# Patient Record
Sex: MALE | Race: WHITE
[De-identification: names, ages, dates, MRNs, and addresses within clinical notes are randomized per-mention and may not be internally consistent; named-entity substitution may affect disease eponyms.]

---

## 2021-04-12 ENCOUNTER — HOSPITAL ENCOUNTER (OUTPATIENT)
Dept: HOSPITAL 47 - PROCWHC3 | Age: 76
End: 2021-04-12
Attending: EMERGENCY MEDICINE
Payer: OTHER GOVERNMENT

## 2021-04-12 VITALS — DIASTOLIC BLOOD PRESSURE: 67 MMHG | SYSTOLIC BLOOD PRESSURE: 142 MMHG | TEMPERATURE: 98 F | HEART RATE: 93 BPM

## 2021-04-12 VITALS — RESPIRATION RATE: 18 BRPM

## 2021-04-12 DIAGNOSIS — J45.909: ICD-10-CM

## 2021-04-12 DIAGNOSIS — U07.1: Primary | ICD-10-CM

## 2021-04-12 DIAGNOSIS — I10: ICD-10-CM

## 2021-04-12 DIAGNOSIS — Z87.891: ICD-10-CM

## 2021-04-12 PROCEDURE — 71046 X-RAY EXAM CHEST 2 VIEWS: CPT

## 2021-04-12 PROCEDURE — 87635 SARS-COV-2 COVID-19 AMP PRB: CPT

## 2021-04-12 PROCEDURE — 96365 THER/PROPH/DIAG IV INF INIT: CPT

## 2021-04-12 PROCEDURE — 99285 EMERGENCY DEPT VISIT HI MDM: CPT

## 2021-04-12 NOTE — ED
General Adult HPI





- General


Chief complaint: Upper Respiratory Infection


Stated complaint: fever/SOB


Source: patient


Mode of arrival: ambulatory


Limitations: no limitations





- History of Present Illness


Initial comments: 





Study 6-year-old male past history of asthma who presents emergency department 

with reported shortness of breath.  Patient states he awoke this morning feeling

short of breath with a fever.  He attempted to his nebulizer without improvement

in his symptoms.  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 

concern for Covid.  Denies any chest pain.  No nausea, vomiting or diarrhea.  

Patient has never been hospitalized for his asthma.  No history of heart 

disease.  No lower externally swelling.  No history of DVT or PE.  No other 

alleviating, precipitator modifying factors





- Related Data


                                  Previous Rx's











 Medication  Instructions  Recorded


 


Dexamethasone [Decadron] 6 mg PO DAILY #5 tablet 04/12/21











                                    Allergies











Allergy/AdvReac Type Severity Reaction Status Date / Time


 


No Known Allergies Allergy   Verified 04/12/21 08:55














Review of Systems


ROS Statement: 


Those systems with pertinent positive or pertinent negative responses have been 

documented in the HPI.





ROS Other: All systems not noted in ROS Statement are negative.





Past Medical History


Past Medical History: Asthma, Hypertension, Prostate Disorder


Additional Past Medical History / Comment(s): allergies


History of Any Multi-Drug Resistant Organisms: None Reported


Past Surgical History: Appendectomy, Back Surgery, Tonsillectomy


Additional Past Surgical History / Comment(s): cervical


Past Psychological History: No Psychological Hx Reported


Smoking Status: Former smoker


Past Alcohol Use History: None Reported


Past Drug Use History: None Reported





General Exam


Limitations: no limitations





Course





                                   Vital Signs











  04/12/21 04/12/21





  08:55 10:00


 


Temperature 98.2 F 97.8 F


 


Pulse Rate 97 87


 


Respiratory 18 18





Rate  


 


Blood Pressure 142/80 135/70


 


O2 Sat by Pulse 94 L 95





Oximetry  














Medical Decision Making





- Medical Decision Making





Upon arrival patient is placed into room 30.  Thorough history and physical exam

 was performed.  Patient does have mild conversational dyspnea.  Saturating 94-

95% on room air.  Patient is swabbed for Covid which is positive.  Chest x-ray 

was performed which demonstrates new bilateral multifocal peripheral opacities. 

 Results are discussed with the patient.  I did recommend treatment with 

monoclonal antibodies for which the patient did agree to.  Patient was taken up 

to the infusion center at this time.  He was given a dose of steroids in the 

emergency department due to his history of asthma with active wheezing.  Patient

 will be placed on an additional 5 day course of steroids.  He is to use his 

inhaler every 4 hours.  Return to the emergency room for any new or worsening 

symptoms.  Follow up with primary care doctor to 4 days per patient was 

discharged home in stable condition





- Lab Data





                                   Lab Results











  04/12/21 Range/Units





  09:05 


 


Coronavirus (PCR)  Detected A  (Not Detectd)  














Disposition


Condition: Stable


Is patient prescribed a controlled substance at d/c from ED?: No


Time of Disposition: 11:39

## 2021-04-12 NOTE — XR
EXAMINATION TYPE: XR chest 2V

 

DATE OF EXAM: 4/12/2021

 

COMPARISON: Chest x-ray June 25, 2015

 

HISTORY: Shortness of breath and fever.

 

TECHNIQUE:  Frontal and lateral views of the chest are obtained.

 

FINDINGS:  Partial visualization of surgical change in the cervical spine similar to prior. Cardiac s
ilhouette size stable and within normal limits. New multifocal opacities bilaterally. 

 

IMPRESSION:  New bilateral multifocal peripheral opacities suspicious for covid-19 infection  in curr
ent environment.

## 2021-12-16 ENCOUNTER — HOSPITAL ENCOUNTER (INPATIENT)
Dept: HOSPITAL 47 - EC | Age: 76
LOS: 7 days | Discharge: HOME | DRG: 871 | End: 2021-12-23
Attending: INTERNAL MEDICINE | Admitting: INTERNAL MEDICINE
Payer: OTHER GOVERNMENT

## 2021-12-16 VITALS — BODY MASS INDEX: 21.3 KG/M2

## 2021-12-16 DIAGNOSIS — Z90.49: ICD-10-CM

## 2021-12-16 DIAGNOSIS — Z86.16: ICD-10-CM

## 2021-12-16 DIAGNOSIS — Z98.890: ICD-10-CM

## 2021-12-16 DIAGNOSIS — Z87.01: ICD-10-CM

## 2021-12-16 DIAGNOSIS — Z20.822: ICD-10-CM

## 2021-12-16 DIAGNOSIS — J15.6: ICD-10-CM

## 2021-12-16 DIAGNOSIS — K50.90: ICD-10-CM

## 2021-12-16 DIAGNOSIS — I25.5: ICD-10-CM

## 2021-12-16 DIAGNOSIS — Z88.7: ICD-10-CM

## 2021-12-16 DIAGNOSIS — Z79.899: ICD-10-CM

## 2021-12-16 DIAGNOSIS — J44.0: ICD-10-CM

## 2021-12-16 DIAGNOSIS — J44.1: ICD-10-CM

## 2021-12-16 DIAGNOSIS — I50.23: ICD-10-CM

## 2021-12-16 DIAGNOSIS — K76.1: ICD-10-CM

## 2021-12-16 DIAGNOSIS — Z87.891: ICD-10-CM

## 2021-12-16 DIAGNOSIS — J18.9: ICD-10-CM

## 2021-12-16 DIAGNOSIS — E87.1: ICD-10-CM

## 2021-12-16 DIAGNOSIS — E86.1: ICD-10-CM

## 2021-12-16 DIAGNOSIS — M19.90: ICD-10-CM

## 2021-12-16 DIAGNOSIS — E87.6: ICD-10-CM

## 2021-12-16 DIAGNOSIS — Z79.1: ICD-10-CM

## 2021-12-16 DIAGNOSIS — J96.01: ICD-10-CM

## 2021-12-16 DIAGNOSIS — A41.50: Primary | ICD-10-CM

## 2021-12-16 DIAGNOSIS — R04.0: ICD-10-CM

## 2021-12-16 DIAGNOSIS — I11.0: ICD-10-CM

## 2021-12-16 DIAGNOSIS — N40.0: ICD-10-CM

## 2021-12-16 DIAGNOSIS — E78.5: ICD-10-CM

## 2021-12-16 DIAGNOSIS — J45.901: ICD-10-CM

## 2021-12-16 DIAGNOSIS — I21.A1: ICD-10-CM

## 2021-12-16 LAB
ALBUMIN SERPL-MCNC: 3.9 G/DL (ref 3.5–5)
ALP SERPL-CCNC: 70 U/L (ref 38–126)
ALT SERPL-CCNC: 85 U/L (ref 4–49)
ANION GAP SERPL CALC-SCNC: 11 MMOL/L
APTT BLD: 23.9 SEC (ref 22–30)
AST SERPL-CCNC: 132 U/L (ref 17–59)
BASOPHILS # BLD AUTO: 0 K/UL (ref 0–0.2)
BASOPHILS NFR BLD AUTO: 0 %
BUN SERPL-SCNC: 24 MG/DL (ref 9–20)
CALCIUM SPEC-MCNC: 8.8 MG/DL (ref 8.4–10.2)
CHLORIDE SERPL-SCNC: 97 MMOL/L (ref 98–107)
CO2 SERPL-SCNC: 27 MMOL/L (ref 22–30)
EOSINOPHIL # BLD AUTO: 0.1 K/UL (ref 0–0.7)
EOSINOPHIL NFR BLD AUTO: 1 %
ERYTHROCYTE [DISTWIDTH] IN BLOOD BY AUTOMATED COUNT: 6.27 M/UL (ref 4.3–5.9)
ERYTHROCYTE [DISTWIDTH] IN BLOOD: 14.6 % (ref 11.5–15.5)
GLUCOSE BLD-MCNC: 204 MG/DL (ref 75–99)
GLUCOSE BLD-MCNC: 252 MG/DL (ref 75–99)
GLUCOSE SERPL-MCNC: 175 MG/DL (ref 74–99)
HCT VFR BLD AUTO: 54 % (ref 39–53)
HGB BLD-MCNC: 18.2 GM/DL (ref 13–17.5)
INR PPP: 1 (ref ?–1.2)
LYMPHOCYTES # SPEC AUTO: 0.6 K/UL (ref 1–4.8)
LYMPHOCYTES NFR SPEC AUTO: 3 %
MAGNESIUM SPEC-SCNC: 2 MG/DL (ref 1.6–2.3)
MCH RBC QN AUTO: 29 PG (ref 25–35)
MCHC RBC AUTO-ENTMCNC: 33.7 G/DL (ref 31–37)
MCV RBC AUTO: 86.1 FL (ref 80–100)
MONOCYTES # BLD AUTO: 1 K/UL (ref 0–1)
MONOCYTES NFR BLD AUTO: 6 %
NEUTROPHILS # BLD AUTO: 15.8 K/UL (ref 1.3–7.7)
NEUTROPHILS NFR BLD AUTO: 89 %
PLATELET # BLD AUTO: 272 K/UL (ref 150–450)
POTASSIUM SERPL-SCNC: 3.9 MMOL/L (ref 3.5–5.1)
PROT SERPL-MCNC: 7.5 G/DL (ref 6.3–8.2)
PT BLD: 11.1 SEC (ref 9–12)
SODIUM SERPL-SCNC: 135 MMOL/L (ref 137–145)
WBC # BLD AUTO: 17.7 K/UL (ref 3.8–10.6)

## 2021-12-16 PROCEDURE — 80053 COMPREHEN METABOLIC PANEL: CPT

## 2021-12-16 PROCEDURE — 36415 COLL VENOUS BLD VENIPUNCTURE: CPT

## 2021-12-16 PROCEDURE — 71045 X-RAY EXAM CHEST 1 VIEW: CPT

## 2021-12-16 PROCEDURE — 93005 ELECTROCARDIOGRAM TRACING: CPT

## 2021-12-16 PROCEDURE — 96374 THER/PROPH/DIAG INJ IV PUSH: CPT

## 2021-12-16 PROCEDURE — 93306 TTE W/DOPPLER COMPLETE: CPT

## 2021-12-16 PROCEDURE — 87634 RSV DNA/RNA AMP PROBE: CPT

## 2021-12-16 PROCEDURE — 83605 ASSAY OF LACTIC ACID: CPT

## 2021-12-16 PROCEDURE — 87040 BLOOD CULTURE FOR BACTERIA: CPT

## 2021-12-16 PROCEDURE — 94640 AIRWAY INHALATION TREATMENT: CPT

## 2021-12-16 PROCEDURE — 87449 NOS EACH ORGANISM AG IA: CPT

## 2021-12-16 PROCEDURE — 96375 TX/PRO/DX INJ NEW DRUG ADDON: CPT

## 2021-12-16 PROCEDURE — 99285 EMERGENCY DEPT VISIT HI MDM: CPT

## 2021-12-16 PROCEDURE — 84132 ASSAY OF SERUM POTASSIUM: CPT

## 2021-12-16 PROCEDURE — 86738 MYCOPLASMA ANTIBODY: CPT

## 2021-12-16 PROCEDURE — 85730 THROMBOPLASTIN TIME PARTIAL: CPT

## 2021-12-16 PROCEDURE — 83735 ASSAY OF MAGNESIUM: CPT

## 2021-12-16 PROCEDURE — 87635 SARS-COV-2 COVID-19 AMP PRB: CPT

## 2021-12-16 PROCEDURE — 83880 ASSAY OF NATRIURETIC PEPTIDE: CPT

## 2021-12-16 PROCEDURE — 71275 CT ANGIOGRAPHY CHEST: CPT

## 2021-12-16 PROCEDURE — 84484 ASSAY OF TROPONIN QUANT: CPT

## 2021-12-16 PROCEDURE — 87502 INFLUENZA DNA AMP PROBE: CPT

## 2021-12-16 PROCEDURE — 85610 PROTHROMBIN TIME: CPT

## 2021-12-16 PROCEDURE — 85025 COMPLETE CBC W/AUTO DIFF WBC: CPT

## 2021-12-16 PROCEDURE — 94760 N-INVAS EAR/PLS OXIMETRY 1: CPT

## 2021-12-16 PROCEDURE — 80048 BASIC METABOLIC PNL TOTAL CA: CPT

## 2021-12-16 RX ADMIN — CEFAZOLIN SCH MLS/HR: 330 INJECTION, POWDER, FOR SOLUTION INTRAMUSCULAR; INTRAVENOUS at 14:06

## 2021-12-16 RX ADMIN — BUDESONIDE SCH MG: 1 SUSPENSION RESPIRATORY (INHALATION) at 18:44

## 2021-12-16 RX ADMIN — INSULIN ASPART SCH UNIT: 100 INJECTION, SOLUTION INTRAVENOUS; SUBCUTANEOUS at 21:48

## 2021-12-16 RX ADMIN — PRAVASTATIN SODIUM SCH MG: 20 TABLET ORAL at 20:21

## 2021-12-16 RX ADMIN — FINASTERIDE SCH MG: 5 TABLET, FILM COATED ORAL at 20:21

## 2021-12-16 RX ADMIN — INSULIN ASPART SCH UNIT: 100 INJECTION, SOLUTION INTRAVENOUS; SUBCUTANEOUS at 19:04

## 2021-12-16 RX ADMIN — FORMOTEROL FUMARATE DIHYDRATE SCH MCG: 20 SOLUTION RESPIRATORY (INHALATION) at 18:44

## 2021-12-16 RX ADMIN — HEPARIN SODIUM SCH MLS/HR: 10000 INJECTION, SOLUTION INTRAVENOUS at 13:40

## 2021-12-16 RX ADMIN — IPRATROPIUM BROMIDE AND ALBUTEROL SULFATE SCH ML: .5; 3 SOLUTION RESPIRATORY (INHALATION) at 18:44

## 2021-12-16 RX ADMIN — IPRATROPIUM BROMIDE AND ALBUTEROL SULFATE SCH ML: .5; 3 SOLUTION RESPIRATORY (INHALATION) at 15:52

## 2021-12-16 RX ADMIN — METHYLPREDNISOLONE SODIUM SUCCINATE SCH MG: 125 INJECTION, POWDER, FOR SOLUTION INTRAMUSCULAR; INTRAVENOUS at 20:20

## 2021-12-16 RX ADMIN — HEPARIN SODIUM PRN UNIT: 1000 INJECTION, SOLUTION INTRAVENOUS; SUBCUTANEOUS at 20:31

## 2021-12-16 RX ADMIN — DOXYCYCLINE SCH MG: 100 CAPSULE ORAL at 20:21

## 2021-12-16 NOTE — CT
EXAMINATION TYPE: CT angio chest

 

DATE OF EXAM: 12/16/2021

 

COMPARISON: Chest x-ray from earlier today and told her x-ray April 12, 2021

 

HISTORY: SOB, cough, pneumonia

 

CT DLP: 209.3 mGycm. Automated Exposure Control for Dose Reduction was Utilized.

 

CONTRAST: 

CTA scan of the thorax is performed with IV Contrast, patient injected with 78 mL of Isovue 370, pulm
onary embolism protocol.   MIP Images are created on CT scanner and reviewed.

 

FINDINGS:

 

LUNGS: Exam suboptimal as patient unable to hold breath. Focal consolidation retrocardiac region left
 lower lobe is present. Smaller focal area of groundglass opacity medial right lower lobe axial image
 99. There is 7 mm calcified right lower lobe nodule laterally axial image 74. Mild reticulation or i
nterstitial edema. No pleural effusion or pneumothorax seen.

 

MEDIASTINUM: There is a suboptimal study with fecal contrast in the SVC and aorta versus pulmonary ar
teries. Some heterogeneity in the periphery but no CT evidence for acute pulmonary embolism. There ar
e no greater than 1 cm noncalcified hilar or mediastinal lymph nodes.  Prominent calcified subcarinal
 and right hilar lymph nodes. No cardiomegaly or pericardial effusion is seen. Coronary calcification
 is present. No thoracic aortic aneurysm or dissection.

 

OTHER: Exaggerated thoracic kyphosis upper thoracic spine. Scoliotic curvature on coronal images. Ant
erior fusion plate lower cervical spine partially imaged.

 

IMPRESSION: No CT evidence for acute pulmonary embolism. Masslike consolidation in the left lower lob
e suspicious for focal pneumonia. Correlate clinically. Follow-up to resolution advised. Smaller focu
s groundglass opacity medial right lower lobe could reflect second acute developing infectious proces
s.

## 2021-12-16 NOTE — HP
HISTORY AND PHYSICAL



DATE OF SERVICE:

12/16/2021



CHIEF COMPLAINTS:

Shortness of breath and weakness and cough.



HISTORY OF PRESENT ILLNESS:

This 76-year-old gentleman with a past medical history of asthma, hypertension, 
history

of prostate disorder, history of appendectomy, back surgery, being followed by 
[facility] and Dr. Perez in the outpatient setting, was complaining of weakness 
and

shortness of breath for the past several days. The patient had a COVID-19 
infection

earlier this year in April, from which the patient improved significantly, but 
the

patient had contact with his grandson, who has viral gastrointestinal illness, 
and the

patient was admitted for evaluation and treatment. Chest x-ray showed bilateral

pneumonia which was confirmed by CTA chest also. CTA is reported as showing no 
evidence

of pulmonary embolism; masslike consolidation in the left lower lobe suspicious 
for

focal pneumonia was considered, and pulmonary consultation with Dr. Moses is in 
progress

at this time.  There is no history of any rigors or chills at this time.



PAST MEDICAL HISTORY:

History of asthma, hypertension, history of prostate disorder, appendectomy.



HOME MEDICATIONS:

HydroDIURIL, Norvasc, Kenalog, Pravachol, Mobic, Advair, Flonase. Doses and 
other

medications are reviewed.



ALLERGIES:

INFLUENZA VIRUS VACCINE.



FAMILY HISTORY:

No history of heart disease or strokes in the family.



SOCIAL HISTORY:

Previous history of smoking.  No history of alcohol intake.



REVIEW OF SYSTEMS:

ENT: Diminished hearing. Diminished vision.

CARDIOVASCULAR SYSTEM: As mentioned earlier.

RESPIRATORY SYSTEM: As mentioned earlier.

GI: No nausea, vomiting, diarrhea.

:  No dysuria.

NERVOUS SYSTEM: No numbness, weakness.

ALLERGY/IMMUNOLOGY:  No asthma or hay fever.

MUSCULOSKELETAL: As mentioned earlier.

HEMATOLOGY/ONCOLOGY:  No history of anemia.

ENDOCRINE: No history of diabetes or hypothyroidism.

CONSTITUTIONAL: As mentioned earlier.

DERMATOLOGY:  Negative.

RHEUMATOLOGY: Negative.

PSYCHIATRY: As mentioned earlier.



PHYSICAL EXAMINATION:

Patient is alert, oriented x3.  Pulse is 113, blood pressure 110/60, respiration
18,

temperature 97.4, pulse ox 89% on 5 L.

HEENT:  Conjunctivae normal.

NECK: No jugular venous distention.

CARDIOVASCULAR: S1, S2 muffled.

RESPIRATION: Breath sounds diminished at the bases.  A few scattered rhonchi and

crackles.

ABDOMEN: Soft, nontender. No mass palpable.

LEGS: No edema. No swelling.

NERVOUS SYSTEM: Higher functions as mentioned earlier. Moves all 4 limbs.  No 
focal

motor or sensory deficit.

LYMPHATICS: No lymph node palpable in neck, axillae or groin.

SKIN: No ulcer, rash, bleeding.

JOINTS: No active deforming arthropathy.



LABS:

Labs at this time show WBC , hemoglobin is 8.2, sodium 134, potassium 3.8.  
Other

labs are noted.



ASSESSMENT:

1. Acute bilateral pneumonia, left more than the right, possibly community-
acquired,

    possibly Gram-negative with sepsis and acute hypoxic respiratory failure, 
present

    on admission.

2. Asthma, chronic obstructive pulmonary disease, acute exacerbation.

3. Increased white count.

4. Hyponatremia.

5. Elevated random glucose.

6. Elevated bilirubin, AST, ALT, possibly hepatitis of undetermined etiology.

7. Troponin 1.170, possible type 2 myocardial infarction.

8. Hypertension.

9. History of prostate disorder.

10.History of appendectomy.

11.History of back surgery, degenerative joint disease.

12.FULL CODE.



RECOMMENDATIONS AND DISCUSSION:

In this 76-year-old gentleman who presented with multiple complex medical 
issues, we

will monitor the patient closely, continue the current medications, continue

symptomatic treatment. Will initiate empiric antibiotics, broad-spectrum 
antibiotics

cardiology and pulmonology consultations. IV steroids.  Prognosis guarded 
because of

multiple complex medical issues. Further recommendations to follow. A copy of 
this

dictation is being forwarded to Dr. Perez in [facility].





VALENTINO / PERLITA: [id number] / Job#: [id number]

MTDD

## 2021-12-16 NOTE — ED
General Adult HPI





- General


Chief complaint: Shortness of Breath


Stated complaint: low O2, weakness, cough


Time Seen by Provider: 12/16/21 09:30


Source: patient, RN notes reviewed, old records reviewed


Mode of arrival: wheelchair


Limitations: no limitations





- History of Present Illness


Initial comments: 





76-year-old male presenting with cough and dyspnea.  Patient's symptoms have 

been present for the past several days.  He states he did have coronavirus in 

the spring of this year.  He has not been vaccinated.  He had contact with his 

grandson who had a viral gastrointestinal illness.  He denies any vomiting or 

diarrhea but states that after this contact he did have development of a 

productive cough, and fever up to 101.





- Related Data


                                  Previous Rx's











 Medication  Instructions  Recorded


 


Dexamethasone [Decadron] 6 mg PO DAILY #5 tablet 04/12/21











                                    Allergies











Allergy/AdvReac Type Severity Reaction Status Date / Time


 


Influenza Virus Vaccines Allergy  Unknown Verified 12/16/21 09:28














Review of Systems


ROS Statement: 


Those systems with pertinent positive or pertinent negative responses have been 

documented in the HPI.





ROS Other: All systems not noted in ROS Statement are negative.





Past Medical History


Past Medical History: Asthma, Hypertension, Prostate Disorder


Additional Past Medical History / Comment(s): allergies


History of Any Multi-Drug Resistant Organisms: None Reported


Past Surgical History: Appendectomy, Back Surgery, Tonsillectomy


Additional Past Surgical History / Comment(s): cervical


Past Psychological History: No Psychological Hx Reported


Smoking Status: Former smoker


Past Alcohol Use History: None Reported


Past Drug Use History: None Reported





General Exam


Limitations: no limitations


General appearance: alert, in no apparent distress


Head exam: Present: atraumatic, normocephalic


Eye exam: Present: normal appearance, PERRL


ENT exam: Present: mucous membranes dry


Neck exam: Present: normal inspection.  Absent: meningismus


Respiratory exam: Present: respiratory distress, wheezes, rhonchi, decreased 

breath sounds


Cardiovascular Exam: Present: normal rhythm, tachycardia


GI/Abdominal exam: Present: soft.  Absent: distended, tenderness, guarding, 

rebound


Extremities exam: Present: normal inspection, normal capillary refill.  Absent: 

pedal edema


Neurological exam: Present: alert, oriented X3, CN II-XII intact.  Absent: motor

sensory deficit


Psychiatric exam: Present: normal affect, normal mood


Skin exam: Present: warm, dry, intact.  Absent: cyanosis, diaphoretic





Course


                                   Vital Signs











  12/16/21 12/16/21 12/16/21





  09:24 09:37 11:12


 


Temperature 98.8 F  


 


Pulse Rate 122 H  118 H


 


Respiratory 20 20 





Rate   


 


Blood Pressure 131/73  


 


O2 Sat by Pulse 90 L  





Oximetry   














  12/16/21





  11:30


 


Temperature 


 


Pulse Rate 125 H


 


Respiratory 





Rate 


 


Blood Pressure 


 


O2 Sat by Pulse 





Oximetry 














EKG Findings





- EKG Comments:


EKG Findings:: EKG: Sinus tachycardia rate of 123, OK interval 142, QRS duration

86, , no ST segment elevation.





Medical Decision Making





- Medical Decision Making





76-year-old male presenting with cough and fever.  Patient has chest x-ray 

showing bilateral pneumonia.  He has previously had coronavirus.  He has fever 

in the emergency department.  He has leukocytosis of 17.  Normal kidney 

function, normal lactic acid.  He started on antibiotics as well as treatment 

for COPD exacerbation.  He will be admitted to Dr. Aguilar who is aware.





- Lab Data


Result diagrams: 


                                 12/16/21 09:49





                                 12/16/21 11:15


                                   Lab Results











  12/16/21 12/16/21 12/16/21 Range/Units





  09:49 09:49 10:11 


 


WBC  17.7 H    (3.8-10.6)  k/uL


 


RBC  6.27 H    (4.30-5.90)  m/uL


 


Hgb  18.2 H    (13.0-17.5)  gm/dL


 


Hct  54.0 H    (39.0-53.0)  %


 


MCV  86.1    (80.0-100.0)  fL


 


MCH  29.0    (25.0-35.0)  pg


 


MCHC  33.7    (31.0-37.0)  g/dL


 


RDW  14.6    (11.5-15.5)  %


 


Plt Count  272    (150-450)  k/uL


 


MPV  8.7    


 


Neutrophils %  89    %


 


Lymphocytes %  3    %


 


Monocytes %  6    %


 


Eosinophils %  1    %


 


Basophils %  0    %


 


Neutrophils #  15.8 H    (1.3-7.7)  k/uL


 


Lymphocytes #  0.6 L    (1.0-4.8)  k/uL


 


Monocytes #  1.0    (0-1.0)  k/uL


 


Eosinophils #  0.1    (0-0.7)  k/uL


 


Basophils #  0.0    (0-0.2)  k/uL


 


PT   11.1   (9.0-12.0)  sec


 


INR   1.0   (<1.2)  


 


APTT   23.9   (22.0-30.0)  sec


 


Sodium     (137-145)  mmol/L


 


Potassium     (3.5-5.1)  mmol/L


 


Chloride     ()  mmol/L


 


Carbon Dioxide     (22-30)  mmol/L


 


Anion Gap     mmol/L


 


BUN     (9-20)  mg/dL


 


Creatinine     (0.66-1.25)  mg/dL


 


Est GFR (CKD-EPI)AfAm     (>60 ml/min/1.73 sqM)  


 


Est GFR (CKD-EPI)NonAf     (>60 ml/min/1.73 sqM)  


 


Glucose     (74-99)  mg/dL


 


Plasma Lactic Acid Td     (0.7-2.0)  mmol/L


 


Calcium     (8.4-10.2)  mg/dL


 


Magnesium     (1.6-2.3)  mg/dL


 


Total Bilirubin     (0.2-1.3)  mg/dL


 


AST     (17-59)  U/L


 


ALT     (4-49)  U/L


 


Alkaline Phosphatase     ()  U/L


 


Total Protein     (6.3-8.2)  g/dL


 


Albumin     (3.5-5.0)  g/dL


 


Coronavirus (PCR)    Not Detected  (Not Detectd)  


 


Influenza Type A RNA     (Not Detectd)  


 


Influenza Type B (PCR)     (Not Detectd)  














  12/16/21 12/16/21 12/16/21 Range/Units





  10:11 11:15 11:15 


 


WBC     (3.8-10.6)  k/uL


 


RBC     (4.30-5.90)  m/uL


 


Hgb     (13.0-17.5)  gm/dL


 


Hct     (39.0-53.0)  %


 


MCV     (80.0-100.0)  fL


 


MCH     (25.0-35.0)  pg


 


MCHC     (31.0-37.0)  g/dL


 


RDW     (11.5-15.5)  %


 


Plt Count     (150-450)  k/uL


 


MPV     


 


Neutrophils %     %


 


Lymphocytes %     %


 


Monocytes %     %


 


Eosinophils %     %


 


Basophils %     %


 


Neutrophils #     (1.3-7.7)  k/uL


 


Lymphocytes #     (1.0-4.8)  k/uL


 


Monocytes #     (0-1.0)  k/uL


 


Eosinophils #     (0-0.7)  k/uL


 


Basophils #     (0-0.2)  k/uL


 


PT     (9.0-12.0)  sec


 


INR     (<1.2)  


 


APTT     (22.0-30.0)  sec


 


Sodium    135 L  (137-145)  mmol/L


 


Potassium    3.9  (3.5-5.1)  mmol/L


 


Chloride    97 L  ()  mmol/L


 


Carbon Dioxide    27  (22-30)  mmol/L


 


Anion Gap    11  mmol/L


 


BUN    24 H  (9-20)  mg/dL


 


Creatinine    0.72  (0.66-1.25)  mg/dL


 


Est GFR (CKD-EPI)AfAm    >90  (>60 ml/min/1.73 sqM)  


 


Est GFR (CKD-EPI)NonAf    90  (>60 ml/min/1.73 sqM)  


 


Glucose    175 H  (74-99)  mg/dL


 


Plasma Lactic Acid Td   1.3   (0.7-2.0)  mmol/L


 


Calcium    8.8  (8.4-10.2)  mg/dL


 


Magnesium    2.0  (1.6-2.3)  mg/dL


 


Total Bilirubin    2.2 H  (0.2-1.3)  mg/dL


 


AST    132 H  (17-59)  U/L


 


ALT    85 H  (4-49)  U/L


 


Alkaline Phosphatase    70  ()  U/L


 


Total Protein    7.5  (6.3-8.2)  g/dL


 


Albumin    3.9  (3.5-5.0)  g/dL


 


Coronavirus (PCR)     (Not Detectd)  


 


Influenza Type A RNA  Not Detected    (Not Detectd)  


 


Influenza Type B (PCR)  Not Detected    (Not Detectd)  














Disposition


Clinical Impression: 


 Community acquired pneumonia, Acute exacerbation of chronic obstructive 

pulmonary disease





Disposition: ADMITTED AS IP TO THIS [facility]


Condition: Stable


Is patient prescribed a controlled substance at d/c from ED?: No


Referrals: 


[facility],Clinic [Primary Care Provider] - 1-2 days


Decision to Admit Reason: Admit from EC


Decision Date: 12/09/21


Decision Time: 12:15

## 2021-12-16 NOTE — P.CRDCN
History of Present Illness


History of present illness: 


HISTORY OF PRESENTING ILLNESS


This is a pleasant 76-year-old male past medical history significant for 

hypertension, asthma, Covid-19 in March 2021. He does not follow with a 

cardiologist. His pulmonologist is Dr. Moses. We have been asked to see in 

consultation for elevated troponin. Patient presents to the emergency department

with complaints of cough and worsening shortness of breath for the past several 

days. He was hypoxic on presentation and placed on 5L nasal cannula. He denies 

any chest pain, palpitations, lightheadedness, dizziness or syncope. On 

admission, patient's chest xray revealed bilateral infiltrate correlate for 

pneumonia.  EKG revealed sinus tachycardia, rate 123, no significant ST-T wave 

abnormalities. On telemetry he is in sinus tachycardic HR 120s. Troponin was 

elevated at 1.1. 


He denies history of CAD, MI, Stroke, or diabetes. He denies diagnosis of COPD. 

He is a non-smoker, quit 50+ years ago. 





DIAGNOSTICS


Chest CT revealed no evidence of pulmonary embolism, mass like consolidation in 

the left lower lobe suspicious for focal pneumonia.  Smaller focus groundglass 

opacities medial right lower lobe


Laboratory reviewed, WBC 17, Hgb 18, Plt 272, Sodium 135, K 3.9, BUN 24, sCr 

0.72, Mag 2.0, Trop 1.1, covid pcr negative, influenza pcr negative 


Current home medications include Decadron 





REVIEW OF SYSTEMS


At the time of my exam:


CONSTITUTIONAL: Denies fever or chills.


CARDIOVASCULAR: +shortness of breath Denies chest pain,  orthopnea, PND or 

palpitations.


RESPIRATORY: +cough. 


GASTROINTESTINAL: Denies abdominal pain, diarrhea, constipation, nausea or 

vomiting.


MUSCULOSKELETAL: Denies myalgias.


NEUROLOGIC: Denies numbness, tingling, headacbe or weakness.


ENDOCRINE: Denies fatigue, weight change,  polydipsia or polyurina.


GENITOURINARY: Denies burning, hematuria or urgency with micturation.


HEMATOLOGIC: Denies history of anemia or bleeding. 





PHYSICAL EXAMINATION


Blood pressure 131/73 , afebrile 


CONSTITUTIONAL: No apparent distress. 


HEENT: Head is normocephalic. Pupils are equal, round. Sclerae anicteric. Mucous

membranes of the mouth are moist.  No JVD. No carotid bruit.


CHEST EXAMINATION: Lungs are bilateral rhonci with wheezes noted to 

auscultation. No chest wall tenderness is noted on palpation or with deep 

breathing. 


HEART EXAMINATION: Regular tachycardic  rate and rhythm. S1, S2 heard. No 

murmurs, gallops or rub.


ABDOMEN: Soft, nontender. Positive bowel sounds.


EXTREMITIES: 2+ peripheral pulses, no lower extremity edema and no calf 

tenderness.


NEUROLOGIC EXAMINATION: Patient is awake, alert and oriented x3. 





ASSESSMENT


Shortness of breath, cough


Acute asthma/copd exacerbation 


Elevated troponin, likely related to hypoxia and infection 


Pneumonia 





PLAN


Recommend consult pulmonary 


We will start aspirin and continue IV heparin at this time


Obtain 2D echocardiogram 


Based on patient's course further recommendations will be made 








Nurse Practitioner note has been reviewed, I agree with a documented findings 

and plan of care.  Patient was seen and examined.











Past Medical History


Past Medical History: Asthma, Hypertension, Prostate Disorder


Additional Past Medical History / Comment(s): allergies


History of Any Multi-Drug Resistant Organisms: None Reported


Past Surgical History: Appendectomy, Back Surgery, Tonsillectomy


Additional Past Surgical History / Comment(s): cervical


Past Psychological History: No Psychological Hx Reported


Smoking Status: Former smoker


Past Alcohol Use History: None Reported


Past Drug Use History: None Reported





Medications and Allergies


                                Home Medications











 Medication  Instructions  Recorded  Confirmed  Type


 


Dexamethasone [Decadron] 6 mg PO DAILY #5 tablet 04/12/21  Rx








                                    Allergies











Allergy/AdvReac Type Severity Reaction Status Date / Time


 


Influenza Virus Vaccines Allergy  Unknown Verified 12/16/21 09:28














Physical Exam


Vitals: 


                                   Vital Signs











  Temp Pulse Resp BP Pulse Ox


 


 12/16/21 11:30   125 H   


 


 12/16/21 11:12   118 H   


 


 12/16/21 09:37    20  


 


 12/16/21 09:24  98.8 F  122 H  20  131/73  90 L








                                Intake and Output











 12/15/21 12/16/21 12/16/21





 22:59 06:59 14:59


 


Other:   


 


  Weight   63.503 kg














Results





                                 12/16/21 09:49





                                 12/16/21 11:15


                                 Cardiac Enzymes











  12/16/21 12/16/21 Range/Units





  11:15 11:15 


 


AST   132 H  (17-59)  U/L


 


Troponin I  1.170 H*   (0.000-0.034)  ng/mL








                                   Coagulation











  12/16/21 Range/Units





  09:49 


 


PT  11.1  (9.0-12.0)  sec


 


APTT  23.9  (22.0-30.0)  sec








                                       CBC











  12/16/21 Range/Units





  09:49 


 


WBC  17.7 H  (3.8-10.6)  k/uL


 


RBC  6.27 H  (4.30-5.90)  m/uL


 


Hgb  18.2 H  (13.0-17.5)  gm/dL


 


Hct  54.0 H  (39.0-53.0)  %


 


Plt Count  272  (150-450)  k/uL








                          Comprehensive Metabolic Panel











  12/16/21 Range/Units





  11:15 


 


Sodium  135 L  (137-145)  mmol/L


 


Potassium  3.9  (3.5-5.1)  mmol/L


 


Chloride  97 L  ()  mmol/L


 


Carbon Dioxide  27  (22-30)  mmol/L


 


BUN  24 H  (9-20)  mg/dL


 


Creatinine  0.72  (0.66-1.25)  mg/dL


 


Glucose  175 H  (74-99)  mg/dL


 


Calcium  8.8  (8.4-10.2)  mg/dL


 


AST  132 H  (17-59)  U/L


 


ALT  85 H  (4-49)  U/L


 


Alkaline Phosphatase  70  ()  U/L


 


Total Protein  7.5  (6.3-8.2)  g/dL


 


Albumin  3.9  (3.5-5.0)  g/dL








                               Current Medications











Generic Name Dose Route Start Last Admin





  Trade Name Freq  PRN Reason Stop Dose Admin


 


Albuterol/Ipratropium  3 ml  12/16/21 12:12 





  Ipratropium-Albuterol 3 Ml Neb  INHALATION  





  RT-Q4H PRN  





  Shortness Of Breath Or Wheezing  


 


Albuterol/Ipratropium  3 ml  12/16/21 16:00 





  Ipratropium-Albuterol 3 Ml Neb  INHALATION  





  RT-QID [facility]  


 


Heparin Sodium (Porcine)  0 unit  12/16/21 12:28 





  Heparin Sodium 1,000 Un/Ml (10ml Vl)  IV  





  PER PROTOCOL PRN  





  Low PTT  





  Protocol  


 


Sodium Chloride  1,000 mls @ 130 mls/hr  12/16/21 11:30 





  Saline 0.9%  IV  





  .Q7H42M [facility]  


 


Heparin Sodium/Sodium Chloride  250 mls @ 7.62 mls/hr  12/16/21 12:30 





  25,000 unit/ Sodium Chloride  IV  





  .Q24H [facility]  





  Protocol  





  12 UNITS/KG/HR  


 


Methylprednisolone Sodium Succinate  60 mg  12/16/21 18:00 





  Methylprednisolone Sod Succi 125 Mg/2 Ml Vial  IV  





  Q6HR [facility]  








                                Intake and Output











 12/15/21 12/16/21 12/16/21





 22:59 06:59 14:59


 


Other:   


 


  Weight   63.503 kg








                                 Patient Weight











 12/17/21





 06:59


 


Weight 63.503 kg








                                        





                                 12/16/21 09:49 





                                 12/16/21 11:15

## 2021-12-16 NOTE — XR
EXAMINATION TYPE: XR chest 1V portable

 

DATE OF EXAM: 12/16/2021

 

COMPARISON: 4/12/2021

 

HISTORY: Cough

 

TECHNIQUE: Single frontal view of the chest is obtained.

 

FINDINGS:  There are patchy bilateral infiltrates. Granuloma right upper lobe. Subsegmental bilateral
 lower lobe infiltrate and small effusion. No pneumothorax. Postsurgical change overlying the cervica
l spine.

 

IMPRESSION:  Bilateral infiltrate correlate for pneumonia.

## 2021-12-16 NOTE — ECHOF
Referral Reason:



MEASUREMENTS

--------

HEIGHT: 175.3 cm

WEIGHT: 63.5 kg

BP: 131/73

RVIDd:   3.7 cm     (< 3.3)

IVSd:   1.0 cm     (0.6 - 1.1)

LVIDd:   4.1 cm     (3.9 - 5.3)

LVPWd:   0.9 cm     (0.6 - 1.1)

IVSs:   1.4 cm

LVIDs:   2.5 cm

LVPWs:   1.2 cm

LAESV Index (A-L):   25.54 ml/m

Ao Diam:   2.7 cm     (2.0 - 3.7)

AV Cusp:   1.5 cm     (1.5 - 2.6)

LA Diam:   3.1 cm     (2.7 - 3.8)

MV EXCURSION:   21.946 mm     (> 18.000)

MV EF SLOPE:   247 mm/s     (70 - 150)

EPSS:   0.5 cm

RAP:   5.00 mmHg

RVSP:   20.52 mmHg







FINDINGS

--------

This was a technically difficult study with suboptimal views.

The left ventricular size is normal.   Left ventricular wall thickness is normal.   Overall left vent
ricular systolic function is moderately impaired with, an EF between 35 - 40 %.   Mid anterior LV wal
l motion is hypokinetic.    Mid lateral LV wall motion is hypokinetic.    Mid anteroseptal LV wall mo
tion is hypokinetic.    Apical anterior LV wall motion is hypokinetic.    Apical lateral LV wall justina
on is hypokinetic.    Apical inferior LV wall motion is hypokinetic.    Apical septum LV wall motion 
is hypokinetic.

The right ventricle is mildly enlarged.

Normal LA  size by volume 22+/-6 ml/m2.

The right atrial size is normal.

3.0mg of Lumason was utilized for enhancement of images

Interatrial and interventricular septum intact.

There is no evidence of aortic regurgitation.   There is no evidence of aortic stenosis.

Mild mitral regurgitation is present.

Mild tricuspid regurgitation present.   There is no evidence of pulmonary hypertension.   The right v
entricular systolic pressure, as measured by Doppler, is 20.52mmHg.

Trace/mild (physiologic)  pulmonic regurgitation.

The aortic root size is normal.

IVC Not well visulized.

There is no pericardial effusion.



CONCLUSIONS

--------

1. The left ventricular size is normal.

2. Left ventricular wall thickness is normal.

3. Overall left ventricular systolic function is moderately impaired with, an EF between 35 - 40 %.

4. Mid anterior LV wall motion is hypokinetic.

5. Mid lateral LV wall motion is hypokinetic.

6. Mid anteroseptal LV wall motion is hypokinetic.

7. Apical anterior LV wall motion is hypokinetic.

8. Apical lateral LV wall motion is hypokinetic.

9. Apical inferior LV wall motion is hypokinetic.

10. Apical septum LV wall motion is hypokinetic.

11. The right ventricle is mildly enlarged.

12. Mild mitral regurgitation is present.

13. Mild tricuspid regurgitation present.

14. Trace/mild (physiologic)  pulmonic regurgitation.





SONOGRAPHER: Carina Verdugo RDCS

## 2021-12-16 NOTE — P.CNPUL
History of Present Illness


Consult date: 21


Reason for consult: dyspnea, cough, hypoxemia


Chief complaint: Shortness of breath with fever started 2 days ago


History of present illness: 





Patient is a 76-year-old with a remote history of smoking prior history of COPD 

lately have been more short of breath congested recently seen in the office for 

the first time preliminary workup including PFTs labs and x-rays have been 

ordered, patient was complaining of shortness of breath and was hypoxic patient 

has recently placed on home oxygen, chest for the last 2 days started getting 

more shortness of breath cough congestion and fever up to 101, patient has not 

vaccinated for COVID-19 pneumonia, prior history of asthma hypertension prostate

problem enlargement, on arrival patient was tachypneic.  Cardiac with low oxygen

saturation of 90% blood pressure was 122/73, heart rate is 120, respiratory rate

20,Shortness breath patient is a 76-year-old male with the history of hypoxic, 

patient does have a history of Crohn enteritis pneumonia infection before she 

this admission white cell count 70,700 hemoglobin and hematocrit is 24/7.7 to 

hemoglobin is 18, influenza A and B both negative: Negative, troponin elevated 

1.1, computed tomography scan of the chest negative for pulmonary embolism 

however masslike consolidation left lower lobe is present consistent with focal 

pneumonia she infiltrated right lower lobe





Review of Systems


All systems: negative





Past Medical History


Past Medical History: Asthma, Hypertension, Prostate Disorder


Additional Past Medical History / Comment(s): allergies


History of Any Multi-Drug Resistant Organisms: None Reported


Past Surgical History: Appendectomy, Back Surgery, Tonsillectomy


Additional Past Surgical History / Comment(s): cervical


Past Anesthesia/Blood Transfusion Reactions: No Reported Reaction


Additional Past Anesthesia/Blood Transfusion Reaction / Comment(s): Pt is 

clausterphobic.


Past Psychological History: No Psychological Hx Reported


Smoking Status: Former smoker


Past Alcohol Use History: None Reported


Past Drug Use History: None Reported





- Past Family History


  ** Mother


Family Medical History: No Reported History


Additional Family Medical History / Comment(s): Mother was healthy and lived to 

be 92 yrs old.





  ** Father


History Unknown: Yes


Additional Family Medical History / Comment(s): Father  at the age of 68yrs,

pt unable to say from what.





Medications and Allergies


                                Home Medications











 Medication  Instructions  Recorded  Confirmed  Type


 


Albuterol Inhaler [Ventolin Hfa 2 puff INHALATION RT-QID PRN 21 

History





Inhaler]    


 


Albuterol Nebulized [Ventolin 2.5 mg INHALATION RT-Q4H PRN 21 

History





Nebulized]    


 


Ammonium Lactate Lotion 1 applic TOPICAL DAILY PRN 21 History





[Lac-Hydrin 12% Lotion]    


 


Cetirizine HCl [Zyrtec] 10 mg PO DAILY PRN 21 History


 


Diclofenac 1% Top Gel 1 applic TOPICAL BID 21 History


 


Finasteride [Proscar] 5 mg PO HS 21 History


 


Fluticasone Nasal Spray [Flonase 2 spray EA NOSTRIL DAILY 21 

History





Nasal Spray]    


 


Fluticasone/Salmeterol [Advair 1 puff INHALATION RT-BID 21 

History





250-50 Diskus]    


 


Lidocaine 5% Patch [Lidoderm] 1 patch TOPICAL DAILY 21 History


 


Meloxicam [Mobic] 7.5 mg PO DAILY PRN 21 History


 


Polyvinyl Alcohol/Povidone 1 drop BOTH EYES TID 21 History





[[place] Eye Drop]    


 


Pravastatin Sodium [Pravachol] 20 mg PO HS 21 History


 


Triamcinolone 0.1% Cream [Kenalog 1 applicatio TOPICAL BID PRN 21

 History





0.1% Cream]    


 


amLODIPine [Norvasc] 10 mg PO HS 21 History


 


hydroCHLOROthiazide [Hydrodiuril] 25 mg PO DAILY 21 History








                                    Allergies











Allergy/AdvReac Type Severity Reaction Status Date / Time


 


Influenza Virus Vaccines Allergy  Unknown Verified 21 14:27














Physical Exam


Vitals: 


                                   Vital Signs











  Temp Pulse Resp BP Pulse Ox


 


 21 14:00   111 H  18  110/68  90 L


 


 21 13:31  97.4 F L  122 H  20  126/76  89 L


 


 21 11:30   125 H   


 


 21 11:12   118 H   


 


 21 09:37    20  


 


 21 09:24  98.8 F  122 H  20  131/73  90 L








                                Intake and Output











 21





 06:59 14:59 22:59


 


Other:   


 


  Weight  63.503 kg 














- Constitutional


General appearance: average body habitus, cooperative, disheveled, mild distress





- EENT


Eyes: PERRLA


Ears: bilateral: normal





- Neck


Neck: normal ROM


Carotids: bilateral: upstroke normal





- Respiratory


Respiratory: bilateral: diminished, wheezing





- Cardiovascular


Rhythm: regular


Heart sounds: normal: S1, S2





- Gastrointestinal


General gastrointestinal: distended, normal bowel sounds





- Integumentary


Integumentary: normal turgor





- Neurologic


Neurologic: CNII-XII intact, focal deficits





- Musculoskeletal


Musculoskeletal: gait normal, generalized weakness, strength equal bilaterally





- Psychiatric


Psychiatric: A&O x's 3, appropriate affect, intact judgment & insight





Results





- Laboratory Findings


CBC and BMP: 


                                 21 09:49





                                 21 11:15


PT/INR, D-dimer











PT  11.1 sec (9.0-12.0)   21  09:49    


 


INR  1.0  (<1.2)   21  09:49    








Abnormal lab findings: 


                                  Abnormal Labs











  21





  09:49 11:15 11:15


 


WBC  17.7 H  


 


RBC  6.27 H  


 


Hgb  18.2 H  


 


Hct  54.0 H  


 


Neutrophils #  15.8 H  


 


Lymphocytes #  0.6 L  


 


Sodium    135 L


 


Chloride    97 L


 


BUN    24 H


 


Glucose    175 H


 


Total Bilirubin    2.2 H


 


AST    132 H


 


ALT    85 H


 


Troponin I   1.170 H* 














- Diagnostic Findings


Chest x-ray: report reviewed, image reviewed


CT scan - chest: report reviewed, image reviewed (Finding as noted above)





Assessment and Plan


Assessment: 





Left lower lobe community-acquired pneumonia also involving the right lower lobe





Masslike process of the left lower lobe cannot be excluded, patient will need 

further radiographs to document resolution in 8-12 weeks





Acute hypoxic respiratory failure





Elevated troponin





History of COVID-19 pneumonia





History of COPD


Plan: 


Broad-spectrum IV antibiotics with Rocephin and Zithromax





Bronchodilators





IV steroids





Supplemental oxygen with slow tapering





IV heparin per cardiovascular services





Follow up radiographic studies the outpatient





Further plan of care and recommendation as per clinical response of the patient





Time with Patient: Greater than 30

## 2021-12-17 LAB
ANION GAP SERPL CALC-SCNC: 15.8 MMOL/L (ref 10–18)
APTT BLD: 28.9 SEC (ref 22–30)
BASOPHILS # BLD AUTO: 0 K/UL (ref 0–0.2)
BASOPHILS NFR BLD AUTO: 0 %
BUN SERPL-SCNC: 18 MG/DL (ref 9–27)
BUN/CREAT SERPL: 22.5 RATIO (ref 12–20)
CALCIUM SPEC-MCNC: 8.2 MG/DL (ref 8.7–10.3)
CHLORIDE SERPL-SCNC: 103 MMOL/L (ref 96–109)
CO2 SERPL-SCNC: 21.2 MMOL/L (ref 20–27.5)
EOSINOPHIL # BLD AUTO: 0 K/UL (ref 0–0.7)
EOSINOPHIL NFR BLD AUTO: 0 %
ERYTHROCYTE [DISTWIDTH] IN BLOOD BY AUTOMATED COUNT: 5.9 M/UL (ref 4.3–5.9)
ERYTHROCYTE [DISTWIDTH] IN BLOOD: 14.7 % (ref 11.5–15.5)
GLUCOSE BLD-MCNC: 170 MG/DL (ref 75–99)
GLUCOSE BLD-MCNC: 173 MG/DL (ref 75–99)
GLUCOSE BLD-MCNC: 221 MG/DL (ref 75–99)
GLUCOSE BLD-MCNC: 222 MG/DL (ref 75–99)
GLUCOSE SERPL-MCNC: 181 MG/DL (ref 70–110)
HCT VFR BLD AUTO: 51.4 % (ref 39–53)
HGB BLD-MCNC: 16.6 GM/DL (ref 13–17.5)
INR PPP: 1 (ref ?–1.2)
LYMPHOCYTES # SPEC AUTO: 0.4 K/UL (ref 1–4.8)
LYMPHOCYTES NFR SPEC AUTO: 3 %
M PNEUMO IGG SER IA-ACNC: 1.65 INDEX (ref ?–0.9)
M PNEUMO IGM SER QL IA: 0.43 INDEX (ref ?–0.9)
MCH RBC QN AUTO: 28.1 PG (ref 25–35)
MCHC RBC AUTO-ENTMCNC: 32.3 G/DL (ref 31–37)
MCV RBC AUTO: 87.1 FL (ref 80–100)
MONOCYTES # BLD AUTO: 0.6 K/UL (ref 0–1)
MONOCYTES NFR BLD AUTO: 4 %
NEUTROPHILS # BLD AUTO: 15.3 K/UL (ref 1.3–7.7)
NEUTROPHILS NFR BLD AUTO: 93 %
PLATELET # BLD AUTO: 267 K/UL (ref 150–450)
POTASSIUM SERPL-SCNC: 3.3 MMOL/L (ref 3.5–5.5)
PT BLD: 11.1 SEC (ref 9–12)
SODIUM SERPL-SCNC: 140 MMOL/L (ref 135–145)
WBC # BLD AUTO: 16.4 K/UL (ref 3.8–10.6)

## 2021-12-17 RX ADMIN — METOPROLOL SUCCINATE SCH MG: 25 TABLET, EXTENDED RELEASE ORAL at 14:43

## 2021-12-17 RX ADMIN — IPRATROPIUM BROMIDE AND ALBUTEROL SULFATE SCH ML: .5; 3 SOLUTION RESPIRATORY (INHALATION) at 19:50

## 2021-12-17 RX ADMIN — FUROSEMIDE SCH MG: 10 INJECTION, SOLUTION INTRAMUSCULAR; INTRAVENOUS at 22:34

## 2021-12-17 RX ADMIN — METHYLPREDNISOLONE SODIUM SUCCINATE SCH MG: 125 INJECTION, POWDER, FOR SOLUTION INTRAMUSCULAR; INTRAVENOUS at 00:46

## 2021-12-17 RX ADMIN — HEPARIN SODIUM PRN UNIT: 1000 INJECTION, SOLUTION INTRAVENOUS; SUBCUTANEOUS at 16:49

## 2021-12-17 RX ADMIN — DEXTRAN 70 AND HYPROMELLOSE 2910 SCH: 1; 3 SOLUTION/ DROPS OPHTHALMIC at 00:33

## 2021-12-17 RX ADMIN — CEFAZOLIN SCH MLS/HR: 330 INJECTION, POWDER, FOR SOLUTION INTRAMUSCULAR; INTRAVENOUS at 14:46

## 2021-12-17 RX ADMIN — Medication SCH MG: at 09:43

## 2021-12-17 RX ADMIN — CEFAZOLIN SCH MLS/HR: 330 INJECTION, POWDER, FOR SOLUTION INTRAMUSCULAR; INTRAVENOUS at 00:46

## 2021-12-17 RX ADMIN — CEFAZOLIN SCH MLS/HR: 330 INJECTION, POWDER, FOR SOLUTION INTRAMUSCULAR; INTRAVENOUS at 18:02

## 2021-12-17 RX ADMIN — METHYLPREDNISOLONE SODIUM SUCCINATE SCH MG: 125 INJECTION, POWDER, FOR SOLUTION INTRAMUSCULAR; INTRAVENOUS at 17:57

## 2021-12-17 RX ADMIN — DOXYCYCLINE SCH MG: 100 CAPSULE ORAL at 09:43

## 2021-12-17 RX ADMIN — DICLOFENAC SODIUM SCH GM: 10 GEL TOPICAL at 14:44

## 2021-12-17 RX ADMIN — AZITHROMYCIN MONOHYDRATE SCH MLS/HR: 500 INJECTION, POWDER, LYOPHILIZED, FOR SOLUTION INTRAVENOUS at 09:59

## 2021-12-17 RX ADMIN — DOXYCYCLINE SCH MG: 100 CAPSULE ORAL at 22:30

## 2021-12-17 RX ADMIN — HEPARIN SODIUM PRN UNIT: 1000 INJECTION, SOLUTION INTRAVENOUS; SUBCUTANEOUS at 23:59

## 2021-12-17 RX ADMIN — INSULIN ASPART SCH UNIT: 100 INJECTION, SOLUTION INTRAVENOUS; SUBCUTANEOUS at 09:44

## 2021-12-17 RX ADMIN — FORMOTEROL FUMARATE DIHYDRATE SCH MCG: 20 SOLUTION RESPIRATORY (INHALATION) at 19:50

## 2021-12-17 RX ADMIN — METHYLPREDNISOLONE SODIUM SUCCINATE SCH MG: 125 INJECTION, POWDER, FOR SOLUTION INTRAMUSCULAR; INTRAVENOUS at 14:45

## 2021-12-17 RX ADMIN — FINASTERIDE SCH MG: 5 TABLET, FILM COATED ORAL at 22:31

## 2021-12-17 RX ADMIN — FORMOTEROL FUMARATE DIHYDRATE SCH MCG: 20 SOLUTION RESPIRATORY (INHALATION) at 08:56

## 2021-12-17 RX ADMIN — DEXTRAN 70 AND HYPROMELLOSE 2910 SCH DROPS: 1; 3 SOLUTION/ DROPS OPHTHALMIC at 10:20

## 2021-12-17 RX ADMIN — INSULIN ASPART SCH UNIT: 100 INJECTION, SOLUTION INTRAVENOUS; SUBCUTANEOUS at 17:57

## 2021-12-17 RX ADMIN — BUDESONIDE SCH MG: 1 SUSPENSION RESPIRATORY (INHALATION) at 08:56

## 2021-12-17 RX ADMIN — FLUTICASONE PROPIONATE SCH SPRAY: 50 SPRAY, METERED NASAL at 14:43

## 2021-12-17 RX ADMIN — PRAVASTATIN SODIUM SCH MG: 20 TABLET ORAL at 22:31

## 2021-12-17 RX ADMIN — HEPARIN SODIUM PRN UNIT: 1000 INJECTION, SOLUTION INTRAVENOUS; SUBCUTANEOUS at 09:41

## 2021-12-17 RX ADMIN — METHYLPREDNISOLONE SODIUM SUCCINATE SCH MG: 125 INJECTION, POWDER, FOR SOLUTION INTRAMUSCULAR; INTRAVENOUS at 05:02

## 2021-12-17 RX ADMIN — CEFAZOLIN SCH: 330 INJECTION, POWDER, FOR SOLUTION INTRAMUSCULAR; INTRAVENOUS at 05:02

## 2021-12-17 RX ADMIN — ASPIRIN 81 MG CHEWABLE TABLET SCH MG: 81 TABLET CHEWABLE at 09:43

## 2021-12-17 RX ADMIN — DICLOFENAC SODIUM SCH: 10 GEL TOPICAL at 22:33

## 2021-12-17 RX ADMIN — FOLIC ACID SCH MG: 1 TABLET ORAL at 09:43

## 2021-12-17 RX ADMIN — IPRATROPIUM BROMIDE AND ALBUTEROL SULFATE SCH ML: .5; 3 SOLUTION RESPIRATORY (INHALATION) at 15:38

## 2021-12-17 RX ADMIN — DEXTRAN 70 AND HYPROMELLOSE 2910 SCH: 1; 3 SOLUTION/ DROPS OPHTHALMIC at 14:52

## 2021-12-17 RX ADMIN — MELOXICAM PRN MG: 7.5 TABLET ORAL at 10:19

## 2021-12-17 RX ADMIN — INSULIN ASPART SCH UNIT: 100 INJECTION, SOLUTION INTRAVENOUS; SUBCUTANEOUS at 14:46

## 2021-12-17 RX ADMIN — HEPARIN SODIUM SCH MLS/HR: 10000 INJECTION, SOLUTION INTRAVENOUS at 16:08

## 2021-12-17 RX ADMIN — IPRATROPIUM BROMIDE AND ALBUTEROL SULFATE SCH ML: .5; 3 SOLUTION RESPIRATORY (INHALATION) at 08:56

## 2021-12-17 RX ADMIN — IPRATROPIUM BROMIDE AND ALBUTEROL SULFATE SCH ML: .5; 3 SOLUTION RESPIRATORY (INHALATION) at 11:37

## 2021-12-17 RX ADMIN — BUDESONIDE SCH MG: 1 SUSPENSION RESPIRATORY (INHALATION) at 19:50

## 2021-12-17 RX ADMIN — INSULIN ASPART SCH UNIT: 100 INJECTION, SOLUTION INTRAVENOUS; SUBCUTANEOUS at 22:32

## 2021-12-17 RX ADMIN — DICLOFENAC SODIUM SCH: 10 GEL TOPICAL at 00:33

## 2021-12-17 RX ADMIN — DEXTRAN 70 AND HYPROMELLOSE 2910 SCH: 1; 3 SOLUTION/ DROPS OPHTHALMIC at 22:33

## 2021-12-17 RX ADMIN — THERA TABS SCH EACH: TAB at 09:43

## 2021-12-17 NOTE — PN
PROGRESS NOTE



DATE OF SERVICE:

12/17/2021



This 76-year-old gentleman who was admitted with acute bilateral pneumonia, left more

than the right with possible community-acquired also had a gram-negative pneumonia with

possible sepsis.  The patient is on antibiotics, steroids, bronchodilators, multiple

consultants are following the patient closely. No chest pain.  No palpitations.  No

fever.  A 2D echo with Doppler was done which showed ejection fraction about 35-40

percent and multiple wall abnormalities as well indicating possibly new onset

cardiomyopathy.



Past medical history reviewed.



REVIEW OF SYSTEMS:

CARDIOVASCULAR system is as mentioned earlier. Respiratory: As mentioned earlier. GI:

As mentioned earlier. : No dysuria. Nervous system: No focal deficits.



CURRENT MEDICATIONS:

Reviewed and include: Norco,  DuoNeb, Xanax Pulmicort, folic acid, rest of medications

noted.



PHYSICAL EXAMINATION:

Patient is alert, oriented x2.  Pulse is 110, blood pressure is 118/60, respiration 18,

temperature 98.4, pulse ox 98% on 6 L. HEENT:  Conjunctivae normal.

NECK: No JVD.

CARDIOVASCULAR: S1, S2 muffled.

RESPIRATORY: Breath sounds diminished in the bases. A few scattered rhonchi.

ABDOMEN: Soft, nontender.

LEGS: No edema. No swelling.

NERVOUS SYSTEM: No focal deficits.



LABS:

Accu-Cheks noted.  Other labs are noted.



ASSESSMENT:

1. Acute bilateral pneumonia, left more than the right, possibly community-acquired,

    with possibly gram-negative with sepsis with acute hypoxic respiratory failure,

    present on admission.

2. Asthma, chronic obstructive pulmonary disease acute exacerbation, present on

    admission.

3. Possible congestive heart failure, acute exacerbation, acute on chronic systolic

    dysfunction, ejection fraction 35-40 percent.

4. Increased WBC.

5. Hyponatremia.

6. Elevated random glucose.

7. Elevated bilirubin, AST, ALT, possibly hepatitis of undetermined etiology.

8. Troponin 1.170, possible type 2 myocardial infarction.

9. Hypertension.

10.History of prostate disorder.

11.History of appendectomy.

12.History of back surgery, degenerative joint disease.

13.FULL CODE.



RECOMMENDATIONS AND DISCUSSION:

Recommend to continue current medications, monitor and symptomatic treatment. Otherwise

the Covid 19 is negative. Mycoplasma IgM is also negative.  I would recommend Covid 19

send out also to complete the workup.  Repeat labs will be ordered.  Overall prognosis

guarded because of multiple complex medical issues.  We will initiate a small dose of

diuretics.  Further recommendations to follow.





MMODL / IJN: [id number] / Job#: [id number]

## 2021-12-17 NOTE — P.PN
Subjective


Progress Note Date: 12/17/21





HISTORY OF PRESENT ILLNESS: 





This is a pleasant 76-year-old male past medical history significant for 

hypertension, asthma, Covid-19 in March 2021. He does not follow with a cardiolo

gist. His pulmonologist is Dr. Moses. We have been asked to see in consultation 

for elevated troponin. Patient presents to the emergency department with 

complaints of cough and worsening shortness of breath for the past several days.

He was hypoxic on presentation and placed on 5L nasal cannula. He denies any 

chest pain, palpitations, lightheadedness, dizziness or syncope. On admission, 

patient's chest xray revealed bilateral infiltrate correlate for pneumonia.  EKG

revealed sinus tachycardia, rate 123, no significant ST-T wave abnormalities. On

telemetry he is in sinus tachycardic HR 120s. Troponin was elevated at 1.1. 


He denies history of CAD, MI, Stroke, or diabetes. He denies diagnosis of COPD. 

He is a non-smoker, quit 50+ years ago. 





DIAGNOSTICS


Chest CT revealed no evidence of pulmonary embolism, mass like consolidation in 

the left lower lobe suspicious for focal pneumonia.  Smaller focus groundglass 

opacities medial right lower lobe


Laboratory reviewed, WBC 17, Hgb 18, Plt 272, Sodium 135, K 3.9, BUN 24, sCr 

0.72, Mag 2.0, Trop 1.1, covid pcr negative, influenza pcr negative 


Current home medications include Decadron 





12/17/2021


Patient examined this morning at the bedside.  Patient denies any chest pain or 

pressure.  Patient denies having any chest pain prior to coming to the hospital.

 He does report mild shortness of breath.  He remains on 5 L nasal cannula.  

Patient is tachycardic this morning with a heart rate around 110.  Blood 

pressure 125/75.  Echocardiogram completed revealing ejection fraction 35-40%, 

mid anterior, mid lateral, mid anterior septal, apical anterior, apical lateral,

apical inferior, and apical septal LV wall hypokinesis, mild mitral 

regurgitation, and mild tricuspid regurgitation.  There is no prior 

echocardiogram available for review.  Upon questioning the patient further, he 

denies any known history of cardiomyopathy.





PHYSICAL EXAM: 


VITAL SIGNS: Reviewed.


GENERAL: Well-developed in no acute distress. 


NECK: Supple. No JVD or thyromegaly


LUNGS: Respirations even and unlabored. Lungs diminished with wheezing and 

rhonchi noted.


HEART: Regular rate and rhythm.  S1 and S2 heard.


EXTREMITIES: Normal range of motion.  No clubbing or cyanosis.  Peripheral 

pulses intact.  No lower extremity edema





ASSESSMENT: 


Shortness of breath


Acute asthma/COPD exacerbation


Non-STEMI


Pneumonia


New onset cardiomyopathy, etiology unclear


Former nicotine dependence





PLAN: 


Continue IV heparin for an additional 24 hours


Continue aspirin and statin


Begin lisinopril 5mg daily


Begin Metoprolol succinate 25mg daily


Patient will require cardiac cath in the future when his acute pulmonary 

conditions have resolved and he is able to lay flat due to new onset cardiom

yopathy to rule out CAD


Further recommendations pending patient's course








Nurse practitioner note has been reviewed by physician. Signing provider agrees 

with the documented findings, assessment, and plan of care. 








Objective





- Vital Signs


Vital signs: 


                                   Vital Signs











Temp  98.5 F   12/17/21 05:00


 


Pulse  114 H  12/17/21 09:19


 


Resp  18   12/17/21 05:00


 


BP  125/75   12/17/21 05:00


 


Pulse Ox  93 L  12/17/21 05:00








                                 Intake & Output











 12/16/21 12/17/21 12/17/21





 18:59 06:59 18:59


 


Intake Total  52.197 116.163


 


Balance  52.197 116.163


 


Weight 63.503 kg  


 


Intake:   


 


  Intake, IV Titration  52.197 116.163





  Amount   


 


    Heparin Sod,Pork in 0.45%  52.197 116.163





    NaCl 25,000 unit In 0.45   





    % NaCl 1 250ml.bag @ 12   





    UNITS/KG/HR 7.62 mls/hr   





    IV .Q24H [facility] Rx#:   





    [id number]   


 


Other:   


 


  Voiding Method  Urinal 


 


  # Voids  3 














- Labs


CBC & Chem 7: 


                                 12/17/21 04:55





                                 12/17/21 04:55


Labs: 


                  Abnormal Lab Results - Last 24 Hours (Table)











  12/16/21 12/16/21 12/16/21 Range/Units





  11:15 11:15 17:29 


 


WBC     (3.8-10.6)  k/uL


 


Neutrophils #     (1.3-7.7)  k/uL


 


Lymphocytes #     (1.0-4.8)  k/uL


 


APTT    35.6 H  (22.0-30.0)  sec


 


Sodium   135 L   (137-145)  mmol/L


 


Potassium     (3.5-5.5)  mmol/L


 


Chloride   97 L   ()  mmol/L


 


BUN   24 H   (9-20)  mg/dL


 


BUN/Creatinine Ratio     (12.00-20.00)  Ratio


 


Glucose   175 H   (74-99)  mg/dL


 


POC Glucose (mg/dL)     (75-99)  mg/dL


 


Calcium     (8.7-10.3)  mg/dL


 


Total Bilirubin   2.2 H   (0.2-1.3)  mg/dL


 


AST   132 H   (17-59)  U/L


 


ALT   85 H   (4-49)  U/L


 


Troponin I  1.170 H*    (0.000-0.034)  ng/mL


 


Mycoplasma pneumon IgG     (<=0.90)  INDEX














  12/16/21 12/16/21 12/16/21 Range/Units





  17:29 18:54 20:10 


 


WBC     (3.8-10.6)  k/uL


 


Neutrophils #     (1.3-7.7)  k/uL


 


Lymphocytes #     (1.0-4.8)  k/uL


 


APTT     (22.0-30.0)  sec


 


Sodium     (137-145)  mmol/L


 


Potassium     (3.5-5.5)  mmol/L


 


Chloride     ()  mmol/L


 


BUN     (9-20)  mg/dL


 


BUN/Creatinine Ratio     (12.00-20.00)  Ratio


 


Glucose     (74-99)  mg/dL


 


POC Glucose (mg/dL)   204 H   (75-99)  mg/dL


 


Calcium     (8.7-10.3)  mg/dL


 


Total Bilirubin     (0.2-1.3)  mg/dL


 


AST     (17-59)  U/L


 


ALT     (4-49)  U/L


 


Troponin I    3.240 H*  (0.000-0.034)  ng/mL


 


Mycoplasma pneumon IgG  1.65 H    (<=0.90)  INDEX














  12/16/21 12/16/21 12/17/21 Range/Units





  21:09 22:38 04:55 


 


WBC    16.4 H  (3.8-10.6)  k/uL


 


Neutrophils #    15.3 H  (1.3-7.7)  k/uL


 


Lymphocytes #    0.4 L  (1.0-4.8)  k/uL


 


APTT   42.3 H   (22.0-30.0)  sec


 


Sodium     (137-145)  mmol/L


 


Potassium     (3.5-5.5)  mmol/L


 


Chloride     ()  mmol/L


 


BUN     (9-20)  mg/dL


 


BUN/Creatinine Ratio     (12.00-20.00)  Ratio


 


Glucose     (74-99)  mg/dL


 


POC Glucose (mg/dL)  252 H    (75-99)  mg/dL


 


Calcium     (8.7-10.3)  mg/dL


 


Total Bilirubin     (0.2-1.3)  mg/dL


 


AST     (17-59)  U/L


 


ALT     (4-49)  U/L


 


Troponin I     (0.000-0.034)  ng/mL


 


Mycoplasma pneumon IgG     (<=0.90)  INDEX














  12/17/21 12/17/21 Range/Units





  04:55 07:28 


 


WBC    (3.8-10.6)  k/uL


 


Neutrophils #    (1.3-7.7)  k/uL


 


Lymphocytes #    (1.0-4.8)  k/uL


 


APTT    (22.0-30.0)  sec


 


Sodium    (137-145)  mmol/L


 


Potassium  3.3 L   (3.5-5.5)  mmol/L


 


Chloride    ()  mmol/L


 


BUN    (9-20)  mg/dL


 


BUN/Creatinine Ratio  22.50 H   (12.00-20.00)  Ratio


 


Glucose  181 H   (74-99)  mg/dL


 


POC Glucose (mg/dL)   173 H  (75-99)  mg/dL


 


Calcium  8.2 L   (8.7-10.3)  mg/dL


 


Total Bilirubin    (0.2-1.3)  mg/dL


 


AST    (17-59)  U/L


 


ALT    (4-49)  U/L


 


Troponin I    (0.000-0.034)  ng/mL


 


Mycoplasma pneumon IgG    (<=0.90)  INDEX

## 2021-12-18 LAB
ANION GAP SERPL CALC-SCNC: 17.8 MMOL/L (ref 10–18)
BASOPHILS # BLD AUTO: 0 K/UL (ref 0–0.2)
BASOPHILS NFR BLD AUTO: 0 %
BUN SERPL-SCNC: 21.7 MG/DL (ref 9–27)
BUN/CREAT SERPL: 24.11 RATIO (ref 12–20)
CALCIUM SPEC-MCNC: 8.4 MG/DL (ref 8.7–10.3)
CHLORIDE SERPL-SCNC: 100 MMOL/L (ref 96–109)
CO2 SERPL-SCNC: 26.2 MMOL/L (ref 20–27.5)
EOSINOPHIL # BLD AUTO: 0 K/UL (ref 0–0.7)
EOSINOPHIL NFR BLD AUTO: 0 %
ERYTHROCYTE [DISTWIDTH] IN BLOOD BY AUTOMATED COUNT: 5.91 M/UL (ref 4.3–5.9)
ERYTHROCYTE [DISTWIDTH] IN BLOOD: 14.6 % (ref 11.5–15.5)
GLUCOSE BLD-MCNC: 183 MG/DL (ref 75–99)
GLUCOSE BLD-MCNC: 188 MG/DL (ref 75–99)
GLUCOSE BLD-MCNC: 216 MG/DL (ref 75–99)
GLUCOSE BLD-MCNC: 220 MG/DL (ref 75–99)
GLUCOSE SERPL-MCNC: 195 MG/DL (ref 70–110)
HCT VFR BLD AUTO: 50.9 % (ref 39–53)
HGB BLD-MCNC: 16.4 GM/DL (ref 13–17.5)
LYMPHOCYTES # SPEC AUTO: 0.3 K/UL (ref 1–4.8)
LYMPHOCYTES NFR SPEC AUTO: 2 %
MCH RBC QN AUTO: 27.8 PG (ref 25–35)
MCHC RBC AUTO-ENTMCNC: 32.3 G/DL (ref 31–37)
MCV RBC AUTO: 86.2 FL (ref 80–100)
MONOCYTES # BLD AUTO: 0.9 K/UL (ref 0–1)
MONOCYTES NFR BLD AUTO: 5 %
NEUTROPHILS # BLD AUTO: 17.5 K/UL (ref 1.3–7.7)
NEUTROPHILS NFR BLD AUTO: 93 %
PLATELET # BLD AUTO: 280 K/UL (ref 150–450)
POTASSIUM SERPL-SCNC: 2.8 MMOL/L (ref 3.5–5.5)
SODIUM SERPL-SCNC: 144 MMOL/L (ref 135–145)
WBC # BLD AUTO: 18.8 K/UL (ref 3.8–10.6)

## 2021-12-18 RX ADMIN — THERA TABS SCH EACH: TAB at 13:19

## 2021-12-18 RX ADMIN — METOPROLOL SUCCINATE SCH MG: 25 TABLET, EXTENDED RELEASE ORAL at 09:37

## 2021-12-18 RX ADMIN — Medication SCH MG: at 13:19

## 2021-12-18 RX ADMIN — INSULIN ASPART SCH UNIT: 100 INJECTION, SOLUTION INTRAVENOUS; SUBCUTANEOUS at 17:33

## 2021-12-18 RX ADMIN — CEFAZOLIN SCH MLS/HR: 330 INJECTION, POWDER, FOR SOLUTION INTRAMUSCULAR; INTRAVENOUS at 01:00

## 2021-12-18 RX ADMIN — FORMOTEROL FUMARATE DIHYDRATE SCH MCG: 20 SOLUTION RESPIRATORY (INHALATION) at 19:52

## 2021-12-18 RX ADMIN — INSULIN ASPART SCH UNIT: 100 INJECTION, SOLUTION INTRAVENOUS; SUBCUTANEOUS at 20:51

## 2021-12-18 RX ADMIN — CEFAZOLIN SCH: 330 INJECTION, POWDER, FOR SOLUTION INTRAMUSCULAR; INTRAVENOUS at 17:04

## 2021-12-18 RX ADMIN — FOLIC ACID SCH MG: 1 TABLET ORAL at 13:19

## 2021-12-18 RX ADMIN — IPRATROPIUM BROMIDE AND ALBUTEROL SULFATE SCH ML: .5; 3 SOLUTION RESPIRATORY (INHALATION) at 12:31

## 2021-12-18 RX ADMIN — DOXYCYCLINE SCH MG: 100 CAPSULE ORAL at 09:34

## 2021-12-18 RX ADMIN — BUDESONIDE SCH MG: 1 SUSPENSION RESPIRATORY (INHALATION) at 19:52

## 2021-12-18 RX ADMIN — FUROSEMIDE SCH MG: 10 INJECTION, SOLUTION INTRAMUSCULAR; INTRAVENOUS at 09:47

## 2021-12-18 RX ADMIN — IPRATROPIUM BROMIDE AND ALBUTEROL SULFATE SCH ML: .5; 3 SOLUTION RESPIRATORY (INHALATION) at 08:45

## 2021-12-18 RX ADMIN — IPRATROPIUM BROMIDE AND ALBUTEROL SULFATE SCH ML: .5; 3 SOLUTION RESPIRATORY (INHALATION) at 19:52

## 2021-12-18 RX ADMIN — BUDESONIDE SCH MG: 1 SUSPENSION RESPIRATORY (INHALATION) at 08:45

## 2021-12-18 RX ADMIN — FORMOTEROL FUMARATE DIHYDRATE SCH MCG: 20 SOLUTION RESPIRATORY (INHALATION) at 08:45

## 2021-12-18 RX ADMIN — INSULIN ASPART SCH UNIT: 100 INJECTION, SOLUTION INTRAVENOUS; SUBCUTANEOUS at 08:52

## 2021-12-18 RX ADMIN — METOPROLOL SUCCINATE SCH MG: 25 TABLET, EXTENDED RELEASE ORAL at 20:51

## 2021-12-18 RX ADMIN — FINASTERIDE SCH MG: 5 TABLET, FILM COATED ORAL at 20:51

## 2021-12-18 RX ADMIN — METHYLPREDNISOLONE SODIUM SUCCINATE SCH MG: 125 INJECTION, POWDER, FOR SOLUTION INTRAMUSCULAR; INTRAVENOUS at 17:33

## 2021-12-18 RX ADMIN — PRAVASTATIN SODIUM SCH MG: 80 TABLET ORAL at 20:52

## 2021-12-18 RX ADMIN — FUROSEMIDE SCH MG: 20 TABLET ORAL at 20:52

## 2021-12-18 RX ADMIN — DICLOFENAC SODIUM SCH: 10 GEL TOPICAL at 20:55

## 2021-12-18 RX ADMIN — IPRATROPIUM BROMIDE AND ALBUTEROL SULFATE SCH ML: .5; 3 SOLUTION RESPIRATORY (INHALATION) at 16:15

## 2021-12-18 RX ADMIN — DEXTRAN 70 AND HYPROMELLOSE 2910 SCH DROPS: 1; 3 SOLUTION/ DROPS OPHTHALMIC at 20:50

## 2021-12-18 RX ADMIN — DICLOFENAC SODIUM SCH GM: 10 GEL TOPICAL at 09:35

## 2021-12-18 RX ADMIN — METHYLPREDNISOLONE SODIUM SUCCINATE SCH MG: 125 INJECTION, POWDER, FOR SOLUTION INTRAMUSCULAR; INTRAVENOUS at 06:15

## 2021-12-18 RX ADMIN — CEFAZOLIN SCH MLS/HR: 330 INJECTION, POWDER, FOR SOLUTION INTRAMUSCULAR; INTRAVENOUS at 12:31

## 2021-12-18 RX ADMIN — HEPARIN SODIUM SCH MLS/HR: 10000 INJECTION, SOLUTION INTRAVENOUS at 12:28

## 2021-12-18 RX ADMIN — INSULIN ASPART SCH UNIT: 100 INJECTION, SOLUTION INTRAVENOUS; SUBCUTANEOUS at 13:20

## 2021-12-18 RX ADMIN — ASPIRIN 81 MG CHEWABLE TABLET SCH MG: 81 TABLET CHEWABLE at 09:35

## 2021-12-18 RX ADMIN — FLUTICASONE PROPIONATE SCH SPRAY: 50 SPRAY, METERED NASAL at 09:35

## 2021-12-18 RX ADMIN — METHYLPREDNISOLONE SODIUM SUCCINATE SCH MG: 125 INJECTION, POWDER, FOR SOLUTION INTRAMUSCULAR; INTRAVENOUS at 13:19

## 2021-12-18 RX ADMIN — METHYLPREDNISOLONE SODIUM SUCCINATE SCH MG: 125 INJECTION, POWDER, FOR SOLUTION INTRAMUSCULAR; INTRAVENOUS at 23:17

## 2021-12-18 RX ADMIN — DOXYCYCLINE SCH MG: 100 CAPSULE ORAL at 20:51

## 2021-12-18 RX ADMIN — MELOXICAM PRN MG: 7.5 TABLET ORAL at 09:46

## 2021-12-18 RX ADMIN — METHYLPREDNISOLONE SODIUM SUCCINATE SCH MG: 125 INJECTION, POWDER, FOR SOLUTION INTRAMUSCULAR; INTRAVENOUS at 00:01

## 2021-12-18 RX ADMIN — DEXTRAN 70 AND HYPROMELLOSE 2910 SCH: 1; 3 SOLUTION/ DROPS OPHTHALMIC at 16:11

## 2021-12-18 RX ADMIN — DEXTRAN 70 AND HYPROMELLOSE 2910 SCH DROPS: 1; 3 SOLUTION/ DROPS OPHTHALMIC at 09:36

## 2021-12-18 RX ADMIN — AZITHROMYCIN MONOHYDRATE SCH MLS/HR: 500 INJECTION, POWDER, LYOPHILIZED, FOR SOLUTION INTRAVENOUS at 09:34

## 2021-12-18 NOTE — P.PN
Subjective


Progress Note Date: 12/18/21


The pleasant 76-year-old gentleman with past medical history significant for 

hypertension, asthma, COVID-19 in March 2021.  Previously followed with Dr. Mckenzie but has not followed with cardiologist in many years.  Presented to 

the emergency department with complaints of cough and worsening shortness of 

breath the past several days.  He was hypoxic on presentation and found to have 

left lower lobe pneumonia on top of his underlying COPD.  Rest the patient in 

consultation for elevation of troponins.  The troponin peaked at 3.240.  

Echocardiogram with Doppler study showed impaired LV systolic function with 

ejection fraction of 35-40% with evidence of segmental wall motion 

abnormalities.  He's been initiated on aspirin, statin, beta blocker and ACE 

inhibitor.  Currently continues to be on IV Lasix.  He continues on IV 

antibiotics.  Overall he is feeling a bit better today.  Continues to have some 

shortness of breath.  Continues to have orthopnea which he feels he's had for 

the last 10 years.








Objective





- Vital Signs


Vital signs: 


                                   Vital Signs











Temp  98.0 F   12/18/21 12:56


 


Pulse  99   12/18/21 12:56


 


Resp  16   12/18/21 12:56


 


BP  118/72   12/18/21 12:56


 


Pulse Ox  91 L  12/18/21 12:56








                                 Intake & Output











 12/17/21 12/18/21 12/18/21





 18:59 06:59 18:59


 


Intake Total 434.434 85.87 276.573


 


Output Total 200 1650 700


 


Balance 234.434 -1564.13 -423.427


 


Intake:   


 


  Intake, IV Titration 194.434 85.87 156.573





  Amount   


 


    Heparin Sod,Pork in 0.45% 194.434 85.87 156.573





    NaCl 25,000 unit In 0.45   





    % NaCl 1 250ml.bag @ 12   





    UNITS/KG/HR 7.62 mls/hr   





    IV .Q24H [facility] Rx#:   





    [id number]   


 


  Oral 240  120


 


Output:   


 


  Urine 200 1650 700


 


Other:   


 


  Voiding Method Urinal Urinal 


 


  # Voids 7  














- Exam


PHYSICAL EXAMINATION: 





HEENT: Head is atraumatic, normocephalic.  Pupils equal, round.  Neck is supple.

 There is no elevated jugular venous pressure.





HEART EXAMINATION: Heart sounds regular, S1 and S2 normal.  No murmur or gallop 

heard.





CHEST EXAMINATION: Lungs reveal diminished air entry bilaterally with crackles 

noted left lower lobe. No chest wall tenderness is noted on palpation or with 

deep breathing.





ABDOMEN:  Soft, nontender. Bowel sounds are heard. No organomegaly noted.


 


EXTREMITIES: 2+ peripheral pulses with evidence of mild peripheral edema and no 

calf tenderness noted.





NEUROLOGIC patient is awake, alert and oriented x3.


 


.


 











- Labs


CBC & Chem 7: 


                                 12/18/21 05:02





                                 12/18/21 05:02


Labs: 


                  Abnormal Lab Results - Last 24 Hours (Table)











  12/17/21 12/17/21 12/17/21 Range/Units





  15:57 17:43 20:08 


 


WBC     (3.8-10.6)  k/uL


 


RBC     (4.30-5.90)  m/uL


 


Neutrophils #     (1.3-7.7)  k/uL


 


Lymphocytes #     (1.0-4.8)  k/uL


 


APTT  40.7 H    (22.0-30.0)  sec


 


Potassium     (3.5-5.5)  mmol/L


 


BUN/Creatinine Ratio     (12.00-20.00)  Ratio


 


Glucose     ()  mg/dL


 


POC Glucose (mg/dL)   222 H  221 H  (75-99)  mg/dL


 


Calcium     (8.7-10.3)  mg/dL














  12/17/21 12/18/21 12/18/21 Range/Units





  22:40 05:02 05:02 


 


WBC   18.8 H   (3.8-10.6)  k/uL


 


RBC   5.91 H   (4.30-5.90)  m/uL


 


Neutrophils #   17.5 H   (1.3-7.7)  k/uL


 


Lymphocytes #   0.3 L   (1.0-4.8)  k/uL


 


APTT  38.8 H    (22.0-30.0)  sec


 


Potassium    2.8 L  (3.5-5.5)  mmol/L


 


BUN/Creatinine Ratio    24.11 H  (12.00-20.00)  Ratio


 


Glucose    195 H  ()  mg/dL


 


POC Glucose (mg/dL)     (75-99)  mg/dL


 


Calcium    8.4 L  (8.7-10.3)  mg/dL














  12/18/21 12/18/21 12/18/21 Range/Units





  05:02 07:21 12:52 


 


WBC     (3.8-10.6)  k/uL


 


RBC     (4.30-5.90)  m/uL


 


Neutrophils #     (1.3-7.7)  k/uL


 


Lymphocytes #     (1.0-4.8)  k/uL


 


APTT  43.6 H    (22.0-30.0)  sec


 


Potassium     (3.5-5.5)  mmol/L


 


BUN/Creatinine Ratio     (12.00-20.00)  Ratio


 


Glucose     ()  mg/dL


 


POC Glucose (mg/dL)   188 H  220 H  (75-99)  mg/dL


 


Calcium     (8.7-10.3)  mg/dL








                      Microbiology - Last 24 Hours (Table)











 12/16/21 10:55 Blood Culture - Preliminary





 Blood    No Growth after 48 hours


 


 12/16/21 11:10 Blood Culture - Preliminary





 Blood    No Growth after 48 hours














Assessment and Plan


Assessment: 


#1 non-ST elevation MI


#2 pneumonia


#3 acute extubation COPD


#4 cardiomyopathy, etiology unclear at this time


#5 former nicotine dependence





Plan: 


From cardiology's perspective we will stop the amlodipine and increase 

lisinopril.  Will increase the statin dose.  Switch the patient to by mouth 

Lasix.  Patient will require cardiac catheterization in the future when his 

acute pulmonary condition have resolved and he is able to be lying flat to 

assess etiology of the cardiomyopathy and rule out CAD.  We will continue to 

follow the patient provide further recommendations accordingly.





Penile

## 2021-12-18 NOTE — XR
EXAMINATION TYPE: XR chest 1V portable

 

DATE OF EXAM: 12/18/2021

 

COMPARISON: 12/16/2021

 

HISTORY: CHF.

 

TECHNIQUE: Single frontal view of the chest is obtained.

 

FINDINGS:  There is persistent mild to moderate prominence of the interstitial markings. There is bee
n interval worsening in the retrocardiac opacity consistent with atelectasis or alveolar infiltrate.

 

There is no pneumothorax. Heart size normal. The osseous structures are intact

 

IMPRESSION:  No change in the mild to moderate diffuse interstitial infiltrates and worsening of the 
infiltrate in the retrocardiac opacity as described above.

## 2021-12-19 LAB
ANION GAP SERPL CALC-SCNC: 10 MMOL/L
BUN SERPL-SCNC: 25 MG/DL (ref 9–20)
CALCIUM SPEC-MCNC: 8.4 MG/DL (ref 8.4–10.2)
CHLORIDE SERPL-SCNC: 93 MMOL/L (ref 98–107)
CO2 SERPL-SCNC: 37 MMOL/L (ref 22–30)
GLUCOSE BLD-MCNC: 192 MG/DL (ref 75–99)
GLUCOSE BLD-MCNC: 211 MG/DL (ref 75–99)
GLUCOSE BLD-MCNC: 215 MG/DL (ref 75–99)
GLUCOSE BLD-MCNC: 225 MG/DL (ref 75–99)
GLUCOSE SERPL-MCNC: 224 MG/DL (ref 74–99)
POTASSIUM SERPL-SCNC: 2.4 MMOL/L (ref 3.5–5.1)
SODIUM SERPL-SCNC: 140 MMOL/L (ref 137–145)

## 2021-12-19 RX ADMIN — FORMOTEROL FUMARATE DIHYDRATE SCH MCG: 20 SOLUTION RESPIRATORY (INHALATION) at 21:01

## 2021-12-19 RX ADMIN — OXYMETAZOLINE HCL SCH SPRAY: 0.05 SPRAY NASAL at 13:07

## 2021-12-19 RX ADMIN — DEXTRAN 70 AND HYPROMELLOSE 2910 SCH: 1; 3 SOLUTION/ DROPS OPHTHALMIC at 21:47

## 2021-12-19 RX ADMIN — METOPROLOL SUCCINATE SCH MG: 25 TABLET, EXTENDED RELEASE ORAL at 21:44

## 2021-12-19 RX ADMIN — METHYLPREDNISOLONE SODIUM SUCCINATE SCH MG: 125 INJECTION, POWDER, FOR SOLUTION INTRAMUSCULAR; INTRAVENOUS at 23:11

## 2021-12-19 RX ADMIN — BUDESONIDE SCH MG: 1 SUSPENSION RESPIRATORY (INHALATION) at 08:19

## 2021-12-19 RX ADMIN — INSULIN ASPART SCH UNIT: 100 INJECTION, SOLUTION INTRAVENOUS; SUBCUTANEOUS at 21:47

## 2021-12-19 RX ADMIN — IPRATROPIUM BROMIDE AND ALBUTEROL SULFATE SCH ML: .5; 3 SOLUTION RESPIRATORY (INHALATION) at 21:01

## 2021-12-19 RX ADMIN — DEXTRAN 70 AND HYPROMELLOSE 2910 SCH DROPS: 1; 3 SOLUTION/ DROPS OPHTHALMIC at 17:32

## 2021-12-19 RX ADMIN — POTASSIUM CHLORIDE SCH MEQ: 20 TABLET, EXTENDED RELEASE ORAL at 13:08

## 2021-12-19 RX ADMIN — CEFAZOLIN SCH: 330 INJECTION, POWDER, FOR SOLUTION INTRAMUSCULAR; INTRAVENOUS at 01:52

## 2021-12-19 RX ADMIN — DOXYCYCLINE SCH MG: 100 CAPSULE ORAL at 09:23

## 2021-12-19 RX ADMIN — IPRATROPIUM BROMIDE AND ALBUTEROL SULFATE SCH ML: .5; 3 SOLUTION RESPIRATORY (INHALATION) at 17:03

## 2021-12-19 RX ADMIN — METHYLPREDNISOLONE SODIUM SUCCINATE SCH MG: 125 INJECTION, POWDER, FOR SOLUTION INTRAMUSCULAR; INTRAVENOUS at 17:32

## 2021-12-19 RX ADMIN — THERA TABS SCH EACH: TAB at 09:27

## 2021-12-19 RX ADMIN — FOLIC ACID SCH MG: 1 TABLET ORAL at 09:28

## 2021-12-19 RX ADMIN — IPRATROPIUM BROMIDE AND ALBUTEROL SULFATE SCH ML: .5; 3 SOLUTION RESPIRATORY (INHALATION) at 08:19

## 2021-12-19 RX ADMIN — POTASSIUM CHLORIDE SCH MEQ: 20 TABLET, EXTENDED RELEASE ORAL at 10:09

## 2021-12-19 RX ADMIN — AZITHROMYCIN MONOHYDRATE SCH MLS/HR: 500 INJECTION, POWDER, LYOPHILIZED, FOR SOLUTION INTRAVENOUS at 09:49

## 2021-12-19 RX ADMIN — Medication SCH MG: at 09:27

## 2021-12-19 RX ADMIN — METHYLPREDNISOLONE SODIUM SUCCINATE SCH MG: 125 INJECTION, POWDER, FOR SOLUTION INTRAMUSCULAR; INTRAVENOUS at 13:06

## 2021-12-19 RX ADMIN — DOXYCYCLINE SCH MG: 100 CAPSULE ORAL at 21:44

## 2021-12-19 RX ADMIN — POTASSIUM CHLORIDE SCH MEQ: 20 TABLET, EXTENDED RELEASE ORAL at 06:05

## 2021-12-19 RX ADMIN — FLUTICASONE PROPIONATE SCH SPRAY: 50 SPRAY, METERED NASAL at 09:24

## 2021-12-19 RX ADMIN — METHYLPREDNISOLONE SODIUM SUCCINATE SCH MG: 125 INJECTION, POWDER, FOR SOLUTION INTRAMUSCULAR; INTRAVENOUS at 05:03

## 2021-12-19 RX ADMIN — PRAVASTATIN SODIUM SCH MG: 80 TABLET ORAL at 21:46

## 2021-12-19 RX ADMIN — FUROSEMIDE SCH MG: 20 TABLET ORAL at 21:44

## 2021-12-19 RX ADMIN — FINASTERIDE SCH MG: 5 TABLET, FILM COATED ORAL at 21:45

## 2021-12-19 RX ADMIN — CEFAZOLIN SCH: 330 INJECTION, POWDER, FOR SOLUTION INTRAMUSCULAR; INTRAVENOUS at 09:53

## 2021-12-19 RX ADMIN — INSULIN ASPART SCH UNIT: 100 INJECTION, SOLUTION INTRAVENOUS; SUBCUTANEOUS at 13:07

## 2021-12-19 RX ADMIN — INSULIN ASPART SCH UNIT: 100 INJECTION, SOLUTION INTRAVENOUS; SUBCUTANEOUS at 09:08

## 2021-12-19 RX ADMIN — DEXTRAN 70 AND HYPROMELLOSE 2910 SCH DROPS: 1; 3 SOLUTION/ DROPS OPHTHALMIC at 09:21

## 2021-12-19 RX ADMIN — MELOXICAM PRN MG: 7.5 TABLET ORAL at 09:47

## 2021-12-19 RX ADMIN — POTASSIUM CHLORIDE SCH MEQ: 20 TABLET, EXTENDED RELEASE ORAL at 14:03

## 2021-12-19 RX ADMIN — POTASSIUM CHLORIDE SCH MEQ: 20 TABLET, EXTENDED RELEASE ORAL at 09:11

## 2021-12-19 RX ADMIN — DICLOFENAC SODIUM SCH: 10 GEL TOPICAL at 21:45

## 2021-12-19 RX ADMIN — FUROSEMIDE SCH MG: 20 TABLET ORAL at 09:27

## 2021-12-19 RX ADMIN — HEPARIN SODIUM SCH MLS/HR: 10000 INJECTION, SOLUTION INTRAVENOUS at 09:14

## 2021-12-19 RX ADMIN — ASPIRIN 81 MG CHEWABLE TABLET SCH MG: 81 TABLET CHEWABLE at 09:11

## 2021-12-19 RX ADMIN — METOPROLOL SUCCINATE SCH MG: 25 TABLET, EXTENDED RELEASE ORAL at 09:27

## 2021-12-19 RX ADMIN — CEFAZOLIN SCH MLS/HR: 330 INJECTION, POWDER, FOR SOLUTION INTRAMUSCULAR; INTRAVENOUS at 17:33

## 2021-12-19 RX ADMIN — INSULIN ASPART SCH UNIT: 100 INJECTION, SOLUTION INTRAVENOUS; SUBCUTANEOUS at 17:33

## 2021-12-19 RX ADMIN — POTASSIUM CHLORIDE SCH MEQ: 20 TABLET, EXTENDED RELEASE ORAL at 15:15

## 2021-12-19 RX ADMIN — BUDESONIDE SCH MG: 1 SUSPENSION RESPIRATORY (INHALATION) at 21:14

## 2021-12-19 RX ADMIN — DICLOFENAC SODIUM SCH GM: 10 GEL TOPICAL at 09:22

## 2021-12-19 RX ADMIN — FORMOTEROL FUMARATE DIHYDRATE SCH MCG: 20 SOLUTION RESPIRATORY (INHALATION) at 08:19

## 2021-12-19 RX ADMIN — OXYMETAZOLINE HCL SCH SPRAY: 0.05 SPRAY NASAL at 21:46

## 2021-12-19 RX ADMIN — POTASSIUM CHLORIDE SCH MEQ: 20 TABLET, EXTENDED RELEASE ORAL at 19:26

## 2021-12-19 RX ADMIN — IPRATROPIUM BROMIDE AND ALBUTEROL SULFATE SCH ML: .5; 3 SOLUTION RESPIRATORY (INHALATION) at 11:39

## 2021-12-19 RX ADMIN — POTASSIUM CHLORIDE SCH MEQ: 20 TABLET, EXTENDED RELEASE ORAL at 21:45

## 2021-12-19 NOTE — P.PN
Subjective


Progress Note Date: 12/18/21














This is a 76 year old male who was recently admitted with acute bilateral 

pneumonia left more than right with possible community-acquired and also gram-

negative pneumonia with possible sepsis and is being closely monitored.  

cardiology following and patient is maintained on Lasix along with IV heparin 

and will continue.  Chest x-ray today shows no change in the mild to moderate 

diffuse interstitial infiltrates and worsening of the infiltrate in the 

retrocardiac opacity consistent with atelectasis or alveolar infiltrate area did

patient continues on 6 L of oxygen via nasal cannula and states he feels his 

breathing is somewhat improved although maintaining 90% on 6 L.  Patient is 

extremely weak and will have PT/OT evaluate the patient. Pulmonary is also 

following the patient is maintained on antibiotics along with breathing 

inhalational treatments and will continue.





Labs:





WBC is 18.8, hemoglobin is 16.4, platelets are 280, APTT is 43.6, sodium is 144,

potassium 2.8, BUN 21.7, creatinine 0.9, calcium 8.4





Review of systems:


Constitutional: reports of fatigue,no  reports of fever, or chills


Cardiovascular: No reports of chest pain or palpitations


Respiratory: reports of shortness of breath or cough


GI: No reports of nausea, vomiting, or diarrhea, patient reports to passing gas 

but no bowel movement abdominal pain  improved


: No reports of dysuria or retention


Neurovascular: no reports of weakness 





All medications have been reviewed





Active Medications





Hydrocodone Bitart/Acetaminophen (Hydrocodone/Apap 5-325mg 1 Each Tab)  1 each 

PO Q6HR PRN


   PRN Reason: Pain


Albuterol/Ipratropium (Ipratropium-Albuterol 3 Ml Neb)  3 ml INHALATION RT-Q4H 

PRN


   PRN Reason: Shortness Of Breath Or Wheezing


Albuterol/Ipratropium (Ipratropium-Albuterol 3 Ml Neb)  3 ml INHALATION RT-QID 

[facility]


   Last Admin: 12/18/21 19:52 Dose:  3 ml


   Documented by: 


Alprazolam (Alprazolam 0.25 Mg Tab)  0.25 mg PO TID PRN


   PRN Reason: Anxiety


Artificial Tears (Artificial Tears-Hypromellose Drops 15 Ml Btl)  1 drops BOTH 

EYES TID [facility]


   Last Admin: 12/18/21 20:50 Dose:  1 drops


   Documented by: 


Aspirin (Aspirin 81 Mg)  81 mg PO DAILY [facility]


   Last Admin: 12/18/21 09:35 Dose:  81 mg


   Documented by: 


Budesonide (Budesonide 1 Mg/2 Ml Nebu)  1 mg INHALATION RT-BID [facility]


   Last Admin: 12/18/21 19:52 Dose:  1 mg


   Documented by: 


Diclofenac Sodium (Diclofenac Sodium Gel 100 Gm Tube)  1 gm TOPICAL BID [facility]


   Last Admin: 12/18/21 20:55 Dose:  Not Given


   Documented by: 


Doxycycline Monohydrate (Doxycycline 100 Mg Cap)  100 mg PO BID [facility]


   Last Admin: 12/18/21 20:51 Dose:  100 mg


   Documented by: 


Finasteride (Finasteride 5 Mg Tab)  5 mg PO HS [facility]


   Last Admin: 12/18/21 20:51 Dose:  5 mg


   Documented by: 


Fluticasone Propionate (Fluticasone 50mcg/Spray Nasal 16gm)  2 spray EA NOSTRIL 

DAILY [facility]


   Last Admin: 12/18/21 09:35 Dose:  2 spray


   Documented by: 


Folic Acid (Folic Acid 1 Mg Tab)  1 mg PO DAILY@1200 [facility]


   Last Admin: 12/18/21 13:19 Dose:  1 mg


   Documented by: 


Formoterol Fumarate (Formoterol Fumarate 20 Mcg/2 Ml Nebu)  20 mcg INHALATION 

RT-BID [facility]


   Last Admin: 12/18/21 19:52 Dose:  20 mcg


   Documented by: 


Furosemide (Furosemide 20 Mg Tab)  20 mg PO BID [facility]


   Last Admin: 12/18/21 20:52 Dose:  20 mg


   Documented by: 


Heparin Sodium (Porcine) (Heparin Sodium 1,000 Un/Ml (10ml Vl))  0 unit IV PER 

PROTOCOL PRN; Protocol


   PRN Reason: Low PTT


   Last Admin: 12/17/21 23:59 Dose:  1,587 unit


   Documented by: 


Hydrochlorothiazide (Hydrochlorothiazide 25 Mg Tab)  25 mg PO DAILY [facility]


   Last Admin: 12/18/21 09:35 Dose:  25 mg


   Documented by: 


Sodium Chloride (Saline 0.9%)  1,000 mls @ 130 mls/hr IV .Q7H42M [facility]


   Last Admin: 12/19/21 01:52 Dose:  Not Given


   Documented by: 


Heparin Sodium/Sodium Chloride (25,000 unit/ Sodium Chloride)  250 mls @ 7.62 

mls/hr IV .Q24H [facility]; Protocol


   Last Admin: 12/18/21 12:28 Dose:  21 units/kg/hr, 13.336 mls/hr


   Documented by: 


Ceftriaxone Sodium 1 gm/ (Sodium Chloride)  50 mls @ 100 mls/hr IVPB Q24HR [facility]


   Last Admin: 12/18/21 09:34 Dose:  100 mls/hr


   Documented by: 


Azithromycin 500 mg/ Sodium (Chloride)  250 mls @ 250 mls/hr IVPB DAILY [facility]


   Last Admin: 12/18/21 09:34 Dose:  250 mls/hr


   Documented by: 


Insulin Aspart (Insulin Aspart (Novolog) 100 Unit/Ml Vial)  0 unit SQ ACHS [facility]; 

Protocol


   Last Admin: 12/18/21 20:51 Dose:  3 unit


   Documented by: 


Lisinopril (Lisinopril 5 Mg Tab)  5 mg PO BID [facility]


   Last Admin: 12/18/21 20:51 Dose:  5 mg


   Documented by: 


Loratadine (Loratadine 10 Mg Tab)  10 mg PO DAILY PRN


   PRN Reason: Allergy Symptoms


Meloxicam (Meloxicam 7.5 Mg Tab)  7.5 mg PO DAILY PRN


   PRN Reason: Pain


   Last Admin: 12/18/21 09:46 Dose:  7.5 mg


   Documented by: 


Methylprednisolone Sodium Succinate (Methylprednisolone Sod Succi 125 Mg/2 Ml 

Vial)  60 mg IV Q6HR [facility]


   Last Admin: 12/19/21 05:03 Dose:  60 mg


   Documented by: 


Metoprolol Succinate (Metoprolol Succinate (Er) 25 Mg Tab.Er.24h)  25 mg PO BID 

[facility]


   Last Admin: 12/18/21 20:51 Dose:  25 mg


   Documented by: 


Miscellaneous Information (Potassium Replacement Protocol 1 Each Misc)  1 each 

MISCELLANE DAILY PRN; Protocol


   PRN Reason: Per Protocol


Multivitamins (Multivitamins, Thera 1 Each Tab)  1 each PO DAILY@1200 [facility]


   Last Admin: 12/18/21 13:19 Dose:  1 each


   Documented by: 


Pravastatin Sodium (Pravastatin Sodium 80 Mg Tab)  80 mg PO HS [facility]


   Last Admin: 12/18/21 20:52 Dose:  80 mg


   Documented by: 


Thiamine HCl (Thiamine 100 Mg Tab)  100 mg PO DAILY@1200 [facility]


   Last Admin: 12/18/21 13:19 Dose:  100 mg


   Documented by: 





Physical exam:





Gen: This is a 76-year-old male awake, alert and oriented 3, well-developed, 

well-nourished, ill appearing.  Temp is 98.0 F, pulse is 99, respirations are 

16, blood pressure is 118/72, oxygen saturation is 91% on 6 L via nasal cannula


HEENT: Head is atraumatic, normocephalic. Pupils equal, round. Sclerae is 

anicteric. 


NECK: Supple. No JVD. No lymphadenopathy. No thyromegaly. 


LUNGS: Diminished breath sounds bilaterally with some crackles and rhonchi 

noted..  No intercostal retractions.


HEART: S1, S2 are muffled. 


ABDOMEN: Soft.  Bowel sounds are present. No masses.  No tenderness.


EXTREMITIES: No pedal edema.  No calf tenderness. 


NEUROLOGICAL: Patient is awake, alert and oriented x3. no focal deficits.  

diffusely weak





Assessment:





Acute bilateral pneumonia left more than right possibly community-acquired, with

possible gram-negative sepsis with acute hypoxic respiratory failure, present on

admission


Asthma, chronic obstructive pulmonary disease acute exacerbation, present on 

admission


Possible congestive heart failure acute exacerbation acute on chronic systolic 

dysfunction, ejection fraction 35-40%


Increased WBC


Hyponatremia


Elevated random glucose


Elevated bilirubin, AST, ALT possibly hepatitis of undetermined etiology


Troponin 1.170, possible type II myocardial infarction


Hypertension


History of prostate disorder


history of appendectomy


History back surgery, degenerative joint disease


Full code





Plan:





Recommend to continue current medications and management.  Cardiology following 

the patient and patient maintained on IV Lasix eating transitioned to oral.  

Patient also receiving fluids at 130 mL per hour along with IV steroids, IV 

heparin, IV ceftriaxone and Zithromax along with doxycycline.  Pulmonary Dr. Moses

following as well.  Patient continues to be extremely weak and will have PT/OT 

evaluate the patient.  Patient is on 6 L of oxygen via nasal cannula with oxygen

saturation of 90% and will continue to wean as tolerated.  Continue with 

breathing inhalational treatments as well.  Repeat am labs.  Due to multiple 

complex medical issues, prognosis is guarded. 








Objective





- Vital Signs


Vital signs: 


                                   Vital Signs











Temp  98.2 F   12/19/21 05:00


 


Pulse  91   12/19/21 05:00


 


Resp  20   12/19/21 05:00


 


BP  144/79   12/19/21 05:00


 


Pulse Ox  96   12/19/21 05:00








                                 Intake & Output











 12/18/21 12/19/21 12/19/21





 18:59 06:59 18:59


 


Intake Total 1246.573 590 


 


Output Total 2000 600 


 


Balance -753.427 -10 


 


Intake:   


 


  Intake, IV Titration 1006.573  





  Amount   


 


    Azithromycin 500 mg In 250  





    Sodium Chloride 0.9% 250   





    ml @ 250 mls/hr IVPB   





    DAILY [facility] Rx#:[id number]   


 


    Heparin Sod,Pork in 0.45% 156.573  





    NaCl 25,000 unit In 0.45   





    % NaCl 1 250ml.bag @ 12   





    UNITS/KG/HR 7.62 mls/hr   





    IV .Q24H [facility] Rx#:   





    [id number]   


 


    Sodium Chloride 0.9% 1, 500  





    000 ml @ 130 mls/hr IV .   





    Q7H42M [facility] Rx#:[id number]   


 


    cefTRIAXone 1 gm In 100  





    Sodium Chloride 0.9% 50   





    ml @ 100 mls/hr IVPB   





    Q24HR [facility] Rx#:[id number]   


 


  Oral 240 590 


 


Output:   


 


  Urine 2000 600 


 


Other:   


 


  # Voids 1  














- Labs


CBC & Chem 7: 


                                 12/18/21 05:02





                                 12/19/21 04:58


Labs: 


                  Abnormal Lab Results - Last 24 Hours (Table)











  12/18/21 12/18/21 12/18/21 Range/Units





  05:02 12:52 17:09 


 


APTT     (22.0-30.0)  sec


 


Potassium  2.8 L    (3.5-5.5)  mmol/L


 


Chloride     ()  mmol/L


 


Carbon Dioxide     (22-30)  mmol/L


 


BUN     (9-20)  mg/dL


 


BUN/Creatinine Ratio  24.11 H    (12.00-20.00)  Ratio


 


Glucose  195 H    ()  mg/dL


 


POC Glucose (mg/dL)   220 H  183 H  (75-99)  mg/dL


 


Calcium  8.4 L    (8.7-10.3)  mg/dL














  12/18/21 12/19/21 12/19/21 Range/Units





  19:59 04:58 04:58 


 


APTT   54.4 H   (22.0-30.0)  sec


 


Potassium    2.4 L*  (3.5-5.5)  mmol/L


 


Chloride    93 L  ()  mmol/L


 


Carbon Dioxide    37 H  (22-30)  mmol/L


 


BUN    25 H  (9-20)  mg/dL


 


BUN/Creatinine Ratio     (12.00-20.00)  Ratio


 


Glucose    224 H  ()  mg/dL


 


POC Glucose (mg/dL)  216 H    (75-99)  mg/dL


 


Calcium     (8.7-10.3)  mg/dL














  12/19/21 Range/Units





  07:30 


 


APTT   (22.0-30.0)  sec


 


Potassium   (3.5-5.5)  mmol/L


 


Chloride   ()  mmol/L


 


Carbon Dioxide   (22-30)  mmol/L


 


BUN   (9-20)  mg/dL


 


BUN/Creatinine Ratio   (12.00-20.00)  Ratio


 


Glucose   ()  mg/dL


 


POC Glucose (mg/dL)  192 H  (75-99)  mg/dL


 


Calcium   (8.7-10.3)  mg/dL








                      Microbiology - Last 24 Hours (Table)











 12/16/21 10:55 Blood Culture - Preliminary





 Blood    No Growth after 48 hours


 


 12/16/21 11:10 Blood Culture - Preliminary





 Blood    No Growth after 48 hours

## 2021-12-19 NOTE — P.PN
Subjective


Progress Note Date: 12/19/21


The pleasant 76-year-old gentleman with past medical history significant for 

hypertension, asthma, COVID-19 in March 2021.  Previously followed with Dr. Mckenzei but has not followed with cardiologist in many years.  Presented to 

the emergency department with complaints of cough and worsening shortness of 

breath the past several days.  He was hypoxic on presentation and found to have 

left lower lobe pneumonia on top of his underlying COPD.  Rest the patient in 

consultation for elevation of troponins.  The troponin peaked at 3.240.  

Echocardiogram with Doppler study showed impaired LV systolic function with 

ejection fraction of 35-40% with evidence of segmental wall motion 

abnormalities.  He's been initiated on aspirin, statin, beta blocker and ACE 

inhibitor.  Currently continues to be on IV Lasix.  He continues on IV 

antibiotics.  Overall he is feeling a bit better today.  Continues to have some 

shortness of breath.  Continues to have orthopnea which he feels he's had for 

the last 10 years.





12/19/21


Patient was seen and examined resting comfortably in bed with head of bed up.  

He feels his breathing has improved.  Continues on 6 L oxygen via nasal cannula.

 Potassium was low this morning of 2.4 and is being replaced.  Chest x-ray 

yesterday showed no change in the mild to moderate diffuse interstitial 

infiltrates and worsening of infiltrate retrocardiac opacity.














Objective





- Vital Signs


Vital signs: 


                                   Vital Signs











Temp  97.7 F   12/19/21 12:30


 


Pulse  81   12/19/21 12:30


 


Resp  14   12/19/21 12:30


 


BP  99/59   12/19/21 12:30


 


Pulse Ox  96   12/19/21 12:30








                                 Intake & Output











 12/18/21 12/19/21 12/19/21





 18:59 06:59 18:59


 


Intake Total 1246.573 590 430


 


Output Total 2000 600 


 


Balance -753.427 -10 430


 


Intake:   


 


  Intake, IV Titration 1006.573  250





  Amount   


 


    Azithromycin 500 mg In 250  





    Sodium Chloride 0.9% 250   





    ml @ 250 mls/hr IVPB   





    [facility] Rx#:[id number]   


 


    Heparin Sod,Pork in 0.45% 156.573  250





    NaCl 25,000 unit In 0.45   





    % NaCl 1 250ml.bag @ 12   





    UNITS/KG/HR 7.62 mls/hr   





    IV .Q24H [facility] Rx#:   





    [id number]   


 


    Sodium Chloride 0.9% 1, 500  





    000 ml @ 130 mls/hr IV .   





    Q7H42M [facility] Rx#:[id number]   


 


    cefTRIAXone 1 gm In 100  





    Sodium Chloride 0.9% 50   





    ml @ 100 mls/hr IVPB   





    Q24HR [facility] Rx#:[id number]   


 


  Oral 240 590 180


 


Output:   


 


  Urine 2000 600 


 


Other:   


 


  # Voids 1  














- Exam


PHYSICAL EXAMINATION: 





HEENT: Head is atraumatic, normocephalic.  Pupils equal, round.  Neck is supple.

 There is no elevated jugular venous pressure.





HEART EXAMINATION: Heart sounds regular, S1 and S2 normal.  No murmur or gallop 

heard.





CHEST EXAMINATION: Lungs reveal diminished air entry bilaterally with crackles 

noted left lower lobe. No chest wall tenderness is noted on palpation or with 

deep breathing.





ABDOMEN:  Soft, nontender. Bowel sounds are heard. No organomegaly noted.


 


EXTREMITIES: 2+ peripheral pulses with evidence of mild peripheral edema and no 

calf tenderness noted.





NEUROLOGIC patient is awake, alert and oriented x3.


 


.


 











- Labs


CBC & Chem 7: 


                                 12/18/21 05:02





                                 12/19/21 11:22


Labs: 


                  Abnormal Lab Results - Last 24 Hours (Table)











  12/18/21 12/18/21 12/19/21 Range/Units





  17:09 19:59 04:58 


 


APTT    54.4 H  (22.0-30.0)  sec


 


Potassium     (3.5-5.1)  mmol/L


 


Chloride     ()  mmol/L


 


Carbon Dioxide     (22-30)  mmol/L


 


BUN     (9-20)  mg/dL


 


Glucose     (74-99)  mg/dL


 


POC Glucose (mg/dL)  183 H  216 H   (75-99)  mg/dL














  12/19/21 12/19/21 12/19/21 Range/Units





  04:58 07:30 11:22 


 


APTT     (22.0-30.0)  sec


 


Potassium  2.4 L*   2.7 L*  (3.5-5.1)  mmol/L


 


Chloride  93 L    ()  mmol/L


 


Carbon Dioxide  37 H    (22-30)  mmol/L


 


BUN  25 H    (9-20)  mg/dL


 


Glucose  224 H    (74-99)  mg/dL


 


POC Glucose (mg/dL)   192 H   (75-99)  mg/dL














  12/19/21 Range/Units





  12:30 


 


APTT   (22.0-30.0)  sec


 


Potassium   (3.5-5.1)  mmol/L


 


Chloride   ()  mmol/L


 


Carbon Dioxide   (22-30)  mmol/L


 


BUN   (9-20)  mg/dL


 


Glucose   (74-99)  mg/dL


 


POC Glucose (mg/dL)  211 H  (75-99)  mg/dL








                      Microbiology - Last 24 Hours (Table)











 12/16/21 10:55 Blood Culture - Preliminary





 Blood    No Growth after 72 hours


 


 12/16/21 11:10 Blood Culture - Preliminary





 Blood    No Growth after 72 hours














Assessment and Plan


Assessment: 


#1 non-ST elevation MI


#2 pneumonia


#3 acute extubation COPD


#4 cardiomyopathy, etiology unclear at this time


#5 former nicotine dependence





Plan: 


From cardiology's perspective we will continue IV heparin.  Will continue to 

optimize medical therapy.  Patient will require cardiac catheterization in the 

future when his acute pulmonary condition have resolved and he is able to be 

lying flat to assess etiology of the cardiomyopathy and rule out CAD.  We will 

continue to follow the patient provide further recommendations accordingly.





NP note has been reviewed, I agree with a documented findings and plan of care. 

Patient was seen and examined.

## 2021-12-19 NOTE — P.PN
Subjective


Progress Note Date: 12/19/21


Principal diagnosis: 


Left lower lobe community-acquired pneumonia also involving the right lower lobe





Mass like process of the left lower lobe cannot be excluded, patient will need 

further radiographs to document resolution in 8-12 weeks





Acute hypoxic respiratory failure





Elevated troponin





History of COVID-19 pneumonia





History of COPD








12/19/2021, patient seen eval examined during the rounds labs reviewed 

medications reviewed care plan discussed, patient remains on 6 L oxygen, oxygen 

saturation is 96%, labs reviewed sodium is 140 potassium is 2.4 BUN/creatinine 

25/0.84, glucose 224, currently patient remains on bronchodilators and 

antibiotics Zithromax and Rocephin, heparin drip, and Solu-Medrol 60 IV every 6,

patient is still complaining of shortness of breath and intermittent wheezing





Patient is a 76-year-old with a remote history of smoking prior history of COPD 

lately have been more short of breath congested recently seen in the office for 

the first time preliminary workup including PFTs labs and x-rays have been 

ordered, patient was complaining of shortness of breath and was hypoxic patient 

has recently placed on home oxygen, chest for the last 2 days started getting 

more shortness of breath cough congestion and fever up to 101, patient has not 

vaccinated for COVID-19 pneumonia, prior history of asthma hypertension prostate

problem enlargement, on arrival patient was tachypneic.  Cardiac with low oxygen

saturation of 90% blood pressure was 122/73, heart rate is 120, respiratory rate

20,Shortness breath patient is a 76-year-old male with the history of hypoxic, 

patient does have a history of Crohn enteritis pneumonia infection before she 

this admission white cell count 70,700 hemoglobin and hematocrit is 24/7.7 to 

hemoglobin is 18, influenza A and B both negative: Negative, troponin elevated 

1.1, computed tomography scan of the chest negative for pulmonary embolism 

however masslike consolidation left lower lobe is present consistent with focal 

pneumonia she infiltrated right lower lobe








Objective





- Vital Signs


Vital signs: 


                                   Vital Signs











Temp  98.2 F   12/19/21 05:00


 


Pulse  89   12/19/21 08:39


 


Resp  20   12/19/21 05:00


 


BP  144/79   12/19/21 05:00


 


Pulse Ox  96   12/19/21 05:00








                                 Intake & Output











 12/18/21 12/19/21 12/19/21





 18:59 06:59 18:59


 


Intake Total 1246.573 590 430


 


Output Total 2000 600 


 


Balance -753.427 -10 430


 


Intake:   


 


  Intake, IV Titration 1006.573  250





  Amount   


 


    Azithromycin 500 mg In 250  





    Sodium Chloride 0.9% 250   





    ml @ 250 mls/hr IVPB   





    [facility] Rx#:[id number]   


 


    Heparin Sod,Pork in 0.45% 156.573  250





    NaCl 25,000 unit In 0.45   





    % NaCl 1 250ml.bag @ 12   





    UNITS/KG/HR 7.62 mls/hr   





    IV .Q24H [facility] Rx#:   





    [id number]   


 


    Sodium Chloride 0.9% 1, 500  





    000 ml @ 130 mls/hr IV .   





    Q7H42M [facility] Rx#:[id number]   


 


    cefTRIAXone 1 gm In 100  





    Sodium Chloride 0.9% 50   





    ml @ 100 mls/hr IVPB   





    Q24HR [facility] Rx#:[id number]   


 


  Oral 240 590 180


 


Output:   


 


  Urine 2000 600 


 


Other:   


 


  # Voids 1  














- Exam





- Constitutional


General appearance: average body habitus, cooperative, disheveled, mild distress





- EENT


Eyes: PERRLA


Ears: bilateral: normal





- Neck


Neck: normal ROM


Carotids: bilateral: upstroke normal





- Respiratory


Respiratory: bilateral: diminished, wheezing





- Cardiovascular


Rhythm: regular


Heart sounds: normal: S1, S2





- Gastrointestinal


General gastrointestinal: distended, normal bowel sounds





- Integumentary


Integumentary: normal turgor





- Neurologic


Neurologic: CNII-XII intact, focal deficits





- Musculoskeletal


Musculoskeletal: gait normal, generalized weakness, strength equal bilaterally





- Psychiatric


Psychiatric: A&O x's 3, appropriate affect, intact judgment & insight








- Labs


CBC & Chem 7: 


                                 12/18/21 05:02





                                 12/19/21 04:58


Labs: 


                  Abnormal Lab Results - Last 24 Hours (Table)











  12/18/21 12/18/21 12/18/21 Range/Units





  05:02 12:52 17:09 


 


APTT     (22.0-30.0)  sec


 


Potassium  2.8 L    (3.5-5.5)  mmol/L


 


Chloride     ()  mmol/L


 


Carbon Dioxide     (22-30)  mmol/L


 


BUN     (9-20)  mg/dL


 


BUN/Creatinine Ratio  24.11 H    (12.00-20.00)  Ratio


 


Glucose  195 H    ()  mg/dL


 


POC Glucose (mg/dL)   220 H  183 H  (75-99)  mg/dL


 


Calcium  8.4 L    (8.7-10.3)  mg/dL














  12/18/21 12/19/21 12/19/21 Range/Units





  19:59 04:58 04:58 


 


APTT   54.4 H   (22.0-30.0)  sec


 


Potassium    2.4 L*  (3.5-5.5)  mmol/L


 


Chloride    93 L  ()  mmol/L


 


Carbon Dioxide    37 H  (22-30)  mmol/L


 


BUN    25 H  (9-20)  mg/dL


 


BUN/Creatinine Ratio     (12.00-20.00)  Ratio


 


Glucose    224 H  ()  mg/dL


 


POC Glucose (mg/dL)  216 H    (75-99)  mg/dL


 


Calcium     (8.7-10.3)  mg/dL














  12/19/21 Range/Units





  07:30 


 


APTT   (22.0-30.0)  sec


 


Potassium   (3.5-5.5)  mmol/L


 


Chloride   ()  mmol/L


 


Carbon Dioxide   (22-30)  mmol/L


 


BUN   (9-20)  mg/dL


 


BUN/Creatinine Ratio   (12.00-20.00)  Ratio


 


Glucose   ()  mg/dL


 


POC Glucose (mg/dL)  192 H  (75-99)  mg/dL


 


Calcium   (8.7-10.3)  mg/dL








                      Microbiology - Last 24 Hours (Table)











 12/16/21 10:55 Blood Culture - Preliminary





 Blood    No Growth after 48 hours


 


 12/16/21 11:10 Blood Culture - Preliminary





 Blood    No Growth after 48 hours














Assessment and Plan


Assessment: 


Severe hypokalemia





Left lower lobe community-acquired pneumonia also involving the right lower lobe





Masslike process of the left lower lobe cannot be excluded, patient will need 

further radiographs to document resolution in 8-12 weeks





Acute hypoxic respiratory failure





Elevated troponin





History of COVID-19 pneumonia





History of COPD


Plan: 








Replace potassium 





Broad-spectrum IV antibiotics with Rocephin and Zithromax





Bronchodilators





IV steroids





Supplemental oxygen with slow tapering





IV heparin per cardiovascular services





Follow up radiographic studies the outpatient





Further plan of care and recommendation as per clinical response of the patient





Time with Patient: Greater than 30

## 2021-12-20 LAB
ANION GAP SERPL CALC-SCNC: 7 MMOL/L
BASOPHILS # BLD AUTO: 0 K/UL (ref 0–0.2)
BASOPHILS NFR BLD AUTO: 0 %
BUN SERPL-SCNC: 25 MG/DL (ref 9–20)
CALCIUM SPEC-MCNC: 8.4 MG/DL (ref 8.4–10.2)
CHLORIDE SERPL-SCNC: 97 MMOL/L (ref 98–107)
CO2 SERPL-SCNC: 36 MMOL/L (ref 22–30)
EOSINOPHIL # BLD AUTO: 0.1 K/UL (ref 0–0.7)
EOSINOPHIL NFR BLD AUTO: 1 %
ERYTHROCYTE [DISTWIDTH] IN BLOOD BY AUTOMATED COUNT: 6.04 M/UL (ref 4.3–5.9)
ERYTHROCYTE [DISTWIDTH] IN BLOOD: 14.2 % (ref 11.5–15.5)
GLUCOSE BLD-MCNC: 144 MG/DL (ref 75–99)
GLUCOSE BLD-MCNC: 189 MG/DL (ref 75–99)
GLUCOSE BLD-MCNC: 203 MG/DL (ref 75–99)
GLUCOSE BLD-MCNC: 205 MG/DL (ref 75–99)
GLUCOSE SERPL-MCNC: 202 MG/DL (ref 74–99)
HCT VFR BLD AUTO: 53.5 % (ref 39–53)
HGB BLD-MCNC: 17.1 GM/DL (ref 13–17.5)
LYMPHOCYTES # SPEC AUTO: 0.3 K/UL (ref 1–4.8)
LYMPHOCYTES NFR SPEC AUTO: 2 %
MCH RBC QN AUTO: 28.3 PG (ref 25–35)
MCHC RBC AUTO-ENTMCNC: 32 G/DL (ref 31–37)
MCV RBC AUTO: 88.5 FL (ref 80–100)
MONOCYTES # BLD AUTO: 0.6 K/UL (ref 0–1)
MONOCYTES NFR BLD AUTO: 5 %
NEUTROPHILS # BLD AUTO: 11.7 K/UL (ref 1.3–7.7)
NEUTROPHILS NFR BLD AUTO: 92 %
PLATELET # BLD AUTO: 258 K/UL (ref 150–450)
POTASSIUM SERPL-SCNC: 3.9 MMOL/L (ref 3.5–5.1)
SODIUM SERPL-SCNC: 140 MMOL/L (ref 137–145)
WBC # BLD AUTO: 12.8 K/UL (ref 3.8–10.6)

## 2021-12-20 RX ADMIN — ASPIRIN 81 MG CHEWABLE TABLET SCH MG: 81 TABLET CHEWABLE at 09:01

## 2021-12-20 RX ADMIN — FORMOTEROL FUMARATE DIHYDRATE SCH MCG: 20 SOLUTION RESPIRATORY (INHALATION) at 20:06

## 2021-12-20 RX ADMIN — METHYLPREDNISOLONE SODIUM SUCCINATE SCH MG: 125 INJECTION, POWDER, FOR SOLUTION INTRAMUSCULAR; INTRAVENOUS at 12:45

## 2021-12-20 RX ADMIN — OXYMETAZOLINE HCL SCH SPRAY: 0.05 SPRAY NASAL at 21:55

## 2021-12-20 RX ADMIN — DOXYCYCLINE SCH MG: 100 CAPSULE ORAL at 09:02

## 2021-12-20 RX ADMIN — FUROSEMIDE SCH MG: 10 INJECTION, SOLUTION INTRAMUSCULAR; INTRAVENOUS at 21:54

## 2021-12-20 RX ADMIN — IPRATROPIUM BROMIDE AND ALBUTEROL SULFATE SCH ML: .5; 3 SOLUTION RESPIRATORY (INHALATION) at 16:19

## 2021-12-20 RX ADMIN — Medication SCH MG: at 12:45

## 2021-12-20 RX ADMIN — IPRATROPIUM BROMIDE AND ALBUTEROL SULFATE SCH ML: .5; 3 SOLUTION RESPIRATORY (INHALATION) at 20:06

## 2021-12-20 RX ADMIN — POTASSIUM CHLORIDE SCH MEQ: 20 TABLET, EXTENDED RELEASE ORAL at 10:28

## 2021-12-20 RX ADMIN — INSULIN ASPART SCH: 100 INJECTION, SOLUTION INTRAVENOUS; SUBCUTANEOUS at 13:52

## 2021-12-20 RX ADMIN — POTASSIUM CHLORIDE SCH MEQ: 20 TABLET, EXTENDED RELEASE ORAL at 09:01

## 2021-12-20 RX ADMIN — INSULIN ASPART SCH UNIT: 100 INJECTION, SOLUTION INTRAVENOUS; SUBCUTANEOUS at 09:02

## 2021-12-20 RX ADMIN — FUROSEMIDE SCH MG: 10 INJECTION, SOLUTION INTRAMUSCULAR; INTRAVENOUS at 09:05

## 2021-12-20 RX ADMIN — OXYMETAZOLINE HCL SCH SPRAY: 0.05 SPRAY NASAL at 09:03

## 2021-12-20 RX ADMIN — FOLIC ACID SCH MG: 1 TABLET ORAL at 12:45

## 2021-12-20 RX ADMIN — BUDESONIDE SCH MG: 1 SUSPENSION RESPIRATORY (INHALATION) at 20:06

## 2021-12-20 RX ADMIN — METOPROLOL SUCCINATE SCH MG: 25 TABLET, EXTENDED RELEASE ORAL at 09:01

## 2021-12-20 RX ADMIN — THERA TABS SCH EACH: TAB at 12:45

## 2021-12-20 RX ADMIN — INSULIN ASPART SCH UNIT: 100 INJECTION, SOLUTION INTRAVENOUS; SUBCUTANEOUS at 17:53

## 2021-12-20 RX ADMIN — BUDESONIDE SCH MG: 1 SUSPENSION RESPIRATORY (INHALATION) at 09:13

## 2021-12-20 RX ADMIN — DICLOFENAC SODIUM SCH GM: 10 GEL TOPICAL at 10:27

## 2021-12-20 RX ADMIN — DICLOFENAC SODIUM SCH: 10 GEL TOPICAL at 21:54

## 2021-12-20 RX ADMIN — METHYLPREDNISOLONE SODIUM SUCCINATE SCH MG: 40 INJECTION, POWDER, FOR SOLUTION INTRAMUSCULAR; INTRAVENOUS at 21:55

## 2021-12-20 RX ADMIN — CEFAZOLIN SCH MLS/HR: 330 INJECTION, POWDER, FOR SOLUTION INTRAMUSCULAR; INTRAVENOUS at 01:29

## 2021-12-20 RX ADMIN — INSULIN ASPART SCH UNIT: 100 INJECTION, SOLUTION INTRAVENOUS; SUBCUTANEOUS at 21:54

## 2021-12-20 RX ADMIN — DEXTRAN 70 AND HYPROMELLOSE 2910 SCH: 1; 3 SOLUTION/ DROPS OPHTHALMIC at 13:53

## 2021-12-20 RX ADMIN — IPRATROPIUM BROMIDE AND ALBUTEROL SULFATE SCH ML: .5; 3 SOLUTION RESPIRATORY (INHALATION) at 09:13

## 2021-12-20 RX ADMIN — PRAVASTATIN SODIUM SCH MG: 80 TABLET ORAL at 21:53

## 2021-12-20 RX ADMIN — IPRATROPIUM BROMIDE AND ALBUTEROL SULFATE SCH ML: .5; 3 SOLUTION RESPIRATORY (INHALATION) at 11:48

## 2021-12-20 RX ADMIN — FINASTERIDE SCH MG: 5 TABLET, FILM COATED ORAL at 21:53

## 2021-12-20 RX ADMIN — AZITHROMYCIN MONOHYDRATE SCH MLS/HR: 500 INJECTION, POWDER, LYOPHILIZED, FOR SOLUTION INTRAVENOUS at 09:11

## 2021-12-20 RX ADMIN — FLUTICASONE PROPIONATE SCH SPRAY: 50 SPRAY, METERED NASAL at 09:03

## 2021-12-20 RX ADMIN — METOPROLOL SUCCINATE SCH MG: 25 TABLET, EXTENDED RELEASE ORAL at 21:52

## 2021-12-20 RX ADMIN — DOXYCYCLINE SCH MG: 100 CAPSULE ORAL at 21:53

## 2021-12-20 RX ADMIN — DEXTRAN 70 AND HYPROMELLOSE 2910 SCH: 1; 3 SOLUTION/ DROPS OPHTHALMIC at 21:55

## 2021-12-20 RX ADMIN — DICLOFENAC SODIUM SCH: 10 GEL TOPICAL at 12:44

## 2021-12-20 RX ADMIN — DEXTRAN 70 AND HYPROMELLOSE 2910 SCH: 1; 3 SOLUTION/ DROPS OPHTHALMIC at 09:11

## 2021-12-20 RX ADMIN — FORMOTEROL FUMARATE DIHYDRATE SCH MCG: 20 SOLUTION RESPIRATORY (INHALATION) at 09:13

## 2021-12-20 RX ADMIN — METHYLPREDNISOLONE SODIUM SUCCINATE SCH MG: 125 INJECTION, POWDER, FOR SOLUTION INTRAMUSCULAR; INTRAVENOUS at 05:24

## 2021-12-20 NOTE — P.PN
Subjective


Progress Note Date: 12/19/21














This is a 76 year old male who was recently admitted with acute bilateral 

pneumonia left more than right with possible community-acquired and also gram-

negative pneumonia with possible sepsis and is being closely monitored.  

cardiology following and patient is maintained on Lasix along with IV heparin 

and will continue.  Chest x-ray today shows no change in the mild to moderate 

diffuse interstitial infiltrates and worsening of the infiltrate in the 

retrocardiac opacity consistent with atelectasis or alveolar infiltrate area did

patient continues on 6 L of oxygen via nasal cannula and states he feels his 

breathing is somewhat improved although maintaining 90% on 6 L.  Patient is 

extremely weak and will have PT/OT evaluate the patient. Pulmonary is also 

following the patient is maintained on antibiotics along with breathing 

inhalational treatments and will continue.





12/19/2021





Patient is seen this morning and continues with shortness of breath and being 

followed by cardiology and pulmonary Dr. Moses. Patient continues on 6L via NC and

states his breathing is improving slowly. Patient continued on bronchodilators 

and will continue. Patient also continues on IV heparin and cardiology following

and recommending to continue for now. Patient potassium is 2.7 and will continue

to replace and repeat labs later this afternoon. Recommend follow up labs in the

am as well. Patient having some congestion and continues to blow his nose and is

having a mild nose bleed. Encouraged the patient to avoid excessive blowing of 

nose and will add afrin. Patient is on IV heparin. No reports of chest pain or 

palpitations. 





Labs:





AP TTT is 54.4, sodium is 140, potassium 2.4 with replacement still 2.7 and will

continue to replace, BUN is 25, creatinine 0.84, calcium 8.4





Review of systems:


Constitutional: reports of fatigue,no  reports of fever, or chills


Cardiovascular: No reports of chest pain or palpitations


Respiratory: reports of shortness of breath 


GI: No reports of nausea, vomiting, or diarrhea


: No reports of dysuria or retention


Neurovascular: no reports of weakness 





All medications have been reviewed





Active Medications





Hydrocodone Bitart/Acetaminophen (Hydrocodone/Apap 5-325mg 1 Each Tab)  1 each 

PO Q6HR PRN


   PRN Reason: Pain


Albuterol/Ipratropium (Ipratropium-Albuterol 3 Ml Neb)  3 ml INHALATION RT-Q4H 

PRN


   PRN Reason: Shortness Of Breath Or Wheezing


Albuterol/Ipratropium (Ipratropium-Albuterol 3 Ml Neb)  3 ml INHALATION RT-QID 

[facility]


   Last Admin: 12/19/21 11:39 Dose:  3 ml


   Documented by: 


Alprazolam (Alprazolam 0.25 Mg Tab)  0.25 mg PO TID PRN


   PRN Reason: Anxiety


Artificial Tears (Artificial Tears-Hypromellose Drops 15 Ml Btl)  1 drops BOTH 

EYES TID [facility]


   Last Admin: 12/19/21 09:21 Dose:  1 drops


   Documented by: 


Aspirin (Aspirin 81 Mg)  81 mg PO DAILY [facility]


   Last Admin: 12/19/21 09:11 Dose:  81 mg


   Documented by: 


Budesonide (Budesonide 1 Mg/2 Ml Nebu)  1 mg INHALATION RT-BID [facility]


   Last Admin: 12/19/21 08:19 Dose:  1 mg


   Documented by: 


Diclofenac Sodium (Diclofenac Sodium Gel 100 Gm Tube)  1 gm TOPICAL BID [facility]


   Last Admin: 12/19/21 09:22 Dose:  1 gm


   Documented by: 


Doxycycline Monohydrate (Doxycycline 100 Mg Cap)  100 mg PO BID [facility]


   Last Admin: 12/19/21 09:23 Dose:  100 mg


   Documented by: 


Finasteride (Finasteride 5 Mg Tab)  5 mg PO HS [facility]


   Last Admin: 12/18/21 20:51 Dose:  5 mg


   Documented by: 


Fluticasone Propionate (Fluticasone 50mcg/Spray Nasal 16gm)  2 spray EA NOSTRIL 

DAILY [facility]


   Last Admin: 12/19/21 09:24 Dose:  2 spray


   Documented by: 


Folic Acid (Folic Acid 1 Mg Tab)  1 mg PO DAILY@1200 [facility]


   Last Admin: 12/19/21 09:28 Dose:  1 mg


   Documented by: 


Formoterol Fumarate (Formoterol Fumarate 20 Mcg/2 Ml Nebu)  20 mcg INHALATION 

RT-BID [facility]


   Last Admin: 12/19/21 08:19 Dose:  20 mcg


   Documented by: 


Furosemide (Furosemide 20 Mg Tab)  20 mg PO BID [facility]


   Last Admin: 12/19/21 09:27 Dose:  20 mg


   Documented by: 


Sodium Chloride (Saline 0.9%)  1,000 mls @ 130 mls/hr IV .Q7H42M [facility]


   Last Admin: 12/19/21 09:53 Dose:  Not Given


   Documented by: 


Ceftriaxone Sodium 1 gm/ (Sodium Chloride)  50 mls @ 100 mls/hr IVPB Q24HR [facility]


   Last Admin: 12/19/21 09:14 Dose:  100 mls/hr


   Documented by: 


Azithromycin 500 mg/ Sodium (Chloride)  250 mls @ 250 mls/hr IVPB DAILY [facility]


   Last Admin: 12/19/21 09:49 Dose:  250 mls/hr


   Documented by: 


Insulin Aspart (Insulin Aspart (Novolog) 100 Unit/Ml Vial)  0 unit SQ ACHS [facility]; 

Protocol


   Last Admin: 12/19/21 13:07 Dose:  3 unit


   Documented by: 


Lisinopril (Lisinopril 10 Mg Tab)  5 mg PO BID [facility]


Loratadine (Loratadine 10 Mg Tab)  10 mg PO DAILY PRN


   PRN Reason: Allergy Symptoms


Meloxicam (Meloxicam 7.5 Mg Tab)  7.5 mg PO DAILY PRN


   PRN Reason: Pain


   Last Admin: 12/19/21 09:47 Dose:  7.5 mg


   Documented by: 


Methylprednisolone Sodium Succinate (Methylprednisolone Sod Succi 125 Mg/2 Ml 

Vial)  60 mg IV Q6HR [facility]


   Last Admin: 12/19/21 13:06 Dose:  60 mg


   Documented by: 


Metoprolol Succinate (Metoprolol Succinate (Er) 25 Mg Tab.Er.24h)  25 mg PO BID 

[facility]


   Last Admin: 12/19/21 09:27 Dose:  25 mg


   Documented by: 


Miscellaneous Information (Potassium Replacement Protocol 1 Each Misc)  1 each M

ISCELLANE DAILY PRN; Protocol


   PRN Reason: Per Protocol


Multivitamins (Multivitamins, Thera 1 Each Tab)  1 each PO DAILY@1200 [facility]


   Last Admin: 12/19/21 09:27 Dose:  1 each


   Documented by: 


Oxymetazoline HCl (Oxymetazoline 0.05% Nasl Spray 1 Spray Bottle)  2 spray NASAL

BID [facility]


   Last Admin: 12/19/21 13:07 Dose:  2 spray


   Documented by: 


Potassium Chloride (Potassium Chloride Er 20 Meq Tab.Er)  20 meq PO Q1HR [facility]; 

Protocol


   Stop: 12/19/21 15:01


   Last Admin: 12/19/21 14:03 Dose:  20 meq


   Documented by: 


Pravastatin Sodium (Pravastatin Sodium 80 Mg Tab)  80 mg PO HS [facility]


   Last Admin: 12/18/21 20:52 Dose:  80 mg


   Documented by: 


Thiamine HCl (Thiamine 100 Mg Tab)  100 mg PO DAILY@1200 [facility]


   Last Admin: 12/19/21 09:27 Dose:  100 mg


   Documented by: 








Physical exam:





Gen: This is a 76-year-old male awake, alert and oriented 3, well-developed, 

well-nourished, ill appearing.  Temp is 97.7 F, pulse is 81, respirations are 

14, blood pressure is 99/59, oxygen saturation is 96% on 6 L via nasal cannula


HEENT: Head is atraumatic, normocephalic. Pupils equal, round. Sclerae is 

anicteric. left nare nose bleed as patient is blowing nose currently on exam


NECK: Supple. No JVD. No lymphadenopathy. No thyromegaly. 


LUNGS: Diminished breath sounds bilaterally with some crackles and rhonchi 

noted..  No intercostal retractions.


HEART: S1, S2 are muffled. 


ABDOMEN: Soft.  Bowel sounds are present. No masses.  No tenderness.


EXTREMITIES: No pedal edema.  No calf tenderness. 


NEUROLOGICAL: Patient is awake, alert and oriented x3. no focal deficits.  

diffusely weak





Assessment:





Acute bilateral pneumonia left more than right possibly community-acquired, with

possible gram-negative sepsis with acute hypoxic respiratory failure, present on

admission


Asthma, chronic obstructive pulmonary disease acute exacerbation, present on 

admission


Possible congestive heart failure acute exacerbation acute on chronic systolic 

dysfunction, ejection fraction 35-40%


mild epistaxis 


heparin monitoring


Increased WBC


Hyponatremia


Elevated random glucose


Elevated bilirubin, AST, ALT possibly hepatitis of undetermined etiology


Troponin 1.170, possible type II myocardial infarction


Hypertension


History of prostate disorder


history of appendectomy


History back surgery, degenerative joint disease


Full code





Plan:





Recommend to continue current medications and management.  Cardiology following 

the patient and patient maintained on oral Lasix and IV fluids. IV heparin 

infusing. Patient having a mild nose bleed status post continued nose blowing 

for congestion. Encouraged the patient to wipe the nose and avoid nose blowing 

and will add afrin and monitor closely as patient is on IV heparin. Will discuss

with cardiology about oral anticoagulant or treatment plan moving forward.  

Continue with IV steroids, IV ceftriaxone and Zithromax along with doxycycline. 

Pulmonary Dr. Moses following as well.  Patient continues to be extremely weak and

will have PT/OT evaluate the patient.  Patient is on 6 L of oxygen via nasal 

cannula with oxygen saturation of 96% and will continue to wean as tolerated.  

Continue with breathing inhalational treatments as well.  Repeat am labs.  Due 

to multiple complex medical issues, prognosis is guarded. 








Objective





- Vital Signs


Vital signs: 


                                   Vital Signs











Temp  98.2 F   12/19/21 05:00


 


Pulse  88   12/19/21 08:19


 


Resp  20   12/19/21 05:00


 


BP  144/79   12/19/21 05:00


 


Pulse Ox  96   12/19/21 05:00








                                 Intake & Output











 12/18/21 12/19/21 12/19/21





 18:59 06:59 18:59


 


Intake Total 1246.573 590 


 


Output Total 2000 600 


 


Balance -753.427 -10 


 


Intake:   


 


  Intake, IV Titration 1006.573  





  Amount   


 


    Azithromycin 500 mg In 250  





    Sodium Chloride 0.9% 250   





    ml @ 250 mls/hr IVPB   





    [facility] Rx#:[id number]   


 


    Heparin Sod,Pork in 0.45% 156.573  





    NaCl 25,000 unit In 0.45   





    % NaCl 1 250ml.bag @ 12   





    UNITS/KG/HR 7.62 mls/hr   





    IV .Q24H [facility] Rx#:   





    [id number]   


 


    Sodium Chloride 0.9% 1, 500  





    000 ml @ 130 mls/hr IV .   





    Q7H42M [facility] Rx#:[id number]   


 


    cefTRIAXone 1 gm In 100  





    Sodium Chloride 0.9% 50   





    ml @ 100 mls/hr IVPB   





    Q24HR [facility] Rx#:[id number]   


 


  Oral 240 590 


 


Output:   


 


  Urine 2000 600 


 


Other:   


 


  # Voids 1  














- Labs


CBC & Chem 7: 


                                 12/18/21 05:02





                                 12/19/21 23:22


Labs: 


                  Abnormal Lab Results - Last 24 Hours (Table)











  12/18/21 12/18/21 12/18/21 Range/Units





  05:02 12:52 17:09 


 


APTT     (22.0-30.0)  sec


 


Potassium  2.8 L    (3.5-5.5)  mmol/L


 


Chloride     ()  mmol/L


 


Carbon Dioxide     (22-30)  mmol/L


 


BUN     (9-20)  mg/dL


 


BUN/Creatinine Ratio  24.11 H    (12.00-20.00)  Ratio


 


Glucose  195 H    ()  mg/dL


 


POC Glucose (mg/dL)   220 H  183 H  (75-99)  mg/dL


 


Calcium  8.4 L    (8.7-10.3)  mg/dL














  12/18/21 12/19/21 12/19/21 Range/Units





  19:59 04:58 04:58 


 


APTT   54.4 H   (22.0-30.0)  sec


 


Potassium    2.4 L*  (3.5-5.5)  mmol/L


 


Chloride    93 L  ()  mmol/L


 


Carbon Dioxide    37 H  (22-30)  mmol/L


 


BUN    25 H  (9-20)  mg/dL


 


BUN/Creatinine Ratio     (12.00-20.00)  Ratio


 


Glucose    224 H  ()  mg/dL


 


POC Glucose (mg/dL)  216 H    (75-99)  mg/dL


 


Calcium     (8.7-10.3)  mg/dL














  12/19/21 Range/Units





  07:30 


 


APTT   (22.0-30.0)  sec


 


Potassium   (3.5-5.5)  mmol/L


 


Chloride   ()  mmol/L


 


Carbon Dioxide   (22-30)  mmol/L


 


BUN   (9-20)  mg/dL


 


BUN/Creatinine Ratio   (12.00-20.00)  Ratio


 


Glucose   ()  mg/dL


 


POC Glucose (mg/dL)  192 H  (75-99)  mg/dL


 


Calcium   (8.7-10.3)  mg/dL








                      Microbiology - Last 24 Hours (Table)











 12/16/21 10:55 Blood Culture - Preliminary





 Blood    No Growth after 48 hours


 


 12/16/21 11:10 Blood Culture - Preliminary





 Blood    No Growth after 48 hours

## 2021-12-20 NOTE — P.PN
Subjective


Progress Note Date: 12/20/21





HISTORY OF PRESENT ILLNESS: 





This is a pleasant 76-year-old male past medical history significant for 

hypertension, asthma, Covid-19 in March 2021. He does not follow with a cardiolo

gist. His pulmonologist is Dr. Moses. We have been asked to see in consultation 

for elevated troponin. Patient presents to the emergency department with 

complaints of cough and worsening shortness of breath for the past several days.

He was hypoxic on presentation and placed on 5L nasal cannula. He denies any 

chest pain, palpitations, lightheadedness, dizziness or syncope. On admission, 

patient's chest xray revealed bilateral infiltrate correlate for pneumonia.  EKG

revealed sinus tachycardia, rate 123, no significant ST-T wave abnormalities. On

telemetry he is in sinus tachycardic HR 120s. Troponin was elevated at 1.1. 


He denies history of CAD, MI, Stroke, or diabetes. He denies diagnosis of COPD. 

He is a non-smoker, quit 50+ years ago. 





DIAGNOSTICS


Chest CT revealed no evidence of pulmonary embolism, mass like consolidation in 

the left lower lobe suspicious for focal pneumonia.  Smaller focus groundglass 

opacities medial right lower lobe


Laboratory reviewed, WBC 17, Hgb 18, Plt 272, Sodium 135, K 3.9, BUN 24, sCr 

0.72, Mag 2.0, Trop 1.1, covid pcr negative, influenza pcr negative 


Current home medications include Decadron 





12/17/2021


Patient examined this morning at the bedside.  Patient denies any chest pain or 

pressure.  Patient denies having any chest pain prior to coming to the hospital.

 He does report mild shortness of breath.  He remains on 5 L nasal cannula.  

Patient is tachycardic this morning with a heart rate around 110.  Blood 

pressure 125/75.  Echocardiogram completed revealing ejection fraction 35-40%, 

mid anterior, mid lateral, mid anterior septal, apical anterior, apical lateral,

apical inferior, and apical septal LV wall hypokinesis, mild mitral 

regurgitation, and mild tricuspid regurgitation.  There is no prior 

echocardiogram available for review.  Upon questioning the patient further, he 

denies any known history of cardiomyopathy.





12/20/2021


Patient examined this morning at the bedside. Patient denies chest pain or 

pressure. He remains on 6L NC. He is eager to go home. However, he continues to 

report SOB at times. He states he is unable to lay flat in bed due to SOB. 





PHYSICAL EXAM: 


VITAL SIGNS: Reviewed.


GENERAL: Well-developed in no acute distress. 


NECK: Supple. No JVD or thyromegaly


LUNGS: Respirations even and unlabored. Lungs diminished with crackles noted.


HEART: Regular rate and rhythm.  S1 and S2 heard.


EXTREMITIES: Normal range of motion.  No clubbing or cyanosis.  Peripheral puls

es intact.  No lower extremity edema





ASSESSMENT: 


Shortness of breath


Acute asthma/COPD exacerbation


Non-STEMI


Pneumonia


New onset cardiomyopathy, etiology unclear


Former nicotine dependence





PLAN: 


Continue current cardiac medications


Discontinue oral lasix. Begin IV lasix 40mg Q12 hours for 24 hours. Will re-

assess tomorrow.


Patient will require cardiac cath in the future when his acute pulmonary 

conditions have resolved and he is able to lay flat due to new onset 

cardiomyopathy to rule out CAD


Further recommendations pending patient's course








Nurse practitioner note has been reviewed by physician. Signing provider agrees 

with the documented findings, assessment, and plan of care. 








Objective





- Vital Signs


Vital signs: 


                                   Vital Signs











Temp  97.7 F   12/20/21 05:00


 


Pulse  82   12/20/21 09:32


 


Resp  20   12/20/21 05:00


 


BP  146/94   12/20/21 09:18


 


Pulse Ox  95   12/20/21 05:00








                                 Intake & Output











 12/19/21 12/20/21 12/20/21





 18:59 06:59 18:59


 


Intake Total 430 590 


 


Output Total 750 1120 


 


Balance -320 -530 


 


Intake:   


 


  Intake, IV Titration 250  





  Amount   


 


    Heparin Sod,Pork in 0.45% 250  





    NaCl 25,000 unit In 0.45   





    % NaCl 1 250ml.bag @ 12   





    UNITS/KG/HR 7.62 mls/hr   





    IV .Q24H [facility] Rx#:   





    [id number]   


 


  Oral 180 590 


 


Output:   


 


  Urine 750 1120 


 


Other:   


 


  Voiding Method Urinal Urinal 


 


  # Voids 1  


 


  # Bowel Movements 2  














- Labs


CBC & Chem 7: 


                                 12/20/21 06:22





                                 12/20/21 06:22


Labs: 


                  Abnormal Lab Results - Last 24 Hours (Table)











  12/19/21 12/19/21 12/19/21 Range/Units





  11:22 12:30 16:57 


 


WBC     (3.8-10.6)  k/uL


 


RBC     (4.30-5.90)  m/uL


 


Hct     (39.0-53.0)  %


 


Neutrophils #     (1.3-7.7)  k/uL


 


Lymphocytes #     (1.0-4.8)  k/uL


 


Potassium  2.7 L*   3.0 L  (3.5-5.1)  mmol/L


 


Chloride     ()  mmol/L


 


Carbon Dioxide     (22-30)  mmol/L


 


BUN     (9-20)  mg/dL


 


Creatinine     (0.66-1.25)  mg/dL


 


Glucose     (74-99)  mg/dL


 


POC Glucose (mg/dL)   211 H   (75-99)  mg/dL














  12/19/21 12/19/21 12/19/21 Range/Units





  17:22 21:28 23:22 


 


WBC     (3.8-10.6)  k/uL


 


RBC     (4.30-5.90)  m/uL


 


Hct     (39.0-53.0)  %


 


Neutrophils #     (1.3-7.7)  k/uL


 


Lymphocytes #     (1.0-4.8)  k/uL


 


Potassium    3.4 L  (3.5-5.1)  mmol/L


 


Chloride     ()  mmol/L


 


Carbon Dioxide     (22-30)  mmol/L


 


BUN     (9-20)  mg/dL


 


Creatinine     (0.66-1.25)  mg/dL


 


Glucose     (74-99)  mg/dL


 


POC Glucose (mg/dL)  225 H  215 H   (75-99)  mg/dL














  12/20/21 12/20/21 12/20/21 Range/Units





  06:22 06:22 07:26 


 


WBC   12.8 H   (3.8-10.6)  k/uL


 


RBC   6.04 H   (4.30-5.90)  m/uL


 


Hct   53.5 H   (39.0-53.0)  %


 


Neutrophils #   11.7 H   (1.3-7.7)  k/uL


 


Lymphocytes #   0.3 L   (1.0-4.8)  k/uL


 


Potassium     (3.5-5.1)  mmol/L


 


Chloride  97 L    ()  mmol/L


 


Carbon Dioxide  36 H    (22-30)  mmol/L


 


BUN  25 H    (9-20)  mg/dL


 


Creatinine  0.64 L    (0.66-1.25)  mg/dL


 


Glucose  202 H    (74-99)  mg/dL


 


POC Glucose (mg/dL)    189 H  (75-99)  mg/dL








                      Microbiology - Last 24 Hours (Table)











 12/16/21 10:55 Blood Culture - Preliminary





 Blood    No Growth after 72 hours


 


 12/16/21 11:10 Blood Culture - Preliminary





 Blood    No Growth after 72 hours

## 2021-12-20 NOTE — P.PN
Subjective


Progress Note Date: 12/20/21


Principal diagnosis: 


Left lower lobe community-acquired pneumonia also involving the right lower lobe





Mass like process of the left lower lobe cannot be excluded, patient will need 

further radiographs to document resolution in 8-12 weeks





Acute hypoxic respiratory failure





Elevated troponin





History of COVID-19 pneumonia





History of COPD





09/20/2021, patient seen eval examined during the rounds labs reviewed 

medications reviewed care plan discussed, respiratory status remains stable, 

however still cough congestion shortness of breath going on but severity has 

improved patient remains on 6 L nasal cannula, afebrile with stable hemodynamics

respiratory rate is 20, saturation 95%, labs reviewed potassium is 3.9 improved 

significantly as well chemistry is stable BUN/creatinine 25/.64 recommend to 

titrate oxygen down to bring it to 3 or 2 L





12/19/2021, patient seen eval examined during the rounds labs reviewed 

medications reviewed care plan discussed, patient remains on 6 L oxygen, oxygen 

saturation is 96%, labs reviewed sodium is 140 potassium is 2.4 BUN/creatinine 2

5/0.84, glucose 224, currently patient remains on bronchodilators and 

antibiotics Zithromax and Rocephin, heparin drip, and Solu-Medrol 60 IV every 6,

patient is still complaining of shortness of breath and intermittent wheezing





Patient is a 76-year-old with a remote history of smoking prior history of COPD 

lately have been more short of breath congested recently seen in the office for 

the first time preliminary workup including PFTs labs and x-rays have been 

ordered, patient was complaining of shortness of breath and was hypoxic patient 

has recently placed on home oxygen, chest for the last 2 days started getting 

more shortness of breath cough congestion and fever up to 101, patient has not 

vaccinated for COVID-19 pneumonia, prior history of asthma hypertension prostate

problem enlargement, on arrival patient was tachypneic.  Cardiac with low oxygen

saturation of 90% blood pressure was 122/73, heart rate is 120, respiratory rate

20,Shortness breath patient is a 76-year-old male with the history of hypoxic, 

patient does have a history of Crohn enteritis pneumonia infection before she 

this admission white cell count 70,700 hemoglobin and hematocrit is 24/7.7 to 

hemoglobin is 18, influenza A and B both negative: Negative, troponin elevated 

1.1, computed tomography scan of the chest negative for pulmonary embolism 

however masslike consolidation left lower lobe is present consistent with focal 

pneumonia she infiltrated right lower lobe








Objective





- Vital Signs


Vital signs: 


                                   Vital Signs











Temp  97.7 F   12/20/21 05:00


 


Pulse  91   12/20/21 05:00


 


Resp  20   12/20/21 05:00


 


BP  145/81   12/20/21 05:00


 


Pulse Ox  95   12/20/21 05:00








                                 Intake & Output











 12/19/21 12/20/21 12/20/21





 18:59 06:59 18:59


 


Intake Total 430 590 


 


Output Total 750 1120 


 


Balance -320 -530 


 


Intake:   


 


  Intake, IV Titration 250  





  Amount   


 


    Heparin Sod,Pork in 0.45% 250  





    NaCl 25,000 unit In 0.45   





    % NaCl 1 250ml.bag @ 12   





    UNITS/KG/HR 7.62 mls/hr   





    IV .Q24H [facility] Rx#:   





    [id number]   


 


  Oral 180 590 


 


Output:   


 


  Urine 750 1120 


 


Other:   


 


  Voiding Method Urinal Urinal 


 


  # Voids 1  


 


  # Bowel Movements 2  














- Exam





- Constitutional


General appearance: average body habitus, cooperative, disheveled, mild distress





- EENT


Eyes: PERRLA


Ears: bilateral: normal





- Neck


Neck: normal ROM


Carotids: bilateral: upstroke normal





- Respiratory


Respiratory: bilateral: diminished, wheezing





- Cardiovascular


Rhythm: regular


Heart sounds: normal: S1, S2





- Gastrointestinal


General gastrointestinal: distended, normal bowel sounds





- Integumentary


Integumentary: normal turgor





- Neurologic


Neurologic: CNII-XII intact, focal deficits





- Musculoskeletal


Musculoskeletal: gait normal, generalized weakness, strength equal bilaterally





- Psychiatric


Psychiatric: A&O x's 3, appropriate affect, intact judgment & insight








- Labs


CBC & Chem 7: 


                                 12/20/21 06:22





                                 12/20/21 06:22


Labs: 


                  Abnormal Lab Results - Last 24 Hours (Table)











  12/19/21 12/19/21 12/19/21 Range/Units





  11:22 12:30 16:57 


 


WBC     (3.8-10.6)  k/uL


 


RBC     (4.30-5.90)  m/uL


 


Hct     (39.0-53.0)  %


 


Neutrophils #     (1.3-7.7)  k/uL


 


Lymphocytes #     (1.0-4.8)  k/uL


 


Potassium  2.7 L*   3.0 L  (3.5-5.1)  mmol/L


 


Chloride     ()  mmol/L


 


Carbon Dioxide     (22-30)  mmol/L


 


BUN     (9-20)  mg/dL


 


Creatinine     (0.66-1.25)  mg/dL


 


Glucose     (74-99)  mg/dL


 


POC Glucose (mg/dL)   211 H   (75-99)  mg/dL














  12/19/21 12/19/21 12/19/21 Range/Units





  17:22 21:28 23:22 


 


WBC     (3.8-10.6)  k/uL


 


RBC     (4.30-5.90)  m/uL


 


Hct     (39.0-53.0)  %


 


Neutrophils #     (1.3-7.7)  k/uL


 


Lymphocytes #     (1.0-4.8)  k/uL


 


Potassium    3.4 L  (3.5-5.1)  mmol/L


 


Chloride     ()  mmol/L


 


Carbon Dioxide     (22-30)  mmol/L


 


BUN     (9-20)  mg/dL


 


Creatinine     (0.66-1.25)  mg/dL


 


Glucose     (74-99)  mg/dL


 


POC Glucose (mg/dL)  225 H  215 H   (75-99)  mg/dL














  12/20/21 12/20/21 12/20/21 Range/Units





  06:22 06:22 07:26 


 


WBC   12.8 H   (3.8-10.6)  k/uL


 


RBC   6.04 H   (4.30-5.90)  m/uL


 


Hct   53.5 H   (39.0-53.0)  %


 


Neutrophils #   11.7 H   (1.3-7.7)  k/uL


 


Lymphocytes #   0.3 L   (1.0-4.8)  k/uL


 


Potassium     (3.5-5.1)  mmol/L


 


Chloride  97 L    ()  mmol/L


 


Carbon Dioxide  36 H    (22-30)  mmol/L


 


BUN  25 H    (9-20)  mg/dL


 


Creatinine  0.64 L    (0.66-1.25)  mg/dL


 


Glucose  202 H    (74-99)  mg/dL


 


POC Glucose (mg/dL)    189 H  (75-99)  mg/dL








                      Microbiology - Last 24 Hours (Table)











 12/16/21 10:55 Blood Culture - Preliminary





 Blood    No Growth after 72 hours


 


 12/16/21 11:10 Blood Culture - Preliminary





 Blood    No Growth after 72 hours














Assessment and Plan


Assessment: 


Non-STEMI





Severe hypokalemia





Left lower lobe community-acquired pneumonia also involving the right lower lobe





Masslike process of the left lower lobe cannot be excluded, patient will need 

further radiographs to document resolution in 8-12 weeks





Acute hypoxic respiratory failure





Elevated troponin





History of COVID-19 pneumonia





History of COPD


Plan: 








Replace potassium 





Broad-spectrum IV antibiotics with Rocephin and Zithromax





Bronchodilators





IV steroids, can be switched to oral at time of discharge





Supplemental oxygen with slow tapering





IV heparin per cardiovascular services





Follow up radiographic studies the outpatient





Further plan of care and recommendation as per clinical response of the patient





Patient will need cardiac cath and angiogram cardiovascular services following





Time with Patient: Greater than 30

## 2021-12-21 LAB
ANION GAP SERPL CALC-SCNC: 12.4 MMOL/L (ref 10–18)
BUN SERPL-SCNC: 21.6 MG/DL (ref 9–27)
BUN/CREAT SERPL: 30.86 RATIO (ref 12–20)
CALCIUM SPEC-MCNC: 8.6 MG/DL (ref 8.7–10.3)
CHLORIDE SERPL-SCNC: 97 MMOL/L (ref 96–109)
CO2 SERPL-SCNC: 34.6 MMOL/L (ref 20–27.5)
GLUCOSE BLD-MCNC: 139 MG/DL (ref 75–99)
GLUCOSE BLD-MCNC: 163 MG/DL (ref 75–99)
GLUCOSE BLD-MCNC: 169 MG/DL (ref 75–99)
GLUCOSE BLD-MCNC: 181 MG/DL (ref 75–99)
GLUCOSE SERPL-MCNC: 171 MG/DL (ref 70–110)
POTASSIUM SERPL-SCNC: 3.9 MMOL/L (ref 3.5–5.5)
SODIUM SERPL-SCNC: 144 MMOL/L (ref 135–145)

## 2021-12-21 RX ADMIN — DICLOFENAC SODIUM SCH: 10 GEL TOPICAL at 21:31

## 2021-12-21 RX ADMIN — PRAVASTATIN SODIUM SCH MG: 80 TABLET ORAL at 21:32

## 2021-12-21 RX ADMIN — FUROSEMIDE SCH MG: 10 INJECTION, SOLUTION INTRAMUSCULAR; INTRAVENOUS at 09:11

## 2021-12-21 RX ADMIN — OXYMETAZOLINE HCL SCH: 0.05 SPRAY NASAL at 21:33

## 2021-12-21 RX ADMIN — IPRATROPIUM BROMIDE AND ALBUTEROL SULFATE SCH ML: .5; 3 SOLUTION RESPIRATORY (INHALATION) at 15:10

## 2021-12-21 RX ADMIN — INSULIN ASPART SCH UNIT: 100 INJECTION, SOLUTION INTRAVENOUS; SUBCUTANEOUS at 21:32

## 2021-12-21 RX ADMIN — DEXTRAN 70 AND HYPROMELLOSE 2910 SCH: 1; 3 SOLUTION/ DROPS OPHTHALMIC at 12:19

## 2021-12-21 RX ADMIN — METHYLPREDNISOLONE SODIUM SUCCINATE SCH MG: 40 INJECTION, POWDER, FOR SOLUTION INTRAMUSCULAR; INTRAVENOUS at 21:33

## 2021-12-21 RX ADMIN — METOPROLOL SUCCINATE SCH MG: 25 TABLET, EXTENDED RELEASE ORAL at 21:32

## 2021-12-21 RX ADMIN — FORMOTEROL FUMARATE DIHYDRATE SCH MCG: 20 SOLUTION RESPIRATORY (INHALATION) at 20:43

## 2021-12-21 RX ADMIN — Medication SCH MG: at 12:19

## 2021-12-21 RX ADMIN — DICLOFENAC SODIUM SCH: 10 GEL TOPICAL at 09:11

## 2021-12-21 RX ADMIN — DOXYCYCLINE SCH MG: 100 CAPSULE ORAL at 21:32

## 2021-12-21 RX ADMIN — IPRATROPIUM BROMIDE AND ALBUTEROL SULFATE SCH ML: .5; 3 SOLUTION RESPIRATORY (INHALATION) at 11:13

## 2021-12-21 RX ADMIN — THERA TABS SCH EACH: TAB at 12:19

## 2021-12-21 RX ADMIN — OXYMETAZOLINE HCL SCH SPRAY: 0.05 SPRAY NASAL at 09:12

## 2021-12-21 RX ADMIN — FOLIC ACID SCH MG: 1 TABLET ORAL at 12:19

## 2021-12-21 RX ADMIN — METHYLPREDNISOLONE SODIUM SUCCINATE SCH MG: 40 INJECTION, POWDER, FOR SOLUTION INTRAMUSCULAR; INTRAVENOUS at 09:11

## 2021-12-21 RX ADMIN — FORMOTEROL FUMARATE DIHYDRATE SCH MCG: 20 SOLUTION RESPIRATORY (INHALATION) at 07:33

## 2021-12-21 RX ADMIN — INSULIN ASPART SCH UNIT: 100 INJECTION, SOLUTION INTRAVENOUS; SUBCUTANEOUS at 17:53

## 2021-12-21 RX ADMIN — DEXTRAN 70 AND HYPROMELLOSE 2910 SCH: 1; 3 SOLUTION/ DROPS OPHTHALMIC at 21:33

## 2021-12-21 RX ADMIN — DEXTRAN 70 AND HYPROMELLOSE 2910 SCH: 1; 3 SOLUTION/ DROPS OPHTHALMIC at 09:11

## 2021-12-21 RX ADMIN — ASPIRIN 81 MG CHEWABLE TABLET SCH MG: 81 TABLET CHEWABLE at 09:10

## 2021-12-21 RX ADMIN — BUDESONIDE SCH MG: 1 SUSPENSION RESPIRATORY (INHALATION) at 20:43

## 2021-12-21 RX ADMIN — IPRATROPIUM BROMIDE AND ALBUTEROL SULFATE SCH ML: .5; 3 SOLUTION RESPIRATORY (INHALATION) at 07:33

## 2021-12-21 RX ADMIN — AZITHROMYCIN MONOHYDRATE SCH MLS/HR: 500 INJECTION, POWDER, LYOPHILIZED, FOR SOLUTION INTRAVENOUS at 09:10

## 2021-12-21 RX ADMIN — IPRATROPIUM BROMIDE AND ALBUTEROL SULFATE SCH ML: .5; 3 SOLUTION RESPIRATORY (INHALATION) at 20:43

## 2021-12-21 RX ADMIN — FUROSEMIDE SCH MG: 10 INJECTION, SOLUTION INTRAMUSCULAR; INTRAVENOUS at 21:32

## 2021-12-21 RX ADMIN — INSULIN ASPART SCH: 100 INJECTION, SOLUTION INTRAVENOUS; SUBCUTANEOUS at 07:54

## 2021-12-21 RX ADMIN — FLUTICASONE PROPIONATE SCH: 50 SPRAY, METERED NASAL at 09:12

## 2021-12-21 RX ADMIN — INSULIN ASPART SCH UNIT: 100 INJECTION, SOLUTION INTRAVENOUS; SUBCUTANEOUS at 12:19

## 2021-12-21 RX ADMIN — BUDESONIDE SCH MG: 1 SUSPENSION RESPIRATORY (INHALATION) at 07:33

## 2021-12-21 RX ADMIN — METOPROLOL SUCCINATE SCH MG: 25 TABLET, EXTENDED RELEASE ORAL at 09:11

## 2021-12-21 RX ADMIN — DOXYCYCLINE SCH MG: 100 CAPSULE ORAL at 09:10

## 2021-12-21 RX ADMIN — FINASTERIDE SCH MG: 5 TABLET, FILM COATED ORAL at 21:32

## 2021-12-21 NOTE — P.PN
Subjective


Progress Note Date: 12/21/21














This is a 76 year old male who was recently admitted with acute bilateral 

pneumonia left more than right with possible community-acquired and also gram-

negative pneumonia with possible sepsis and is being closely monitored.  

cardiology following and patient is maintained on Lasix along with IV heparin 

and will continue.  Chest x-ray today shows no change in the mild to moderate 

diffuse interstitial infiltrates and worsening of the infiltrate in the 

retrocardiac opacity consistent with atelectasis or alveolar infiltrate area did

patient continues on 6 L of oxygen via nasal cannula and states he feels his 

breathing is somewhat improved although maintaining 90% on 6 L.  Patient is 

extremely weak and will have PT/OT evaluate the patient. Pulmonary is also 

following the patient is maintained on antibiotics along with breathing 

inhalational treatments and will continue.





12/19/2021





Patient is seen this morning and continues with shortness of breath and being 

followed by cardiology and pulmonary Dr. Moses. Patient continues on 6L via NC and

states his breathing is improving slowly. Patient continued on bronchodilators 

and will continue. Patient also continues on IV heparin and cardiology following

and recommending to continue for now. Patient potassium is 2.7 and will continue

to replace and repeat labs later this afternoon. Recommend follow up labs in the

am as well. Patient having some congestion and continues to blow his nose and is

having a mild nose bleed. Encouraged the patient to avoid excessive blowing of 

nose and will add afrin. Patient is on IV heparin. No reports of chest pain or 

palpitations. 





12/20/2021





Patient is seen in follow up this morning and continues with shortness of breath

and weakness although feels is improving. Cardiology following for CHF and 

patient to continue on IV lasix and will follow up with repeat am labs to 

monitor electrolytes and kidney functions. IV heparin being discontinued. Dr. Moses also following and will decrease IV steroids and continue with breathing 

treatments. Patient is tolerating diet with no vomiting noted. PT to reevaluate 

the patient and work with for continued weakness. Continue on 6L via NC and wean

as tolerated.  Continue electrolyte replacement per protocol. 





12/21/2021 





Patient is evaluated today sitting at the bedside asking if he can go home.  

Patient remains on IV Lasix and discuss with cardiology recommending continuing 

IV Lasix for 24 more hours and reevaluation along with labs in the morning.  

Patient is also on 4 L of oxygen and Dr. Moses pulmonary following closely.  

Patient has not had oxygen in the outpatient setting prior to admission.  Case 

management following and [facility] 

setting.  Patient was also continued on Zithromax and ceftriaxone along with 

doxycycline and will continue.  Patient is also on IV steroids along with 

breathing inhalational treatments.  Patient denies any worsening shortness of 

breath or chest pains.  Patient is afebrile.








Review of systems:


Constitutional: No reports of fatigue,no  reports of fever, or chills


Cardiovascular: No reports of chest pain or palpitations


Respiratory: reports of shortness of breath although feels is improved


GI: No reports of nausea, vomiting, or diarrhea


: No reports of dysuria or retention


Neurovascular: no reports of weakness 





All medications have been reviewed





Active Medications





Hydrocodone Bitart/Acetaminophen (Hydrocodone/Apap 5-325mg 1 Each Tab)  1 each 

PO Q6HR PRN


   PRN Reason: Pain


Albuterol/Ipratropium (Ipratropium-Albuterol 3 Ml Neb)  3 ml INHALATION RT-Q4H 

PRN


   PRN Reason: Shortness Of Breath Or Wheezing


Albuterol/Ipratropium (Ipratropium-Albuterol 3 Ml Neb)  3 ml INHALATION RT-QID 

[facility]


   Last Admin: 12/21/21 11:13 Dose:  3 ml


   Documented by: 


Alprazolam (Alprazolam 0.25 Mg Tab)  0.25 mg PO TID PRN


   PRN Reason: Anxiety


Artificial Tears (Artificial Tears-Hypromellose Drops 15 Ml Btl)  1 drops BOTH 

EYES TID [facility]


   Last Admin: 12/21/21 12:19 Dose:  Not Given


   Documented by: 


Aspirin (Aspirin 81 Mg)  81 mg PO DAILY [facility]


   Last Admin: 12/21/21 09:10 Dose:  81 mg


   Documented by: 


Budesonide (Budesonide 1 Mg/2 Ml Nebu)  1 mg INHALATION RT-BID [facility]


   Last Admin: 12/21/21 07:33 Dose:  1 mg


   Documented by: 


Diclofenac Sodium (Diclofenac Sodium Gel 100 Gm Tube)  1 gm TOPICAL BID [facility]


   Last Admin: 12/21/21 09:11 Dose:  Not Given


   Documented by: 


Doxycycline Monohydrate (Doxycycline 100 Mg Cap)  100 mg PO BID [facility]


   Last Admin: 12/21/21 09:10 Dose:  100 mg


   Documented by: 


Finasteride (Finasteride 5 Mg Tab)  5 mg PO HS [facility]


   Last Admin: 12/20/21 21:53 Dose:  5 mg


   Documented by: 


Fluticasone Propionate (Fluticasone 50mcg/Spray Nasal 16gm)  2 spray EA NOSTRIL 

DAILY [facility]


   Last Admin: 12/21/21 09:12 Dose:  Not Given


   Documented by: 


Folic Acid (Folic Acid 1 Mg Tab)  1 mg PO DAILY@1200 [facility]


   Last Admin: 12/21/21 12:19 Dose:  1 mg


   Documented by: 


Formoterol Fumarate (Formoterol Fumarate 20 Mcg/2 Ml Nebu)  20 mcg INHALATION 

RT-BID [facility]


   Last Admin: 12/21/21 07:33 Dose:  20 mcg


   Documented by: 


Furosemide (Furosemide 10 Mg/Ml 4 Ml Vial)  40 mg IV Q12HR [facility]


   Last Admin: 12/21/21 09:11 Dose:  40 mg


   Documented by: 


Ceftriaxone Sodium 1 gm/ (Sodium Chloride)  50 mls @ 100 mls/hr IVPB Q24HR [facility]


   Last Admin: 12/21/21 11:01 Dose:  100 mls/hr


   Documented by: 


Azithromycin 500 mg/ Sodium (Chloride)  250 mls @ 250 mls/hr IVPB DAILY [facility]


   Last Admin: 12/21/21 09:10 Dose:  250 mls/hr


   Documented by: 


Insulin Aspart (Insulin Aspart (Novolog) 100 Unit/Ml Vial)  0 unit SQ ACHS [facility]; 

Protocol


   Last Admin: 12/21/21 12:19 Dose:  2 unit


   Documented by: 


Lisinopril (Lisinopril 5 Mg Tab)  5 mg PO BID [facility]


   Last Admin: 12/21/21 09:10 Dose:  5 mg


   Documented by: 


Loratadine (Loratadine 10 Mg Tab)  10 mg PO DAILY PRN


   PRN Reason: Allergy Symptoms


Meloxicam (Meloxicam 7.5 Mg Tab)  7.5 mg PO DAILY PRN


   PRN Reason: Pain


   Last Admin: 12/19/21 09:47 Dose:  7.5 mg


   Documented by: 


Methylprednisolone Sodium Succinate (Methylprednisolone Sod Succi 40 Mg/Ml 1 Ml 

Vial)  40 mg IV BID [facility]


   Last Admin: 12/21/21 09:11 Dose:  40 mg


   Documented by: 


Metoprolol Succinate (Metoprolol Succinate (Er) 25 Mg Tab.Er.24h)  25 mg PO BID 

[facility]


   Last Admin: 12/21/21 09:11 Dose:  25 mg


   Documented by: 


Miscellaneous Information (Potassium Replacement Protocol 1 Each Misc)  1 each 

MISCELLANE DAILY PRN; Protocol


   PRN Reason: Per Protocol


Multivitamins (Multivitamins, Thera 1 Each Tab)  1 each PO DAILY@1200 [facility]


   Last Admin: 12/21/21 12:19 Dose:  1 each


   Documented by: 


Oxymetazoline HCl (Oxymetazoline 0.05% Nasl Spray 1 Spray Bottle)  2 spray NASAL

BID [facility]


   Last Admin: 12/21/21 09:12 Dose:  2 spray


   Documented by: 


Pravastatin Sodium (Pravastatin Sodium 80 Mg Tab)  80 mg PO HS [facility]


   Last Admin: 12/20/21 21:53 Dose:  80 mg


   Documented by: 


Thiamine HCl (Thiamine 100 Mg Tab)  100 mg PO DAILY@1200 [facility]


   Last Admin: 12/21/21 12:19 Dose:  100 mg


   Documented by: 














Physical exam:





Gen: This is a 76-year-old male awake, alert and oriented 3, well-developed, 

well-nourished, ill appearing. on 4 L via nasal cannula


HEENT: Head is atraumatic, normocephalic. Pupils equal, round. Sclerae is 

anicteric. left nare nose bleed as patient is blowing nose currently on exam


NECK: Supple. No JVD. No lymphadenopathy. No thyromegaly. 


LUNGS: Diminished breath sounds bilaterally with some crackles at the bases and 

rhonchi noted..  No intercostal retractions.


HEART: S1, S2 are muffled. 


ABDOMEN: Soft.  Bowel sounds are present. No masses.  No tenderness.


EXTREMITIES: No pedal edema.  No calf tenderness. 


NEUROLOGICAL: Patient is awake, alert and oriented x3. no focal deficits.  di

ffusely weak





Assessment:





Acute bilateral pneumonia left more than right possibly community-acquired, with

possible gram-negative sepsis with acute hypoxic respiratory failure, present on

admission


Asthma, chronic obstructive pulmonary disease acute exacerbation, present on 

admission


congestive heart failure acute exacerbation acute on chronic systolic 

dysfunction, ejection fraction 35-40%


mild epistaxis, resolved


Increased WBC


Hyponatremia


Elevated random glucose


Elevated bilirubin, AST, ALT possibly hepatitis of undetermined etiology


Troponin 1.170, possible type II myocardial infarction


Hypertension


History of prostate disorder


history of appendectomy


History back surgery, degenerative joint disease


Full code





Plan:





Recommend to continue current medications and management.  Cardiology following 

the patient and patient maintained on IV Lasix. discussed with cardiology and 

would like to continue with the patient on 24 more hours of IV Lasix and follow-

up labs and evaluation.  Patient also continues  with IV steroids, IV 

ceftriaxone and Zithromax along with doxycycline.  Pulmonary Dr. Moses following 

as well.  she currently on 4 L via nasal cannula and discuss with nursing staff 

about weaning FiO2 as tolerated and case management following [facility] 

[facility] as this is [facility] this [facility]. Continue with breathing 

inhalational treatments as well.  Repeat am labs.  Due to multiple complex 

medical issues, prognosis is guarded.  possible discharge in 24-48 hours.








Objective





- Vital Signs


Vital signs: 


                                   Vital Signs











Temp  98.0 F   12/21/21 05:00


 


Pulse  92   12/21/21 07:54


 


Resp  18   12/21/21 05:00


 


BP  160/93   12/21/21 05:00


 


Pulse Ox  100   12/21/21 05:00








                                 Intake & Output











 12/20/21 12/21/21 12/21/21





 18:59 06:59 18:59


 


Weight  65.5 kg 


 


Other:   


 


  Voiding Method Urinal Urinal 





  Diaper 


 


  # Bowel Movements 1  














- Labs


CBC & Chem 7: 


                                 12/20/21 06:22





                                 12/21/21 05:22


Labs: 


                  Abnormal Lab Results - Last 24 Hours (Table)











  12/20/21 12/20/21 12/20/21 Range/Units





  12:39 17:26 21:00 


 


POC Glucose (mg/dL)  144 H  203 H  205 H  (75-99)  mg/dL














  12/21/21 Range/Units





  07:23 


 


POC Glucose (mg/dL)  139 H  (75-99)  mg/dL








                      Microbiology - Last 24 Hours (Table)











 12/16/21 10:55 Blood Culture - Preliminary





 Blood    No Growth after 96 hours


 


 12/16/21 11:10 Blood Culture - Preliminary





 Blood    No Growth after 96 hours

## 2021-12-21 NOTE — P.PN
Subjective


Progress Note Date: 12/20/21














This is a 76 year old male who was recently admitted with acute bilateral 

pneumonia left more than right with possible community-acquired and also gram-

negative pneumonia with possible sepsis and is being closely monitored.  

cardiology following and patient is maintained on Lasix along with IV heparin 

and will continue.  Chest x-ray today shows no change in the mild to moderate 

diffuse interstitial infiltrates and worsening of the infiltrate in the 

retrocardiac opacity consistent with atelectasis or alveolar infiltrate area did

patient continues on 6 L of oxygen via nasal cannula and states he feels his 

breathing is somewhat improved although maintaining 90% on 6 L.  Patient is 

extremely weak and will have PT/OT evaluate the patient. Pulmonary is also 

following the patient is maintained on antibiotics along with breathing 

inhalational treatments and will continue.





12/19/2021





Patient is seen this morning and continues with shortness of breath and being 

followed by cardiology and pulmonary Dr. Moses. Patient continues on 6L via NC and

states his breathing is improving slowly. Patient continued on bronchodilators 

and will continue. Patient also continues on IV heparin and cardiology following

and recommending to continue for now. Patient potassium is 2.7 and will continue

to replace and repeat labs later this afternoon. Recommend follow up labs in the

am as well. Patient having some congestion and continues to blow his nose and is

having a mild nose bleed. Encouraged the patient to avoid excessive blowing of 

nose and will add afrin. Patient is on IV heparin. No reports of chest pain or 

palpitations. 





12/20/2021





Patient is seen in follow up this morning and continues with shortness of breath

and weakness although feels is improving. Cardiology following for CHF and 

patient to continue on IV lasix and will follow up with repeat am labs to 

monitor electrolytes and kidney functions. IV heparin being discontinued. Dr. Moses also following and will decrease IV steroids and continue with breathing 

treatments. Patient is tolerating diet with no vomiting noted. PT to reevaluate 

the patient and work with for continued weakness. Continue on 6L via NC and wean

as tolerated.  Continue electrolyte replacement per protocol. 








Review of systems:


Constitutional: reports of fatigue,no  reports of fever, or chills


Cardiovascular: No reports of chest pain or palpitations


Respiratory: reports of shortness of breath 


GI: No reports of nausea, vomiting, or diarrhea


: No reports of dysuria or retention


Neurovascular: no reports of weakness 





All medications have been reviewed





Active Medications





Hydrocodone Bitart/Acetaminophen (Hydrocodone/Apap 5-325mg 1 Each Tab)  1 each 

PO Q6HR PRN


   PRN Reason: Pain


Albuterol/Ipratropium (Ipratropium-Albuterol 3 Ml Neb)  3 ml INHALATION RT-Q4H 

PRN


   PRN Reason: Shortness Of Breath Or Wheezing


Albuterol/Ipratropium (Ipratropium-Albuterol 3 Ml Neb)  3 ml INHALATION RT-QID 

[facility]


   Last Admin: 12/20/21 20:06 Dose:  3 ml


   Documented by: 


Alprazolam (Alprazolam 0.25 Mg Tab)  0.25 mg PO TID PRN


   PRN Reason: Anxiety


Artificial Tears (Artificial Tears-Hypromellose Drops 15 Ml Btl)  1 drops BOTH 

EYES TID [facility]


   Last Admin: 12/20/21 21:55 Dose:  Not Given


   Documented by: 


Aspirin (Aspirin 81 Mg)  81 mg PO DAILY [facility]


   Last Admin: 12/20/21 09:01 Dose:  81 mg


   Documented by: 


Budesonide (Budesonide 1 Mg/2 Ml Nebu)  1 mg INHALATION RT-BID [facility]


   Last Admin: 12/20/21 20:06 Dose:  1 mg


   Documented by: 


Diclofenac Sodium (Diclofenac Sodium Gel 100 Gm Tube)  1 gm TOPICAL BID [facility]


   Last Admin: 12/20/21 21:54 Dose:  Not Given


   Documented by: 


Doxycycline Monohydrate (Doxycycline 100 Mg Cap)  100 mg PO BID [facility]


   Last Admin: 12/20/21 21:53 Dose:  100 mg


   Documented by: 


Finasteride (Finasteride 5 Mg Tab)  5 mg PO HS [facility]


   Last Admin: 12/20/21 21:53 Dose:  5 mg


   Documented by: 


Fluticasone Propionate (Fluticasone 50mcg/Spray Nasal 16gm)  2 spray EA NOSTRIL 

DAILY [facility]


   Last Admin: 12/20/21 09:03 Dose:  2 spray


   Documented by: 


Folic Acid (Folic Acid 1 Mg Tab)  1 mg PO DAILY@1200 [facility]


   Last Admin: 12/20/21 12:45 Dose:  1 mg


   Documented by: 


Formoterol Fumarate (Formoterol Fumarate 20 Mcg/2 Ml Nebu)  20 mcg INHALATION 

RT-BID [facility]


   Last Admin: 12/20/21 20:06 Dose:  20 mcg


   Documented by: 


Furosemide (Furosemide 10 Mg/Ml 4 Ml Vial)  40 mg IV Q12HR [facility]


   Last Admin: 12/20/21 21:54 Dose:  40 mg


   Documented by: 


Ceftriaxone Sodium 1 gm/ (Sodium Chloride)  50 mls @ 100 mls/hr IVPB Q24HR [facility]


   Last Admin: 12/20/21 10:28 Dose:  100 mls/hr


   Documented by: 


Azithromycin 500 mg/ Sodium (Chloride)  250 mls @ 250 mls/hr IVPB DAILY [facility]


   Last Admin: 12/20/21 09:11 Dose:  250 mls/hr


   Documented by: 


Insulin Aspart (Insulin Aspart (Novolog) 100 Unit/Ml Vial)  0 unit SQ ACHS [facility]; 

Protocol


   Last Admin: 12/20/21 21:54 Dose:  2 unit


   Documented by: 


Lisinopril (Lisinopril 5 Mg Tab)  5 mg PO BID [facility]


   Last Admin: 12/20/21 21:53 Dose:  5 mg


   Documented by: 


Loratadine (Loratadine 10 Mg Tab)  10 mg PO DAILY PRN


   PRN Reason: Allergy Symptoms


Meloxicam (Meloxicam 7.5 Mg Tab)  7.5 mg PO DAILY PRN


   PRN Reason: Pain


   Last Admin: 12/19/21 09:47 Dose:  7.5 mg


   Documented by: 


Methylprednisolone Sodium Succinate (Methylprednisolone Sod Succi 40 Mg/Ml 1 Ml 

Vial)  40 mg IV BID [facility]


   Last Admin: 12/20/21 21:55 Dose:  40 mg


   Documented by: 


Metoprolol Succinate (Metoprolol Succinate (Er) 25 Mg Tab.Er.24h)  25 mg PO BID 

[facility]


   Last Admin: 12/20/21 21:52 Dose:  25 mg


   Documented by: 


Miscellaneous Information (Potassium Replacement Protocol 1 Each Misc)  1 each 

MISCELLANE DAILY PRN; Protocol


   PRN Reason: Per Protocol


Multivitamins (Multivitamins, Thera 1 Each Tab)  1 each PO DAILY@1200 [facility]


   Last Admin: 12/20/21 12:45 Dose:  1 each


   Documented by: 


Oxymetazoline HCl (Oxymetazoline 0.05% Nasl Spray 1 Spray Bottle)  2 spray NASAL

BID [facility]


   Last Admin: 12/20/21 21:55 Dose:  2 spray


   Documented by: 


Pravastatin Sodium (Pravastatin Sodium 80 Mg Tab)  80 mg PO HS [facility]


   Last Admin: 12/20/21 21:53 Dose:  80 mg


   Documented by: 


Thiamine HCl (Thiamine 100 Mg Tab)  100 mg PO DAILY@1200 [facility]


   Last Admin: 12/20/21 12:45 Dose:  100 mg


   Documented by: 











Physical exam:





Gen: This is a 76-year-old male awake, alert and oriented 3, well-developed, 

well-nourished, ill appearing. on 6 L via nasal cannula


HEENT: Head is atraumatic, normocephalic. Pupils equal, round. Sclerae is 

anicteric. left nare nose bleed as patient is blowing nose currently on exam


NECK: Supple. No JVD. No lymphadenopathy. No thyromegaly. 


LUNGS: Diminished breath sounds bilaterally with some crackles and rhonchi 

noted..  No intercostal retractions.


HEART: S1, S2 are muffled. 


ABDOMEN: Soft.  Bowel sounds are present. No masses.  No tenderness.


EXTREMITIES: No pedal edema.  No calf tenderness. 


NEUROLOGICAL: Patient is awake, alert and oriented x3. no focal deficits.  di

ffusely weak





Assessment:





Acute bilateral pneumonia left more than right possibly community-acquired, with

possible gram-negative sepsis with acute hypoxic respiratory failure, present on

admission


Asthma, chronic obstructive pulmonary disease acute exacerbation, present on 

admission


Possible congestive heart failure acute exacerbation acute on chronic systolic 

dysfunction, ejection fraction 35-40%


mild epistaxis, resolved


Increased WBC


Hyponatremia


Elevated random glucose


Elevated bilirubin, AST, ALT possibly hepatitis of undetermined etiology


Troponin 1.170, possible type II myocardial infarction


Hypertension


History of prostate disorder


history of appendectomy


History back surgery, degenerative joint disease


Full code





Plan:





Recommend to continue current medications and management.  Cardiology following 

the patient and patient maintained on IV Lasix. IV heparin discontinued. Continu

e with IV steroids, IV ceftriaxone and Zithromax along with doxycycline.  

Pulmonary Dr. Moses following as well.  Patient continues to be extremely weak and

will have PT/OT evaluate the patient.  Patient is on 6 L of oxygen via nasal 

cannula  will continue to wean as tolerated.  Continue with breathing 

inhalational treatments as well.  Repeat am labs.  Due to multiple complex 

medical issues, prognosis is guarded. 








Objective





- Vital Signs


Vital signs: 


                                   Vital Signs











Temp  97.7 F   12/20/21 05:00


 


Pulse  82   12/20/21 09:32


 


Resp  20   12/20/21 05:00


 


BP  146/94   12/20/21 09:18


 


Pulse Ox  95   12/20/21 05:00








                                 Intake & Output











 12/19/21 12/20/21 12/20/21





 18:59 06:59 18:59


 


Intake Total 430 590 


 


Output Total 750 1120 


 


Balance -320 -530 


 


Intake:   


 


  Intake, IV Titration 250  





  Amount   


 


    Heparin Sod,Pork in 0.45% 250  





    NaCl 25,000 unit In 0.45   





    % NaCl 1 250ml.bag @ 12   





    UNITS/KG/HR 7.62 mls/hr   





    IV .Q24H [facility] Rx#:   





    [id number]   


 


  Oral 180 590 


 


Output:   


 


  Urine 750 1120 


 


Other:   


 


  Voiding Method Urinal Urinal 


 


  # Voids 1  


 


  # Bowel Movements 2  














- Labs


CBC & Chem 7: 


                                 12/20/21 06:22





                                 12/20/21 06:22


Labs: 


                  Abnormal Lab Results - Last 24 Hours (Table)











  12/19/21 12/19/21 12/19/21 Range/Units





  11:22 12:30 16:57 


 


WBC     (3.8-10.6)  k/uL


 


RBC     (4.30-5.90)  m/uL


 


Hct     (39.0-53.0)  %


 


Neutrophils #     (1.3-7.7)  k/uL


 


Lymphocytes #     (1.0-4.8)  k/uL


 


Potassium  2.7 L*   3.0 L  (3.5-5.1)  mmol/L


 


Chloride     ()  mmol/L


 


Carbon Dioxide     (22-30)  mmol/L


 


BUN     (9-20)  mg/dL


 


Creatinine     (0.66-1.25)  mg/dL


 


Glucose     (74-99)  mg/dL


 


POC Glucose (mg/dL)   211 H   (75-99)  mg/dL














  12/19/21 12/19/21 12/19/21 Range/Units





  17:22 21:28 23:22 


 


WBC     (3.8-10.6)  k/uL


 


RBC     (4.30-5.90)  m/uL


 


Hct     (39.0-53.0)  %


 


Neutrophils #     (1.3-7.7)  k/uL


 


Lymphocytes #     (1.0-4.8)  k/uL


 


Potassium    3.4 L  (3.5-5.1)  mmol/L


 


Chloride     ()  mmol/L


 


Carbon Dioxide     (22-30)  mmol/L


 


BUN     (9-20)  mg/dL


 


Creatinine     (0.66-1.25)  mg/dL


 


Glucose     (74-99)  mg/dL


 


POC Glucose (mg/dL)  225 H  215 H   (75-99)  mg/dL














  12/20/21 12/20/21 12/20/21 Range/Units





  06:22 06:22 07:26 


 


WBC   12.8 H   (3.8-10.6)  k/uL


 


RBC   6.04 H   (4.30-5.90)  m/uL


 


Hct   53.5 H   (39.0-53.0)  %


 


Neutrophils #   11.7 H   (1.3-7.7)  k/uL


 


Lymphocytes #   0.3 L   (1.0-4.8)  k/uL


 


Potassium     (3.5-5.1)  mmol/L


 


Chloride  97 L    ()  mmol/L


 


Carbon Dioxide  36 H    (22-30)  mmol/L


 


BUN  25 H    (9-20)  mg/dL


 


Creatinine  0.64 L    (0.66-1.25)  mg/dL


 


Glucose  202 H    (74-99)  mg/dL


 


POC Glucose (mg/dL)    189 H  (75-99)  mg/dL








                      Microbiology - Last 24 Hours (Table)











 12/16/21 10:55 Blood Culture - Preliminary





 Blood    No Growth after 72 hours


 


 12/16/21 11:10 Blood Culture - Preliminary





 Blood    No Growth after 72 hours

## 2021-12-21 NOTE — P.PN
Progress Note - Text


Progress Note Date: 12/21/21





The patient is a very pleasant 76-year-old gentleman with hypertension and 

dyslipidemia who was admitted to the hospital with shortness of breath and ruled

in for acute coronary syndrome.  The acute coronary syndrome was treated 

medically.  The patient was also experiencing exacerbation of asthma/COPD as 

well as he does have underlying pneumonia.  He underwent an echo which showed 

cardiomyopathy as well.  The patient was started yesterday on Lasix IV because 

we felt he was in heart failure.





He was seen this morning.  He stated "I feel better".  On examination he does 

have bilateral lower extremities edema and crackles/decreased breathing sounds 

in both lung fields.  We advised continue the IV Lasix for additional 24 hours 

and continue monitor the kidney function as well as electrolytes.  He is on 

antiplatelet which we will continue.  Beside that we would consider up titrating

his anti-ischemic medications as well as cardiomyopathy medications and also add

Plavix to the current medical regimen in the next 24-48 hours.  We'll continue 

following up with

## 2021-12-21 NOTE — P.PN
Subjective


Progress Note Date: 12/21/21


Principal diagnosis: 


Left lower lobe community-acquired pneumonia also involving the right lower lobe





Mass like process of the left lower lobe cannot be excluded, patient will need 

further radiographs to document resolution in 8-12 weeks





Acute hypoxic respiratory failure





Elevated troponin





History of COVID-19 pneumonia





History of COPD





09/21/2021, patient seen eval examined during the rounds labs reviewed 

medications reviewed, respiratory rate remained stable in low 20s, patient has 

been ambulating, remains on 6 L oxygen, oxygen saturation 100%, blood pressure 

160/93 respiratory 18 pulse 89 temperature 90.8,





09/20/2021, patient seen eval examined during the rounds labs reviewed 

medications reviewed care plan discussed, respiratory status remains stable, 

however still cough congestion shortness of breath going on but severity has 

improved patient remains on 6 L nasal cannula, afebrile with stable hemodynamics

respiratory rate is 20, saturation 95%, labs reviewed potassium is 3.9 improved 

significantly as well chemistry is stable BUN/creatinine 25/.64 recommend to 

titrate oxygen down to bring it to 3 or 2 L





12/19/2021, patient seen eval examined during the rounds labs reviewed 

medications reviewed care plan discussed, patient remains on 6 L oxygen, oxygen 

saturation is 96%, labs reviewed sodium is 140 potassium is 2.4 BUN/creatinine 

25/0.84, glucose 224, currently patient remains on bronchodilators and 

antibiotics Zithromax and Rocephin, heparin drip, and Solu-Medrol 60 IV every 6,

patient is still complaining of shortness of breath and intermittent wheezing





Patient is a 76-year-old with a remote history of smoking prior history of COPD 

lately have been more short of breath congested recently seen in the office for 

the first time preliminary workup including PFTs labs and x-rays have been 

ordered, patient was complaining of shortness of breath and was hypoxic patient 

has recently placed on home oxygen, chest for the last 2 days started getting 

more shortness of breath cough congestion and fever up to 101, patient has not 

vaccinated for COVID-19 pneumonia, prior history of asthma hypertension prostate

problem enlargement, on arrival patient was tachypneic.  Cardiac with low oxygen

saturation of 90% blood pressure was 122/73, heart rate is 120, respiratory rate

20,Shortness breath patient is a 76-year-old male with the history of hypoxic, 

patient does have a history of Crohn enteritis pneumonia infection before she 

this admission white cell count 70,700 hemoglobin and hematocrit is 24/7.7 to 

hemoglobin is 18, influenza A and B both negative: Negative, troponin elevated 

1.1, computed tomography scan of the chest negative for pulmonary embolism 

however masslike consolidation left lower lobe is present consistent with focal 

pneumonia she infiltrated right lower lobe








Objective





- Vital Signs


Vital signs: 


                                   Vital Signs











Temp  98.0 F   12/21/21 05:00


 


Pulse  92   12/21/21 07:54


 


Resp  18   12/21/21 05:00


 


BP  160/93   12/21/21 05:00


 


Pulse Ox  100   12/21/21 05:00








                                 Intake & Output











 12/20/21 12/21/21 12/21/21





 18:59 06:59 18:59


 


Weight  65.5 kg 


 


Other:   


 


  Voiding Method Urinal Urinal 





  Diaper 


 


  # Bowel Movements 1  














- Exam





- Constitutional


General appearance: average body habitus, cooperative, disheveled, mild distress





- EENT


Eyes: PERRLA


Ears: bilateral: normal





- Neck


Neck: normal ROM


Carotids: bilateral: upstroke normal





- Respiratory


Respiratory: bilateral: diminished, wheezing





- Cardiovascular


Rhythm: regular


Heart sounds: normal: S1, S2





- Gastrointestinal


General gastrointestinal: distended, normal bowel sounds





- Integumentary


Integumentary: normal turgor





- Neurologic


Neurologic: CNII-XII intact, focal deficits





- Musculoskeletal


Musculoskeletal: gait normal, generalized weakness, strength equal bilaterally





- Psychiatric


Psychiatric: A&O x's 3, appropriate affect, intact judgment & insight








- Labs


CBC & Chem 7: 


                                 12/20/21 06:22





                                 12/20/21 06:22


Labs: 


                  Abnormal Lab Results - Last 24 Hours (Table)











  12/20/21 12/20/21 12/20/21 Range/Units





  12:39 17:26 21:00 


 


POC Glucose (mg/dL)  144 H  203 H  205 H  (75-99)  mg/dL














  12/21/21 Range/Units





  07:23 


 


POC Glucose (mg/dL)  139 H  (75-99)  mg/dL








                      Microbiology - Last 24 Hours (Table)











 12/16/21 10:55 Blood Culture - Preliminary





 Blood    No Growth after 96 hours


 


 12/16/21 11:10 Blood Culture - Preliminary





 Blood    No Growth after 96 hours














Assessment and Plan


Assessment: 


Non-STEMI





Severe hypokalemia





Left lower lobe community-acquired pneumonia also involving the right lower lobe





Masslike process of the left lower lobe cannot be excluded, patient will need 

further radiographs to document resolution in 8-12 weeks





Acute hypoxic respiratory failure





Elevated troponin





History of COVID-19 pneumonia





History of COPD


Plan: 











Broad-spectrum IV antibiotics with Rocephin and Zithromax him a condition 

changed to simple Zithromax to complete 7-10 day therapy





Bronchodilators





IV steroids, can be switched to oral at time of discharge





Supplemental oxygen with slow tapering patient can be safely turned down to 2-4 

L oxygen HEENT saturation around 90% and above





Follow up radiographic studies the outpatient





Further plan of care and recommendation as per clinical response of the patient





Patient will need cardiac cath and angiogram cardiovascular services following





Time with Patient: Greater than 30

## 2021-12-22 VITALS — RESPIRATION RATE: 18 BRPM

## 2021-12-22 LAB
ANION GAP SERPL CALC-SCNC: 6 MMOL/L
BUN SERPL-SCNC: 35 MG/DL (ref 9–20)
CALCIUM SPEC-MCNC: 8.6 MG/DL (ref 8.4–10.2)
CHLORIDE SERPL-SCNC: 90 MMOL/L (ref 98–107)
CO2 SERPL-SCNC: 41 MMOL/L (ref 22–30)
GLUCOSE BLD-MCNC: 165 MG/DL (ref 75–99)
GLUCOSE BLD-MCNC: 167 MG/DL (ref 75–99)
GLUCOSE BLD-MCNC: 198 MG/DL (ref 75–99)
GLUCOSE BLD-MCNC: 201 MG/DL (ref 75–99)
GLUCOSE SERPL-MCNC: 272 MG/DL (ref 74–99)
POTASSIUM SERPL-SCNC: 4 MMOL/L (ref 3.5–5.1)
SODIUM SERPL-SCNC: 137 MMOL/L (ref 137–145)

## 2021-12-22 RX ADMIN — FUROSEMIDE SCH MG: 10 INJECTION, SOLUTION INTRAMUSCULAR; INTRAVENOUS at 07:57

## 2021-12-22 RX ADMIN — METHYLPREDNISOLONE SODIUM SUCCINATE SCH MG: 40 INJECTION, POWDER, FOR SOLUTION INTRAMUSCULAR; INTRAVENOUS at 07:57

## 2021-12-22 RX ADMIN — IPRATROPIUM BROMIDE AND ALBUTEROL SULFATE SCH ML: .5; 3 SOLUTION RESPIRATORY (INHALATION) at 16:00

## 2021-12-22 RX ADMIN — METHYLPREDNISOLONE SODIUM SUCCINATE SCH MG: 40 INJECTION, POWDER, FOR SOLUTION INTRAMUSCULAR; INTRAVENOUS at 21:12

## 2021-12-22 RX ADMIN — DOXYCYCLINE SCH MG: 100 CAPSULE ORAL at 07:58

## 2021-12-22 RX ADMIN — AZITHROMYCIN MONOHYDRATE SCH MLS/HR: 500 INJECTION, POWDER, LYOPHILIZED, FOR SOLUTION INTRAVENOUS at 07:56

## 2021-12-22 RX ADMIN — FORMOTEROL FUMARATE DIHYDRATE SCH MCG: 20 SOLUTION RESPIRATORY (INHALATION) at 07:46

## 2021-12-22 RX ADMIN — Medication SCH MG: at 13:34

## 2021-12-22 RX ADMIN — METOPROLOL SUCCINATE SCH MG: 50 TABLET, EXTENDED RELEASE ORAL at 21:12

## 2021-12-22 RX ADMIN — INSULIN ASPART SCH UNIT: 100 INJECTION, SOLUTION INTRAVENOUS; SUBCUTANEOUS at 18:07

## 2021-12-22 RX ADMIN — FUROSEMIDE SCH MG: 10 INJECTION, SOLUTION INTRAMUSCULAR; INTRAVENOUS at 21:11

## 2021-12-22 RX ADMIN — DEXTRAN 70 AND HYPROMELLOSE 2910 SCH: 1; 3 SOLUTION/ DROPS OPHTHALMIC at 11:50

## 2021-12-22 RX ADMIN — INSULIN ASPART SCH UNIT: 100 INJECTION, SOLUTION INTRAVENOUS; SUBCUTANEOUS at 13:33

## 2021-12-22 RX ADMIN — BUDESONIDE SCH MG: 1 SUSPENSION RESPIRATORY (INHALATION) at 20:18

## 2021-12-22 RX ADMIN — THERA TABS SCH EACH: TAB at 07:57

## 2021-12-22 RX ADMIN — FORMOTEROL FUMARATE DIHYDRATE SCH MCG: 20 SOLUTION RESPIRATORY (INHALATION) at 20:18

## 2021-12-22 RX ADMIN — ASPIRIN 81 MG CHEWABLE TABLET SCH MG: 81 TABLET CHEWABLE at 07:57

## 2021-12-22 RX ADMIN — DOXYCYCLINE SCH MG: 100 CAPSULE ORAL at 21:11

## 2021-12-22 RX ADMIN — METOPROLOL SUCCINATE SCH MG: 25 TABLET, EXTENDED RELEASE ORAL at 07:56

## 2021-12-22 RX ADMIN — DICLOFENAC SODIUM SCH: 10 GEL TOPICAL at 21:11

## 2021-12-22 RX ADMIN — DICLOFENAC SODIUM SCH: 10 GEL TOPICAL at 07:58

## 2021-12-22 RX ADMIN — BUDESONIDE SCH MG: 1 SUSPENSION RESPIRATORY (INHALATION) at 07:46

## 2021-12-22 RX ADMIN — IPRATROPIUM BROMIDE AND ALBUTEROL SULFATE SCH ML: .5; 3 SOLUTION RESPIRATORY (INHALATION) at 20:18

## 2021-12-22 RX ADMIN — IPRATROPIUM BROMIDE AND ALBUTEROL SULFATE SCH ML: .5; 3 SOLUTION RESPIRATORY (INHALATION) at 07:47

## 2021-12-22 RX ADMIN — OXYMETAZOLINE HCL SCH SPRAY: 0.05 SPRAY NASAL at 07:58

## 2021-12-22 RX ADMIN — FINASTERIDE SCH MG: 5 TABLET, FILM COATED ORAL at 21:11

## 2021-12-22 RX ADMIN — PRAVASTATIN SODIUM SCH MG: 80 TABLET ORAL at 21:13

## 2021-12-22 RX ADMIN — INSULIN ASPART SCH UNIT: 100 INJECTION, SOLUTION INTRAVENOUS; SUBCUTANEOUS at 07:56

## 2021-12-22 RX ADMIN — DEXTRAN 70 AND HYPROMELLOSE 2910 SCH: 1; 3 SOLUTION/ DROPS OPHTHALMIC at 07:58

## 2021-12-22 RX ADMIN — FOLIC ACID SCH MG: 1 TABLET ORAL at 07:57

## 2021-12-22 RX ADMIN — INSULIN ASPART SCH UNIT: 100 INJECTION, SOLUTION INTRAVENOUS; SUBCUTANEOUS at 21:12

## 2021-12-22 RX ADMIN — OXYMETAZOLINE HCL SCH SPRAY: 0.05 SPRAY NASAL at 21:31

## 2021-12-22 RX ADMIN — DEXTRAN 70 AND HYPROMELLOSE 2910 SCH: 1; 3 SOLUTION/ DROPS OPHTHALMIC at 21:13

## 2021-12-22 RX ADMIN — FLUTICASONE PROPIONATE SCH: 50 SPRAY, METERED NASAL at 07:58

## 2021-12-22 RX ADMIN — IPRATROPIUM BROMIDE AND ALBUTEROL SULFATE SCH ML: .5; 3 SOLUTION RESPIRATORY (INHALATION) at 11:51

## 2021-12-22 NOTE — P.PN
Subjective


Progress Note Date: 12/22/21


Principal diagnosis: 





Acute coronary syndrome/heart failure





The patient is a 76-year-old gentleman who was admitted to the hospital with 

acute coronary syndrome complicated by heart failure.





He was seen this morning.  He remains not having any symptoms of chest pain or 

chest discomfort and he stated that the shortness of breath has improved.  On 

examination he continues to have diminished breathing sounds bilaterally and 

crackles are better overall.  He does have mild bilateral lower except his 

edema.  He continues to be hypertensive and tachycardic.  I'm going to increase 

the dose of Toprol-XL along with continue IV Lasix for additional 24 hours and 

add Plavix to the current medical regimen.





Objective





- Vital Signs


Vital signs: 


                                   Vital Signs











Temp  97.4 F L  12/22/21 12:32


 


Pulse  87   12/22/21 12:32


 


Resp  16   12/22/21 12:32


 


BP  152/81   12/22/21 12:32


 


Pulse Ox  95   12/22/21 12:32








                                 Intake & Output











 12/21/21 12/22/21 12/22/21





 18:59 06:59 18:59


 


Output Total   300


 


Balance   -300


 


Weight 65.5 kg 66 kg 


 


Output:   


 


  Urine   300


 


Other:   


 


  Voiding Method  Urinal 





  Diaper 














- Constitutional


General appearance: Present: no acute distress





- Respiratory


Respiratory: bilateral: diminished





- Cardiovascular


Rhythm: regular


Heart sounds: normal: S1, S2





- Labs


CBC & Chem 7: 


                                 12/20/21 06:22





                                 12/22/21 14:57


Labs: 


                  Abnormal Lab Results - Last 24 Hours (Table)











  12/21/21 12/21/21 12/22/21 Range/Units





  17:43 20:11 07:25 


 


Chloride     ()  mmol/L


 


Carbon Dioxide     (22-30)  mmol/L


 


BUN     (9-20)  mg/dL


 


Glucose     (74-99)  mg/dL


 


POC Glucose (mg/dL)  181 H  163 H  167 H  (75-99)  mg/dL














  12/22/21 12/22/21 Range/Units





  12:29 14:57 


 


Chloride   90 L  ()  mmol/L


 


Carbon Dioxide   41 H*  (22-30)  mmol/L


 


BUN   35 H  (9-20)  mg/dL


 


Glucose   272 H  (74-99)  mg/dL


 


POC Glucose (mg/dL)  165 H   (75-99)  mg/dL








                      Microbiology - Last 24 Hours (Table)











 12/16/21 11:10 Blood Culture - Final





 Blood    No Growth after 144 hours


 


 12/16/21 10:55 Blood Culture - Final





 Blood    No Growth after 144 hours














Assessment and Plan


Assessment: 





Assessment


#1 acute non-ST deviation myocardial infarction


#2 heart failure exacerbation related to heart failure with reduced ejection 

fraction


#3 cardiomyopathy, ischemic


#4 multiple comorbid conditions





Plan


#1 increase the dose of Toprol-XL


#2 continue IV Lasix for additional 24 hours


#3 add Plavix to current medical regimen

## 2021-12-22 NOTE — P.PN
Subjective


Progress Note Date: 12/22/21














This is a 76 year old male who was recently admitted with acute bilateral 

pneumonia left more than right with possible community-acquired and also gram-

negative pneumonia with possible sepsis and is being closely monitored.  

cardiology following and patient is maintained on Lasix along with IV heparin 

and will continue.  Chest x-ray today shows no change in the mild to moderate 

diffuse interstitial infiltrates and worsening of the infiltrate in the 

retrocardiac opacity consistent with atelectasis or alveolar infiltrate area did

patient continues on 6 L of oxygen via nasal cannula and states he feels his 

breathing is somewhat improved although maintaining 90% on 6 L.  Patient is 

extremely weak and will have PT/OT evaluate the patient. Pulmonary is also 

following the patient is maintained on antibiotics along with breathing 

inhalational treatments and will continue.





12/19/2021





Patient is seen this morning and continues with shortness of breath and being 

followed by cardiology and pulmonary Dr. Moses. Patient continues on 6L via NC and

states his breathing is improving slowly. Patient continued on bronchodilators 

and will continue. Patient also continues on IV heparin and cardiology following

and recommending to continue for now. Patient potassium is 2.7 and will continue

to replace and repeat labs later this afternoon. Recommend follow up labs in the

am as well. Patient having some congestion and continues to blow his nose and is

having a mild nose bleed. Encouraged the patient to avoid excessive blowing of 

nose and will add afrin. Patient is on IV heparin. No reports of chest pain or 

palpitations. 





12/20/2021





Patient is seen in follow up this morning and continues with shortness of breath

and weakness although feels is improving. Cardiology following for CHF and 

patient to continue on IV lasix and will follow up with repeat am labs to 

monitor electrolytes and kidney functions. IV heparin being discontinued. Dr. Moses also following and will decrease IV steroids and continue with breathing 

treatments. Patient is tolerating diet with no vomiting noted. PT to reevaluate 

the patient and work with for continued weakness. Continue on 6L via NC and wean

as tolerated.  Continue electrolyte replacement per protocol. 





12/21/2021 





Patient is evaluated today sitting at the bedside asking if he can go home.  

Patient remains on IV Lasix and discuss with cardiology recommending continuing 

IV Lasix for 24 more hours and reevaluation along with labs in the morning.  

Patient is also on 4 L of oxygen and Dr. Moses pulmonary following closely.  

Patient has not had oxygen in the outpatient setting prior to admission.  Case 

management following and [facility] in the [facility] 

setting.  Patient was also continued on Zithromax and ceftriaxone along with 

doxycycline and will continue.  Patient is also on IV steroids along with 

breathing inhalational treatments.  Patient denies any worsening shortness of 

breath or chest pains.  Patient is afebrile.





12/22/2021


Patient is seen in follow-up continues on 4 L via nasal cannula and case 

management working on [facility].  Cardiology and 

pulmonary following and patient is continued on IV steroids along with IV Lasix 

and cardiology evaluated the patient recommending an additional 24 hours with 

close monitoring of IV Lasix and follow-up with repeat labs tomorrow.  Patient 

continues to request to go home although agreeable to one more day.  Patient 

will need a prednisone taper on discharge.








Review of systems:


Constitutional: No reports of fatigue,no  reports of fever, or chills


Cardiovascular: No reports of chest pain or palpitations


Respiratory: reports of shortness of breath although feels is improved


GI: No reports of nausea, vomiting, or diarrhea


: No reports of dysuria or retention


Neurovascular: no reports of weakness 





All medications have been reviewed





Active Medications





Hydrocodone Bitart/Acetaminophen (Hydrocodone/Apap 5-325mg 1 Each Tab)  1 each 

PO Q6HR PRN


   PRN Reason: Pain


Albuterol/Ipratropium (Ipratropium-Albuterol 3 Ml Neb)  3 ml INHALATION RT-Q4H 

PRN


   PRN Reason: Shortness Of Breath Or Wheezing


Albuterol/Ipratropium (Ipratropium-Albuterol 3 Ml Neb)  3 ml INHALATION RT-QID 

[facility]


   Last Admin: 12/22/21 16:00 Dose:  3 ml


   Documented by: 


Alprazolam (Alprazolam 0.25 Mg Tab)  0.25 mg PO TID PRN


   PRN Reason: Anxiety


Artificial Tears (Artificial Tears-Hypromellose Drops 15 Ml Btl)  1 drops BOTH 

EYES TID [facility]


   Last Admin: 12/22/21 11:50 Dose:  Not Given


   Documented by: 


Aspirin (Aspirin 81 Mg)  81 mg PO DAILY [facility]


   Last Admin: 12/22/21 07:57 Dose:  81 mg


   Documented by: 


Budesonide (Budesonide 1 Mg/2 Ml Nebu)  1 mg INHALATION RT-BID [facility]


   Last Admin: 12/22/21 07:46 Dose:  1 mg


   Documented by: 


Clopidogrel Bisulfate (Clopidogrel 75 Mg Tab)  75 mg PO DAILY [facility]


Diclofenac Sodium (Diclofenac Sodium Gel 100 Gm Tube)  1 gm TOPICAL BID [facility]


   Last Admin: 12/22/21 07:58 Dose:  Not Given


   Documented by: 


Doxycycline Monohydrate (Doxycycline 100 Mg Cap)  100 mg PO BID [facility]


   Last Admin: 12/22/21 07:58 Dose:  100 mg


   Documented by: 


Finasteride (Finasteride 5 Mg Tab)  5 mg PO HS [facility]


   Last Admin: 12/21/21 21:32 Dose:  5 mg


   Documented by: 


Fluticasone Propionate (Fluticasone 50mcg/Spray Nasal 16gm)  2 spray EA NOSTRIL 

DAILY [facility]


   Last Admin: 12/22/21 07:58 Dose:  Not Given


   Documented by: 


Folic Acid (Folic Acid 1 Mg Tab)  1 mg PO DAILY@1200 [facility]


   Last Admin: 12/22/21 07:57 Dose:  1 mg


   Documented by: 


Formoterol Fumarate (Formoterol Fumarate 20 Mcg/2 Ml Nebu)  20 mcg INHALATION 

RT-BID [facility]


   Last Admin: 12/22/21 07:46 Dose:  20 mcg


   Documented by: 


Furosemide (Furosemide 10 Mg/Ml 4 Ml Vial)  40 mg IV Q12HR [facility]


   Last Admin: 12/22/21 07:57 Dose:  40 mg


   Documented by: 


Ceftriaxone Sodium 1 gm/ (Sodium Chloride)  50 mls @ 100 mls/hr IVPB Q24HR [facility]


   Last Admin: 12/22/21 10:06 Dose:  100 mls/hr


   Documented by: 


Azithromycin 500 mg/ Sodium (Chloride)  250 mls @ 250 mls/hr IVPB DAILY [facility]


   Last Admin: 12/22/21 07:56 Dose:  250 mls/hr


   Documented by: 


Insulin Aspart (Insulin Aspart (Novolog) 100 Unit/Ml Vial)  0 unit SQ ACHS [facility]; 

Protocol


   Last Admin: 12/22/21 13:33 Dose:  1 unit


   Documented by: 


Lisinopril (Lisinopril 5 Mg Tab)  5 mg PO BID [facility]


   Last Admin: 12/22/21 07:57 Dose:  5 mg


   Documented by: 


Loratadine (Loratadine 10 Mg Tab)  10 mg PO DAILY PRN


   PRN Reason: Allergy Symptoms


Meloxicam (Meloxicam 7.5 Mg Tab)  7.5 mg PO DAILY PRN


   PRN Reason: Pain


   Last Admin: 12/19/21 09:47 Dose:  7.5 mg


   Documented by: 


Methylprednisolone Sodium Succinate (Methylprednisolone Sod Succi 40 Mg/Ml 1 Ml 

Vial)  40 mg IV BID [facility]


   Last Admin: 12/22/21 07:57 Dose:  40 mg


   Documented by: 


Metoprolol Succinate (Metoprolol Succinate (Er) 50 Mg Tab.Er.24h)  50 mg PO BID 

[facility]


Miscellaneous Information (Potassium Replacement Protocol 1 Each Misc)  1 each 

MISCELLANE DAILY PRN; Protocol


   PRN Reason: Per Protocol


Multivitamins (Multivitamins, Thera 1 Each Tab)  1 each PO DAILY@1200 [facility]


   Last Admin: 12/22/21 07:57 Dose:  1 each


   Documented by: 


Oxymetazoline HCl (Oxymetazoline 0.05% Nasl Spray 1 Spray Bottle)  2 spray NASAL

BID [facility]


   Last Admin: 12/22/21 07:58 Dose:  2 spray


   Documented by: 


Pravastatin Sodium (Pravastatin Sodium 80 Mg Tab)  80 mg PO HS [facility]


   Last Admin: 12/21/21 21:32 Dose:  80 mg


   Documented by: 


Thiamine HCl (Thiamine 100 Mg Tab)  100 mg PO DAILY@1200 [facility]


   Last Admin: 12/22/21 13:34 Dose:  100 mg


   Documented by: 











Physical exam:





Gen: This is a 76-year-old male awake, alert and oriented 3, well-developed, 

well-nourished, ill appearing. on 4 L via nasal cannula


HEENT: Head is atraumatic, normocephalic. Pupils equal, round. Sclerae is 

anicteric. left nare nose bleed as patient is blowing nose currently on exam


NECK: Supple. No JVD. No lymphadenopathy. No thyromegaly. 


LUNGS: Diminished breath sounds bilaterally with some crackles at the bases and 

rhonchi noted..  No intercostal retractions.


HEART: S1, S2 are muffled. 


ABDOMEN: Soft.  Bowel sounds are present. No masses.  No tenderness.


EXTREMITIES: No pedal edema.  No calf tenderness. 


NEUROLOGICAL: Patient is awake, alert and oriented x3. no focal deficits.  

diffusely weak





Assessment:





Acute bilateral pneumonia left more than right possibly community-acquired, with

possible gram-negative sepsis with acute hypoxic respiratory failure, present on

admission


Asthma, chronic obstructive pulmonary disease acute exacerbation, present on 

admission


congestive heart failure acute exacerbation acute on chronic systolic 

dysfunction, ejection fraction 35-40%


mild epistaxis, resolved


Increased WBC


Hyponatremia


Elevated random glucose


Elevated bilirubin, AST, ALT possibly hepatitis of undetermined etiology


Troponin 1.170, possible type II myocardial infarction


Hypertension


History of prostate disorder


history of appendectomy


History back surgery, degenerative joint disease


Full code





Plan:





Recommend to continue current medications and management.  Cardiology following 

the patient and patient maintained on IV Lasix. discussed with cardiology and 

would like to continue with the patient on 24 more hours of IV Lasix and 

follow-up labs and evaluation.  Patient also continues  with IV steroids, IV 

ceftriaxone and Zithromax along with doxycycline.  Pulmonary Dr. Moses following 

as well.  Patient is currently on 4 L via nasal cannula and discuss with nursing

staff about weaning FiO2 as tolerated and case management following arranging 

[facility] as this is new this admission. Continue with breathing

inhalational treatments as well.  Repeat am labs.  Due to multiple complex 

medical issues, prognosis is guarded.  possible discharge in 24 hours.








Objective





- Vital Signs


Vital signs: 


                                   Vital Signs











Temp  98.1 F   12/22/21 04:36


 


Pulse  92   12/22/21 08:09


 


Resp  18   12/22/21 04:36


 


BP  132/84   12/22/21 08:00


 


Pulse Ox  95   12/22/21 04:36








                                 Intake & Output











 12/21/21 12/22/21 12/22/21





 18:59 06:59 18:59


 


Weight 65.5 kg 66 kg 


 


Other:   


 


  Voiding Method  Urinal 





  Diaper 














- Labs


CBC & Chem 7: 


                                 12/20/21 06:22





                                 12/22/21 14:57


Labs: 


                  Abnormal Lab Results - Last 24 Hours (Table)











  12/21/21 12/21/21 12/21/21 Range/Units





  05:22 11:59 17:43 


 


Carbon Dioxide  34.6 H    (20.0-27.5)  mmol/L


 


BUN/Creatinine Ratio  30.86 H    (12.00-20.00)  Ratio


 


Glucose  171 H    ()  mg/dL


 


POC Glucose (mg/dL)   169 H  181 H  (75-99)  mg/dL


 


Calcium  8.6 L    (8.7-10.3)  mg/dL














  12/21/21 12/22/21 Range/Units





  20:11 07:25 


 


Carbon Dioxide    (20.0-27.5)  mmol/L


 


BUN/Creatinine Ratio    (12.00-20.00)  Ratio


 


Glucose    ()  mg/dL


 


POC Glucose (mg/dL)  163 H  167 H  (75-99)  mg/dL


 


Calcium    (8.7-10.3)  mg/dL








                      Microbiology - Last 24 Hours (Table)











 12/16/21 11:10 Blood Culture - Preliminary





 Blood    No Growth after 120 hours


 


 12/16/21 10:55 Blood Culture - Preliminary





 Blood    No Growth after 120 hours

## 2021-12-23 VITALS — TEMPERATURE: 97.5 F | DIASTOLIC BLOOD PRESSURE: 78 MMHG | SYSTOLIC BLOOD PRESSURE: 130 MMHG

## 2021-12-23 VITALS — HEART RATE: 96 BPM

## 2021-12-23 LAB
GLUCOSE BLD-MCNC: 129 MG/DL (ref 75–99)
GLUCOSE BLD-MCNC: 170 MG/DL (ref 75–99)
GLUCOSE BLD-MCNC: 190 MG/DL (ref 75–99)

## 2021-12-23 RX ADMIN — BUDESONIDE SCH MG: 1 SUSPENSION RESPIRATORY (INHALATION) at 08:21

## 2021-12-23 RX ADMIN — AZITHROMYCIN MONOHYDRATE SCH MLS/HR: 500 INJECTION, POWDER, LYOPHILIZED, FOR SOLUTION INTRAVENOUS at 10:55

## 2021-12-23 RX ADMIN — FORMOTEROL FUMARATE DIHYDRATE SCH MCG: 20 SOLUTION RESPIRATORY (INHALATION) at 08:21

## 2021-12-23 RX ADMIN — FOLIC ACID SCH MG: 1 TABLET ORAL at 08:42

## 2021-12-23 RX ADMIN — IPRATROPIUM BROMIDE AND ALBUTEROL SULFATE SCH ML: .5; 3 SOLUTION RESPIRATORY (INHALATION) at 08:21

## 2021-12-23 RX ADMIN — IPRATROPIUM BROMIDE AND ALBUTEROL SULFATE SCH ML: .5; 3 SOLUTION RESPIRATORY (INHALATION) at 16:02

## 2021-12-23 RX ADMIN — FORMOTEROL FUMARATE DIHYDRATE SCH MCG: 20 SOLUTION RESPIRATORY (INHALATION) at 20:00

## 2021-12-23 RX ADMIN — INSULIN ASPART SCH: 100 INJECTION, SOLUTION INTRAVENOUS; SUBCUTANEOUS at 13:06

## 2021-12-23 RX ADMIN — THERA TABS SCH EACH: TAB at 08:42

## 2021-12-23 RX ADMIN — DICLOFENAC SODIUM SCH: 10 GEL TOPICAL at 08:44

## 2021-12-23 RX ADMIN — ASPIRIN 81 MG CHEWABLE TABLET SCH MG: 81 TABLET CHEWABLE at 08:42

## 2021-12-23 RX ADMIN — IPRATROPIUM BROMIDE AND ALBUTEROL SULFATE SCH ML: .5; 3 SOLUTION RESPIRATORY (INHALATION) at 11:35

## 2021-12-23 RX ADMIN — BUDESONIDE SCH MG: 1 SUSPENSION RESPIRATORY (INHALATION) at 20:00

## 2021-12-23 RX ADMIN — IPRATROPIUM BROMIDE AND ALBUTEROL SULFATE SCH ML: .5; 3 SOLUTION RESPIRATORY (INHALATION) at 20:00

## 2021-12-23 RX ADMIN — DEXTRAN 70 AND HYPROMELLOSE 2910 SCH: 1; 3 SOLUTION/ DROPS OPHTHALMIC at 08:44

## 2021-12-23 RX ADMIN — FLUTICASONE PROPIONATE SCH: 50 SPRAY, METERED NASAL at 08:45

## 2021-12-23 RX ADMIN — INSULIN ASPART SCH UNIT: 100 INJECTION, SOLUTION INTRAVENOUS; SUBCUTANEOUS at 08:40

## 2021-12-23 RX ADMIN — Medication SCH MG: at 08:42

## 2021-12-23 RX ADMIN — METHYLPREDNISOLONE SODIUM SUCCINATE SCH MG: 40 INJECTION, POWDER, FOR SOLUTION INTRAMUSCULAR; INTRAVENOUS at 08:43

## 2021-12-23 RX ADMIN — FUROSEMIDE SCH MG: 10 INJECTION, SOLUTION INTRAMUSCULAR; INTRAVENOUS at 08:45

## 2021-12-23 RX ADMIN — OXYMETAZOLINE HCL SCH SPRAY: 0.05 SPRAY NASAL at 08:43

## 2021-12-23 RX ADMIN — INSULIN ASPART SCH UNIT: 100 INJECTION, SOLUTION INTRAVENOUS; SUBCUTANEOUS at 17:39

## 2021-12-23 RX ADMIN — DEXTRAN 70 AND HYPROMELLOSE 2910 SCH: 1; 3 SOLUTION/ DROPS OPHTHALMIC at 16:36

## 2021-12-23 RX ADMIN — DOXYCYCLINE SCH MG: 100 CAPSULE ORAL at 08:45

## 2021-12-23 RX ADMIN — METOPROLOL SUCCINATE SCH MG: 50 TABLET, EXTENDED RELEASE ORAL at 08:42

## 2021-12-23 NOTE — P.DS
Providers


Date of admission: 


12/16/21 12:12





Attending physician: 


Sabas Aguilar MD





Consults: 





                                        





12/16/21 12:29


Consult Physician Routine 


   Consulting Provider: Yvan Lin


   Consult Reason/Comments: Elevated troponin


   Do you want consulting provider notified?: Yes





12/16/21 13:08


Consult Physician Routine 


   Consulting Provider: Braeden Moses


   Consult Reason/Comments: Pneumonia, COPD/Asthma exacerbation


   Do you want consulting provider notified?: Yes











Primary care physician: 


[facility]





Hospital Course: 





76 year old male who was recently admitted with acute bilateral pneumonia left 

more than right with possible community-acquired and also gram-negative 

pneumonia with possible sepsis and is being closely monitored.  cardiology 

following and patient is maintained on Lasix along with IV heparin and will c

ontinue.  Chest x-ray today shows no change in the mild to moderate diffuse 

interstitial infiltrates and worsening of the infiltrate in the retrocardiac 

opacity consistent with atelectasis or alveolar infiltrate area did patient 

continues on 6 L of oxygen via nasal cannula and states he feels his breathing 

is somewhat improved although maintaining 90% on 6 L.  Patient is extremely weak

and will have PT/OT evaluate the patient. Pulmonary is also following the 

patient is maintained on antibiotics along with breathing inhalational 

treatments and will continue.





12/19/2021





Patient is seen this morning and continues with shortness of breath and being 

followed by cardiology and pulmonary Dr. Moses. Patient continues on 6L via NC and

states his breathing is improving slowly. Patient continued on bronchodilators 

and will continue. Patient also continues on IV heparin and cardiology following

and recommending to continue for now. Patient potassium is 2.7 and will continue

to replace and repeat labs later this afternoon. Recommend follow up labs in the

am as well. Patient having some congestion and continues to blow his nose and is

having a mild nose bleed. Encouraged the patient to avoid excessive blowing of 

nose and will add afrin. Patient is on IV heparin. No reports of chest pain or 

palpitations. 





12/20/2021





Patient is seen in follow up this morning and continues with shortness of breath

and weakness although feels is improving. Cardiology following for CHF and 

patient to continue on IV lasix and will follow up with repeat am labs to 

monitor electrolytes and kidney functions. IV heparin being discontinued. Dr. Moses also following and will decrease IV steroids and continue with breathing 

treatments. Patient is tolerating diet with no vomiting noted. PT to reevaluate 

the patient and work with for continued weakness. Continue on 6L via NC and wean

as tolerated.  Continue electrolyte replacement per protocol. 





12/21/2021 





Patient is evaluated today sitting at the bedside asking if he can go home.  

Patient remains on IV Lasix and discuss with cardiology recommending continuing 

IV Lasix for 24 more hours and reevaluation along with labs in the morning.  

Patient is also on 4 L of oxygen and Dr. Moses pulmonary following closely.  

Patient has not had oxygen in the outpatient setting prior to admission.  Case 

management following and [facility] for [facility] in the outpatient 

setting.  Patient was also continued on Zithromax and ceftriaxone along with 

doxycycline and will continue.  Patient is also on IV steroids along with 

breathing inhalational treatments.  Patient denies any worsening shortness of 

breath or chest pains.  Patient is afebrile.





12/22/2021


Patient is seen in follow-up continues on 4 L via nasal cannula and case 

management working on [facility].  Cardiology and 

pulmonary following and patient is continued on IV steroids along with IV Lasix 

and cardiology evaluated the patient recommending an additional 24 hours with 

close monitoring of IV Lasix and follow-up with repeat labs tomorrow.  Patient 

continues to request to go home although agreeable to one more day.  Patient 

will need a prednisone taper on discharge.





12/23/2021


Patient is clinically doing well patient is evaluated this time patient will be 

discharged today on oral Lasix.  Patient is requiring oxygen because of his 

COPD.








Review of systems:


Constitutional: No reports of fatigue,no  reports of fever, or chills


Cardiovascular: No reports of chest pain or palpitations


Respiratory: reports of shortness of breath although feels is improved


GI: No reports of nausea, vomiting, or diarrhea


: No reports of dysuria or retention


Neurovascular: no reports of weakness 





All medications have been reviewed














Physical exam:





Gen: This is a 76-year-old male awake, alert and oriented 3, well-developed, 

well-nourished, ill appearing. on 4 L via nasal cannula


HEENT: Head is atraumatic, normocephalic. Pupils equal, round. Sclerae is anicte

mary. left nare nose bleed as patient is blowing nose currently on exam


NECK: Supple. No JVD. No lymphadenopathy. No thyromegaly. 


LUNGS: Diminished breath sounds bilaterally with some crackles at the bases and 

rhonchi noted..  No intercostal retractions.


HEART: S1, S2 are muffled. 


ABDOMEN: Soft.  Bowel sounds are present. No masses.  No tenderness.


EXTREMITIES: No pedal edema.  No calf tenderness. 


NEUROLOGICAL: Patient is awake, alert and oriented x3. no focal deficits.  

diffusely weak





Assessment:





Possibility of pneumonia on the left lower lung fields for which patient 

received 7 days of antibiotics will not require any more antibodies upon 

discharge pneumonia left more than right possibly community-acquired, 


-Acute hypoxic respiratory failure mainly secondary to CHF


chronic obstructive pulmonary disease acute exacerbation, present on admission


congestive heart failure acute exacerbation acute on chronic systolic 

dysfunction, ejection fraction 35-40%


mild epistaxis, resolved


Hyponatremia hypervolemic hyponatremia improved with Lasix


Transaminitis: Secondary to hepatic congestion 


Troponin 1.170, possible type II myocardial infarction she will follow up with 

cardiology as an outpatient may need cardiac catheterization down the line


Hypertension


History of prostate disorder








Patient Condition at Discharge: Stable





Plan - Discharge Summary


Discharge Rx Participant: No


New Discharge Prescriptions: 


New


   Metoprolol Succinate (ER) [Toprol XL] 50 mg PO BID #60


   Folic Acid 1 mg PO DAILY@1200 #30 tab


   Furosemide [Lasix] 40 mg PO BID@0900,1600 #60 tab


   Thiamine [Vitamin B-1] 100 mg PO DAILY@1200 #30 tab


   lisinopriL [Zestril] 5 mg PO BID #60 tab





Continue


   Albuterol Nebulized [Ventolin Nebulized] 2.5 mg INHALATION RT-Q4H PRN


     PRN Reason: Shortness Of Breath


   Meloxicam [Mobic] 7.5 mg PO DAILY PRN


     PRN Reason: Pain


   Finasteride [Proscar] 5 mg PO HS


   Lidocaine 5% Patch [Lidoderm 5% Patch] 1 patch TOPICAL DAILY


   Polyvinyl Alcohol/Povidone [[place] Eye Drop] 1 drop BOTH EYES TID


   Fluticasone/Salmeterol [Advair 250-50 Diskus] 1 puff INHALATION RT-BID


   Albuterol Inhaler [Ventolin Hfa Inhaler] 2 puff INHALATION RT-QID PRN


     PRN Reason: Shortness Of Breath


   Cetirizine HCl [Zyrtec] 10 mg PO DAILY PRN


     PRN Reason: Allergy Symptoms


   Triamcinolone 0.1% Cream [Kenalog 0.1% Cream] 1 applicatio TOPICAL BID PRN


     PRN Reason: Rash


   Ammonium Lactate Lotion [Lac-Hydrin 12% Lotion] 1 applic TOPICAL DAILY PRN


     PRN Reason: DRY FEET


   Diclofenac 1% Top Gel 1 applic TOPICAL BID


   Pravastatin Sodium [Pravachol] 20 mg PO HS


   Fluticasone Nasal Spray [Flonase Nasal Spray] 2 spray EA NOSTRIL DAILY





Discontinued


   hydroCHLOROthiazide [Hydrodiuril] 25 mg PO DAILY


   amLODIPine [Norvasc] 10 mg PO HS


Discharge Medication List





Albuterol Inhaler [Ventolin Hfa Inhaler] 2 puff INHALATION RT-QID PRN 12/16/21 

[History]


Albuterol Nebulized [Ventolin Nebulized] 2.5 mg INHALATION RT-Q4H PRN 12/16/21 

[History]


Ammonium Lactate Lotion [Lac-Hydrin 12% Lotion] 1 applic TOPICAL DAILY PRN 

12/16/21 [History]


Cetirizine HCl [Zyrtec] 10 mg PO DAILY PRN 12/16/21 [History]


Diclofenac 1% Top Gel 1 applic TOPICAL BID 12/16/21 [History]


Finasteride [Proscar] 5 mg PO HS 12/16/21 [History]


Fluticasone Nasal Spray [Flonase Nasal Spray] 2 spray EA NOSTRIL DAILY 12/16/21 

[History]


Fluticasone/Salmeterol [Advair 250-50 Diskus] 1 puff INHALATION RT-BID 12/16/21 

[History]


Lidocaine 5% Patch [Lidoderm 5% Patch] 1 patch TOPICAL DAILY 12/16/21 [History]


Meloxicam [Mobic] 7.5 mg PO DAILY PRN 12/16/21 [History]


Polyvinyl Alcohol/Povidone [[place] Eye Drop] 1 drop BOTH EYES TID 12/16/21 

[History]


Pravastatin Sodium [Pravachol] 20 mg PO HS 12/16/21 [History]


Triamcinolone 0.1% Cream [Kenalog 0.1% Cream] 1 applicatio TOPICAL BID PRN 

12/16/21 [History]


Folic Acid 1 mg PO DAILY@1200 #30 tab 12/23/21 [Rx]


Furosemide [Lasix] 40 mg PO BID@0900,1600 #60 tab 12/23/21 [Rx]


Metoprolol Succinate (ER) [Toprol XL] 50 mg PO BID #60 12/23/21 [Rx]


Thiamine [Vitamin B-1] 100 mg PO DAILY@1200 #30 tab 12/23/21 [Rx]


lisinopriL [Zestril] 5 mg PO BID #60 tab 12/23/21 [Rx]








Follow up Appointment(s)/Referral(s): 


[facility] [NON-STAFF] - 1 Week


Farhat Pelaez MD [STAFF PHYSICIAN] - 1 Week


Braeden Moses MD [STAFF PHYSICIAN] - 1 Week


[facility] [Primary Care Provider] - 1-2 days


Discharge Disposition: HOME SELF-CARE

## 2021-12-23 NOTE — P.PN
Subjective


Progress Note Date: 12/23/21


Principal diagnosis: 





Acute coronary syndrome/heart failure





The patient is a 76-year-old gentleman who was admitted to the hospital with 

acute coronary syndrome complicated by heart failure.





The patient was seen this morning.  He is feeling better.  The lung examination 

is definitely better.  The lower extremities edema has improved.  He is on skin 

to go home.  He is on dual antiplatelet therapy along with high intensity 

statin.  I'm going to DC Lasix IV and start the patient on Lasix by mouth.  From

a cardiovascular standpoint of view, the patient need to be seen as an ou

tpatient in about one to 2 weeks for a heart catheterization.  He is currently 

chest pain-free











Objective





- Vital Signs


Vital signs: 


                                   Vital Signs











Temp  97.8 F   12/23/21 05:00


 


Pulse  100   12/23/21 08:40


 


Resp  18   12/23/21 05:00


 


BP  153/91   12/23/21 05:00


 


Pulse Ox  97   12/23/21 05:00








                                 Intake & Output











 12/22/21 12/23/21 12/23/21





 18:59 06:59 18:59


 


Intake Total 540  


 


Output Total 1100 100 


 


Balance -560 -100 


 


Weight  67.5 kg 


 


Intake:   


 


  Oral 540  


 


Output:   


 


  Urine 1100 100 


 


Other:   


 


  Voiding Method  Urinal Urinal





  Diaper Diaper


 


  # Voids 3 1 














- Constitutional


General appearance: Present: no acute distress





- Respiratory


Respiratory: bilateral: diminished





- Cardiovascular


Rhythm: regular





- Labs


CBC & Chem 7: 


                                 12/20/21 06:22





                                 12/22/21 14:57


Labs: 


                  Abnormal Lab Results - Last 24 Hours (Table)











  12/22/21 12/22/21 12/22/21 Range/Units





  12:29 14:57 17:48 


 


Chloride   90 L   ()  mmol/L


 


Carbon Dioxide   41 H*   (22-30)  mmol/L


 


BUN   35 H   (9-20)  mg/dL


 


Glucose   272 H   (74-99)  mg/dL


 


POC Glucose (mg/dL)  165 H   198 H  (75-99)  mg/dL














  12/22/21 12/23/21 Range/Units





  20:17 07:25 


 


Chloride    ()  mmol/L


 


Carbon Dioxide    (22-30)  mmol/L


 


BUN    (9-20)  mg/dL


 


Glucose    (74-99)  mg/dL


 


POC Glucose (mg/dL)  201 H  170 H  (75-99)  mg/dL








                      Microbiology - Last 24 Hours (Table)











 12/16/21 11:10 Blood Culture - Final





 Blood    No Growth after 144 hours


 


 12/16/21 10:55 Blood Culture - Final





 Blood    No Growth after 144 hours














Assessment and Plan


Assessment: 





Assessment


#1 acute non-ST deviation myocardial infarction


#2 heart failure exacerbation related to heart failure with reduced ejection 

fraction


#3 cardiomyopathy, ischemic


#4 multiple comorbid conditions





Plan


#1 DC Lasix IV and start the patient on Lasix by mouth


#2 the patient is asking to go home


#3 the patient need to be seen for a cardiac follow-up for a heart 

catheterization

## 2021-12-23 NOTE — P.PN
Subjective


Progress Note Date: 12/22/21


Principal diagnosis: 


Left lower lobe community-acquired pneumonia also involving the right lower lobe





Mass like process of the left lower lobe cannot be excluded, patient will need 

further radiographs to document resolution in 8-12 weeks





Acute hypoxic respiratory failure





Elevated troponin





History of COVID-19 pneumonia





History of COPD





12/22/2021, patient seen and evaluated examined remains on 4 L nasal cannula 

oxygen slowly being Titrated down, patient remains on IV steroids breathing 

treatment and IV diuretics, respiratory status continued to improve, once ready 

for discharge can be switched to oral





12/21/2021, patient seen eval examined during the rounds labs reviewed 

medications reviewed, respiratory rate remained stable in low 20s, patient has 

been ambulating, remains on 6 L oxygen, oxygen saturation 100%, blood pressure 

160/93 respiratory 18 pulse 89 temperature 90.8,





12/20/2021, patient seen eval examined during the rounds labs reviewed 

medications reviewed care plan discussed, respiratory status remains stable, 

however still cough congestion shortness of breath going on but severity has 

improved patient remains on 6 L nasal cannula, afebrile with stable hemodynamics

respiratory rate is 20, saturation 95%, labs reviewed potassium is 3.9 improved 

significantly as well chemistry is stable BUN/creatinine 25/.64 recommend to 

titrate oxygen down to bring it to 3 or 2 L





12/19/2021, patient seen eval examined during the rounds labs reviewed medicatio

ns reviewed care plan discussed, patient remains on 6 L oxygen, oxygen 

saturation is 96%, labs reviewed sodium is 140 potassium is 2.4 BUN/creatinine 

25/0.84, glucose 224, currently patient remains on bronchodilators and 

antibiotics Zithromax and Rocephin, heparin drip, and Solu-Medrol 60 IV every 6,

patient is still complaining of shortness of breath and intermittent wheezing





Patient is a 76-year-old with a remote history of smoking prior history of COPD 

lately have been more short of breath congested recently seen in the office for 

the first time preliminary workup including PFTs labs and x-rays have been 

ordered, patient was complaining of shortness of breath and was hypoxic patient 

has recently placed on home oxygen, chest for the last 2 days started getting 

more shortness of breath cough congestion and fever up to 101, patient has not 

vaccinated for COVID-19 pneumonia, prior history of asthma hypertension prostate

problem enlargement, on arrival patient was tachypneic.  Cardiac with low oxygen

saturation of 90% blood pressure was 122/73, heart rate is 120, respiratory rate

20,Shortness breath patient is a 76-year-old male with the history of hypoxic, 

patient does have a history of Crohn enteritis pneumonia infection before she 

this admission white cell count 70,700 hemoglobin and hematocrit is 24/7.7 to 

hemoglobin is 18, influenza A and B both negative: Negative, troponin elevated 

1.1, computed tomography scan of the chest negative for pulmonary embolism 

however masslike consolidation left lower lobe is present consistent with focal 

pneumonia she infiltrated right lower lobe








Objective





- Vital Signs


Vital signs: 


                                   Vital Signs











Temp  97.4 F L  12/22/21 12:32


 


Pulse  92   12/22/21 16:11


 


Resp  16   12/22/21 12:32


 


BP  152/81   12/22/21 12:32


 


Pulse Ox  95   12/22/21 15:45








                                 Intake & Output











 12/21/21 12/22/21 12/22/21





 18:59 06:59 18:59


 


Output Total   300


 


Balance   -300


 


Weight 65.5 kg 66 kg 


 


Output:   


 


  Urine   300


 


Other:   


 


  Voiding Method  Urinal 





  Diaper 














- Exam





- Constitutional


General appearance: average body habitus, cooperative, disheveled, mild distress





- EENT


Eyes: PERRLA


Ears: bilateral: normal





- Neck


Neck: normal ROM


Carotids: bilateral: upstroke normal





- Respiratory


Respiratory: bilateral: diminished, wheezing





- Cardiovascular


Rhythm: regular


Heart sounds: normal: S1, S2





- Gastrointestinal


General gastrointestinal: distended, normal bowel sounds





- Integumentary


Integumentary: normal turgor





- Neurologic


Neurologic: CNII-XII intact, focal deficits





- Musculoskeletal


Musculoskeletal: gait normal, generalized weakness, strength equal bilaterally





- Psychiatric


Psychiatric: A&O x's 3, appropriate affect, intact judgment & insight








- Labs


CBC & Chem 7: 


                                 12/20/21 06:22





                                 12/22/21 14:57


Labs: 


                  Abnormal Lab Results - Last 24 Hours (Table)











  12/21/21 12/21/21 12/22/21 Range/Units





  17:43 20:11 07:25 


 


Chloride     ()  mmol/L


 


Carbon Dioxide     (22-30)  mmol/L


 


BUN     (9-20)  mg/dL


 


Glucose     (74-99)  mg/dL


 


POC Glucose (mg/dL)  181 H  163 H  167 H  (75-99)  mg/dL














  12/22/21 12/22/21 Range/Units





  12:29 14:57 


 


Chloride   90 L  ()  mmol/L


 


Carbon Dioxide   41 H*  (22-30)  mmol/L


 


BUN   35 H  (9-20)  mg/dL


 


Glucose   272 H  (74-99)  mg/dL


 


POC Glucose (mg/dL)  165 H   (75-99)  mg/dL








                      Microbiology - Last 24 Hours (Table)











 12/16/21 11:10 Blood Culture - Final





 Blood    No Growth after 144 hours


 


 12/16/21 10:55 Blood Culture - Final





 Blood    No Growth after 144 hours














Assessment and Plan


Assessment: 


Non-STEMI





Severe hypokalemia





Left lower lobe community-acquired pneumonia also involving the right lower lobe





Masslike process of the left lower lobe cannot be excluded, patient will need 

further radiographs to document resolution in 8-12 weeks





Acute hypoxic respiratory failure





Elevated troponin





History of COVID-19 pneumonia





History of COPD


Plan: 











Broad-spectrum IV antibiotics with Rocephin and Zithromax him a condition 

changed to simple Zithromax to complete 7-10 day therapy





Bronchodilators





IV steroids, can be switched to oral at time of discharge





Supplemental oxygen with slow tapering patient can be safely turned down to 2-4 

L oxygen HEENT saturation around 90% and above





Follow up radiographic studies the outpatient





Further plan of care and recommendation as per clinical response of the patient





Patient will need cardiac cath and angiogram cardiovascular services following





Time with Patient: Greater than 30

## 2021-12-23 NOTE — P.PN
Subjective


Progress Note Date: 12/23/21


Principal diagnosis: 


Left lower lobe community-acquired pneumonia also involving the right lower lobe





Mass like process of the left lower lobe cannot be excluded, patient will need 

further radiographs to document resolution in 8-12 weeks





Acute hypoxic respiratory failure





Elevated troponin





History of COVID-19 pneumonia





History of COPD





12/23/2021, patient seen eval examined during the rounds labs reviewed 

medications reviewed care plan discussed, patient is now down to 2 L nasal 

cannula, while getting nebulizer treatment high flow oxygen, respirator status 

significantly improved denies any sputum production, noted elevated CO2 in the 

serum chemistry consistent with contraction alkalosis





12/22/2021, patient seen and evaluated examined remains on 4 L nasal cannula oxy

gen slowly being Titrated down, patient remains on IV steroids breathing 

treatment and IV diuretics, respiratory status continued to improve, once ready 

for discharge can be switched to oral





12/21/2021, patient seen eval examined during the rounds labs reviewed 

medications reviewed, respiratory rate remained stable in low 20s, patient has 

been ambulating, remains on 6 L oxygen, oxygen saturation 100%, blood pressure 

160/93 respiratory 18 pulse 89 temperature 90.8,





12/20/2021, patient seen eval examined during the rounds labs reviewed 

medications reviewed care plan discussed, respiratory status remains stable, 

however still cough congestion shortness of breath going on but severity has 

improved patient remains on 6 L nasal cannula, afebrile with stable hemodynamics

respiratory rate is 20, saturation 95%, labs reviewed potassium is 3.9 improved 

significantly as well chemistry is stable BUN/creatinine 25/.64 recommend to 

titrate oxygen down to bring it to 3 or 2 L





12/19/2021, patient seen eval examined during the rounds labs reviewed 

medications reviewed care plan discussed, patient remains on 6 L oxygen, oxygen 

saturation is 96%, labs reviewed sodium is 140 potassium is 2.4 BUN/creatinine 

25/0.84, glucose 224, currently patient remains on bronchodilators and 

antibiotics Zithromax and Rocephin, heparin drip, and Solu-Medrol 60 IV every 6,

patient is still complaining of shortness of breath and intermittent wheezing





Patient is a 76-year-old with a remote history of smoking prior history of COPD 

lately have been more short of breath congested recently seen in the office for 

the first time preliminary workup including PFTs labs and x-rays have been 

ordered, patient was complaining of shortness of breath and was hypoxic patient 

has recently placed on home oxygen, chest for the last 2 days started getting 

more shortness of breath cough congestion and fever up to 101, patient has not 

vaccinated for COVID-19 pneumonia, prior history of asthma hypertension prostate

problem enlargement, on arrival patient was tachypneic.  Cardiac with low oxygen

saturation of 90% blood pressure was 122/73, heart rate is 120, respiratory rate

20,Shortness breath patient is a 76-year-old male with the history of hypoxic, 

patient does have a history of Crohn enteritis pneumonia infection before she 

this admission white cell count 70,700 hemoglobin and hematocrit is 24/7.7 to 

hemoglobin is 18, influenza A and B both negative: Negative, troponin elevated 

1.1, computed tomography scan of the chest negative for pulmonary embolism 

however masslike consolidation left lower lobe is present consistent with focal 

pneumonia she infiltrated right lower lobe








Objective





- Vital Signs


Vital signs: 


                                   Vital Signs











Temp  97.8 F   12/23/21 05:00


 


Pulse  100   12/23/21 08:40


 


Resp  18   12/23/21 05:00


 


BP  153/91   12/23/21 05:00


 


Pulse Ox  97   12/23/21 05:00








                                 Intake & Output











 12/22/21 12/23/21 12/23/21





 18:59 06:59 18:59


 


Intake Total 540  


 


Output Total 1100 100 


 


Balance -560 -100 


 


Weight  67.5 kg 


 


Intake:   


 


  Oral 540  


 


Output:   


 


  Urine 1100 100 


 


Other:   


 


  Voiding Method  Urinal Urinal





  Diaper Diaper


 


  # Voids 3 1 














- Exam





- Constitutional


General appearance: average body habitus, cooperative, disheveled, mild distress





- EENT


Eyes: PERRLA


Ears: bilateral: normal





- Neck


Neck: normal ROM


Carotids: bilateral: upstroke normal





- Respiratory


Respiratory: bilateral: diminished, wheezing





- Cardiovascular


Rhythm: regular


Heart sounds: normal: S1, S2





- Gastrointestinal


General gastrointestinal: distended, normal bowel sounds





- Integumentary


Integumentary: normal turgor





- Neurologic


Neurologic: CNII-XII intact, focal deficits





- Musculoskeletal


Musculoskeletal: gait normal, generalized weakness, strength equal bilaterally





- Psychiatric


Psychiatric: A&O x's 3, appropriate affect, intact judgment & insight








- Labs


CBC & Chem 7: 


                                 12/20/21 06:22





                                 12/22/21 14:57


Labs: 


                  Abnormal Lab Results - Last 24 Hours (Table)











  12/22/21 12/22/21 12/22/21 Range/Units





  12:29 14:57 17:48 


 


Chloride   90 L   ()  mmol/L


 


Carbon Dioxide   41 H*   (22-30)  mmol/L


 


BUN   35 H   (9-20)  mg/dL


 


Glucose   272 H   (74-99)  mg/dL


 


POC Glucose (mg/dL)  165 H   198 H  (75-99)  mg/dL














  12/22/21 12/23/21 Range/Units





  20:17 07:25 


 


Chloride    ()  mmol/L


 


Carbon Dioxide    (22-30)  mmol/L


 


BUN    (9-20)  mg/dL


 


Glucose    (74-99)  mg/dL


 


POC Glucose (mg/dL)  201 H  170 H  (75-99)  mg/dL








                      Microbiology - Last 24 Hours (Table)











 12/16/21 11:10 Blood Culture - Final





 Blood    No Growth after 144 hours


 


 12/16/21 10:55 Blood Culture - Final





 Blood    No Growth after 144 hours














Assessment and Plan


Assessment: 


Non-STEMI





Severe hypokalemia





Left lower lobe community-acquired pneumonia also involving the right lower lobe





Masslike process of the left lower lobe cannot be excluded, patient will need 

further radiographs to document resolution in 8-12 weeks





Acute hypoxic respiratory failure





Elevated troponin





History of COVID-19 pneumonia





History of COPD


Plan: 











Broad-spectrum IV antibiotics with Rocephin and Zithromax him a condition 

changed to simple Zithromax to complete 7-10 day therapy





Bronchodilators





IV steroids, can be switched to oral at time of discharge





Supplemental oxygen with slow tapering patient can be safely turned down to 2-4 

L oxygen HEENT saturation around 90% and above





Follow up radiographic studies the outpatient





Further plan of care and recommendation as per clinical response of the patient





Patient will need cardiac cath and angiogram cardiovascular services following





Time with Patient: Greater than 30

## 2021-12-30 ENCOUNTER — HOSPITAL ENCOUNTER (INPATIENT)
Dept: HOSPITAL 47 - EC | Age: 76
LOS: 3 days | Discharge: HOME HEALTH | DRG: 246 | End: 2022-01-02
Attending: HOSPITALIST | Admitting: HOSPITALIST
Payer: OTHER GOVERNMENT

## 2021-12-30 VITALS — BODY MASS INDEX: 21.2 KG/M2

## 2021-12-30 DIAGNOSIS — Z98.890: ICD-10-CM

## 2021-12-30 DIAGNOSIS — E87.1: ICD-10-CM

## 2021-12-30 DIAGNOSIS — I11.0: ICD-10-CM

## 2021-12-30 DIAGNOSIS — N40.1: ICD-10-CM

## 2021-12-30 DIAGNOSIS — C61: ICD-10-CM

## 2021-12-30 DIAGNOSIS — Z90.89: ICD-10-CM

## 2021-12-30 DIAGNOSIS — I25.5: ICD-10-CM

## 2021-12-30 DIAGNOSIS — I21.09: Primary | ICD-10-CM

## 2021-12-30 DIAGNOSIS — N17.9: ICD-10-CM

## 2021-12-30 DIAGNOSIS — Z90.49: ICD-10-CM

## 2021-12-30 DIAGNOSIS — Z88.7: ICD-10-CM

## 2021-12-30 DIAGNOSIS — I50.21: ICD-10-CM

## 2021-12-30 DIAGNOSIS — Z87.01: ICD-10-CM

## 2021-12-30 DIAGNOSIS — E86.1: ICD-10-CM

## 2021-12-30 DIAGNOSIS — Z79.891: ICD-10-CM

## 2021-12-30 DIAGNOSIS — N13.8: ICD-10-CM

## 2021-12-30 DIAGNOSIS — E78.5: ICD-10-CM

## 2021-12-30 DIAGNOSIS — J44.9: ICD-10-CM

## 2021-12-30 DIAGNOSIS — Z79.899: ICD-10-CM

## 2021-12-30 DIAGNOSIS — I25.10: ICD-10-CM

## 2021-12-30 DIAGNOSIS — Z87.891: ICD-10-CM

## 2021-12-30 DIAGNOSIS — J44.0: ICD-10-CM

## 2021-12-30 DIAGNOSIS — I25.2: ICD-10-CM

## 2021-12-30 DIAGNOSIS — Z87.19: ICD-10-CM

## 2021-12-30 DIAGNOSIS — Z20.822: ICD-10-CM

## 2021-12-30 DIAGNOSIS — Z79.1: ICD-10-CM

## 2021-12-30 DIAGNOSIS — R31.0: ICD-10-CM

## 2021-12-30 LAB
ALBUMIN SERPL-MCNC: 2.9 G/DL (ref 3.5–5)
ALP SERPL-CCNC: 57 U/L (ref 38–126)
ALT SERPL-CCNC: 40 U/L (ref 4–49)
ANION GAP SERPL CALC-SCNC: 15 MMOL/L
ANION GAP SERPL CALC-SCNC: 6 MMOL/L
APTT BLD: 21.9 SEC (ref 22–30)
AST SERPL-CCNC: 33 U/L (ref 17–59)
BASOPHILS # BLD AUTO: 0 K/UL (ref 0–0.2)
BASOPHILS # BLD AUTO: 0 K/UL (ref 0–0.2)
BASOPHILS NFR BLD AUTO: 0 %
BASOPHILS NFR BLD AUTO: 0 %
BUN SERPL-SCNC: 113 MG/DL (ref 9–20)
BUN SERPL-SCNC: 64 MG/DL (ref 9–20)
CALCIUM SPEC-MCNC: 8.1 MG/DL (ref 8.4–10.2)
CALCIUM SPEC-MCNC: 8.9 MG/DL (ref 8.4–10.2)
CHLORIDE SERPL-SCNC: 89 MMOL/L (ref 98–107)
CHLORIDE SERPL-SCNC: 99 MMOL/L (ref 98–107)
CO2 SERPL-SCNC: 26 MMOL/L (ref 22–30)
CO2 SERPL-SCNC: 33 MMOL/L (ref 22–30)
EOSINOPHIL # BLD AUTO: 0 K/UL (ref 0–0.7)
EOSINOPHIL # BLD AUTO: 0 K/UL (ref 0–0.7)
EOSINOPHIL NFR BLD AUTO: 0 %
EOSINOPHIL NFR BLD AUTO: 0 %
ERYTHROCYTE [DISTWIDTH] IN BLOOD BY AUTOMATED COUNT: 5.24 M/UL (ref 4.3–5.9)
ERYTHROCYTE [DISTWIDTH] IN BLOOD BY AUTOMATED COUNT: 5.32 M/UL (ref 4.3–5.9)
ERYTHROCYTE [DISTWIDTH] IN BLOOD BY AUTOMATED COUNT: 5.44 M/UL (ref 4.3–5.9)
ERYTHROCYTE [DISTWIDTH] IN BLOOD: 14.1 % (ref 11.5–15.5)
ERYTHROCYTE [DISTWIDTH] IN BLOOD: 14.1 % (ref 11.5–15.5)
ERYTHROCYTE [DISTWIDTH] IN BLOOD: 14.3 % (ref 11.5–15.5)
GLUCOSE BLD-MCNC: 179 MG/DL (ref 75–99)
GLUCOSE SERPL-MCNC: 145 MG/DL (ref 74–99)
GLUCOSE SERPL-MCNC: 213 MG/DL (ref 74–99)
HCT VFR BLD AUTO: 44.3 % (ref 39–53)
HCT VFR BLD AUTO: 45.9 % (ref 39–53)
HCT VFR BLD AUTO: 46.9 % (ref 39–53)
HGB BLD-MCNC: 14.4 GM/DL (ref 13–17.5)
HGB BLD-MCNC: 14.8 GM/DL (ref 13–17.5)
HGB BLD-MCNC: 15.2 GM/DL (ref 13–17.5)
INR PPP: 1.1 (ref ?–1.2)
LIPASE SERPL-CCNC: 107 U/L (ref 23–300)
LYMPHOCYTES # SPEC AUTO: 0.4 K/UL (ref 1–4.8)
LYMPHOCYTES # SPEC AUTO: 0.5 K/UL (ref 1–4.8)
LYMPHOCYTES NFR SPEC AUTO: 3 %
LYMPHOCYTES NFR SPEC AUTO: 3 %
MAGNESIUM SPEC-SCNC: 2.2 MG/DL (ref 1.6–2.3)
MCH RBC QN AUTO: 27.5 PG (ref 25–35)
MCH RBC QN AUTO: 27.8 PG (ref 25–35)
MCH RBC QN AUTO: 27.9 PG (ref 25–35)
MCHC RBC AUTO-ENTMCNC: 32.3 G/DL (ref 31–37)
MCHC RBC AUTO-ENTMCNC: 32.4 G/DL (ref 31–37)
MCHC RBC AUTO-ENTMCNC: 32.6 G/DL (ref 31–37)
MCV RBC AUTO: 84.4 FL (ref 80–100)
MCV RBC AUTO: 86.2 FL (ref 80–100)
MCV RBC AUTO: 86.3 FL (ref 80–100)
MONOCYTES # BLD AUTO: 1 K/UL (ref 0–1)
MONOCYTES # BLD AUTO: 1.2 K/UL (ref 0–1)
MONOCYTES NFR BLD AUTO: 6 %
MONOCYTES NFR BLD AUTO: 7 %
NEUTROPHILS # BLD AUTO: 12.8 K/UL (ref 1.3–7.7)
NEUTROPHILS # BLD AUTO: 16 K/UL (ref 1.3–7.7)
NEUTROPHILS NFR BLD AUTO: 88 %
NEUTROPHILS NFR BLD AUTO: 88 %
PLATELET # BLD AUTO: 144 K/UL (ref 150–450)
PLATELET # BLD AUTO: 166 K/UL (ref 150–450)
PLATELET # BLD AUTO: 172 K/UL (ref 150–450)
PLATELET # BLD AUTO: 183 K/UL (ref 150–450)
POTASSIUM SERPL-SCNC: 3.8 MMOL/L (ref 3.5–5.1)
POTASSIUM SERPL-SCNC: 4.4 MMOL/L (ref 3.5–5.1)
PROT SERPL-MCNC: 5.5 G/DL (ref 6.3–8.2)
PT BLD: 11.6 SEC (ref 9–12)
SODIUM SERPL-SCNC: 130 MMOL/L (ref 137–145)
SODIUM SERPL-SCNC: 138 MMOL/L (ref 137–145)
WBC # BLD AUTO: 14.6 K/UL (ref 3.8–10.6)
WBC # BLD AUTO: 15.2 K/UL (ref 3.8–10.6)
WBC # BLD AUTO: 18.1 K/UL (ref 3.8–10.6)

## 2021-12-30 PROCEDURE — 99291 CRITICAL CARE FIRST HOUR: CPT

## 2021-12-30 PROCEDURE — 71045 X-RAY EXAM CHEST 1 VIEW: CPT

## 2021-12-30 PROCEDURE — 027036Z DILATION OF CORONARY ARTERY, ONE ARTERY WITH THREE DRUG-ELUTING INTRALUMINAL DEVICES, PERCUTANEOUS APPROACH: ICD-10-PCS

## 2021-12-30 PROCEDURE — 83735 ASSAY OF MAGNESIUM: CPT

## 2021-12-30 PROCEDURE — 85610 PROTHROMBIN TIME: CPT

## 2021-12-30 PROCEDURE — B41F1ZZ FLUOROSCOPY OF RIGHT LOWER EXTREMITY ARTERIES USING LOW OSMOLAR CONTRAST: ICD-10-PCS

## 2021-12-30 PROCEDURE — 83880 ASSAY OF NATRIURETIC PEPTIDE: CPT

## 2021-12-30 PROCEDURE — 80053 COMPREHEN METABOLIC PANEL: CPT

## 2021-12-30 PROCEDURE — 4A023N7 MEASUREMENT OF CARDIAC SAMPLING AND PRESSURE, LEFT HEART, PERCUTANEOUS APPROACH: ICD-10-PCS

## 2021-12-30 PROCEDURE — 84484 ASSAY OF TROPONIN QUANT: CPT

## 2021-12-30 PROCEDURE — 87635 SARS-COV-2 COVID-19 AMP PRB: CPT

## 2021-12-30 PROCEDURE — 93308 TTE F-UP OR LMTD: CPT

## 2021-12-30 PROCEDURE — 85025 COMPLETE CBC W/AUTO DIFF WBC: CPT

## 2021-12-30 PROCEDURE — 85027 COMPLETE CBC AUTOMATED: CPT

## 2021-12-30 PROCEDURE — 93005 ELECTROCARDIOGRAM TRACING: CPT

## 2021-12-30 PROCEDURE — 83690 ASSAY OF LIPASE: CPT

## 2021-12-30 PROCEDURE — 85730 THROMBOPLASTIN TIME PARTIAL: CPT

## 2021-12-30 PROCEDURE — 85049 AUTOMATED PLATELET COUNT: CPT

## 2021-12-30 PROCEDURE — 36415 COLL VENOUS BLD VENIPUNCTURE: CPT

## 2021-12-30 PROCEDURE — 80061 LIPID PANEL: CPT

## 2021-12-30 PROCEDURE — 80048 BASIC METABOLIC PNL TOTAL CA: CPT

## 2021-12-30 PROCEDURE — B2111ZZ FLUOROSCOPY OF MULTIPLE CORONARY ARTERIES USING LOW OSMOLAR CONTRAST: ICD-10-PCS

## 2021-12-30 PROCEDURE — 93458 L HRT ARTERY/VENTRICLE ANGIO: CPT

## 2021-12-30 RX ADMIN — NICARDIPINE HYDROCHLORIDE ONE MCG: 2.5 INJECTION INTRAVENOUS at 03:03

## 2021-12-30 RX ADMIN — ASPIRIN 81 MG CHEWABLE TABLET SCH MG: 81 TABLET CHEWABLE at 10:44

## 2021-12-30 RX ADMIN — ATORVASTATIN CALCIUM SCH MG: 80 TABLET, FILM COATED ORAL at 20:36

## 2021-12-30 RX ADMIN — NICARDIPINE HYDROCHLORIDE ONE MCG: 2.5 INJECTION INTRAVENOUS at 02:53

## 2021-12-30 RX ADMIN — NITROGLYCERIN ONE MCG: 10 INJECTION INTRAVENOUS at 02:53

## 2021-12-30 RX ADMIN — ACETAMINOPHEN PRN MG: 325 TABLET, FILM COATED ORAL at 10:44

## 2021-12-30 RX ADMIN — METOPROLOL SUCCINATE SCH: 25 TABLET, EXTENDED RELEASE ORAL at 09:12

## 2021-12-30 RX ADMIN — TICAGRELOR SCH MG: 90 TABLET ORAL at 13:00

## 2021-12-30 RX ADMIN — NITROGLYCERIN ONE MCG: 10 INJECTION INTRAVENOUS at 03:03

## 2021-12-30 RX ADMIN — TICAGRELOR SCH MG: 90 TABLET ORAL at 20:36

## 2021-12-30 NOTE — CC
CARDIAC CATHETERIZATION REPORT



DATE OF SERVICE:

December 30, 2021



PERFORMING PHYSICIAN:

Farhat Pelaez MD.



PROCEDURE PERFORMED:

1. Selective right and left coronary angiogram.

2. Successful stenting of the proximal left anterior descending artery using 2.75 x 18

    mm Xience drug-eluting stent with an excellent angiographic results.

3. Successful stenting of the mid left anterior descending artery using 2.75 x 12 mm

    Xience drug-eluting stent with excellent angiographic results.

4. Successful stenting of the distal left anterior descending artery using 2.0 x 26 mm

    yamel drug-eluting stent with an excellent angiographic results.

5. Left heart catheterization.

6. Selective right common femoral artery angiogram.



INDICATION:

This is a 76-year-old gentleman who presented to the hospital with chest discomfort and

was diagnosed with acute anterior ST-elevation myocardial infarction. In light of that,

a heart catheterization was advised.  He was admitted to the hospital recently with

shortness of breath and he was ruled in for acute coronary event and also was diagnosed

with pneumonia.  Maximize medical treatment was advised at that point, and the plan to

pursue with a heart catheterization as an outpatient.  Unfortunately, the patient came

in with chest discomfort.



APPROACH:

Right common femoral artery.



COMPLICATIONS:

None.



LEVEL OF SEDATION:

Moderate, with sedation length of 48 minutes.



Door to balloon was 85 minutes.



PROCEDURE DESCRIPTION:

After obtaining an informed consent, the patient was brought to the cardiac cath lab.

The right common femoral artery was cannulated using micropuncture technique, the

micropuncture wire passed easily, then I placed a 6-[place] sheath at the right common

femoral artery.



Selective right and left coronary angiogram performed using JR 3.5 diagnostic catheter

and JL 3.5 guiding catheter.



I did after that intervene on the LAD. Please see a separate paragraph for that.  After

that, I did the left heart catheterization using 6-[place] pigtail catheter.



After that, I did selective right common femoral artery angiogram.



The procedure was completed without any complication.



SELECTIVE CORONARY ANGIOGRAM:

1. The right coronary artery is a large-caliber vessel. It is a superdominant vessel.

    The proximal RCA has mild disease only.  The mid RCA has mild disease only.  The

    RCA distally has mild disease only. It bifurcates into PDA and PLV branches.  The

    PDA has an ostial lesion appeared to be in the range of 70% and mid lesion appeared

    to be in the range of 50% to 60%.  The PLV branch of the RCA has a critical lesion

    appeared to be in the range of 80% to 90%.

2. Left main is a short left main.  Has a lesion appeared to be in the range of 20-30

    percent.  Bifurcates into a small nondominant left circumflex and left anterior

    descending artery.

3. The left circumflex is a small caliber vessel. It is a nondominant vessel.  The

    left circumflex appeared to have mild disease only.  Gives rise into multiple small

    obtuse marginal branches.

4. The LAD: The LAD is occluded in the proximal portion right after the bifurcation

    from the left main.

5.



HEMODYNAMICS:

The LVEDP was 25 mmHg with no gradient across aortic valve.



PCI OF THE LAD:

Anticoagulation was achieved using heparin with continuous ACT monitoring throughout

the procedure.



Subsequently, using JL3.5 guiding catheter,  the left main was engaged.  I did wire the

LAD using a run-through wire and the wire was advanced all the way to the distal LAD.

Balloon angioplasty of the proximal left anterior descending artery was performed using

2.5 x 12 mm balloon.



After that, the angiogram showed a critical lesion appeared to be involving the mid LAD

and seems to be only a short lesion.  For that reason, I decided to stent that lesion

first.  I deployed x 12 mm Xience drug-eluting stent where the stent was positioned

under fluoroscopic guidance and deployed under 12 atmospheres for about 15 seconds.

The following angiogram showed good angiographic results for that lesion.  After that,

I did an PUENTE caudal view, which showed the lesion we ballooned initially which is in

the proximal LAD and I decided to stent that lesion which was tight as well.  I

deployed 2.75 x 18 mm Xience drug-eluting stent at that point, where the stent was

positioned again under fluoroscopic guidance and deployed under its nominal pressure.

The following angiogram showed good angiographic results for the proximal and mid LAD,

but the distal LAD now has a long tubular lesion appeared to be flow limiting and hazy

with possible thrombus there.  I decided to stent that lesion as well.  Attempting

advancing 2.0 x 26 mm yamel drug-eluting stent for direct stenting was unsuccessful.

For that reason, I decided to balloon the distal lesion.  The balloon angioplasty

performed using 2.0 X 12 x 20 mm balloon.  With that, I was able to advance 2.0 x 26 mm

yamel drug-eluting stent where the stent was positioned under fluoroscopic guidance and

deployed under its nominal pressure.  The following angiogram showed excellent

angiographic results.  I was able to achieve JUANCARLOS-3 flow by the end.  There was some

myocardial blush as well.



CONCLUSION:

1. Acute anterior ST-elevation myocardial infarction.

2. Occluded LAD in the proximal portion.  Beside that, severe disease involving the

    mid and distal LAD.  I performed successful stenting of the proximal and mid and

    distal LAD as described above with an excellent angiographic results and JUANCARLOS-3

    flow.

3. Severe disease involving the PLV branch and PDA branch of the right coronary

    artery.

4. Elevated left-sided filling pressure.



POSTPROCEDURE MANAGEMENT:

1. Dual anti-platelet therapy.

2. Aggressive cholesterol control.

3. Risk factor modifications.

4. Echocardiogram to assess the LV function.

5. Nephrology consult in view of the elevated creatinine.

6. Start the patient on anti-ischemic medication using beta blocker.

7. Follow up with the patient.





VALENTINO / CRYSTALN: [id number] / Job#: [id number]

## 2021-12-30 NOTE — ED
Chest Pain HPI





- General


Chief Complaint: Chest Pain


Stated Complaint: Chest Pain


Time Seen by Provider: 21 01:25


Source: patient, EMS, RN notes reviewed, old records reviewed


Mode of arrival: EMS


Limitations: no limitations





- History of Present Illness


Initial Comments: 





This is a 76-year-old male with history of high blood pressure coming in for 

evaluation of chest pain today recent hospital admission where he did have 

similar chest pain and was supposed to undergo heart catheterization suite is a 

wanted him to get stronger in the meantime.  Patient began with persistent chest

pain tonight chest pain mild shortness of breath and weakness burning chest 

pain.


MD Complaint: chest pain


-: hour(s)


Onset: during rest


Pain Location: substernal, left chest


Pain Radiation: none


Severity: moderate


Severity scale (1-10): 4


Quality: tightness, other (Burning)


Consistency: constant


Improves With: nothing


Worsens With: nothing


Context: other (Recent history of similar admission)


Anginal Symptoms: diaphoresis, dyspnea, sense of impending doom


Other Symptoms: palpitations


Treatments Prior to Arrival: none





- Related Data


                                Home Medications











 Medication  Instructions  Recorded  Confirmed


 


Albuterol Inhaler [Ventolin Hfa 2 puff INHALATION RT-QID PRN 21





Inhaler]   


 


Albuterol Nebulized [Ventolin 2.5 mg INHALATION RT-Q4H PRN 21





Nebulized]   


 


Ammonium Lactate Lotion 1 applic TOPICAL DAILY PRN 21





[Lac-Hydrin 12% Lotion]   


 


Cetirizine HCl [Zyrtec] 10 mg PO DAILY PRN 21


 


Diclofenac 1% Top Gel 1 applic TOPICAL BID 21


 


Finasteride [Proscar] 5 mg PO HS 21


 


Fluticasone Nasal Spray [Flonase 2 spray EA NOSTRIL DAILY 21





Nasal Spray]   


 


Fluticasone/Salmeterol [Advair 1 puff INHALATION RT-BID 21





250-50 Diskus]   


 


Lidocaine 5% Patch [Lidoderm 5% 1 patch TOPICAL DAILY 21





Patch]   


 


Meloxicam [Mobic] 7.5 mg PO DAILY PRN 21


 


Polyvinyl Alcohol/Povidone 1 drop BOTH EYES TID 21





[[place] Eye Drop]   


 


Pravastatin Sodium [Pravachol] 20 mg PO HS 21


 


Triamcinolone 0.1% Cream [Kenalog 1 applicatio TOPICAL BID PRN 21





0.1% Cream]   








                                  Previous Rx's











 Medication  Instructions  Recorded


 


Folic Acid 1 mg PO DAILY@1200 #30 tab 21


 


Furosemide [Lasix] 40 mg PO BID@0900,1600 #60 tab 21


 


Metoprolol Succinate (ER) [Toprol 50 mg PO BID #60 21





XL]  


 


Thiamine [Vitamin B-1] 100 mg PO DAILY@1200 #30 tab 21


 


lisinopriL [Zestril] 5 mg PO BID #60 tab 21











                                    Allergies











Allergy/AdvReac Type Severity Reaction Status Date / Time


 


Influenza Virus Vaccines Allergy  Unknown Verified 21 14:27














Review of Systems


ROS Statement: 


Those systems with pertinent positive or pertinent negative responses have been 

documented in the HPI.





ROS Other: All systems not noted in ROS Statement are negative.





Past Medical History


Past Medical History: Asthma, Hypertension, Prostate Disorder


Additional Past Medical History / Comment(s): allergies


History of Any Multi-Drug Resistant Organisms: None Reported


Past Surgical History: Appendectomy, Back Surgery, Tonsillectomy


Additional Past Surgical History / Comment(s): cervical


Past Anesthesia/Blood Transfusion Reactions: No Reported Reaction


Additional Past Anesthesia/Blood Transfusion Reaction / Comment(s): Pt is 

clausterphobic.


Past Psychological History: No Psychological Hx Reported


Smoking Status: Former smoker


Past Alcohol Use History: None Reported


Past Drug Use History: None Reported





- Past Family History


  ** Mother


Family Medical History: No Reported History


Additional Family Medical History / Comment(s): Mother was healthy and lived to 

be 92 yrs old.





  ** Father


History Unknown: Yes


Additional Family Medical History / Comment(s): Father  at the age of 68yrs,

 pt unable to say from what.





General Exam


Limitations: no limitations


General appearance: alert, in no apparent distress


Head exam: Present: atraumatic, normocephalic, normal inspection


Eye exam: Present: normal appearance, PERRL, EOMI.  Absent: scleral icterus, 

conjunctival injection, periorbital swelling


ENT exam: Present: normal exam, mucous membranes moist


Neck exam: Present: normal inspection.  Absent: tenderness, meningismus, 

lymphadenopathy


Respiratory exam: Present: normal lung sounds bilaterally.  Absent: respiratory 

distress, wheezes, rales, rhonchi, stridor


Cardiovascular Exam: Present: regular rate, normal rhythm, normal heart sounds. 

 Absent: systolic murmur, diastolic murmur, rubs, gallop, clicks


GI/Abdominal exam: Present: soft, normal bowel sounds.  Absent: distended, 

tenderness, guarding, rebound, rigid


Extremities exam: Present: normal inspection, full ROM, normal capillary refill.

  Absent: tenderness, pedal edema, joint swelling, calf tenderness


Back exam: Present: normal inspection


Neurological exam: Present: alert, oriented X3, CN II-XII intact


Psychiatric exam: Present: normal affect, normal mood


Skin exam: Present: warm, dry, intact, normal color.  Absent: rash





Course





                                   Vital Signs











  21





  01:26


 


Pulse Rate 103 H


 


Respiratory 17





Rate 


 


Blood Pressure 109/74


 


O2 Sat by Pulse 94 L





Oximetry 














- Reevaluation(s)


Reevaluation #1: 





21 01:47


Medical record is reviewed


Reevaluation #2: 





21 01:47


STEMI is paged on initial EKG


Reevaluation #3: 





21 01:47


Patient no significant acute distress does have persistent chest pain





- Consultations


Consultation #1: 





Spoke with Dr. Polanco coming in to see patient regarding ST elevated MI





Chest Pain MDM





- [facility]





76 male to be admitted for ST elevation MI with chest pain.





Critical Care Time


Critical Care Time: Yes


Total Critical Care Time: 31





Disposition


Clinical Impression: 


 ST elevation myocardial infarction (STEMI)





Disposition: ADMITTED AS IP TO THIS HOSP


Condition: Serious


Is patient prescribed a controlled substance at d/c from ED?: No


Referrals: 


[facility],Clinic [Primary Care Provider] - 1-2 days

## 2021-12-30 NOTE — P.NPCON
History of Present Illness





- Reason for Consult


acute renal failure





- Chief Complaint


Chest pain and acute MI





- History of Present Illness





This is a 76-year-old male came in with chest discomfort and had acute anterior 

ST elevation MI, underwent emergent heart catheterization and had occluded LAD 

which was stented in 3 places because of large thrombus burden he was started on

Aggrastat.





He had hematuria.





A Jama catheter was inserted and seems to be like he had large amount of urine 

that came out.  His creatinine which was 0.73 on 2021 and was 10.95 on 

admission has come down to 3.79 within a few hours.





Patient is currently asymptomatic denies any chest pain shortness of breath.  No

dizziness no fever chills sweating.





He has a Jama catheter which is draining somewhat blood tinged urine.





Patient is aware that there was some issues with his urinary retention with 

difficulty in urination and a feeling of not emptying completely.  He was seen 

at the [facility] in the past and was told that there may be a small prostate 

cancer with does not need any significant surgery or radiation treatment.  Other

than this no history of kidney disease.





At home he was on Proscar and Mobic





Past Medical History


Past Medical History: Asthma, Hypertension, Prostate Disorder


Additional Past Medical History / Comment(s): allergies


Last Myocardial Infarction Date:: 2021


History of Any Multi-Drug Resistant Organisms: None Reported


Past Surgical History: Appendectomy, Back Surgery, Tonsillectomy


Additional Past Surgical History / Comment(s): cervical


Past Anesthesia/Blood Transfusion Reactions: No Reported Reaction


Additional Past Anesthesia/Blood Transfusion Reaction / Comment(s): Pt is 

clausterphobic.


Past Psychological History: No Psychological Hx Reported


Smoking Status: Former smoker


Past Alcohol Use History: None Reported


Past Drug Use History: None Reported





- Past Family History


  ** Mother


Family Medical History: No Reported History


Additional Family Medical History / Comment(s): Mother was healthy and lived to 

be 92 yrs old.





  ** Father


History Unknown: Yes


Additional Family Medical History / Comment(s): Father  at the age of 68yrs,

pt unable to say from what.





Medications and Allergies


                                Home Medications











 Medication  Instructions  Recorded  Confirmed  Type


 


Albuterol Inhaler [Ventolin Hfa 2 puff INHALATION RT-QID PRN 21 

History





Inhaler]    


 


Albuterol Nebulized [Ventolin 2.5 mg INHALATION RT-Q4H PRN 21 

History





Nebulized]    


 


Ammonium Lactate Lotion 1 applic TOPICAL DAILY PRN 21 History





[Lac-Hydrin 12% Lotion]    


 


Cetirizine HCl [Zyrtec] 10 mg PO DAILY PRN 21 History


 


Diclofenac 1% Top Gel 1 applic TOPICAL BID 21 History


 


Finasteride [Proscar] 5 mg PO HS 21 History


 


Fluticasone Nasal Spray [Flonase 2 spray EA NOSTRIL DAILY 21 H

istory





Nasal Spray]    


 


Fluticasone/Salmeterol [Advair 1 puff INHALATION RT-BID 21 

History





250-50 Diskus]    


 


Lidocaine 5% Patch [Lidoderm 5% 1 patch TOPICAL DAILY 21 History





Patch]    


 


Meloxicam [Mobic] 7.5 mg PO DAILY PRN 21 History


 


Polyvinyl Alcohol/Povidone 1 drop BOTH EYES TID 21 History





[[place] Eye Drop]    


 


Pravastatin Sodium [Pravachol] 20 mg PO HS 21 History


 


Triamcinolone 0.1% Cream [Kenalog 1 applicatio TOPICAL BID PRN 21

 History





0.1% Cream]    


 


Folic Acid 1 mg PO DAILY@1200 #30 tab 21  Rx


 


Furosemide [Lasix] 40 mg PO BID@0900,1600 #60 tab 21  Rx


 


Metoprolol Succinate (ER) [Toprol 50 mg PO BID #60 21  Rx





XL]    


 


Thiamine [Vitamin B-1] 100 mg PO DAILY@1200 #30 tab 21  Rx


 


lisinopriL [Zestril] 5 mg PO BID #60 tab 21  Rx








                                    Allergies











Allergy/AdvReac Type Severity Reaction Status Date / Time


 


Influenza Virus Vaccines Allergy  Unknown Verified 21 14:27














Physical Exam


Vitals: 


                                   Vital Signs











  Temp Pulse Resp BP Pulse Ox


 


 21 12:00   90  18  108/65  96


 


 21 11:00   92  16  105/61  96


 


 21 10:00   80  18  105/61  97


 


 21 09:00   93  27 H  88/65  98


 


 21 08:00   92  26 H  92/61  97


 


 21 07:00   89  13  94/62  95


 


 21 06:00   88  15  114/72  97


 


 21 05:00   90  3 L  98/79  97


 


 21 04:00  97.4 F L  94  18  119/72  95


 


 21 02:10   103 H  19  119/72  95


 


 21 01:26   103 H  17  109/74  94 L








                                Intake and Output











 21





 22:59 06:59 14:59


 


Intake Total  405 450


 


Output Total  3525 2900


 


Balance  -3120 -2450


 


Intake:   


 


  IV  405 450


 


    Sodium Chloride 0.9% 1,  225 450





    000 ml @ 75 mls/hr IV .   





    [id number] [facility] Rx#:[id number]   


 


Output:   


 


  Urine  3525 2900


 


Other:   


 


  Weight  65.317 kg 








On exam concurrently is awake alert oriented comfortable





A chin exam no JVP neck is supple no facial asymmetry





Lungs are clear to auscultation good air entry bilaterally





Heart sounds unremarkable for any murmur rub gallop





Abdomen soft nontender no organomegaly ascites masses





Extremity exam was 2+ edema





Neurologically awake alert oriented





Results





- Lab Results


                             Most recent lab results











Calcium  8.9 mg/dL (8.4-10.2)   21  11:24    


 


Magnesium  2.2 mg/dL (1.6-2.3)   21  01:43    














                                 21 11:24





                                 21 11:24





Assessment and Plan


Assessment: 





Impression





1.  Acute kidney injury from outlet obstruction, relieved with Jama catheter 

creatinine coming down from 10-3.7 and a few hours with somewhat blood tinged 

urine.





2.  History of taking nonsteroidal Mobic at home.  This might have contributed 

to acute kidney injury.





3.  History of prostatism.  Questionable history of carcinoma of the prostate 

based on patient's history 





4.  Acute MI status post cardiac catheterization and stenting.





5.  Hyponatremia secondary to acute kidney injury expected to improve





Recommendation





1.  Continue Jama catheter





2.  If hematuria does not clear or worsens we'll consider starting IV bicarbona

te drip





3.  Avoid any nephrotoxic medication for right now





4.  Monitor labs





Thank you for this consultation and we'll continue to follow

## 2021-12-30 NOTE — ECHOF
Referral Reason:CP



MEASUREMENTS

--------

HEIGHT: 175.3 cm

WEIGHT: 65.3 kg

BP: 

RVIDd:   1.9 cm     (< 3.3)

IVSd:   1.1 cm     (0.6 - 1.1)

LVIDd:   4.1 cm     (3.9 - 5.3)

LVPWd:   1.4 cm     (0.6 - 1.1)

IVSs:   1.5 cm

LVIDs:   3.3 cm

LVPWs:   1.3 cm







FINDINGS

--------

Limited Study

The left ventricular size is normal.   There is borderline concentric left ventricular hypertrophy.  
 Overall left ventricular systolic function is moderate-severely impaired with, an EF between 30 - 35
 %.   Basal anterior LV wall motion is hypokinetic.    Mid anterior LV wall motion is hypokinetic.   
 Mid anteroseptal LV wall motion is hypokinetic.    Apical anterior LV wall motion is hypokinetic.   
 Apical lateral LV wall motion is hypokinetic.    Apical inferior LV wall motion is hypokinetic.    A
pical septum LV wall motion is hypokinetic.

There is a trivial pericardial effusion present.



CONCLUSIONS

--------

1. The left ventricular size is normal.

2. There is borderline concentric left ventricular hypertrophy.

3. Overall left ventricular systolic function is moderate-severely impaired with, an EF between 30 - 
35 %.

4. Basal anterior LV wall motion is hypokinetic.

5. Mid anterior LV wall motion is hypokinetic.

6. Mid anteroseptal LV wall motion is hypokinetic.

7. Apical anterior LV wall motion is hypokinetic.

8. Apical lateral LV wall motion is hypokinetic.

9. Apical inferior LV wall motion is hypokinetic.

10. Apical septum LV wall motion is hypokinetic.

11. There is a trivial pericardial effusion present.





SONOGRAPHER: Kristina Manzano, SAMI

## 2021-12-30 NOTE — XR
EXAMINATION TYPE: XR chest 1V

 

DATE OF EXAM: 12/30/2021

 

COMPARISON: 12/18/2021

 

HISTORY: Chest pain

 

TECHNIQUE: Single view

 

FINDINGS: Heart is normal. There is slight coarsening of interstitial markings. There is mild bluntin
g of the costophrenic angles. There are no hilar masses.

 

IMPRESSION: Small pleural effusions and mild pulmonary congestion. Minimal heart failure is possible.
 Chest not significantly different than recent exam.

## 2021-12-30 NOTE — P.GSCN
History of Present Illness


Consult date: 21


Reason for Consult: 





Urinary retention


History of present illness: 





This is a 76-year-old male admitted to the hospital with chest pain.  Urology is

consulted for urinary retention.  A Jama catheter was placed with return of 1.9

liter of blood-tinged urine urine.  Upon presentation his creatinine was 10.9, 

from a baseline of 0.7.  His creatinine is 3.7 following Jama catheter 

insertion.  He was asymptomatic from his urinary retention, denies any 

significant urinary symptoms at baseline.  He does have history of prostate 

cancer, on active surveillance.  [facility] 

[facility].  He has made 6 L of urine since catheter placement





Review of Systems





- Constitutional


Denies fever, Denies weight loss





- Cardiovascular


Reports chest pain





- Respiratory


Denies cough, Denies 7





- Gastrointestinal


Reports as per HPI





- Genitourinary


Reports urinary retention, Denies flank pain, Denies hematuria





- Neurological


Denies headaches, Denies syncope





Past Medical History


Past Medical History: Asthma, Hypertension, Prostate Disorder


Additional Past Medical History / Comment(s): allergies


Last Myocardial Infarction Date:: 2021


History of Any Multi-Drug Resistant Organisms: None Reported


Past Surgical History: Appendectomy, Back Surgery, Tonsillectomy


Additional Past Surgical History / Comment(s): cervical


Past Anesthesia/Blood Transfusion Reactions: No Reported Reaction


Additional Past Anesthesia/Blood Transfusion Reaction / Comm: Pt is 

clausterphobic.


Past Psychological History: No Psychological Hx Reported


Smoking Status: Former smoker


Past Alcohol Use History: None Reported


Past Drug Use History: None Reported





- Past Family History


  ** Mother


Family Medical History: No Reported History


Additional Family Medical History / Comment(s): Mother was healthy and lived to 

be 92 yrs old.





  ** Father


History Unknown: Yes


Additional Family Medical History / Comment(s): Father  at the age of 68yrs,

pt unable to say from what.





Medications and Allergies


                                Home Medications











 Medication  Instructions  Recorded  Confirmed  Type


 


Albuterol Inhaler [Ventolin Hfa 2 puff INHALATION RT-QID PRN 21 

History





Inhaler]    


 


Albuterol Nebulized [Ventolin 2.5 mg INHALATION RT-QID PRN 21 

History





Nebulized]    


 


Ammonium Lactate Lotion 1 applic TOPICAL DAILY PRN 21 History





[Lac-Hydrin 12% Lotion]    


 


Cetirizine HCl [Zyrtec] 10 mg PO DAILY PRN 21 History


 


Finasteride [Proscar] 5 mg PO HS 21 History


 


Fluticasone Nasal Spray [Flonase 2 spr EA NOSTRIL DAILY 21 

History





Nasal Spray]    


 


Fluticasone/Salmeterol [Advair 1 puff INHALATION RT-BID 21 

History





250-50 Diskus]    


 


Lidocaine 5% Patch [Lidoderm 5% 1 patch TRANSDERM DAILY 21 

History





Patch]    


 


Meloxicam [Mobic] 7.5 mg PO DAILY PRN 21 History


 


Polyvinyl Alcohol/Povidone 1 drop BOTH EYES TID 21 History





[[place] Eye Drop]    


 


Pravastatin Sodium [Pravachol] 20 mg PO HS 21 History


 


Triamcinolone 0.1% Cream [Kenalog 1 applic TOPICAL BID PRN 21 

History





0.1% Cream]    


 


Folic Acid 1 mg PO DAILY@1200 #30 tab 21 Rx


 


Furosemide [Lasix] 40 mg PO BID@0900,1600 #60 tab 21 Rx


 


Metoprolol Succinate (ER) [Toprol 50 mg PO BID #60 21 Rx





XL]    


 


Thiamine [Vitamin B-1] 100 mg PO DAILY@1200 #30 tab 21 Rx


 


lisinopriL [Zestril] 5 mg PO BID #60 tab 21 Rx


 


Diclofenac Sodium Gel [Voltaren 2 - 4 gm TOPICAL BID 21 History





Gel]    








                                    Allergies











Allergy/AdvReac Type Severity Reaction Status Date / Time


 


Influenza Virus Vaccines Allergy  Unknown Verified 21 13:06














Surgical - Exam


                                   Vital Signs











Pulse Resp BP Pulse Ox


 


 103 H  17   109/74   94 L


 


 21 01:26  21 01:26  21 01:26  21 01:26














- General


no distress, no pain





- Eyes


normal ocular movement, no pale





- ENT


normal nares, normal mucosa





- Respiratory


normal expansion, normal respiratory effort





- Abdomen


Abdomen: soft, non tender





- Genitourinary





Jama in place blood-tinged urine





- Psychiatric


oriented to time, oriented to person





Results





- Labs





                                 21 11:24





                                 21 11:24


                  Abnormal Lab Results - Last 24 Hours (Table)











  21 Range/Units





  01:43 01:43 01:43 


 


WBC  14.6 H    (3.8-10.6)  k/uL


 


Plt Count  144 L    (150-450)  k/uL


 


Neutrophils #  12.8 H    (1.3-7.7)  k/uL


 


Lymphocytes #  0.4 L    (1.0-4.8)  k/uL


 


Monocytes #     (0-1.0)  k/uL


 


APTT   21.9 L   (22.0-30.0)  sec


 


Sodium    130 L  (137-145)  mmol/L


 


Chloride    89 L  ()  mmol/L


 


Carbon Dioxide     (22-30)  mmol/L


 


BUN    113 H*  (9-20)  mg/dL


 


Creatinine    10.95 H*  (0.66-1.25)  mg/dL


 


Glucose    213 H  (74-99)  mg/dL


 


POC Glucose (mg/dL)     (75-99)  mg/dL


 


Calcium    8.1 L  (8.4-10.2)  mg/dL


 


Troponin I     (0.000-0.034)  ng/mL


 


Total Protein    5.5 L  (6.3-8.2)  g/dL


 


Albumin    2.9 L  (3.5-5.0)  g/dL














  21 Range/Units





  01:43 04:02 04:22 


 


WBC     (3.8-10.6)  k/uL


 


Plt Count     (150-450)  k/uL


 


Neutrophils #     (1.3-7.7)  k/uL


 


Lymphocytes #     (1.0-4.8)  k/uL


 


Monocytes #     (0-1.0)  k/uL


 


APTT    135.5 H*  (22.0-30.0)  sec


 


Sodium     (137-145)  mmol/L


 


Chloride     ()  mmol/L


 


Carbon Dioxide     (22-30)  mmol/L


 


BUN     (9-20)  mg/dL


 


Creatinine     (0.66-1.25)  mg/dL


 


Glucose     (74-99)  mg/dL


 


POC Glucose (mg/dL)   179 H   (75-99)  mg/dL


 


Calcium     (8.4-10.2)  mg/dL


 


Troponin I  0.592 H*    (0.000-0.034)  ng/mL


 


Total Protein     (6.3-8.2)  g/dL


 


Albumin     (3.5-5.0)  g/dL














  21 Range/Units





  04:22 04:22 07:36 


 


WBC   15.2 H   (3.8-10.6)  k/uL


 


Plt Count     (150-450)  k/uL


 


Neutrophils #     (1.3-7.7)  k/uL


 


Lymphocytes #     (1.0-4.8)  k/uL


 


Monocytes #     (0-1.0)  k/uL


 


APTT     (22.0-30.0)  sec


 


Sodium     (137-145)  mmol/L


 


Chloride     ()  mmol/L


 


Carbon Dioxide     (22-30)  mmol/L


 


BUN     (9-20)  mg/dL


 


Creatinine     (0.66-1.25)  mg/dL


 


Glucose     (74-99)  mg/dL


 


POC Glucose (mg/dL)     (75-99)  mg/dL


 


Calcium     (8.4-10.2)  mg/dL


 


Troponin I  24.400 H*   231.000 H*  (0.000-0.034)  ng/mL


 


Total Protein     (6.3-8.2)  g/dL


 


Albumin     (3.5-5.0)  g/dL














  21 Range/Units





  11:24 11:24 


 


WBC   18.1 H  (3.8-10.6)  k/uL


 


Plt Count    (150-450)  k/uL


 


Neutrophils #   16.0 H  (1.3-7.7)  k/uL


 


Lymphocytes #   0.5 L  (1.0-4.8)  k/uL


 


Monocytes #   1.2 H  (0-1.0)  k/uL


 


APTT    (22.0-30.0)  sec


 


Sodium    (137-145)  mmol/L


 


Chloride    ()  mmol/L


 


Carbon Dioxide  33 H   (22-30)  mmol/L


 


BUN  64 H   (9-20)  mg/dL


 


Creatinine  3.79 H   (0.66-1.25)  mg/dL


 


Glucose  145 H   (74-99)  mg/dL


 


POC Glucose (mg/dL)    (75-99)  mg/dL


 


Calcium    (8.4-10.2)  mg/dL


 


Troponin I    (0.000-0.034)  ng/mL


 


Total Protein    (6.3-8.2)  g/dL


 


Albumin    (3.5-5.0)  g/dL








                                 Diabetes panel











  21 Range/Units





  01:43 11:24 


 


Sodium  130 L  138  (137-145)  mmol/L


 


Potassium  3.8  4.4  (3.5-5.1)  mmol/L


 


Chloride  89 L  99  ()  mmol/L


 


Carbon Dioxide  26  33 H  (22-30)  mmol/L


 


BUN  113 H*  64 H  (9-20)  mg/dL


 


Creatinine  10.95 H*  3.79 H  (0.66-1.25)  mg/dL


 


Glucose  213 H  145 H  (74-99)  mg/dL


 


Calcium  8.1 L  8.9  (8.4-10.2)  mg/dL


 


AST  33   (17-59)  U/L


 


ALT  40   (4-49)  U/L


 


Alkaline Phosphatase  57   ()  U/L


 


Total Protein  5.5 L   (6.3-8.2)  g/dL


 


Albumin  2.9 L   (3.5-5.0)  g/dL








                                  Calcium panel











  21 Range/Units





  01:43 11:24 


 


Calcium  8.1 L  8.9  (8.4-10.2)  mg/dL


 


Albumin  2.9 L   (3.5-5.0)  g/dL








                                 Pituitary panel











  21 Range/Units





  01:43 11:24 


 


Sodium  130 L  138  (137-145)  mmol/L


 


Potassium  3.8  4.4  (3.5-5.1)  mmol/L


 


Chloride  89 L  99  ()  mmol/L


 


Carbon Dioxide  26  33 H  (22-30)  mmol/L


 


BUN  113 H*  64 H  (9-20)  mg/dL


 


Creatinine  10.95 H*  3.79 H  (0.66-1.25)  mg/dL


 


Glucose  213 H  145 H  (74-99)  mg/dL


 


Calcium  8.1 L  8.9  (8.4-10.2)  mg/dL








                                  Adrenal panel











  21 Range/Units





  01:43 11:24 


 


Sodium  130 L  138  (137-145)  mmol/L


 


Potassium  3.8  4.4  (3.5-5.1)  mmol/L


 


Chloride  89 L  99  ()  mmol/L


 


Carbon Dioxide  26  33 H  (22-30)  mmol/L


 


BUN  113 H*  64 H  (9-20)  mg/dL


 


Creatinine  10.95 H*  3.79 H  (0.66-1.25)  mg/dL


 


Glucose  213 H  145 H  (74-99)  mg/dL


 


Calcium  8.1 L  8.9  (8.4-10.2)  mg/dL


 


Total Bilirubin  0.8   (0.2-1.3)  mg/dL


 


AST  33   (17-59)  U/L


 


ALT  40   (4-49)  U/L


 


Alkaline Phosphatase  57   ()  U/L


 


Total Protein  5.5 L   (6.3-8.2)  g/dL


 


Albumin  2.9 L   (3.5-5.0)  g/dL














Assessment and Plan


Assessment: 





76-year-old male with 1.9 L urinary retention.  History of prostate cancer on a

ctive surveillance.  [facility].  Patient was an 

acute kidney injury on presentation secondary to his retention.  His gross 

hematuria is from bladder distention.  Given his degree of urine output, monitor

closely his I's and O's..


-Recommend keeping the Jama catheters for 2 weeks, given the degree of 

retention and his acute kidney injury.  He [facility] 

as an outpatient for catheter removal


-We will start Flomax 0.4 mg

## 2021-12-30 NOTE — P.PN
Subjective


Progress Note Date: 12/30/21


Principal diagnosis: 





Acute coronary syndrome





The patient is a pleasant 76-year-old gentleman with hypertension and 

dyslipidemia who was admitted to the hospital yesterday with chest discomfort 

and was diagnosed with acute anterior ST elevation myocardial infarction.  He 

underwent an emergent heart catheterization and was found to have occluded LAD 

which was stented in the proximal and mid and distal portion.  Because of the 

large thrombus burden he was started on Aggrastat.





The patient was seen this morning.  He had a Jama catheter for urinary 

obstruction and subsequently he did have hematuria.  Aggrastat is on hold at 

this point.  No heparin IV is going at this point as well.  He remains on dual 

antiplatelet therapy along with high intensity statin along with beta blocker 

with Toprol-XL.  An echo is in process to be done.  Clinically he is doing very 

well.  Hemodynamically he is stable.





Objective





- Vital Signs


Vital signs: 


                                   Vital Signs











Temp  97.4 F L  12/30/21 04:00


 


Pulse  90   12/30/21 05:00


 


Resp  3 L  12/30/21 05:00


 


BP  98/79   12/30/21 05:00


 


Pulse Ox  97   12/30/21 05:00








                                 Intake & Output











 12/29/21 12/29/21 12/30/21





 06:59 18:59 06:59


 


Intake Total   330


 


Output Total   3400


 


Balance   -3070


 


Weight   65.317 kg


 


Intake:   


 


  IV   330


 


    Sodium Chloride 0.9% 1,   150





    000 ml @ 75 mls/hr IV .   





    [id number] [facility] Rx#:[id number]   


 


Output:   


 


  Urine   3400














- Constitutional


General appearance: Present: no acute distress





- Respiratory


Respiratory: bilateral: CTA





- Cardiovascular


Rhythm: regular


Heart sounds: normal: S1, S2





- Labs


CBC & Chem 7: 


                                 12/30/21 04:22





                                 12/30/21 01:43


Labs: 


                  Abnormal Lab Results - Last 24 Hours (Table)











  12/30/21 12/30/21 12/30/21 Range/Units





  01:43 01:43 01:43 


 


WBC  14.6 H    (3.8-10.6)  k/uL


 


Plt Count  144 L    (150-450)  k/uL


 


Neutrophils #  12.8 H    (1.3-7.7)  k/uL


 


Lymphocytes #  0.4 L    (1.0-4.8)  k/uL


 


APTT   21.9 L   (22.0-30.0)  sec


 


Sodium    130 L  (137-145)  mmol/L


 


Chloride    89 L  ()  mmol/L


 


BUN    113 H*  (9-20)  mg/dL


 


Creatinine    10.95 H*  (0.66-1.25)  mg/dL


 


Glucose    213 H  (74-99)  mg/dL


 


POC Glucose (mg/dL)     (75-99)  mg/dL


 


Calcium    8.1 L  (8.4-10.2)  mg/dL


 


Troponin I     (0.000-0.034)  ng/mL


 


Total Protein    5.5 L  (6.3-8.2)  g/dL


 


Albumin    2.9 L  (3.5-5.0)  g/dL














  12/30/21 12/30/21 12/30/21 Range/Units





  01:43 04:02 04:22 


 


WBC     (3.8-10.6)  k/uL


 


Plt Count     (150-450)  k/uL


 


Neutrophils #     (1.3-7.7)  k/uL


 


Lymphocytes #     (1.0-4.8)  k/uL


 


APTT    135.5 H*  (22.0-30.0)  sec


 


Sodium     (137-145)  mmol/L


 


Chloride     ()  mmol/L


 


BUN     (9-20)  mg/dL


 


Creatinine     (0.66-1.25)  mg/dL


 


Glucose     (74-99)  mg/dL


 


POC Glucose (mg/dL)   179 H   (75-99)  mg/dL


 


Calcium     (8.4-10.2)  mg/dL


 


Troponin I  0.592 H*    (0.000-0.034)  ng/mL


 


Total Protein     (6.3-8.2)  g/dL


 


Albumin     (3.5-5.0)  g/dL














  12/30/21 12/30/21 Range/Units





  04:22 04:22 


 


WBC   15.2 H  (3.8-10.6)  k/uL


 


Plt Count    (150-450)  k/uL


 


Neutrophils #    (1.3-7.7)  k/uL


 


Lymphocytes #    (1.0-4.8)  k/uL


 


APTT    (22.0-30.0)  sec


 


Sodium    (137-145)  mmol/L


 


Chloride    ()  mmol/L


 


BUN    (9-20)  mg/dL


 


Creatinine    (0.66-1.25)  mg/dL


 


Glucose    (74-99)  mg/dL


 


POC Glucose (mg/dL)    (75-99)  mg/dL


 


Calcium    (8.4-10.2)  mg/dL


 


Troponin I  24.400 H*   (0.000-0.034)  ng/mL


 


Total Protein    (6.3-8.2)  g/dL


 


Albumin    (3.5-5.0)  g/dL














Assessment and Plan


Assessment: 





Assessment


#1 acute anterior ST patient myocardial infarction


#2 hypertension


#3 dyslipidemia


#4 acute renal failure likely related to obstructive uropathy


#5 hematuria








Plan


#1 DC Aggrastat


#2 he is not on heparin IV


#3 continue dual antiplatelet therapy.  Consider switching the patient to Plavix

if he continues to have hematuria


#4 continue high intensity statin


#5 continue Toprol-XL


#6 consider adding ACE inhibitor once the creatinine back to baseline


#7 nephrology consult


#8 urology consult


#9 follow-up on the echocardiogram


#10 follow-up with the patient

## 2021-12-31 LAB
ANION GAP SERPL CALC-SCNC: 5 MMOL/L
BASOPHILS # BLD AUTO: 0 K/UL (ref 0–0.2)
BASOPHILS NFR BLD AUTO: 0 %
BUN SERPL-SCNC: 19 MG/DL (ref 9–20)
CALCIUM SPEC-MCNC: 9 MG/DL (ref 8.4–10.2)
CHLORIDE SERPL-SCNC: 106 MMOL/L (ref 98–107)
CHOLEST SERPL-MCNC: 110 MG/DL (ref 0–200)
CO2 SERPL-SCNC: 27 MMOL/L (ref 22–30)
EOSINOPHIL # BLD AUTO: 0.1 K/UL (ref 0–0.7)
EOSINOPHIL NFR BLD AUTO: 1 %
ERYTHROCYTE [DISTWIDTH] IN BLOOD BY AUTOMATED COUNT: 4.92 M/UL (ref 4.3–5.9)
ERYTHROCYTE [DISTWIDTH] IN BLOOD: 14.5 % (ref 11.5–15.5)
GLUCOSE SERPL-MCNC: 104 MG/DL (ref 74–99)
HCT VFR BLD AUTO: 43.8 % (ref 39–53)
HDLC SERPL-MCNC: 34.9 MG/DL (ref 40–60)
HGB BLD-MCNC: 13.6 GM/DL (ref 13–17.5)
LDLC SERPL CALC-MCNC: 55.1 MG/DL (ref 0–131)
LYMPHOCYTES # SPEC AUTO: 0.8 K/UL (ref 1–4.8)
LYMPHOCYTES NFR SPEC AUTO: 5 %
MCH RBC QN AUTO: 27.6 PG (ref 25–35)
MCHC RBC AUTO-ENTMCNC: 31 G/DL (ref 31–37)
MCV RBC AUTO: 89.1 FL (ref 80–100)
MONOCYTES # BLD AUTO: 0.8 K/UL (ref 0–1)
MONOCYTES NFR BLD AUTO: 6 %
NEUTROPHILS # BLD AUTO: 12.1 K/UL (ref 1.3–7.7)
NEUTROPHILS NFR BLD AUTO: 86 %
PLATELET # BLD AUTO: 165 K/UL (ref 150–450)
POTASSIUM SERPL-SCNC: 4 MMOL/L (ref 3.5–5.1)
SODIUM SERPL-SCNC: 138 MMOL/L (ref 137–145)
TRIGL SERPL-MCNC: 100 MG/DL (ref 0–149)
VLDLC SERPL CALC-MCNC: 20 MG/DL (ref 5–40)
WBC # BLD AUTO: 14.1 K/UL (ref 3.8–10.6)

## 2021-12-31 RX ADMIN — TAMSULOSIN HYDROCHLORIDE SCH MG: 0.4 CAPSULE ORAL at 08:39

## 2021-12-31 RX ADMIN — TICAGRELOR SCH MG: 90 TABLET ORAL at 19:56

## 2021-12-31 RX ADMIN — ATORVASTATIN CALCIUM SCH MG: 80 TABLET, FILM COATED ORAL at 19:56

## 2021-12-31 RX ADMIN — METOPROLOL SUCCINATE SCH MG: 25 TABLET, EXTENDED RELEASE ORAL at 08:39

## 2021-12-31 RX ADMIN — ASPIRIN 81 MG CHEWABLE TABLET SCH MG: 81 TABLET CHEWABLE at 08:39

## 2021-12-31 RX ADMIN — TICAGRELOR SCH MG: 90 TABLET ORAL at 08:39

## 2021-12-31 RX ADMIN — ACETAMINOPHEN PRN MG: 325 TABLET, FILM COATED ORAL at 19:56

## 2021-12-31 RX ADMIN — CEFAZOLIN SCH MLS/HR: 330 INJECTION, POWDER, FOR SOLUTION INTRAMUSCULAR; INTRAVENOUS at 08:53

## 2021-12-31 NOTE — P.PN
Subjective





HISTORY OF PRESENTING ILLNESS


Acute coronary syndrome





The patient is a pleasant 76-year-old gentleman with hypertension and 

dyslipidemia who was admitted to the hospital yesterday with chest discomfort 

and was diagnosed with acute anterior ST elevation myocardial infarction.  He 

underwent an emergent heart catheterization and was found to have occluded LAD 

which was stented in the proximal and mid and distal portion.  Because of the 

large thrombus burden he was started on Aggrastat.





The patient was seen this morning.  He had a Jama catheter for urinary 

obstruction and subsequently he did have hematuria.  Aggrastat is on hold at 

this point.  No heparin IV is going at this point as well.  He remains on dual 

antiplatelet therapy along with high intensity statin along with beta blocker 

with Toprol-XL.  An echo is in process to be done.  Clinically he is doing very 

well.  Hemodynamically he is stable.





12/31


Patient seen and examined.  Patient underwent extensive stenting of the LAD with

anterior STEMI.  He had been having urinary retention for the prior 4-5 days and

was found to have acute kidney injury with creatinine up to 10.  He had a Jama 

placed with improvement in creatinine down to 3's today.  Echocardiogram shows 

cardiomyopathy EF 30-35%.  His blood pressures been borderline and Toprol was 

held yesterday however given this morning.  He denies any further heartburn type

sensation.





REVIEW OF SYSTEMS


At the time of my exam:


CONSTITUTIONAL: Denies fever or chills.


CARDIOVASCULAR: Denies chest pain, shortness of breath, orthopnea, PND or 

palpitations.


RESPIRATORY: Denies cough. 


GASTROINTESTINAL: Denies abdominal pain, diarrhea, constipation, nausea or 

vomiting.


MUSCULOSKELETAL: Denies myalgias.


NEUROLOGIC: Denies numbness, tingling or weakness.


ENDOCRINE: Denies fatigue, weight change,  polydipsia or polyurina.


GENITOURINARY: Denies burning, hematuria or urgency with micturation.


HEMATOLOGIC: Denies history of anemia or bleeding. 





PHYSICAL EXAMINATION


Vital signs reviewed.


CONSTITUTIONAL: No apparent distress. 


HEENT: Head is normocephalic. Pupils are equal, round. Sclerae anicteric. Mucous

membranes of the mouth are moist.  No JVD. No carotid bruit.


CHEST EXAMINATION: Lungs are clear to auscultation. No chest wall tenderness is 

noted on palpation or with deep breathing. 


HEART EXAMINATION: Regular rate and rhythm. S1, S2 heard. No murmurs, gallops or

rub.


ABDOMEN: Soft, nontender. Positive bowel sounds.


EXTREMITIES: 2+ peripheral pulses, no lower extremity edema and no calf 

tenderness.


NEUROLOGIC EXAMINATION: Patient is awake, alert and oriented x3. 





ASSESSMENT


1.  Anterior STEMI status post PCI LAD 


2.  Coronary artery disease with residual RCA disease


3.  Acute kidney injury related to obstructive uropathy


4.  Hypertension, borderline


5.  Ischemic cardiopathy EF 30-35%


6.  Prostate cancer appears stable per patient





PLAN


We will continue with dual antiplatelets.  Continue with low-dose metoprolol and

monitor blood pressure closely.  Ideally add ACE inhibitor however monitor 

kidney function before adding and monitor blood pressure.  Continue gentle IV 

fluids given acute kidney injury however majority of this appears related to 

obstructive uropathy and hopefully this improves.











Objective





- Vital Signs


Vital signs: 


                                   Vital Signs











Temp  97.9 F   12/31/21 08:00


 


Pulse  88   12/31/21 08:00


 


Resp  24   12/31/21 08:00


 


BP  96/63   12/31/21 08:00


 


Pulse Ox  94 L  12/31/21 08:00








                                 Intake & Output











 12/30/21 12/31/21 12/31/21





 18:59 06:59 18:59


 


Intake Total 825 750 


 


Output Total 3850 1950 


 


Balance -3025 -1200 


 


Weight 65.317 kg 69.3 kg 


 


Intake:   


 


   390 


 


    .9 KVO  180 


 


    Sodium Chloride 0.9% 1, 825 210 





    000 ml @ 75 mls/hr IV .   





    [id number] [facility] Rx#:[id number]   


 


  Oral  360 


 


Output:   


 


  Urine 3850 1950 


 


Other:   


 


  Voiding Method Indwelling Catheter Indwelling Catheter 














- Labs


CBC & Chem 7: 


                                 12/30/21 11:24





                                 12/30/21 11:24


Labs: 


                  Abnormal Lab Results - Last 24 Hours (Table)











  12/30/21 12/30/21 Range/Units





  11:24 11:24 


 


WBC   18.1 H  (3.8-10.6)  k/uL


 


Neutrophils #   16.0 H  (1.3-7.7)  k/uL


 


Lymphocytes #   0.5 L  (1.0-4.8)  k/uL


 


Monocytes #   1.2 H  (0-1.0)  k/uL


 


Carbon Dioxide  33 H   (22-30)  mmol/L


 


BUN  64 H   (9-20)  mg/dL


 


Creatinine  3.79 H   (0.66-1.25)  mg/dL


 


Glucose  145 H   (74-99)  mg/dL

## 2021-12-31 NOTE — P.PN
Subjective


Progress Note Date: 12/31/21


Principal diagnosis: 





This is a 76-year-old male seen in consultation because of acute kidney injury, 

likely from outlet obstruction mostly as his creatinine came down from 10 mg to 

3.7 within a few hours after the Jama catheter was inserted.  He was admitted 

with acute MI and had a cardiac catheterization, with significant coronary 

artery disease needing stenting.  He developed some hematuria post cardiac 

catheterization  because of the anticoagulation use  was subsequently changed.





He is known with history of prostatism and questionable see of the prostate. 





This morning his vital signs us stable except blood pressure lowest is 93/65 

currently 112/62 and he is comfortable and denies any complaints.





Urine output is documented 5800 mL, intake of 1575.  Premature is improved he 

continues to have Jama catheter Labs are pending this morning





Objective





- Vital Signs


Vital signs: 


                                   Vital Signs











Temp  97.8 F   12/31/21 04:00


 


Pulse  86   12/31/21 06:00


 


Resp  33 H  12/31/21 06:00


 


BP  112/62   12/31/21 06:00


 


Pulse Ox  95   12/31/21 06:00








                                 Intake & Output











 12/30/21 12/31/21 12/31/21





 18:59 06:59 18:59


 


Intake Total 825 750 


 


Output Total 3850 1950 


 


Balance -3025 -1200 


 


Weight 65.317 kg 69.3 kg 


 


Intake:   


 


   390 


 


    .9 KVO  180 


 


    Sodium Chloride 0.9% 1, 825 210 





    000 ml @ 75 mls/hr IV .   





    [id number] [facility] Rx#:[id number]   


 


  Oral  360 


 


Output:   


 


  Urine 3850 1950 


 


Other:   


 


  Voiding Method Indwelling Catheter Indwelling Catheter 








On examination awake alert oriented comfortable





A chin exam no JVP neck is supple no facial asymmetry





Lungs are clear to auscultation good air entry bilaterally





Heart sounds unremarkable normal sinus rhythm no murmur rub gallop





Abdomen soft nontender





Extreme exam mild edema improved





Neuro awake alert oriented 





- Labs


CBC & Chem 7: 


                                 12/30/21 11:24





                                 12/30/21 11:24


Labs: 


                  Abnormal Lab Results - Last 24 Hours (Table)











  12/30/21 12/30/21 12/30/21 Range/Units





  07:36 11:24 11:24 


 


WBC    18.1 H  (3.8-10.6)  k/uL


 


Neutrophils #    16.0 H  (1.3-7.7)  k/uL


 


Lymphocytes #    0.5 L  (1.0-4.8)  k/uL


 


Monocytes #    1.2 H  (0-1.0)  k/uL


 


Carbon Dioxide   33 H   (22-30)  mmol/L


 


BUN   64 H   (9-20)  mg/dL


 


Creatinine   3.79 H   (0.66-1.25)  mg/dL


 


Glucose   145 H   (74-99)  mg/dL


 


Troponin I  231.000 H*    (0.000-0.034)  ng/mL














Assessment and Plan


Assessment: 





Impression





1.  Acute kidney injury from outlet obstruction, additionally the possibility of

dye-induced acute kidney injury is considered.  His creatinine and urine output 

significantly improved with a Jama catheter, creatinine coming down from 10-3.7

and a few hours with somewhat blood tinged urine.  This morning lab is pending, 

he had 5 L out





2.  History of taking nonsteroidal Mobic at home.  This might have contributed 

to acute kidney injury.





3.  History of prostatism.  Questionable history of carcinoma of the prostate 

based on patient's history 





4.  Acute MI status post cardiac catheterization and stenting.





5.  Hyponatremia secondary to acute kidney injury expected to improve





Recommendation





1.  Continue Jama catheter





2.  If hematuria does not clear or worsens we'll consider starting IV bicarbonat

e drip





3.  Avoid any nephrotoxic medication for right now





4.  Monitor labs





Thank you for this consultation and we'll continue to follow

## 2021-12-31 NOTE — P.HPIM
History of Present Illness


H&P Date: 21


Chief Complaint: Chest pain





Patient is a 76-year-old male with a known history of asthma, hypertension, 

recent diagnosis of cardiomyopathy ejection fraction 35 to 40% presents to ER 

with complaints of chest pain.  Patient states that he was at home sitting in 

the couch and suddenly developed mid retrosternal chest pain and felt like 

heartburn.  Denied any radiation of the pain.  No complaints of nausea or 

vomiting or dizziness or lightheadedness.  Patient was brought to the hospital 

by EMS.  EKG concerning for acute anterior ST related MI and was taken to 

cardiac catheterization for stent placement.


Patient was recently discharged from the hospital on 2021 he was treated 

for COPD exacerbation and pneumonia and was also found to have elevated troponin

level.  2D echocardiogram today with him fraction 35 to 40% is recommended to 

follow-up in the clinic after completion of treatment for pneumonia.


Patient underwent cardiac catheterization showed total occlusion of the LAD in 

the proximal portion and severe disease involving the mid LAD and distal LAD.  

Status post successful stenting of the proximal and mid and distal LAD.


Chest x-ray showed small pleural effusions and mild pulmonary congestion.  

Minimal heart failure is possible.


Laboratory showed WBC 14.6 hemoglobin 14.4 and platelets 144 lymphocytes 0.4


 and creatinine 10.95


Troponin 0 0.592 and 24.4


COVID-19 PCR not detected.  proBNP is 869, sodium 130 potassium 3.8 chloride 89








Review of Systems





Constitutional: Patient denies any fever or chills .  No generalized weakness or

weight loss.  


Abdomen: Patient denied nausea vomiting and diarrhea and abdominal pain.


Cardiovascular: Patient denies any chest pain or short of breath no 

palpitations.


Respiratory: patient denied any cough or sputum production.  No shortness of 

breath


Neurologic: Patient denied any numbness or tingling headache.


Musculoskeletal: Patient denies any complaints of joint swelling or deformity.


Skin: Negative


Psychiatric: Negative


Endocrine: No heat or cold intolerance.  No recent weight gain.


Genitourinary: No dysuria or hematuria.


All other 14 point ROS negative except the above





Past Medical History


Past Medical History: Asthma, Hypertension, Prostate Disorder


Additional Past Medical History / Comment(s): allergies


Last Myocardial Infarction Date:: 2021


History of Any Multi-Drug Resistant Organisms: None Reported


Past Surgical History: Appendectomy, Back Surgery, Tonsillectomy


Additional Past Surgical History / Comment(s): cervical


Past Anesthesia/Blood Transfusion Reactions: No Reported Reaction


Additional Past Anesthesia/Blood Transfusion Reaction / Comment(s): Pt is 

clausterphobic.


Past Psychological History: No Psychological Hx Reported


Smoking Status: Former smoker


Past Alcohol Use History: None Reported


Past Drug Use History: None Reported





- Past Family History


  ** Mother


Family Medical History: No Reported History


Additional Family Medical History / Comment(s): Mother was healthy and lived to 

be 92 yrs old.





  ** Father


History Unknown: Yes


Additional Family Medical History / Comment(s): Father  at the age of 68yrs,

pt unable to say from what.





Medications and Allergies


                                Home Medications











 Medication  Instructions  Recorded  Confirmed  Type


 


RX: Albuterol Inhaler [Ventolin 2 puff INHALATION RT-QID PRN 21 

History





Hfa Inhaler]    


 


RX: Albuterol Nebulized [Ventolin 2.5 mg INHALATION RT-QID PRN 21

 History





Nebulized]    


 


RX: Ammonium Lactate Lotion 1 applic TOPICAL DAILY PRN 21 History





[Lac-Hydrin 12% Lotion]    


 


RX: Cetirizine HCl [Zyrtec] 10 mg PO DAILY PRN 21 History


 


RX: Finasteride [Proscar] 5 mg PO HS 21 History


 


RX: Fluticasone Nasal Spray 2 spr EA NOSTRIL DAILY 21 History





[Flonase Nasal Spray]    


 


RX: Fluticasone/Salmeterol [Advair 1 puff INHALATION RT-BID 21 

History





250-50 Diskus]    


 


RX: Lidocaine 5% Patch [Lidoderm 1 patch TRANSDERM DAILY 21 

History





5% Patch]    


 


RX: Meloxicam [Mobic] 7.5 mg PO DAILY PRN 21 History


 


RX: Polyvinyl Alcohol/Povidone 1 drop BOTH EYES TID 21 History





[[place] Eye Drop]    


 


RX: Pravastatin Sodium [Pravachol] 20 mg PO HS 21 History


 


RX: Triamcinolone 0.1% Cream 1 applic TOPICAL BID PRN 21 History





[Kenalog 0.1% Cream]    


 


RX: Folic Acid 1 mg PO DAILY@1200 #30 tab 21 Rx


 


RX: Furosemide [Lasix] 40 mg PO BID@0900,1600 #60 tab 21 Rx


 


RX: Metoprolol Succinate (ER) 50 mg PO BID #60 21 Rx





[Toprol XL]    


 


RX: Thiamine [Vitamin B-1] 100 mg PO DAILY@1200 #30 tab 21 Rx


 


RX: lisinopriL [Zestril] 5 mg PO BID #60 tab 21 Rx


 


Diclofenac Sodium Gel [Voltaren 2 - 4 gm TOPICAL BID 21 History





Gel]    








                                    Allergies











Allergy/AdvReac Type Severity Reaction Status Date / Time


 


Influenza Virus Vaccines Allergy  Unknown Verified 21 13:06














Physical Exam


Vitals: 


                                   Vital Signs











  Temp Pulse Resp BP Pulse Ox


 


 21 12:00   90  18  108/65  96


 


 21 11:00   92  16  105/61  96


 


 21 10:00   80  18  105/61  97


 


 21 09:00   93  27 H  88/65  98


 


 21 08:00   92  18  92/61  97


 


 21 07:00   89  13  94/62  95


 


 21 06:00   88  15  114/72  97


 


 21 05:00   90  3 L  98/79  97


 


 21 04:00  97.4 F L  94  18  119/72  95


 


 21 02:10   103 H  19  119/72  95


 


 21 01:26   103 H  17  109/74  94 L








                                Intake and Output











 21





 22:59 06:59 14:59


 


Intake Total  405 525


 


Output Total  3525 3125


 


Balance  -3120 -2600


 


Intake:   


 


  IV  405 525


 


    Sodium Chloride 0.9% 1,  225 525





    000 ml @ 75 mls/hr IV .   





    [id number] [facility] Rx#:[id number]   


 


Output:   


 


  Urine  3525 3125


 


Other:   


 


  Voiding Method   Indwelling Catheter


 


  Weight  65.317 kg 65.317 kg














PHYSICAL EXAMINATION: 


Patient is lying in the bed comfortably, no acute distress, awake alert and 

oriented.. 


HEENT: Normocephalic. Neck is supple. Pupils reactive. Nostrils clear. Oral 

cavity is moist. 


Neck reveals no JVD, carotid bruits, or thyromegaly. 


CHEST EXAMINATION: Trachea is central. Symmetrical expansion. Lung fields clear 

to auscultation and percussion. 


CARDIAC: Normal S1, S2 with no gallops. No murmurs 


ABDOMEN: Soft. Bowel sounds normal. No organomegaly. No abdominal bruits. 


Extremities: reveal no edema.  No clubbing or cyanosis


Neurologically awake, alert, oriented x3 with well-coordinated movements.  No 

focal deficits noted


Skin: No rash or skin lesions. 


Psychiatric: Cooperative.  Nonsuicidal


Musculoskeletal: No joint swelling or deformity.  Normal range of motion.





Results


CBC & Chem 7: 


                                 21 11:24





                                 21 11:24


Labs: 


                  Abnormal Lab Results - Last 24 Hours (Table)











  21 Range/Units





  01:43 01:43 01:43 


 


WBC  14.6 H    (3.8-10.6)  k/uL


 


Plt Count  144 L    (150-450)  k/uL


 


Neutrophils #  12.8 H    (1.3-7.7)  k/uL


 


Lymphocytes #  0.4 L    (1.0-4.8)  k/uL


 


Monocytes #     (0-1.0)  k/uL


 


APTT   21.9 L   (22.0-30.0)  sec


 


Sodium    130 L  (137-145)  mmol/L


 


Chloride    89 L  ()  mmol/L


 


Carbon Dioxide     (22-30)  mmol/L


 


BUN    113 H*  (9-20)  mg/dL


 


Creatinine    10.95 H*  (0.66-1.25)  mg/dL


 


Glucose    213 H  (74-99)  mg/dL


 


POC Glucose (mg/dL)     (75-99)  mg/dL


 


Calcium    8.1 L  (8.4-10.2)  mg/dL


 


Troponin I     (0.000-0.034)  ng/mL


 


Total Protein    5.5 L  (6.3-8.2)  g/dL


 


Albumin    2.9 L  (3.5-5.0)  g/dL














  21 Range/Units





  01:43 04:02 04:22 


 


WBC     (3.8-10.6)  k/uL


 


Plt Count     (150-450)  k/uL


 


Neutrophils #     (1.3-7.7)  k/uL


 


Lymphocytes #     (1.0-4.8)  k/uL


 


Monocytes #     (0-1.0)  k/uL


 


APTT    135.5 H*  (22.0-30.0)  sec


 


Sodium     (137-145)  mmol/L


 


Chloride     ()  mmol/L


 


Carbon Dioxide     (22-30)  mmol/L


 


BUN     (9-20)  mg/dL


 


Creatinine     (0.66-1.25)  mg/dL


 


Glucose     (74-99)  mg/dL


 


POC Glucose (mg/dL)   179 H   (75-99)  mg/dL


 


Calcium     (8.4-10.2)  mg/dL


 


Troponin I  0.592 H*    (0.000-0.034)  ng/mL


 


Total Protein     (6.3-8.2)  g/dL


 


Albumin     (3.5-5.0)  g/dL














  21 Range/Units





  04:22 04:22 07:36 


 


WBC   15.2 H   (3.8-10.6)  k/uL


 


Plt Count     (150-450)  k/uL


 


Neutrophils #     (1.3-7.7)  k/uL


 


Lymphocytes #     (1.0-4.8)  k/uL


 


Monocytes #     (0-1.0)  k/uL


 


APTT     (22.0-30.0)  sec


 


Sodium     (137-145)  mmol/L


 


Chloride     ()  mmol/L


 


Carbon Dioxide     (22-30)  mmol/L


 


BUN     (9-20)  mg/dL


 


Creatinine     (0.66-1.25)  mg/dL


 


Glucose     (74-99)  mg/dL


 


POC Glucose (mg/dL)     (75-99)  mg/dL


 


Calcium     (8.4-10.2)  mg/dL


 


Troponin I  24.400 H*   231.000 H*  (0.000-0.034)  ng/mL


 


Total Protein     (6.3-8.2)  g/dL


 


Albumin     (3.5-5.0)  g/dL














  21 Range/Units





  11:24 11:24 


 


WBC   18.1 H  (3.8-10.6)  k/uL


 


Plt Count    (150-450)  k/uL


 


Neutrophils #   16.0 H  (1.3-7.7)  k/uL


 


Lymphocytes #   0.5 L  (1.0-4.8)  k/uL


 


Monocytes #   1.2 H  (0-1.0)  k/uL


 


APTT    (22.0-30.0)  sec


 


Sodium    (137-145)  mmol/L


 


Chloride    ()  mmol/L


 


Carbon Dioxide  33 H   (22-30)  mmol/L


 


BUN  64 H   (9-20)  mg/dL


 


Creatinine  3.79 H   (0.66-1.25)  mg/dL


 


Glucose  145 H   (74-99)  mg/dL


 


POC Glucose (mg/dL)    (75-99)  mg/dL


 


Calcium    (8.4-10.2)  mg/dL


 


Troponin I    (0.000-0.034)  ng/mL


 


Total Protein    (6.3-8.2)  g/dL


 


Albumin    (3.5-5.0)  g/dL














Thrombosis Risk Factor Assmnt





- DVT/VTE Prophylaxis


DVT/VTE Prophylaxis: Pharmacologic Prophylaxis ordered





- Choose All That Apply


Each Risk Factor Represents 3 Points: Age 75 years or older


Thrombosis Risk Factor Assessment Total Risk Factor Score: 3


Thrombosis Risk Factor Assessment Level: Moderate Risk





Assessment and Plan


Assessment: 








Acute ST elevated anterior wall MI status post stent placement to proximal mid 

and distal LAD


Acute kidney injury due to post renal obstruction relieved with Jama catheter.


Hypovolemic hyponatremia


Cardiomyopathy ejection fraction 35 to 40%


Hypertension


Hyperlipidemia


COPD not in exacerbation


DVT prophylaxis





Plan:


Patient is status post cardiac catheterization and stent placement to LAD.  Co

ntinue with dual antiplatelet therapy and statins.


Nephrology has seen the patient due to elevated creatinine level which is due to

obstructive uropathy and creatinine level improved to 3.37 after Jama catheter 

placement.


Continue with telemetry monitoring and patient is being monitored in the MICU 

currently.  Follow-up closely.


Time with Patient: Greater than 30

## 2022-01-01 LAB
BASOPHILS # BLD AUTO: 0 K/UL (ref 0–0.2)
BASOPHILS NFR BLD AUTO: 0 %
EOSINOPHIL # BLD AUTO: 0.2 K/UL (ref 0–0.7)
EOSINOPHIL NFR BLD AUTO: 2 %
ERYTHROCYTE [DISTWIDTH] IN BLOOD BY AUTOMATED COUNT: 4.53 M/UL (ref 4.3–5.9)
ERYTHROCYTE [DISTWIDTH] IN BLOOD: 15.1 % (ref 11.5–15.5)
GLUCOSE BLD-MCNC: 172 MG/DL (ref 75–99)
HCT VFR BLD AUTO: 41.1 % (ref 39–53)
HGB BLD-MCNC: 12.8 GM/DL (ref 13–17.5)
LYMPHOCYTES # SPEC AUTO: 0.9 K/UL (ref 1–4.8)
LYMPHOCYTES NFR SPEC AUTO: 10 %
MCH RBC QN AUTO: 28.2 PG (ref 25–35)
MCHC RBC AUTO-ENTMCNC: 31.1 G/DL (ref 31–37)
MCV RBC AUTO: 90.6 FL (ref 80–100)
MONOCYTES # BLD AUTO: 0.5 K/UL (ref 0–1)
MONOCYTES NFR BLD AUTO: 5 %
NEUTROPHILS # BLD AUTO: 7.9 K/UL (ref 1.3–7.7)
NEUTROPHILS NFR BLD AUTO: 82 %
PLATELET # BLD AUTO: 177 K/UL (ref 150–450)
WBC # BLD AUTO: 9.6 K/UL (ref 3.8–10.6)

## 2022-01-01 RX ADMIN — FUROSEMIDE SCH MG: 20 TABLET ORAL at 12:24

## 2022-01-01 RX ADMIN — ATORVASTATIN CALCIUM SCH MG: 80 TABLET, FILM COATED ORAL at 19:50

## 2022-01-01 RX ADMIN — CEFAZOLIN SCH MLS/HR: 330 INJECTION, POWDER, FOR SOLUTION INTRAMUSCULAR; INTRAVENOUS at 06:31

## 2022-01-01 RX ADMIN — METOPROLOL SUCCINATE SCH MG: 25 TABLET, EXTENDED RELEASE ORAL at 08:25

## 2022-01-01 RX ADMIN — TICAGRELOR SCH MG: 90 TABLET ORAL at 08:26

## 2022-01-01 RX ADMIN — TAMSULOSIN HYDROCHLORIDE SCH MG: 0.4 CAPSULE ORAL at 08:26

## 2022-01-01 RX ADMIN — TICAGRELOR SCH MG: 90 TABLET ORAL at 19:51

## 2022-01-01 RX ADMIN — ASPIRIN 81 MG CHEWABLE TABLET SCH MG: 81 TABLET CHEWABLE at 08:26

## 2022-01-01 NOTE — P.PN
Subjective





HISTORY OF PRESENTING ILLNESS


Acute coronary syndrome





The patient is a pleasant 76-year-old gentleman with hypertension and 

dyslipidemia who was admitted to the hospital yesterday with chest discomfort 

and was diagnosed with acute anterior ST elevation myocardial infarction.  He 

underwent an emergent heart catheterization and was found to have occluded LAD 

which was stented in the proximal and mid and distal portion.  Because of the 

large thrombus burden he was started on Aggrastat.





The patient was seen this morning.  He had a Jama catheter for urinary 

obstruction and subsequently he did have hematuria.  Aggrastat is on hold at 

this point.  No heparin IV is going at this point as well.  He remains on dual 

antiplatelet therapy along with high intensity statin along with beta blocker 

with Toprol-XL.  An echo is in process to be done.  Clinically he is doing very 

well.  Hemodynamically he is stable.





12/31


Patient seen and examined.  Patient underwent extensive stenting of the LAD with

anterior STEMI.  He had been having urinary retention for the prior 4-5 days and

was found to have acute kidney injury with creatinine up to 10.  He had a Jama 

placed with improvement in creatinine down to 3's today.  Echocardiogram shows 

cardiomyopathy EF 30-35%.  His blood pressures been borderline and Toprol was 

held yesterday however given this morning.  He denies any further heartburn type

sensation.





1/1


Patient seen and examined.  Patient denies any further chest pain or GERD type 

symptoms.  He is having some mild hematuria noted and his Jama catheter.  Per 

urology plan is for discharge home with Jama catheter.  He has been on gentle 

IV fluids and creatinine has improved back to normal.  BP has been borderline on

the metoprolol 25 mg daily.





REVIEW OF SYSTEMS


At the time of my exam:


CONSTITUTIONAL: Denies fever or chills.


CARDIOVASCULAR: Denies chest pain, shortness of breath, orthopnea, PND or 

palpitations.


RESPIRATORY: Denies cough. 


GASTROINTESTINAL: Denies abdominal pain, diarrhea, constipation, nausea or 

vomiting.


MUSCULOSKELETAL: Denies myalgias.


NEUROLOGIC: Denies numbness, tingling or weakness.


ENDOCRINE: Denies fatigue, weight change,  polydipsia or polyurina.


GENITOURINARY: Denies burning, hematuria or urgency with micturation.


HEMATOLOGIC: Denies history of anemia or bleeding. 





PHYSICAL EXAMINATION


Vital signs reviewed.


CONSTITUTIONAL: No apparent distress. 


HEENT: Head is normocephalic. Pupils are equal, round. Sclerae anicteric. Mucous

membranes of the mouth are moist.  No JVD. No carotid bruit.


CHEST EXAMINATION: Lungs are clear to auscultation. No chest wall tenderness is 

noted on palpation or with deep breathing. 


HEART EXAMINATION: Regular rate and rhythm. S1, S2 heard. No murmurs, gallops or

rub.


ABDOMEN: Soft, nontender. Positive bowel sounds.


EXTREMITIES: 2+ peripheral pulses, no lower extremity edema and no calf 

tenderness.


NEUROLOGIC EXAMINATION: Patient is awake, alert and oriented x3. 





ASSESSMENT


1.  Anterior STEMI status post PCI LAD 


2.  Coronary artery disease with residual RCA disease


3.  Acute kidney injury related to obstructive uropathy


4.  Hypertension, borderline


5.  Ischemic cardiopathy EF 30-35%


6.  Prostate cancer appears stable per patient





PLAN


Patient appears relatively stable from a cardiac standpoint.  Kidney function 

has improved and appears all related to obstructive uropathy.  Patient does have

mild hematuria noted however would continue with dual antiplatelets.  Blood 

pressures have been borderline however tolerating the metoprolol and no ACE inh

ibitor or angiotensin receptor blocker given borderline blood pressures.  Would 

recommend staged PCI of the RCA to be performed as an outpatient.  Patient 

appears stable for a cardiac standpoint for discharge home on current regimen 

however if further monitoring of hematuria as needed will defer to primary team.











Objective





- Vital Signs


Vital signs: 


                                   Vital Signs











Temp  97.4 F L  01/01/22 08:00


 


Pulse  87   01/01/22 08:00


 


Resp  16   01/01/22 08:00


 


BP  97/53   01/01/22 08:00


 


Pulse Ox  97   01/01/22 08:00








                                 Intake & Output











 12/31/21 01/01/22 01/01/22





 18:59 06:59 18:59


 


Intake Total 100 420 240


 


Output Total 750 900 


 


Balance -650 -480 240


 


Intake:   


 


    


 


    .9   


 


  Intake, IV Titration  420 





  Amount   


 


    Sodium Chloride 0.9% 1,  420 





    000 ml @ 60 mls/hr IV .   





    [id number] [facility] Rx#:[id number]   


 


  Oral   240


 


Output:   


 


  Urine 750 900 


 


Other:   


 


  Voiding Method Indwelling Catheter Indwelling Catheter 














- Labs


CBC & Chem 7: 


                                 01/01/22 07:20





                                 12/31/21 12:06


Labs: 


                  Abnormal Lab Results - Last 24 Hours (Table)











  12/31/21 12/31/21 01/01/22 Range/Units





  12:06 12:06 07:20 


 


WBC   14.1 H   (3.8-10.6)  k/uL


 


Hgb    12.8 L  (13.0-17.5)  gm/dL


 


Neutrophils #   12.1 H  7.9 H  (1.3-7.7)  k/uL


 


Lymphocytes #   0.8 L  0.9 L  (1.0-4.8)  k/uL


 


Glucose  104 H    (74-99)  mg/dL


 


HDL Cholesterol  34.90 L    (40.00-60.00)  mg/dL

## 2022-01-01 NOTE — P.PN
Subjective





Patient is seen in follow-up for acute kidney injury.  Renal function back to 

baseline.  Receiving IV fluids.  Has a Jama catheter for urinary retention.  

Denies chest pain or shortness of breath.





Vital signs are stable.


General: The patient appeared well nourished and normally developed. 


HEENT: Head exam is unremarkable. 


LUNGS: Breath sounds decreased.


HEART: Rate and Rhythm are regular. 


ABDOMEN: Soft, no distention.


EXTREMITITES: 2+ edema.





Objective





- Vital Signs


Vital signs: 


                                   Vital Signs











Temp  97.4 F L  01/01/22 08:00


 


Pulse  87   01/01/22 08:00


 


Resp  16   01/01/22 08:00


 


BP  97/53   01/01/22 08:00


 


Pulse Ox  97   01/01/22 08:00








                                 Intake & Output











 12/31/21 01/01/22 01/01/22





 18:59 06:59 18:59


 


Intake Total 100 420 240


 


Output Total 750 900 


 


Balance -650 -480 240


 


Intake:   


 


    


 


    .9   


 


  Intake, IV Titration  420 





  Amount   


 


    Sodium Chloride 0.9% 1,  420 





    000 ml @ 60 mls/hr IV .   





    [id number] [facility] Rx#:[id number]   


 


  Oral   240


 


Output:   


 


  Urine 750 900 


 


Other:   


 


  Voiding Method Indwelling Catheter Indwelling Catheter 














- Labs


CBC & Chem 7: 


                                 01/01/22 07:20





                                 12/31/21 12:06


Labs: 


                  Abnormal Lab Results - Last 24 Hours (Table)











  12/31/21 12/31/21 01/01/22 Range/Units





  12:06 12:06 07:20 


 


WBC   14.1 H   (3.8-10.6)  k/uL


 


Hgb    12.8 L  (13.0-17.5)  gm/dL


 


Neutrophils #   12.1 H  7.9 H  (1.3-7.7)  k/uL


 


Lymphocytes #   0.8 L  0.9 L  (1.0-4.8)  k/uL


 


Glucose  104 H    (74-99)  mg/dL


 


HDL Cholesterol  34.90 L    (40.00-60.00)  mg/dL














Assessment and Plan


Plan: 





Assessment:


1.  Acute kidney injury secondary to urinary retention.  Improved post Jama 

catheter placement.  Creatinine 0.85 today.


2.  Urinary retention.  Has a Jama catheter.  Urology following.  On Flomax.


3.  Coronary artery disease status post cardiac catheterization with stenting on

12/30/2021.


4.  Acute systolic CHF with ejection fraction of 30-35%.


5.  Lower extremity edema.





Plan:


Hep-Lock IV fluids.


Add oral Lasix 20 mg once daily.


Avoid nephrotoxins.

## 2022-01-01 NOTE — P.PN
Subjective


Progress Note Date: 12/31/21





Patient is a 76-year-old male with a known history of asthma, hypertension, 

recent diagnosis of cardiomyopathy ejection fraction 35 to 40% presents to ER 

with complaints of chest pain.  Patient states that he was at home sitting in 

the couch and suddenly developed mid retrosternal chest pain and felt like 

heartburn.  Denied any radiation of the pain.  No complaints of nausea or 

vomiting or dizziness or lightheadedness.  Patient was brought to the hospital 

by EMS.  EKG concerning for acute anterior ST related MI and was taken to 

cardiac catheterization for stent placement.


Patient was recently discharged from the hospital on 12/23/2021 he was treated 

for COPD exacerbation and pneumonia and was also found to have elevated troponin

level.  2D echocardiogram today with him fraction 35 to 40% is recommended to 

follow-up in the clinic after completion of treatment for pneumonia.


Patient underwent cardiac catheterization showed total occlusion of the LAD in 

the proximal portion and severe disease involving the mid LAD and distal LAD.  

Status post successful stenting of the proximal and mid and distal LAD.


Chest x-ray showed small pleural effusions and mild pulmonary congestion.  

Minimal heart failure is possible.


Laboratory showed WBC 14.6 hemoglobin 14.4 and platelets 144 lymphocytes 0.4


 and creatinine 10.95


Troponin 0 0.592 and 24.4


COVID-19 PCR not detected.  proBNP is 869, sodium 130 potassium 3.8 chloride 89





12/31/2021


Patient is currently in MICU and is being transferred to medical floor.  No 

complaints of chest pain or worsening shortness breath.  Minimal exertional dysp

tacos.  Requiring 2 L oxygen via nasal cannula.


Patient is status post cardiac catheterization and stent placement and presented

with acute anterior wall MI.


Patient is being continued on dual antiplatelet agents and metoprolol.


2D echocardiogram today contractural 30 to 35%.


Urinary retention has resolved and currently on Jama catheter.  Creatinine 

level is normalized to 0.85.  Creatinine went up to 10 before.  Nephrology and 

cardiology is on board.





Current medications reviewed.





REVIEW OF SYSTEMS


CONSTITUTIONAL: Denies fever or chills.


CARDIOVASCULAR: Denies chest pain, shortness of breath, orthopnea, PND or 

palpitations.


RESPIRATORY: Denies cough. 


GASTROINTESTINAL: Denies abdominal pain, diarrhea, constipation, nausea or 

vomiting.


MUSCULOSKELETAL: Denies myalgias.


NEUROLOGIC: Denies numbness, tingling or weakness.


ENDOCRINE: Denies fatigue, weight change,  polydipsia or polyurina.


GENITOURINARY: Denies burning, hematuria or urgency with micturation.


HEMATOLOGIC: Denies history of anemia or bleeding. 





Objective





- Vital Signs


Vital signs: 


                                   Vital Signs











Temp  97.9 F   12/31/21 08:00


 


Pulse  78   12/31/21 11:00


 


Resp  17   12/31/21 11:00


 


BP  97/60   12/31/21 11:00


 


Pulse Ox  95   12/31/21 11:00








                                 Intake & Output











 12/30/21 12/31/21 12/31/21





 18:59 06:59 18:59


 


Intake Total 825 750 


 


Output Total 3850 1950 


 


Balance -3025 -1200 


 


Weight 65.317 kg 69.3 kg 


 


Intake:   


 


   390 


 


    .9 KVO  180 


 


    Sodium Chloride 0.9% 1, 825 210 





    000 ml @ 75 mls/hr IV .   





    [id number] [facility] Rx#:[id number]   


 


  Oral  360 


 


Output:   


 


  Urine 3850 1950 


 


Other:   


 


  Voiding Method Indwelling Catheter Indwelling Catheter 














- Exam





PHYSICAL EXAMINATION: 


Patient is lying in the bed comfortably, no acute distress, awake alert and 

oriented.. 


HEENT: Normocephalic. Neck is supple. Pupils reactive. Nostrils clear. Oral 

cavity is moist. 


Neck reveals no JVD, carotid bruits, or thyromegaly. 


CHEST EXAMINATION: Trachea is central. Symmetrical expansion. rt.  bsilar 

crackles, Lung fields clear to auscultation and percussion. 


CARDIAC: Normal S1, S2 with no gallops. No murmurs 


ABDOMEN: Soft. Bowel sounds normal. No organomegaly. No abdominal bruits. 


Extremities: reveal no edema.  No clubbing or cyanosis


Neurologically awake, alert, oriented x3 with well-coordinated movements.  No 

focal deficits noted


Skin: No rash or skin lesions. 


Psychiatric: Cooperative.  Nonsuicidal


Musculoskeletal: No joint swelling or deformity.  Normal range of motion.





- Labs


CBC & Chem 7: 


                                 01/01/22 07:20





                                 12/31/21 12:06





Assessment and Plan


Assessment: 








Acute ST elevated anterior wall MI status post stent placement to proximal mid 

and distal LAD


Acute kidney injury due to post renal obstruction relieved with Jama catheter.


Hypovolemic hyponatremia


Cardiomyopathy ejection fraction 30-35%


Hypertension


Hyperlipidemia


COPD not in exacerbation


DVT prophylaxis





Plan:


Patient is status post cardiac catheterization and stent placement to LAD.  

Continue with dual antiplatelet therapy and statins.


Nephrology has seen the patient due to elevated creatinine level which is due to

obstructive uropathy and creatinine level improved to 3.37-->0.85 after Jama 

catheter placement.


Seen by urology and recommends to continue Jama catheter and follow-up as an 

outpatient.


Continue with telemetry monitoring and patient is being monitored in the MICU 

currently.  Follow-up closely.


Time with Patient: Greater than 30

## 2022-01-01 NOTE — P.PN
Subjective


Progress Note Date: 01/01/22





Patient is a 76-year-old male with a known history of asthma, hypertension, 

recent diagnosis of cardiomyopathy ejection fraction 35 to 40% presents to ER 

with complaints of chest pain.  Patient states that he was at home sitting in 

the couch and suddenly developed mid retrosternal chest pain and felt like 

heartburn.  Denied any radiation of the pain.  No complaints of nausea or 

vomiting or dizziness or lightheadedness.  Patient was brought to the hospital 

by EMS.  EKG concerning for acute anterior ST related MI and was taken to 

cardiac catheterization for stent placement.


Patient was recently discharged from the hospital on 12/23/2021 he was treated 

for COPD exacerbation and pneumonia and was also found to have elevated troponin

level.  2D echocardiogram today with him fraction 35 to 40% is recommended to 

follow-up in the clinic after completion of treatment for pneumonia.


Patient underwent cardiac catheterization showed total occlusion of the LAD in 

the proximal portion and severe disease involving the mid LAD and distal LAD.  

Status post successful stenting of the proximal and mid and distal LAD.


Chest x-ray showed small pleural effusions and mild pulmonary congestion.  

Minimal heart failure is possible.


Laboratory showed WBC 14.6 hemoglobin 14.4 and platelets 144 lymphocytes 0.4


 and creatinine 10.95


Troponin 0 0.592 and 24.4


COVID-19 PCR not detected.  proBNP is 869, sodium 130 potassium 3.8 chloride 89





12/31/2021


Patient is currently in MICU and is being transferred to medical floor.  No 

complaints of chest pain or worsening shortness breath.  Minimal exertional dysp

tacos.  Requiring 2 L oxygen via nasal cannula.


Patient is status post cardiac catheterization and stent placement and presented

with acute anterior wall MI.


Patient is being continued on dual antiplatelet agents and metoprolol.


2D echocardiogram today contractural 30 to 35%.


Urinary retention has resolved and currently on Jama catheter.  Creatinine 

level is normalized to 0.85.  Creatinine went up to 10 before.  Nephrology and 

cardiology is on board.





1/1/2022


Patient is currently sitting in the chair.  Awake alert oriented x3.  Breathing 

status is much improved.  Requiring 2 L oxygen via nasal cannula which is her 

baseline at home.


Denies any complaints of chest pain or shortness of breath.


Patient does have blood-tinged urine this morning which is clearing up at this 

time.


Laboratory data showed WBC 9.6 and hemoglobin 12.8 and platelets 177.  Patient 

will need urology follow-up as an outpatient and continue with Jama catheter 

upon discharge.


Anticipate discharge in next 24 hours.





Current medications reviewed.





REVIEW OF SYSTEMS


CONSTITUTIONAL: Denies fever or chills.


CARDIOVASCULAR: Denies chest pain, shortness of breath, orthopnea, PND or 

palpitations.


RESPIRATORY: Denies cough. 


GASTROINTESTINAL: Denies abdominal pain, diarrhea, constipation, nausea or 

vomiting.


MUSCULOSKELETAL: Denies myalgias.


NEUROLOGIC: Denies numbness, tingling or weakness.


ENDOCRINE: Denies fatigue, weight change,  polydipsia or polyurina.


GENITOURINARY: Denies burning, hematuria or urgency with micturation.


HEMATOLOGIC: Denies history of anemia or bleeding. 





Objective





- Vital Signs


Vital signs: 


                                   Vital Signs











Temp  98.2 F   01/01/22 19:45


 


Pulse  94   01/01/22 20:00


 


Resp  16   01/01/22 19:45


 


BP  117/72   01/01/22 19:45


 


Pulse Ox  97   01/01/22 19:45








                                 Intake & Output











 01/01/22 01/01/22 01/02/22





 06:59 18:59 06:59


 


Intake Total 420 976 


 


Output Total 900 1400 400


 


Balance -480 -424 -400


 


Intake:   


 


  Intake, IV Titration 420  





  Amount   


 


    Sodium Chloride 0.9% 1, 420  





    000 ml @ 60 mls/hr IV .   





    [id number] [facility] Rx#:[id number]   


 


  Oral  976 


 


Output:   


 


  Urine 900 1400 400


 


Other:   


 


  Voiding Method Indwelling Catheter Indwelling Catheter Indwelling Catheter














- Exam





PHYSICAL EXAMINATION: 


Patient is lying in the bed comfortably, no acute distress, awake alert and 

oriented.. 


HEENT: Normocephalic. Neck is supple. Pupils reactive. Nostrils clear. Oral 

cavity is moist. 


Neck reveals no JVD, carotid bruits, or thyromegaly. 


CHEST EXAMINATION: Trachea is central. Symmetrical expansion. rt.  bsilar 

crackles, Lung fields clear to auscultation and percussion. 


CARDIAC: Normal S1, S2 with no gallops. No murmurs 


ABDOMEN: Soft. Bowel sounds normal. No organomegaly. No abdominal bruits. 


Extremities: reveal no edema.  No clubbing or cyanosis


Neurologically awake, alert, oriented x3 with well-coordinated movements.  No 

focal deficits noted


Skin: No rash or skin lesions. 


Psychiatric: Cooperative.  Nonsuicidal


Musculoskeletal: No joint swelling or deformity.  Normal range of motion.





- Labs


CBC & Chem 7: 


                                 01/01/22 07:20





                                 12/31/21 12:06


Labs: 


                  Abnormal Lab Results - Last 24 Hours (Table)











  01/01/22 01/01/22 Range/Units





  07:20 11:53 


 


Hgb  12.8 L   (13.0-17.5)  gm/dL


 


Neutrophils #  7.9 H   (1.3-7.7)  k/uL


 


Lymphocytes #  0.9 L   (1.0-4.8)  k/uL


 


POC Glucose (mg/dL)   172 H  (75-99)  mg/dL














Assessment and Plan


Assessment: 








Acute ST elevated anterior wall MI status post stent placement to proximal mid 

and distal LAD


Acute kidney injury due to post renal obstruction relieved with Jama catheter.


Acute urine retention status post Jama catheter placement.  Outpatient urology 

follow-up.


Hypovolemic hyponatremia


Cardiomyopathy ejection fraction 30-35%


Hypertension


Hyperlipidemia


COPD not in exacerbation


DVT prophylaxis





Plan:


Patient is status post cardiac catheterization and stent placement to LAD.  

Continue with dual antiplatelet therapy and statins.


Nephrology has seen the patient due to elevated creatinine level which is due to

obstructive uropathy and creatinine level improved to 3.37-->0.85 after Jama 

catheter placement.


Seen by urology and recommends to continue Jama catheter and follow-up as an 

outpatient.


Continue with telemetry monitoring and patient is being monitored in the MICU 

currently.  Follow-up closely.


Time with Patient: Greater than 30

## 2022-01-02 VITALS — HEART RATE: 85 BPM | SYSTOLIC BLOOD PRESSURE: 96 MMHG | TEMPERATURE: 97.6 F | DIASTOLIC BLOOD PRESSURE: 56 MMHG

## 2022-01-02 VITALS — RESPIRATION RATE: 18 BRPM

## 2022-01-02 LAB
ANION GAP SERPL CALC-SCNC: 3 MMOL/L
BASOPHILS # BLD AUTO: 0 K/UL (ref 0–0.2)
BASOPHILS NFR BLD AUTO: 0 %
BUN SERPL-SCNC: 15 MG/DL (ref 9–20)
CALCIUM SPEC-MCNC: 8.7 MG/DL (ref 8.4–10.2)
CHLORIDE SERPL-SCNC: 106 MMOL/L (ref 98–107)
CO2 SERPL-SCNC: 28 MMOL/L (ref 22–30)
EOSINOPHIL # BLD AUTO: 0.2 K/UL (ref 0–0.7)
EOSINOPHIL NFR BLD AUTO: 2 %
ERYTHROCYTE [DISTWIDTH] IN BLOOD BY AUTOMATED COUNT: 4.56 M/UL (ref 4.3–5.9)
ERYTHROCYTE [DISTWIDTH] IN BLOOD: 14.7 % (ref 11.5–15.5)
GLUCOSE SERPL-MCNC: 208 MG/DL (ref 74–99)
HCT VFR BLD AUTO: 41.4 % (ref 39–53)
HGB BLD-MCNC: 12.8 GM/DL (ref 13–17.5)
LYMPHOCYTES # SPEC AUTO: 0.8 K/UL (ref 1–4.8)
LYMPHOCYTES NFR SPEC AUTO: 7 %
MAGNESIUM SPEC-SCNC: 1.7 MG/DL (ref 1.6–2.3)
MCH RBC QN AUTO: 28.2 PG (ref 25–35)
MCHC RBC AUTO-ENTMCNC: 31 G/DL (ref 31–37)
MCV RBC AUTO: 90.7 FL (ref 80–100)
MONOCYTES # BLD AUTO: 0.4 K/UL (ref 0–1)
MONOCYTES NFR BLD AUTO: 4 %
NEUTROPHILS # BLD AUTO: 9.6 K/UL (ref 1.3–7.7)
NEUTROPHILS NFR BLD AUTO: 86 %
PLATELET # BLD AUTO: 183 K/UL (ref 150–450)
POTASSIUM SERPL-SCNC: 3.9 MMOL/L (ref 3.5–5.1)
SODIUM SERPL-SCNC: 137 MMOL/L (ref 137–145)
WBC # BLD AUTO: 11.2 K/UL (ref 3.8–10.6)

## 2022-01-02 RX ADMIN — METOPROLOL SUCCINATE SCH MG: 25 TABLET, EXTENDED RELEASE ORAL at 08:21

## 2022-01-02 RX ADMIN — FUROSEMIDE SCH MG: 20 TABLET ORAL at 08:21

## 2022-01-02 RX ADMIN — TICAGRELOR SCH MG: 90 TABLET ORAL at 08:21

## 2022-01-02 RX ADMIN — TAMSULOSIN HYDROCHLORIDE SCH MG: 0.4 CAPSULE ORAL at 08:21

## 2022-01-02 RX ADMIN — ASPIRIN 81 MG CHEWABLE TABLET SCH MG: 81 TABLET CHEWABLE at 08:21

## 2022-01-02 NOTE — P.PN
Subjective





Patient is seen in follow-up for acute kidney injury.  Renal function back to 

baseline.  Has a Jama catheter for urinary retention.  Denies chest pain or 

shortness of breath.  No active complaints.





Vital signs are stable.


General: The patient appeared well nourished and normally developed. 


HEENT: Head exam is unremarkable. 


LUNGS: Breath sounds decreased.


HEART: Rate and Rhythm are regular. 


ABDOMEN: Soft, no distention.


EXTREMITITES: Trace edema.





Objective





- Vital Signs


Vital signs: 


                                   Vital Signs











Temp  97.4 F L  01/02/22 08:14


 


Pulse  95   01/02/22 08:14


 


Resp  18   01/02/22 08:14


 


BP  97/57   01/02/22 08:14


 


Pulse Ox  95   01/02/22 08:14








                                 Intake & Output











 01/01/22 01/02/22 01/02/22





 18:59 06:59 18:59


 


Intake Total 976  130


 


Output Total 1400 1050 250


 


Balance -424 -1050 -120


 


Intake:   


 


  IV   10


 


    Invasive Line 1   10


 


  Oral 976  120


 


Output:   


 


  Urine 1400 1050 250


 


    Uretheral (Jama)   250


 


Other:   


 


  Voiding Method Indwelling Catheter Indwelling Catheter Indwelling Catheter














- Labs


CBC & Chem 7: 


                                 01/02/22 08:08





                                 01/02/22 08:08


Labs: 


                  Abnormal Lab Results - Last 24 Hours (Table)











  01/01/22 01/02/22 01/02/22 Range/Units





  11:53 08:08 08:08 


 


WBC    11.2 H  (3.8-10.6)  k/uL


 


Hgb    12.8 L  (13.0-17.5)  gm/dL


 


Neutrophils #    9.6 H  (1.3-7.7)  k/uL


 


Lymphocytes #    0.8 L  (1.0-4.8)  k/uL


 


Glucose   208 H   (74-99)  mg/dL


 


POC Glucose (mg/dL)  172 H    (75-99)  mg/dL














Assessment and Plan


Plan: 





Assessment:


1.  Acute kidney injury secondary to urinary retention.  Improved post Jama 

catheter placement.  Creatinine 0.69 today.


2.  Urinary retention.  Has a Jama catheter.  Urology following.  On Flomax.


3.  Coronary artery disease status post cardiac catheterization with stenting on

12/30/2021.


4.  Acute systolic CHF with ejection fraction of 30-35%.


5.  Lower extremity edema.  Improved with diuresis.





Plan:


Maintain oral Lasix.


Avoid nephrotoxins.

## 2022-01-02 NOTE — P.PN
Subjective





HISTORY OF PRESENTING ILLNESS


Acute coronary syndrome





The patient is a pleasant 76-year-old gentleman with hypertension and 

dyslipidemia who was admitted to the hospital yesterday with chest discomfort 

and was diagnosed with acute anterior ST elevation myocardial infarction.  He 

underwent an emergent heart catheterization and was found to have occluded LAD 

which was stented in the proximal and mid and distal portion.  Because of the 

large thrombus burden he was started on Aggrastat.





The patient was seen this morning.  He had a Jama catheter for urinary 

obstruction and subsequently he did have hematuria.  Aggrastat is on hold at 

this point.  No heparin IV is going at this point as well.  He remains on dual 

antiplatelet therapy along with high intensity statin along with beta blocker 

with Toprol-XL.  An echo is in process to be done.  Clinically he is doing very 

well.  Hemodynamically he is stable.





12/31


Patient seen and examined.  Patient underwent extensive stenting of the LAD with

anterior STEMI.  He had been having urinary retention for the prior 4-5 days and

was found to have acute kidney injury with creatinine up to 10.  He had a Jama 

placed with improvement in creatinine down to 3's today.  Echocardiogram shows 

cardiomyopathy EF 30-35%.  His blood pressures been borderline and Toprol was 

held yesterday however given this morning.  He denies any further heartburn type

sensation.





1/1


Patient seen and examined.  Patient denies any further chest pain or GERD type 

symptoms.  He is having some mild hematuria noted and his Jama catheter.  Per 

urology plan is for discharge home with Jama catheter.  He has been on gentle 

IV fluids and creatinine has improved back to normal.  BP has been borderline on

the metoprolol 25 mg daily.





1/2


Patient seen and examined.  Patient denies any chest pain or heartburn 10 

sensation.  He denies any significant shortness breath.  He has been tolerating 

the metoprolol with borderline blood pressures.  Creatinine continues to remain 

stable.  No further hematuria noted.





REVIEW OF SYSTEMS


At the time of my exam:


CONSTITUTIONAL: Denies fever or chills.


CARDIOVASCULAR: Denies chest pain, shortness of breath, orthopnea, PND or 

palpitations.


RESPIRATORY: Denies cough. 


GASTROINTESTINAL: Denies abdominal pain, diarrhea, constipation, nausea or 

vomiting.


MUSCULOSKELETAL: Denies myalgias.


NEUROLOGIC: Denies numbness, tingling or weakness.


ENDOCRINE: Denies fatigue, weight change,  polydipsia or polyurina.


GENITOURINARY: Denies burning, hematuria or urgency with micturation.


HEMATOLOGIC: Denies history of anemia or bleeding. 





PHYSICAL EXAMINATION


Vital signs reviewed.


CONSTITUTIONAL: No apparent distress. 


HEENT: Head is normocephalic. Pupils are equal, round. Sclerae anicteric. Mucous

membranes of the mouth are moist.  No JVD. No carotid bruit.


CHEST EXAMINATION: Lungs are clear to auscultation. No chest wall tenderness is 

noted on palpation or with deep breathing. 


HEART EXAMINATION: Regular rate and rhythm. S1, S2 heard. No murmurs, gallops or

rub.


ABDOMEN: Soft, nontender. Positive bowel sounds.


EXTREMITIES: 2+ peripheral pulses, no lower extremity edema and no calf 

tenderness.


NEUROLOGIC EXAMINATION: Patient is awake, alert and oriented x3. 





ASSESSMENT


1.  Anterior STEMI status post PCI LAD 


2.  Coronary artery disease with residual RCA disease


3.  Acute kidney injury related to obstructive uropathy


4.  Hypertension, borderline


5.  Ischemic cardiopathy EF 30-35%


6.  Prostate cancer appears stable per patient





PLAN


Hematuria appears to have improved.  Creatinine also improved and would likely 

consider staged PCI however this may be performed outpatient.  No ACE inhibitor 

given borderline blood pressures.  Appears stable for discharge home on dual an

tiplatelets and follow-up in office within one week.








Objective





- Vital Signs


Vital signs: 


                                   Vital Signs











Temp  97.4 F L  01/02/22 08:14


 


Pulse  95   01/02/22 08:14


 


Resp  18   01/02/22 08:14


 


BP  97/57   01/02/22 08:14


 


Pulse Ox  95   01/02/22 08:14








                                 Intake & Output











 01/01/22 01/02/22 01/02/22





 18:59 06:59 18:59


 


Intake Total 976  130


 


Output Total 1400 1050 250


 


Balance -424 -1050 -120


 


Intake:   


 


  IV   10


 


    Invasive Line 1   10


 


  Oral 976  120


 


Output:   


 


  Urine 1400 1050 250


 


    Uretheral (Jama)   250


 


Other:   


 


  Voiding Method Indwelling Catheter Indwelling Catheter Indwelling Catheter














- Labs


CBC & Chem 7: 


                                 01/02/22 08:08





                                 12/31/21 12:06


Labs: 


                  Abnormal Lab Results - Last 24 Hours (Table)











  01/01/22 01/02/22 Range/Units





  11:53 08:08 


 


WBC   11.2 H  (3.8-10.6)  k/uL


 


Hgb   12.8 L  (13.0-17.5)  gm/dL


 


Neutrophils #   9.6 H  (1.3-7.7)  k/uL


 


Lymphocytes #   0.8 L  (1.0-4.8)  k/uL


 


POC Glucose (mg/dL)  172 H   (75-99)  mg/dL

## 2022-01-19 ENCOUNTER — HOSPITAL ENCOUNTER (EMERGENCY)
Dept: HOSPITAL 47 - EC | Age: 77
Discharge: HOME | End: 2022-01-19
Payer: MEDICARE

## 2022-01-19 VITALS — TEMPERATURE: 98.4 F

## 2022-01-19 VITALS — HEART RATE: 110 BPM | DIASTOLIC BLOOD PRESSURE: 71 MMHG | SYSTOLIC BLOOD PRESSURE: 127 MMHG

## 2022-01-19 VITALS — RESPIRATION RATE: 18 BRPM

## 2022-01-19 DIAGNOSIS — Z79.899: ICD-10-CM

## 2022-01-19 DIAGNOSIS — I25.2: ICD-10-CM

## 2022-01-19 DIAGNOSIS — R33.9: Primary | ICD-10-CM

## 2022-01-19 DIAGNOSIS — Z79.51: ICD-10-CM

## 2022-01-19 DIAGNOSIS — I10: ICD-10-CM

## 2022-01-19 DIAGNOSIS — Z79.82: ICD-10-CM

## 2022-01-19 DIAGNOSIS — Z87.891: ICD-10-CM

## 2022-01-19 DIAGNOSIS — J45.909: ICD-10-CM

## 2022-01-19 LAB
PH UR: 6.5 [PH] (ref 5–8)
PROT UR QL: (no result)
RBC UR QL: 171 /HPF (ref 0–5)
SP GR UR: 1.02 (ref 1–1.03)
UROBILINOGEN UR QL STRIP: <2 MG/DL (ref ?–2)
WBC # UR AUTO: >182 /HPF (ref 0–5)

## 2022-01-19 PROCEDURE — 87077 CULTURE AEROBIC IDENTIFY: CPT

## 2022-01-19 PROCEDURE — 51702 INSERT TEMP BLADDER CATH: CPT

## 2022-01-19 PROCEDURE — 87186 SC STD MICRODIL/AGAR DIL: CPT

## 2022-01-19 PROCEDURE — 81001 URINALYSIS AUTO W/SCOPE: CPT

## 2022-01-19 PROCEDURE — 87086 URINE CULTURE/COLONY COUNT: CPT

## 2022-01-19 PROCEDURE — 99283 EMERGENCY DEPT VISIT LOW MDM: CPT

## 2022-01-19 NOTE — ED
Male Urogenital HPI





- General


Chief complaint: Urogenital


Stated complaint: Difficulty urinating


Time Seen by Provider: 22 04:12


Source: patient, family, RN notes reviewed, old records reviewed


Mode of arrival: wheelchair


Limitations: no limitations





- History of Present Illness


Initial comments: 





This is a 76-year-old male to the emergency department today.  Patient presents 

today for evaluation regards to inability to urinate.  Patient has had 

difficulty with urination recently had Jama catheter was just removed and has 

appointment with urology today.  Patient has been unable to urinate to the night

has severe abdominal pain


MD Complaint: other (Inability to urinate)


-: hour(s)


Location: abdomen


Radiation: none


Severity: severe


Severity scale (1-10): 10


Quality: sharp


Consistency: constant


Improves with: none


Worsens with: none


indwelling catheter (Just had removed)


Reports: urinary retention, blood in urine, nausea/vomiting





- Related Data


                                Home Medications











 Medication  Instructions  Recorded  Confirmed


 


Albuterol Inhaler [Ventolin Hfa 2 puff INHALATION RT-QID PRN 21





Inhaler]   


 


Albuterol Nebulized [Ventolin 2.5 mg INHALATION RT-QID PRN 21





Nebulized]   


 


Ammonium Lactate Lotion 1 applic TOPICAL DAILY PRN 21





[Lac-Hydrin 12% Lotion]   


 


Cetirizine HCl [Zyrtec] 10 mg PO DAILY PRN 21


 


Finasteride [Proscar] 5 mg PO HS 21


 


Fluticasone Nasal Spray [Flonase 2 spr EA NOSTRIL DAILY 21





Nasal Spray]   


 


Fluticasone/Salmeterol [Advair 1 puff INHALATION RT-BID 21





250-50 Diskus]   


 


Lidocaine 5% Patch [Lidoderm 5% 1 patch TRANSDERM DAILY 21





Patch]   


 


Polyvinyl Alcohol/Povidone 1 drop BOTH EYES TID 21





[[place] Eye Drop]   


 


Triamcinolone 0.1% Cream [Kenalog 1 applic TOPICAL BID PRN 21





0.1% Cream]   


 


Diclofenac Sodium Gel [Voltaren 2 - 4 gm TOPICAL BID 21





Gel]   








                                  Previous Rx's











 Medication  Instructions  Recorded


 


Folic Acid 1 mg PO DAILY@1200 #30 tab 21


 


Thiamine [Vitamin B-1] 100 mg PO DAILY@1200 #30 tab 21


 


Aspirin 81 mg PO DAILY #30 tab 22


 


Atorvastatin [Lipitor] 80 mg PO HS #30 tab 22


 


Furosemide [Lasix] 20 mg PO DAILY #30 tab 22


 


Metoprolol Succinate (ER) [Toprol 25 mg PO DAILY #30 22





XL]  


 


Nitroglycerin Sl Tabs [Nitrostat] 0.4 mg SUBLINGUAL Q5M PRN #30 tab 22


 


Tamsulosin [Flomax] 0.4 mg PO DAILY #30 capsule 22


 


Ticagrelor [Brilinta] 90 mg PO BID #60 tab 22











                                    Allergies











Allergy/AdvReac Type Severity Reaction Status Date / Time


 


Influenza Virus Vaccines Allergy  Unknown Verified 22 04:10














Review of Systems


ROS Statement: 


Those systems with pertinent positive or pertinent negative responses have been 

documented in the HPI.





ROS Other: All systems not noted in ROS Statement are negative.





Past Medical History


Past Medical History: Asthma, Hypertension, Myocardial Infarction (MI), 

Pneumonia, Prostate Disorder


Additional Past Medical History / Comment(s): allergies


Last Myocardial Infarction Date:: 2021


History of Any Multi-Drug Resistant Organisms: None Reported


Past Surgical History: Appendectomy, Back Surgery, Heart Catheterization With 

Stent, Tonsillectomy


Additional Past Surgical History / Comment(s): cervical


Past Anesthesia/Blood Transfusion Reactions: No Reported Reaction


Additional Past Anesthesia/Blood Transfusion Reaction / Comment(s): Pt is 

clausterphobic.


Past Psychological History: No Psychological Hx Reported


Smoking Status: Former smoker


Past Alcohol Use History: None Reported


Past Drug Use History: None Reported





- Past Family History


  ** Mother


Family Medical History: No Reported History


Additional Family Medical History / Comment(s): Mother was healthy and lived to 

be 92 yrs old.





  ** Father


History Unknown: Yes


Additional Family Medical History / Comment(s): Father  at the age of 68yrs,

 pt unable to say from what.





General Exam


Limitations: no limitations


General appearance: alert, anxious, in distress


Head exam: Present: atraumatic, normocephalic, normal inspection


Eye exam: Present: normal appearance, PERRL, EOMI.  Absent: scleral icterus, 

conjunctival injection, periorbital swelling


ENT exam: Present: normal exam, mucous membranes moist


Neck exam: Present: normal inspection.  Absent: tenderness, meningismus, 

lymphadenopathy


Respiratory exam: Present: normal lung sounds bilaterally.  Absent: respiratory 

distress, wheezes, rales, rhonchi, stridor


Cardiovascular Exam: Present: regular rate, tachycardia, normal heart sounds.  

Absent: systolic murmur, diastolic murmur, rubs, gallop, clicks


GI/Abdominal exam: Present: soft, distended, tenderness, guarding, normal bowel 

sounds.  Absent: rebound, rigid


Extremities exam: Present: normal inspection, full ROM, normal capillary refill.

  Absent: tenderness, pedal edema, joint swelling, calf tenderness


Back exam: Present: normal inspection


Neurological exam: Present: alert, oriented X3, CN II-XII intact


Psychiatric exam: Present: normal affect, normal mood


Skin exam: Present: warm, dry, intact, normal color.  Absent: rash





Course


                                   Vital Signs











  22





  04:08


 


Temperature 98.4 F


 


Pulse Rate 126 H


 


Respiratory 20





Rate 


 


Blood Pressure 116/68


 


O2 Sat by Pulse 94 L





Oximetry 














- Reevaluation(s)


Reevaluation #1: 





22 05:42


Medical record is reviewed


Reevaluation #2: 





22 05:42


Jama catheter is placed in symptoms are resolved


Reevaluation #3: 





22 05:42


Patient informed results and questions are answered





Medical Decision Making





- Medical Decision Making





76 male to the emergency department for severe abdominal pain, urinary retention

 severe, Jama catheter is placed with resolution and patient can be discharged 

home





Disposition


Clinical Impression: 


 Urinary retention





Disposition: HOME SELF-CARE


Condition: Good


Instructions (If sedation given, give patient instructions):  Urinary Retention 

in Men (ED)


Is patient prescribed a controlled substance at d/c from ED?: No


Referrals: 


[facility],Clinic [Primary Care Provider] - 1-2 days

## 2022-01-20 ENCOUNTER — HOSPITAL ENCOUNTER (OUTPATIENT)
Dept: HOSPITAL 47 - LABWHC1 | Age: 77
Discharge: HOME | End: 2022-01-20
Attending: NURSE PRACTITIONER
Payer: OTHER GOVERNMENT

## 2022-01-20 DIAGNOSIS — I25.5: Primary | ICD-10-CM

## 2022-01-20 LAB
ALBUMIN SERPL-MCNC: 3.3 G/DL (ref 3.8–4.9)
ALBUMIN/GLOB SERPL: 1 G/DL (ref 1.6–3.17)
ALP SERPL-CCNC: 69 U/L (ref 41–126)
ALT SERPL-CCNC: 11 U/L (ref 10–49)
ANION GAP SERPL CALC-SCNC: 19.1 MMOL/L (ref 10–18)
AST SERPL-CCNC: 26 U/L (ref 14–35)
BUN SERPL-SCNC: 10.5 MG/DL (ref 9–27)
BUN/CREAT SERPL: 11.3 RATIO (ref 12–20)
CALCIUM SPEC-MCNC: 9.2 MG/DL (ref 8.7–10.3)
CHLORIDE SERPL-SCNC: 99 MMOL/L (ref 96–109)
CO2 SERPL-SCNC: 22 MMOL/L (ref 20–27.5)
GLOBULIN SER CALC-MCNC: 3.3 G/DL (ref 1.6–3.3)
GLUCOSE SERPL-MCNC: 108 MG/DL (ref 70–110)
POTASSIUM SERPL-SCNC: 3.6 MMOL/L (ref 3.5–5.5)
PROT SERPL-MCNC: 6.6 G/DL (ref 6.2–8.2)
SODIUM SERPL-SCNC: 140 MMOL/L (ref 135–145)

## 2022-01-20 PROCEDURE — 36415 COLL VENOUS BLD VENIPUNCTURE: CPT

## 2022-01-20 PROCEDURE — 80053 COMPREHEN METABOLIC PANEL: CPT

## 2022-01-20 PROCEDURE — 83880 ASSAY OF NATRIURETIC PEPTIDE: CPT

## 2022-02-22 ENCOUNTER — HOSPITAL ENCOUNTER (OUTPATIENT)
Dept: HOSPITAL 47 - RADXRMAIN | Age: 77
Discharge: HOME | End: 2022-02-22
Payer: OTHER GOVERNMENT

## 2022-02-22 DIAGNOSIS — J90: Primary | ICD-10-CM

## 2022-02-22 PROCEDURE — 71046 X-RAY EXAM CHEST 2 VIEWS: CPT

## 2022-02-22 NOTE — XR
EXAMINATION TYPE: XR chest 2V

 

DATE OF EXAM: 2/22/2022

 

COMPARISON: Chest x-ray 12/30/2021, chest x-ray 12/16/2021

 

HISTORY: Pneumonia

 

TECHNIQUE:  Frontal and lateral views of the chest are obtained.

 

FINDINGS:  There is persistent blunting the left costophrenic angle, obscured left hemidiaphragm. No 
evident pneumothorax. Cardiac mediastinal silhouette is stable accounting for rotation. Postop change
 noted to the lower cervical spine. The aorta is dense. Nodular density in the right midlung is again
 noted. Arthropathy noted at the acromioclavicular joint.

 

IMPRESSION:  Basilar effusion, associated atelectasis versus pneumonia is improved compared to previo
us exams. Old granulomatous disease.

## 2022-03-07 ENCOUNTER — HOSPITAL ENCOUNTER (OUTPATIENT)
Dept: HOSPITAL 47 - LABWHC1 | Age: 77
Discharge: HOME | End: 2022-03-07
Attending: INTERNAL MEDICINE
Payer: OTHER GOVERNMENT

## 2022-03-07 DIAGNOSIS — E78.2: Primary | ICD-10-CM

## 2022-03-07 DIAGNOSIS — I25.5: ICD-10-CM

## 2022-03-07 LAB
ALBUMIN SERPL-MCNC: 4.3 G/DL (ref 3.8–4.9)
ALBUMIN/GLOB SERPL: 1.48 G/DL (ref 1.6–3.17)
ALP SERPL-CCNC: 86 U/L (ref 41–126)
ALT SERPL-CCNC: 10 U/L (ref 10–49)
ANION GAP SERPL CALC-SCNC: 12.5 MMOL/L (ref 10–18)
AST SERPL-CCNC: 15 U/L (ref 14–35)
BUN SERPL-SCNC: 10.8 MG/DL (ref 9–27)
BUN/CREAT SERPL: 13.5 RATIO (ref 12–20)
CALCIUM SPEC-MCNC: 9.8 MG/DL (ref 8.7–10.3)
CHLORIDE SERPL-SCNC: 104 MMOL/L (ref 96–109)
CHOLEST SERPL-MCNC: 102 MG/DL (ref 0–200)
CO2 SERPL-SCNC: 26.5 MMOL/L (ref 20–27.5)
GLOBULIN SER CALC-MCNC: 2.9 G/DL (ref 1.6–3.3)
GLUCOSE SERPL-MCNC: 125 MG/DL (ref 70–110)
HDLC SERPL-MCNC: 48.1 MG/DL (ref 40–60)
LDLC SERPL CALC-MCNC: 42.3 MG/DL (ref 0–131)
POTASSIUM SERPL-SCNC: 4.2 MMOL/L (ref 3.5–5.5)
PROT SERPL-MCNC: 7.2 G/DL (ref 6.2–8.2)
SODIUM SERPL-SCNC: 143 MMOL/L (ref 135–145)
TRIGL SERPL-MCNC: 58.1 MG/DL (ref 0–149)
VLDLC SERPL CALC-MCNC: 11.62 MG/DL (ref 5–40)

## 2022-03-07 PROCEDURE — 83880 ASSAY OF NATRIURETIC PEPTIDE: CPT

## 2022-03-07 PROCEDURE — 36415 COLL VENOUS BLD VENIPUNCTURE: CPT

## 2022-03-07 PROCEDURE — 80061 LIPID PANEL: CPT

## 2022-03-07 PROCEDURE — 80053 COMPREHEN METABOLIC PANEL: CPT

## 2022-04-29 ENCOUNTER — HOSPITAL ENCOUNTER (EMERGENCY)
Dept: HOSPITAL 47 - EC | Age: 77
Discharge: HOME | End: 2022-04-29
Payer: OTHER GOVERNMENT

## 2022-04-29 VITALS — TEMPERATURE: 98.3 F

## 2022-04-29 DIAGNOSIS — I10: ICD-10-CM

## 2022-04-29 DIAGNOSIS — S09.90XA: Primary | ICD-10-CM

## 2022-04-29 DIAGNOSIS — Z79.899: ICD-10-CM

## 2022-04-29 DIAGNOSIS — Z79.51: ICD-10-CM

## 2022-04-29 DIAGNOSIS — J45.909: ICD-10-CM

## 2022-04-29 DIAGNOSIS — Z79.82: ICD-10-CM

## 2022-04-29 DIAGNOSIS — Z87.891: ICD-10-CM
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
## 2022-04-29 DIAGNOSIS — I25.2: ICD-10-CM

## 2022-04-29 PROCEDURE — 99284 EMERGENCY DEPT VISIT MOD MDM: CPT

## 2022-04-29 PROCEDURE — 72125 CT NECK SPINE W/O DYE: CPT

## 2022-04-29 PROCEDURE — 70450 CT HEAD/BRAIN W/O DYE: CPT

## 2022-04-29 NOTE — ED
Head Injury HPI





- General


Chief complaint: Head Injury


Stated complaint: Fall


Time Seen by Provider: 22 21:32


Source: patient


Mode of arrival: ambulatory


Limitations: no limitations





- History of Present Illness


Initial comments: 





This patient is 77-year-old man who states that he had fallen ground-level on 

Wednesday.  He had lost his balance, tipped backwards and struck his head near 

the occiput.  There was no loss of consciousness.  He states there was only a 

little little bit of tenderness at the site, no real headache.  No neck pain.  

He has not had strokelike symptoms weakness change in vision or other senses.  

The patient states that he discussed the episode with his physician at the [facility] and they wanted him to go to the ER to have computed tomography 

scan because of the fact that he takes Plavix and aspirin and they were 

concerned about possibility of hemorrhage.


MD Complaint: head injury


Onset/Timin


-: days(s)


Mechanism of Injury: mechanical fall


Location: occipital


Loss of Consciousness: no


Previous Trauma to this Area: No


Place: outdoors


Radiation: none


Severity: mild


Quality: dull


Consistency: now resolved


Provoking factors: none known


Other Injuries: none


Context: on Aspirin, on other anticoagulant


Associated Symptoms: denies other symptoms





- Related Data


                                Home Medications











 Medication  Instructions  Recorded  Confirmed


 


Albuterol Inhaler [Ventolin Hfa 2 puff INHALATION RT-QID PRN 21





Inhaler]   


 


Albuterol Nebulized [Ventolin 2.5 mg INHALATION RT-QID PRN 21





Nebulized]   


 


Ammonium Lactate Lotion 1 applic TOPICAL DAILY PRN 21





[Lac-Hydrin 12% Lotion]   


 


Cetirizine HCl [Zyrtec] 10 mg PO DAILY PRN 21


 


Finasteride [Proscar] 5 mg PO HS 21


 


Fluticasone Nasal Spray [Flonase 2 spr EA NOSTRIL DAILY 21





Nasal Spray]   


 


Fluticasone/Salmeterol [Advair 1 puff INHALATION RT-BID 21





250-50 Diskus]   


 


Lidocaine 5% Patch [Lidoderm 5% 1 patch TRANSDERM DAILY 21





Patch]   


 


Polyvinyl Alcohol/Povidone 1 drop BOTH EYES TID 21





[[place] Eye Drop]   


 


Triamcinolone 0.1% Cream [Kenalog 1 applic TOPICAL BID PRN 21





0.1% Cream]   


 


Diclofenac Sodium Gel [Voltaren 2 - 4 gm TOPICAL BID 21





Gel]   








                                  Previous Rx's











 Medication  Instructions  Recorded


 


Folic Acid 1 mg PO DAILY@1200 #30 tab 21


 


Thiamine [Vitamin B-1] 100 mg PO DAILY@1200 #30 tab 21


 


Aspirin 81 mg PO DAILY #30 tab 22


 


Atorvastatin [Lipitor] 80 mg PO HS #30 tab 22


 


Furosemide [Lasix] 20 mg PO DAILY #30 tab 22


 


Metoprolol Succinate (ER) [Toprol 25 mg PO DAILY #30 22





XL]  


 


Nitroglycerin Sl Tabs [Nitrostat] 0.4 mg SUBLINGUAL Q5M PRN #30 tab 22


 


Tamsulosin [Flomax] 0.4 mg PO DAILY #30 capsule 22


 


Ticagrelor [Brilinta] 90 mg PO BID #60 tab 22











Allergies/Adverse reactions: 


                                    Allergies











Allergy/AdvReac Type Severity Reaction Status Date / Time


 


Influenza Virus Vaccines Allergy  Unknown Verified 22 18:10














Review of Systems


ROS Statement: 


Those systems with pertinent positive or pertinent negative responses have been 

documented in the HPI.





ROS Other: All systems not noted in ROS Statement are negative.


Constitutional: Denies: fever, weakness


Eyes: Denies: eye pain, vision change


ENT: Denies: ear pain, hearing loss


Respiratory: Denies: cough


Cardiovascular: Denies: chest pain


Gastrointestinal: Denies: abdominal pain, nausea, vomiting


Musculoskeletal: Denies: back pain


Neurological: Denies: headache, weakness, numbness, paresthesias, confusion


Hematological/Lymphatic: Denies: easy bleeding





Past Medical History


Past Medical History: Asthma, Hypertension, Myocardial Infarction (MI), 

Pneumonia, Prostate Disorder


Additional Past Medical History / Comment(s): allergies


Last Myocardial Infarction Date:: 2021


History of Any Multi-Drug Resistant Organisms: None Reported


Past Surgical History: Appendectomy, Back Surgery, Heart Catheterization With 

Stent, Tonsillectomy


Additional Past Surgical History / Comment(s): cervical


Past Anesthesia/Blood Transfusion Reactions: No Reported Reaction


Additional Past Anesthesia/Blood Transfusion Reaction / Comment(s): Pt is 

clausterphobic.


Past Psychological History: No Psychological Hx Reported


Smoking Status: Former smoker


Past Alcohol Use History: None Reported


Past Drug Use History: None Reported





- Past Family History


  ** Mother


Family Medical History: No Reported History


Additional Family Medical History / Comment(s): Mother was healthy and lived to 

be 92 yrs old.





  ** Father


History Unknown: Yes


Additional Family Medical History / Comment(s): Father  at the age of 68yrs,

 pt unable to say from what.





General Exam


Limitations: no limitations


General appearance: alert, in no apparent distress


Head exam: Present: atraumatic, normocephalic, normal inspection


Eye exam: Present: normal appearance, PERRL, EOMI.  Absent: scleral icterus, 

conjunctival injection, nystagmus


Neck exam: Present: normal inspection, full ROM.  Absent: tenderness, 

meningismus


Respiratory exam: Absent: chest wall tenderness


Extremities exam: Present: normal inspection, full ROM.  Absent: tenderness


Back exam: Absent: vertebral tenderness


Neurological exam: Present: alert, oriented X3, CN II-XII intact.  Absent: motor

 sensory deficit


Skin exam: Present: warm, dry, intact, normal color.  Absent: rash





Course


                                   Vital Signs











  22





  18:07


 


Temperature 98.3 F


 


Pulse Rate 78


 


Respiratory 20





Rate 


 


Blood Pressure 143/92


 


O2 Sat by Pulse 98





Oximetry 














Disposition


Clinical Impression: 


 Head injury





Disposition: HOME SELF-CARE


Condition: Good


Instructions (If sedation given, give patient instructions):  Head Injury (ED)


Is patient prescribed a controlled substance at d/c from ED?: No


Referrals: 


[facility],Clinic [Primary Care Provider] - 1-2 days

## 2022-04-29 NOTE — CT
EXAMINATION TYPE: CT brain cspine wo con

 

DATE OF EXAM: 4/29/2022

 

COMPARISON: None

 

HISTORY: head injury from fall

 

CT DLP: 1307.4 mGycm

Automated exposure control for dose reduction was used.

 

Images of the brain and cervical spine obtained with no contrast.

 

There is some cerebral cortical atrophy. There is no mass effect or midline shift. There is no sign o
f intracranial hemorrhage. There is mild hypodensity in the left parietal lobe white matter. Calvariu
m is intact. There is normal aeration of the mastoid sinuses.

The cervical vertebra have normal alignment. There is plate with screws fusing anteriorly the cervica
l spine from C7 to C4. There is spondylotic changes at C3-4 with spurring of the endplates and bridgi
ng osteophytes. There is similar change at C7-T1. There is multilevel hypertrophic facet arthropathy.
 Skull base is intact.

 

 

IMPRESSION:

White matter hypodensity left posterior parietal lobe suggestive of chronic small vessel ischemia. No
 acute intracranial abnormality.

 

Multilevel cervical spine fusion surgery. No acute abnormality. No fracture. There is some narrowing 
of the spinal canal at C6-7 and C7-T1 and a mild relative spinal stenosis. There is probably also spi
nal stenosis at C3-4.

## 2022-04-30 VITALS — DIASTOLIC BLOOD PRESSURE: 84 MMHG | SYSTOLIC BLOOD PRESSURE: 147 MMHG | HEART RATE: 74 BPM | RESPIRATION RATE: 18 BRPM

## 2022-09-26 ENCOUNTER — HOSPITAL ENCOUNTER (OUTPATIENT)
Dept: HOSPITAL 47 - EC | Age: 77
Setting detail: OBSERVATION
LOS: 1 days | Discharge: HOME | End: 2022-09-27
Attending: INTERNAL MEDICINE | Admitting: INTERNAL MEDICINE
Payer: OTHER GOVERNMENT

## 2022-09-26 DIAGNOSIS — R00.2: ICD-10-CM

## 2022-09-26 DIAGNOSIS — N40.1: ICD-10-CM

## 2022-09-26 DIAGNOSIS — Z79.899: ICD-10-CM

## 2022-09-26 DIAGNOSIS — R33.8: ICD-10-CM

## 2022-09-26 DIAGNOSIS — I10: ICD-10-CM

## 2022-09-26 DIAGNOSIS — E80.7: ICD-10-CM

## 2022-09-26 DIAGNOSIS — I25.5: ICD-10-CM

## 2022-09-26 DIAGNOSIS — R07.89: Primary | ICD-10-CM

## 2022-09-26 DIAGNOSIS — J30.9: ICD-10-CM

## 2022-09-26 DIAGNOSIS — Z66: ICD-10-CM

## 2022-09-26 DIAGNOSIS — I34.0: ICD-10-CM

## 2022-09-26 DIAGNOSIS — Z98.890: ICD-10-CM

## 2022-09-26 DIAGNOSIS — Z79.51: ICD-10-CM

## 2022-09-26 DIAGNOSIS — Z95.5: ICD-10-CM

## 2022-09-26 DIAGNOSIS — I25.10: ICD-10-CM

## 2022-09-26 DIAGNOSIS — Z96.0: ICD-10-CM

## 2022-09-26 DIAGNOSIS — Z87.891: ICD-10-CM

## 2022-09-26 DIAGNOSIS — Z90.49: ICD-10-CM

## 2022-09-26 DIAGNOSIS — Z85.828: ICD-10-CM

## 2022-09-26 DIAGNOSIS — F40.240: ICD-10-CM

## 2022-09-26 DIAGNOSIS — Z79.82: ICD-10-CM

## 2022-09-26 DIAGNOSIS — E78.5: ICD-10-CM

## 2022-09-26 DIAGNOSIS — Z87.01: ICD-10-CM

## 2022-09-26 DIAGNOSIS — Z85.46: ICD-10-CM

## 2022-09-26 DIAGNOSIS — Z88.7: ICD-10-CM

## 2022-09-26 DIAGNOSIS — Z79.02: ICD-10-CM

## 2022-09-26 DIAGNOSIS — J44.9: ICD-10-CM

## 2022-09-26 DIAGNOSIS — I25.2: ICD-10-CM

## 2022-09-26 LAB
ALBUMIN SERPL-MCNC: 4.3 G/DL (ref 3.5–5)
ALP SERPL-CCNC: 90 U/L (ref 38–126)
ALT SERPL-CCNC: 24 U/L (ref 4–49)
ANION GAP SERPL CALC-SCNC: 10 MMOL/L
APTT BLD: 24.2 SEC (ref 22–30)
AST SERPL-CCNC: 37 U/L (ref 17–59)
BASOPHILS # BLD AUTO: 0.2 K/UL (ref 0–0.2)
BASOPHILS NFR BLD AUTO: 2 %
BUN SERPL-SCNC: 14 MG/DL (ref 9–20)
CALCIUM SPEC-MCNC: 9.6 MG/DL (ref 8.4–10.2)
CHLORIDE SERPL-SCNC: 100 MMOL/L (ref 98–107)
CO2 SERPL-SCNC: 32 MMOL/L (ref 22–30)
EOSINOPHIL # BLD AUTO: 0.9 K/UL (ref 0–0.7)
EOSINOPHIL NFR BLD AUTO: 9 %
ERYTHROCYTE [DISTWIDTH] IN BLOOD BY AUTOMATED COUNT: 6.29 M/UL (ref 4.3–5.9)
ERYTHROCYTE [DISTWIDTH] IN BLOOD: 15.8 % (ref 11.5–15.5)
GLUCOSE SERPL-MCNC: 101 MG/DL (ref 74–99)
HCT VFR BLD AUTO: 52.8 % (ref 39–53)
HGB BLD-MCNC: 16.1 GM/DL (ref 13–17.5)
INR PPP: 1.1 (ref ?–1.2)
LYMPHOCYTES # SPEC AUTO: 2 K/UL (ref 1–4.8)
LYMPHOCYTES NFR SPEC AUTO: 20 %
MAGNESIUM SPEC-SCNC: 2 MG/DL (ref 1.6–2.3)
MCH RBC QN AUTO: 25.7 PG (ref 25–35)
MCHC RBC AUTO-ENTMCNC: 30.6 G/DL (ref 31–37)
MCV RBC AUTO: 84 FL (ref 80–100)
MONOCYTES # BLD AUTO: 0.6 K/UL (ref 0–1)
MONOCYTES NFR BLD AUTO: 6 %
NEUTROPHILS # BLD AUTO: 5.8 K/UL (ref 1.3–7.7)
NEUTROPHILS NFR BLD AUTO: 60 %
PLATELET # BLD AUTO: 278 K/UL (ref 150–450)
POTASSIUM SERPL-SCNC: 4.5 MMOL/L (ref 3.5–5.1)
PROT SERPL-MCNC: 7 G/DL (ref 6.3–8.2)
PT BLD: 11.7 SEC (ref 9–12)
SODIUM SERPL-SCNC: 142 MMOL/L (ref 137–145)
WBC # BLD AUTO: 9.7 K/UL (ref 3.8–10.6)

## 2022-09-26 PROCEDURE — 94640 AIRWAY INHALATION TREATMENT: CPT

## 2022-09-26 PROCEDURE — 83735 ASSAY OF MAGNESIUM: CPT

## 2022-09-26 PROCEDURE — 80061 LIPID PANEL: CPT

## 2022-09-26 PROCEDURE — 85610 PROTHROMBIN TIME: CPT

## 2022-09-26 PROCEDURE — 85379 FIBRIN DEGRADATION QUANT: CPT

## 2022-09-26 PROCEDURE — 93306 TTE W/DOPPLER COMPLETE: CPT

## 2022-09-26 PROCEDURE — 96372 THER/PROPH/DIAG INJ SC/IM: CPT

## 2022-09-26 PROCEDURE — 93005 ELECTROCARDIOGRAM TRACING: CPT

## 2022-09-26 PROCEDURE — 85025 COMPLETE CBC W/AUTO DIFF WBC: CPT

## 2022-09-26 PROCEDURE — 71046 X-RAY EXAM CHEST 2 VIEWS: CPT

## 2022-09-26 PROCEDURE — 85730 THROMBOPLASTIN TIME PARTIAL: CPT

## 2022-09-26 PROCEDURE — 99285 EMERGENCY DEPT VISIT HI MDM: CPT

## 2022-09-26 PROCEDURE — 80053 COMPREHEN METABOLIC PANEL: CPT

## 2022-09-26 PROCEDURE — 36415 COLL VENOUS BLD VENIPUNCTURE: CPT

## 2022-09-26 PROCEDURE — 84484 ASSAY OF TROPONIN QUANT: CPT

## 2022-09-26 PROCEDURE — 83880 ASSAY OF NATRIURETIC PEPTIDE: CPT

## 2022-09-26 RX ADMIN — SODIUM CHLORIDE, PRESERVATIVE FREE SCH ML: 5 INJECTION INTRAVENOUS at 21:13

## 2022-09-26 RX ADMIN — METOPROLOL SUCCINATE SCH MG: 25 TABLET, EXTENDED RELEASE ORAL at 21:12

## 2022-09-26 RX ADMIN — HEPARIN SODIUM SCH UNIT: 5000 INJECTION INTRAVENOUS; SUBCUTANEOUS at 21:12

## 2022-09-26 RX ADMIN — NITROGLYCERIN SCH: 20 OINTMENT TOPICAL at 17:07

## 2022-09-26 RX ADMIN — BUDESONIDE AND FORMOTEROL FUMARATE DIHYDRATE SCH PUFF: 80; 4.5 AEROSOL RESPIRATORY (INHALATION) at 19:55

## 2022-09-26 NOTE — XR
EXAMINATION TYPE: XR chest 2V

 

DATE OF EXAM: 9/26/2022

 

COMPARISON: 8/21/2022

 

HISTORY: 77-year-old male with chest pain

 

TECHNIQUE:  AP and lateral views

 

FINDINGS:  

ACDF hardware. Calcified granuloma periphery of the right mid lung. Heart normal size. Aorta and pulm
onary vasculature within normal limits. Cardiac stents. Heart normal size. Interstitial prominence is
 unchanged. No consolidation or pleural effusion.

 

 

IMPRESSION:  

Chronic changes. Prior granulomatous disease. No acute process seen.

## 2022-09-26 NOTE — P.HPIM
History of Present Illness


H&P Date: 22


Chief Complaint: chest pain





Patient is a 77-year-old male with a past medical history of hypertension, 

hyperlipidemia, BPH, coronary artery disease status post stents, COPD, history 

of skin cancer status post resection, urinary retention with chronic Jama 

catheter, prostate cancer that is being monitored and possible chronic heart 

failure with unknown ejection fraction who presents to the ED with palpitations 

and chest discomfort.  Patient states that he was sitting on his couch and when 

he got up his heart was racing and he also had some chest comfort which he 

described it as "funny feeling".  Patient states that it felt like heartburn.  

He states he was left-sided but nonradiating.  He states that the discomfort was

similar to his previous heart attack.  In the ED patient had a nitro patch 

placed on his chest which patient states is helping with his chest pain.  

Patient currently denying any palpitations.  EKG shows no acute ischemic 

changes.  First 2 troponins were negative.





Review of Systems





10 ROS reviewed and are negative except as noted in HPI





Past Medical History


Past Medical History: Asthma, Hypertension, Myocardial Infarction (MI), 

Pneumonia, Prostate Disorder


Additional Past Medical History / Comment(s): allergies


Last Myocardial Infarction Date:: 2021


History of Any Multi-Drug Resistant Organisms: None Reported


Past Surgical History: Appendectomy, Back Surgery, Heart Catheterization With 

Stent, Tonsillectomy


Additional Past Surgical History / Comment(s): cervical


Past Anesthesia/Blood Transfusion Reactions: No Reported Reaction


Additional Past Anesthesia/Blood Transfusion Reaction / Comment(s): Pt is 

clausterphobic.


Past Psychological History: No Psychological Hx Reported


Smoking Status: Former smoker


Past Alcohol Use History: None Reported


Past Drug Use History: None Reported





- Past Family History


  ** Mother


Family Medical History: No Reported History


Additional Family Medical History / Comment(s): Mother was healthy and lived to 

be 92 yrs old.





  ** Father


History Unknown: Yes


Additional Family Medical History / Comment(s): Father  at the age of 68yrs,

pt unable to say from what.





Medications and Allergies


                                Home Medications











 Medication  Instructions  Recorded  Confirmed  Type


 


Albuterol Inhaler [Ventolin Hfa 2 puff INHALATION RT-Q6H PRN 21 

History





Inhaler]    


 


Albuterol Nebulized [Ventolin 2.5 mg INHALATION RT-Q4H PRN 21 

History





Nebulized]    


 


Ammonium Lactate Lotion 1 applic TOPICAL DAILY PRN 21 History





[Lac-Hydrin 12% Lotion]    


 


Cetirizine HCl [Zyrtec] 10 mg PO DAILY PRN 21 History


 


Finasteride [Proscar] 5 mg PO HS 21 History


 


Fluticasone Nasal Spray [Flonase 1 spr EA NOSTRIL DAILY 21 

History





Nasal Spray]    


 


Fluticasone Propion/Salmeterol 1 puff INHALATION RT-BID 21 

History





[Advair 250-50 Diskus]    


 


Lidocaine 5% Patch [Lidoderm 5% 1 patch TRANSDERM DAILY PRN 21 

History





Patch]    


 


Polyvinyl Alcohol/Povidone 1 drop BOTH EYES QID PRN 21 History





[[place] Eye Drop]    


 


Diclofenac Sodium Gel [Voltaren 1 applic TOPICAL BID PRN 21 

History





Gel]    


 


Aspirin 81 mg PO DAILY #30 tab 22 Rx


 


Furosemide [Lasix] 20 mg PO DAILY #30 tab 22 Rx


 


Nitroglycerin Sl Tabs [Nitrostat] 0.4 mg SUBLINGUAL Q5M PRN #30 tab 22 Rx


 


Tamsulosin [Flomax] 0.4 mg PO DAILY #30 capsule 22 Rx


 


Acetaminophen Tab [Tylenol Tab] 1,000 mg PO TID PRN 22 History


 


Atorvastatin [Lipitor] 80 mg PO DAILY 22 History


 


Clopidogrel [Plavix] 75 mg PO DAILY 22 History


 


Folic Acid 1 mg PO DAILY 22 History


 


Ketoconazole 2% Shampoo [Nizoral] 1 applic TOPICAL DAILY 22 

History


 


Metoprolol Succinate (ER) [Toprol 25 mg PO BID 22 History





XL]    


 


Multivitamins, Thera [Multivitamin 1 tab PO DAILY 22 History





(formulary)]    


 


Thiamine [Vitamin B-1] 100 mg PO DAILY 22 History


 


lisinopriL [Zestril] 5 mg PO DAILY 22 History








                                    Allergies











Allergy/AdvReac Type Severity Reaction Status Date / Time


 


Influenza Virus Vaccines Allergy  Unknown Verified 22 12:14














Physical Exam


Osteopathic Statement: *.  No significant issues noted on an osteopathic 

structural exam other than those noted in the History and Physical/Consult.


Vitals: 


                                   Vital Signs











  Temp Pulse Resp BP Pulse Ox


 


 22 12:30   58 L  22  146/74  96


 


 22 12:00   60  24  145/72  96


 


 22 11:30   51 L  22  145/72  96


 


 22 11:00   53 L  25 H  157/77  96


 


 22 10:40   59 L  20  157/77  96


 


 22 10:22   60  22  151/73  97


 


 22 09:39  97.3 F L  52 L  18  142/74  96








                                Intake and Output











 22





 06:59 14:59 22:59


 


Other:   


 


  Weight  54.431 kg 














General: [Alert and oriented, well nourished, no acute distress, appears 

chronically debilitated].


Eye: [PERRL, EOMI, normal conjunctiva].


HENT: [Normocephalic, clear tympanic membranes, normal hearing, moist oral 

mucosa, no scleral icterus, no sinus tenderness].


Neck: [Supple, non-tender, no carotid bruits, no JVD, no lymphadenopathy].


Lungs: [Clear to auscultation and percussion, non-labored respiration].


Heart: [Normal rate, regular rhythm, no murmur, gallop or edema].


Abdomen: [Soft, non-tender, non-distended, normal bowel sounds, no masses].


Musculoskeletal: [Normal range of motion and strength, no tenderness or 

swelling].


Skin: [Skin is warm, dry and pink, no rashes or lesions].


Neurologic: [Awake, alert, and oriented X3, CN II-XII intact].


Psychiatric: [Cooperative, appropriate mood and affect].





Results


CBC & Chem 7: 


                                 09/26/22 10:07





                                 22 10:06


Labs: 


                  Abnormal Lab Results - Last 24 Hours (Table)











  22 Range/Units





  10:06 10:06 10:07 


 


RBC    6.29 H  (4.30-5.90)  m/uL


 


MCHC    30.6 L  (31.0-37.0)  g/dL


 


RDW    15.8 H  (11.5-15.5)  %


 


Eosinophils #    0.9 H  (0-0.7)  k/uL


 


D-Dimer  0.63 H    (<0.60)  mg/L FEU


 


Carbon Dioxide   32 H   (22-30)  mmol/L


 


Glucose   101 H   (74-99)  mg/dL


 


Total Bilirubin   1.6 H   (0.2-1.3)  mg/dL














Assessment and Plan


Assessment: 





Chest pain


Rule out ACS: Check troponins 3.  First 2 sets are negative


Patient known to our cardiology service so we will defer echocardiogram to 

cardiology


Patient currently has nitro patch


Cardiology consult


Nothing by mouth past midnight





Mildly elevated bilirubin


Patient asymptomatic


Trend CMP





Coronary artery disease status post stents


Resume aspirin and Plavix and statin beta blocker





Hypertension


Resume home BP meds





Hyperlipidemia


Resume statin





COPD


Stable


Resume home inhalers





Allergy rhinitis


Resume Flonase and cetirizine as needed





Possible CHF with unspecified ejection fraction


Euvolemic


Resume Lasix





Urinary retention with chronic Jama catheter


Patient states that his Jama catheter was changed 1 week ago


Resume Flomax











History of skin cancer status post resection


History of prostate cancer that is being monitored


-Stable





CODE STATUS:full code


DVT prophylaxis: Subcu heparin


Discussed with: Patient, ER, rn


Anticipated length of stay  < than 2 midnights


Anticipated discharge place: home


A total of 50 minutes was spent on the care of this complex patient more than 

50% of the time was spent in counseling and care coordination.

## 2022-09-26 NOTE — ED
General Adult HPI





- General


Chief complaint: Chest Pain


Stated complaint: Chest Pain, SOB


Time Seen by Provider: 22 09:43


Source: patient, RN notes reviewed


Mode of arrival: ambulatory


Limitations: no limitations





- History of Present Illness


Initial comments: 





Patient is a pleasant 77-year-old male presenting to the emergency department 

with concerns with chest discomfort.  Onset of symptoms was around 7:30 this 

morning while at rest.  Patient did have some palpitations however those have 

resolved.  Patient has continued discomfort which is mild.  Difficult to 

describe.  Patient has associated dyspnea.  No nausea or diaphoresis.





- Related Data


                                Home Medications











 Medication  Instructions  Recorded  Confirmed


 


Albuterol Inhaler [Ventolin Hfa 2 puff INHALATION RT-QID PRN 21





Inhaler]   


 


Albuterol Nebulized [Ventolin 2.5 mg INHALATION RT-QID PRN 21





Nebulized]   


 


Ammonium Lactate Lotion 1 applic TOPICAL DAILY PRN 21





[Lac-Hydrin 12% Lotion]   


 


Cetirizine HCl [Zyrtec] 10 mg PO DAILY PRN 21


 


Finasteride [Proscar] 5 mg PO HS 21


 


Fluticasone Nasal Spray [Flonase 2 spr EA NOSTRIL DAILY 21





Nasal Spray]   


 


Fluticasone Propion/Salmeterol 1 puff INHALATION RT-BID 21





[Advair 250-50 Diskus]   


 


Lidocaine 5% Patch [Lidoderm 5% 1 patch TRANSDERM DAILY 21





Patch]   


 


Polyvinyl Alcohol/Povidone 1 drop BOTH EYES TID 21





[[place] Eye Drop]   


 


Triamcinolone 0.1% Cream [Kenalog 1 applic TOPICAL BID PRN 21





0.1% Cream]   


 


Diclofenac Sodium Gel [Voltaren 2 - 4 gm TOPICAL BID 21





Gel]   








                                  Previous Rx's











 Medication  Instructions  Recorded


 


Folic Acid 1 mg PO DAILY@1200 #30 tab 21


 


Thiamine [Vitamin B-1] 100 mg PO DAILY@1200 #30 tab 12/23/21


 


Aspirin 81 mg PO DAILY #30 tab 22


 


Atorvastatin [Lipitor] 80 mg PO HS #30 tab 22


 


Furosemide [Lasix] 20 mg PO DAILY #30 tab 22


 


Metoprolol Succinate (ER) [Toprol 25 mg PO DAILY #30 22





XL]  


 


Nitroglycerin Sl Tabs [Nitrostat] 0.4 mg SUBLINGUAL Q5M PRN #30 tab 22


 


Tamsulosin [Flomax] 0.4 mg PO DAILY #30 capsule 22


 


Ticagrelor [Brilinta] 90 mg PO BID #60 tab 22


 


Azithromycin [Zithromax Z Pack] 1 tab PO AS DIRECTED #6 tab 22


 


predniSONE 50 mg PO DAILY #5 tab 22











                                    Allergies











Allergy/AdvReac Type Severity Reaction Status Date / Time


 


Influenza Virus Vaccines Allergy  Unknown Verified 22 09:41














Review of Systems


ROS Statement: 


Those systems with pertinent positive or pertinent negative responses have been 

documented in the HPI.





ROS Other: All systems not noted in ROS Statement are negative.


Constitutional: Denies: fever


Eyes: Denies: eye pain


ENT: Denies: ear pain


Respiratory: Reports: as per HPI, dyspnea.  Denies: cough


Cardiovascular: Reports: as per HPI, chest pain


Endocrine: Denies: fatigue


Gastrointestinal: Denies: abdominal pain


Genitourinary: Denies: dysuria


Musculoskeletal: Denies: back pain


Skin: Denies: rash


Neurological: Denies: weakness





Past Medical History


Past Medical History: Asthma, Hypertension, Myocardial Infarction (MI), 

Pneumonia, Prostate Disorder


Additional Past Medical History / Comment(s): allergies


Last Myocardial Infarction Date:: 2021


History of Any Multi-Drug Resistant Organisms: None Reported


Past Surgical History: Appendectomy, Back Surgery, Heart Catheterization With 

Stent, Tonsillectomy


Additional Past Surgical History / Comment(s): cervical


Past Anesthesia/Blood Transfusion Reactions: No Reported Reaction


Additional Past Anesthesia/Blood Transfusion Reaction / Comment(s): Pt is 

clausterphobic.


Past Psychological History: No Psychological Hx Reported


Smoking Status: Former smoker


Past Alcohol Use History: None Reported


Past Drug Use History: None Reported





- Past Family History


  ** Mother


Family Medical History: No Reported History


Additional Family Medical History / Comment(s): Mother was healthy and lived to 

be 92 yrs old.





  ** Father


History Unknown: Yes


Additional Family Medical History / Comment(s): Father  at the age of 68yrs,

pt unable to say from what.





General Exam


Limitations: no limitations


General appearance: alert, in no apparent distress


Eye exam: Present: normal appearance


Neck exam: Present: normal inspection


Respiratory exam: Present: normal lung sounds bilaterally.  Absent: chest wall 

tenderness


Cardiovascular Exam: Present: regular rate, normal rhythm, normal heart sounds


  ** Expanded


Peripheral pulses: 2+: Radial (R), Radial (L), Posterior Tibialis (R), Posterior

Tibialis (L)


GI/Abdominal exam: Present: soft.  Absent: tenderness


Extremities exam: Present: normal inspection.  Absent: pedal edema, calf 

tenderness


Neurological exam: Present: alert


Psychiatric exam: Present: normal affect, normal mood


Skin exam: Present: normal color





Course


                                   Vital Signs











  22





  09:39 10:22 10:40


 


Temperature 97.3 F L  


 


Pulse Rate 52 L 60 59 L


 


Respiratory 18 22 20





Rate   


 


Blood Pressure 142/74 151/73 157/77


 


O2 Sat by Pulse 96 97 96





Oximetry   














EKG Findings





- EKG Comments:


EKG Findings:: Sinus rhythm with a rate of 65.  .  QRS 99.  .  QTC 

49.  Normal axis.  Septal Q waves.  No acute ST change.





Medical Decision Making





- Medical Decision Making





Patient reevaluated and somewhat improved.  Patient updated on results and plan.

 Case discussed with Dr. Torrez, who will admit for this va patient.





- Lab Data


Result diagrams: 


                                 22 10:07





                                 22 10:06


                                   Lab Results











  22 Range/Units





  10:06 10:06 10:06 


 


WBC     (3.8-10.6)  k/uL


 


RBC     (4.30-5.90)  m/uL


 


Hgb     (13.0-17.5)  gm/dL


 


Hct     (39.0-53.0)  %


 


MCV     (80.0-100.0)  fL


 


MCH     (25.0-35.0)  pg


 


MCHC     (31.0-37.0)  g/dL


 


RDW     (11.5-15.5)  %


 


Plt Count     (150-450)  k/uL


 


MPV     


 


Neutrophils %     %


 


Lymphocytes %     %


 


Monocytes %     %


 


Eosinophils %     %


 


Basophils %     %


 


Neutrophils #     (1.3-7.7)  k/uL


 


Lymphocytes #     (1.0-4.8)  k/uL


 


Monocytes #     (0-1.0)  k/uL


 


Eosinophils #     (0-0.7)  k/uL


 


Basophils #     (0-0.2)  k/uL


 


Hypochromasia     


 


PT  11.7    (9.0-12.0)  sec


 


INR  1.1    (<1.2)  


 


APTT  24.2    (22.0-30.0)  sec


 


D-Dimer  0.63 H    (<0.60)  mg/L FEU


 


Sodium   142   (137-145)  mmol/L


 


Potassium   4.5   (3.5-5.1)  mmol/L


 


Chloride   100   ()  mmol/L


 


Carbon Dioxide   32 H   (22-30)  mmol/L


 


Anion Gap   10   mmol/L


 


BUN   14   (9-20)  mg/dL


 


Creatinine   0.73   (0.66-1.25)  mg/dL


 


Est GFR (CKD-EPI)AfAm   >90   (>60 ml/min/1.73 sqM)  


 


Est GFR (CKD-EPI)NonAf   89   (>60 ml/min/1.73 sqM)  


 


Glucose   101 H   (74-99)  mg/dL


 


Calcium   9.6   (8.4-10.2)  mg/dL


 


Magnesium   2.0   (1.6-2.3)  mg/dL


 


Total Bilirubin   1.6 H   (0.2-1.3)  mg/dL


 


AST   37   (17-59)  U/L


 


ALT   24   (4-49)  U/L


 


Alkaline Phosphatase   90   ()  U/L


 


Troponin I    <0.012  (0.000-0.034)  ng/mL


 


NT-Pro-B Natriuret Pep     pg/mL


 


Total Protein   7.0   (6.3-8.2)  g/dL


 


Albumin   4.3   (3.5-5.0)  g/dL














  22 Range/Units





  10:06 10:07 


 


WBC   9.7  (3.8-10.6)  k/uL


 


RBC   6.29 H  (4.30-5.90)  m/uL


 


Hgb   16.1  (13.0-17.5)  gm/dL


 


Hct   52.8  (39.0-53.0)  %


 


MCV   84.0  (80.0-100.0)  fL


 


MCH   25.7  (25.0-35.0)  pg


 


MCHC   30.6 L  (31.0-37.0)  g/dL


 


RDW   15.8 H  (11.5-15.5)  %


 


Plt Count   278  (150-450)  k/uL


 


MPV   8.8  


 


Neutrophils %   60  %


 


Lymphocytes %   20  %


 


Monocytes %   6  %


 


Eosinophils %   9  %


 


Basophils %   2  %


 


Neutrophils #   5.8  (1.3-7.7)  k/uL


 


Lymphocytes #   2.0  (1.0-4.8)  k/uL


 


Monocytes #   0.6  (0-1.0)  k/uL


 


Eosinophils #   0.9 H  (0-0.7)  k/uL


 


Basophils #   0.2  (0-0.2)  k/uL


 


Hypochromasia   Slight  


 


PT    (9.0-12.0)  sec


 


INR    (<1.2)  


 


APTT    (22.0-30.0)  sec


 


D-Dimer    (<0.60)  mg/L FEU


 


Sodium    (137-145)  mmol/L


 


Potassium    (3.5-5.1)  mmol/L


 


Chloride    ()  mmol/L


 


Carbon Dioxide    (22-30)  mmol/L


 


Anion Gap    mmol/L


 


BUN    (9-20)  mg/dL


 


Creatinine    (0.66-1.25)  mg/dL


 


Est GFR (CKD-EPI)AfAm    (>60 ml/min/1.73 sqM)  


 


Est GFR (CKD-EPI)NonAf    (>60 ml/min/1.73 sqM)  


 


Glucose    (74-99)  mg/dL


 


Calcium    (8.4-10.2)  mg/dL


 


Magnesium    (1.6-2.3)  mg/dL


 


Total Bilirubin    (0.2-1.3)  mg/dL


 


AST    (17-59)  U/L


 


ALT    (4-49)  U/L


 


Alkaline Phosphatase    ()  U/L


 


Troponin I    (0.000-0.034)  ng/mL


 


NT-Pro-B Natriuret Pep  951   pg/mL


 


Total Protein    (6.3-8.2)  g/dL


 


Albumin    (3.5-5.0)  g/dL














- Radiology Data


Radiology results: image reviewed (Chest x-ray chronic changes.)





Disposition


Clinical Impression: 


 Chest pain





Disposition: ADMITTED AS IP TO THIS HOSP


Is patient prescribed a controlled substance at d/c from ED?: No


Referrals: 


[facility] [Primary Care Provider] - 1-2 days


Time of Disposition: 11:47

## 2022-09-27 VITALS
HEART RATE: 77 BPM | DIASTOLIC BLOOD PRESSURE: 56 MMHG | RESPIRATION RATE: 18 BRPM | SYSTOLIC BLOOD PRESSURE: 112 MMHG | TEMPERATURE: 97.9 F

## 2022-09-27 LAB
ALBUMIN SERPL-MCNC: 3.8 G/DL (ref 3.8–4.9)
ALBUMIN/GLOB SERPL: 1.58 G/DL (ref 1.6–3.17)
ALP SERPL-CCNC: 84 U/L (ref 41–126)
ALT SERPL-CCNC: 24 U/L (ref 10–49)
ANION GAP SERPL CALC-SCNC: 10.1 MMOL/L (ref 10–18)
AST SERPL-CCNC: 30 U/L (ref 14–35)
BASOPHILS # BLD AUTO: 0.15 X 10*3/UL (ref 0–0.1)
BASOPHILS NFR BLD AUTO: 1.7 %
BUN SERPL-SCNC: 10.6 MG/DL (ref 9–27)
BUN/CREAT SERPL: 13.25 RATIO (ref 12–20)
CALCIUM SPEC-MCNC: 9.3 MG/DL (ref 8.7–10.3)
CHLORIDE SERPL-SCNC: 103 MMOL/L (ref 96–109)
CHOLEST SERPL-MCNC: 92 MG/DL (ref 0–200)
CO2 SERPL-SCNC: 29.9 MMOL/L (ref 20–27.5)
EOSINOPHIL # BLD AUTO: 0.84 X 10*3/UL (ref 0.04–0.35)
EOSINOPHIL NFR BLD AUTO: 9.4 %
ERYTHROCYTE [DISTWIDTH] IN BLOOD BY AUTOMATED COUNT: 6 X 10*6/UL (ref 4.4–5.6)
ERYTHROCYTE [DISTWIDTH] IN BLOOD: 17.2 % (ref 11.5–14.5)
GLOBULIN SER CALC-MCNC: 2.4 G/DL (ref 1.6–3.3)
GLUCOSE SERPL-MCNC: 88 MG/DL (ref 70–110)
HCT VFR BLD AUTO: 49.7 % (ref 39.6–50)
HDLC SERPL-MCNC: 42.5 MG/DL (ref 40–60)
HGB BLD-MCNC: 15.4 G/DL (ref 13–17)
IMM GRANULOCYTES BLD QL AUTO: 0.2 %
LDLC SERPL CALC-MCNC: 36.3 MG/DL (ref 0–131)
LYMPHOCYTES # SPEC AUTO: 1.89 X 10*3/UL (ref 0.9–5)
LYMPHOCYTES NFR SPEC AUTO: 21.1 %
MCH RBC QN AUTO: 25.7 PG (ref 27–32)
MCHC RBC AUTO-ENTMCNC: 31 G/DL (ref 32–37)
MCV RBC AUTO: 82.8 FL (ref 80–97)
MONOCYTES # BLD AUTO: 0.86 X 10*3/UL (ref 0.2–1)
MONOCYTES NFR BLD AUTO: 9.6 %
NEUTROPHILS # BLD AUTO: 5.19 X 10*3/UL (ref 1.8–7.7)
NEUTROPHILS NFR BLD AUTO: 58 %
NRBC BLD AUTO-RTO: 0 /100 WBCS (ref 0–0)
PLATELET # BLD AUTO: 258 X 10*3/UL (ref 140–440)
POTASSIUM SERPL-SCNC: 4.5 MMOL/L (ref 3.5–5.5)
PROT SERPL-MCNC: 6.2 G/DL (ref 6.2–8.2)
SODIUM SERPL-SCNC: 143 MMOL/L (ref 135–145)
TRIGL SERPL-MCNC: 64.2 MG/DL (ref 0–149)
VLDLC SERPL CALC-MCNC: 12.84 MG/DL (ref 5–40)
WBC # BLD AUTO: 8.95 X 10*3/UL (ref 4.5–10)

## 2022-09-27 RX ADMIN — METOPROLOL SUCCINATE SCH MG: 25 TABLET, EXTENDED RELEASE ORAL at 09:11

## 2022-09-27 RX ADMIN — SODIUM CHLORIDE, PRESERVATIVE FREE SCH ML: 5 INJECTION INTRAVENOUS at 09:15

## 2022-09-27 RX ADMIN — NITROGLYCERIN SCH: 20 OINTMENT TOPICAL at 01:24

## 2022-09-27 RX ADMIN — NITROGLYCERIN SCH: 20 OINTMENT TOPICAL at 05:48

## 2022-09-27 RX ADMIN — BUDESONIDE AND FORMOTEROL FUMARATE DIHYDRATE SCH PUFF: 80; 4.5 AEROSOL RESPIRATORY (INHALATION) at 08:07

## 2022-09-27 RX ADMIN — HEPARIN SODIUM SCH UNIT: 5000 INJECTION INTRAVENOUS; SUBCUTANEOUS at 09:14

## 2022-09-27 NOTE — P.CRDCN
History of Present Illness


History of present illness: 





HISTORY OF PRESENT ILLNESS:  





This is a 77-year-old male with a past medical history significant for coronary 

artery disease with previous stenting, ischemic cardiomyopathy, hypertension, 

and hyperlipidemia. Patient follows in the office with Dr. Lin. We have been 

asked to see the patient in consultation for chest pain. Patient examined at the

bedside. Patient states yesterday he was sitting in his chair when he began to 

have some very mild chest pressure. He also reports his heart felt like it was 

racing. He states that he usually takes Afrin nasal spray but has been out of it

for the past few days and feels very congested in his sinuses and thinks it is c

ontributing to his symptoms.  He currently denies chest pain or pressure.  

Denies shortness of breath.  Vital signs are stable.





* EKG reveals sinus mechanism with no signs of acute ischemia.  Anterior Q 

  waves.


* Chest xray chronic changes.  No acute process seen.


* Laboratory data: WBC 8.95.  Hemoglobin 15.4.  Platelet count 258.  Sodium 143.

   Potassium 4.5.  BUN 10.  Creatinine 0.8.  Troponin negative 3.  ProBNP 951.


* Current home cardiac medications include Lipitor 80 mg daily, lisinopril 5 mg 

  daily, Plavix 75 mg daily, metoprolol succinate 25 mg twice a day, Lasix 20 mg

  daily, aspirin 81 mg daily.


* Most recent echocardiogram obtained in the office in 2022 revealed 

  ejection fraction 47%, mild TR, mild MR.  Previous echocardiogram performed in

  2021 revealed ejection fraction of 32%.


* Cardiac catheterization history: 2021 with stenting of the proximal 

  LAD, stenting of the mid LAD, stenting of the distal LAD.  Patient was also 

  found to have severe disease involving the PLV branch and PDA branch of the 

  right coronary artery.  Elevated left-sided filling pressures.





REVIEW OF SYSTEMS: 


At the time of my exam:


CONSTITUTIONAL: Denies fever or chills.


HEENT: Denies blurred vision, vision changes, or eye pain. Denies hemoptysis 


CARDIOVASCULAR: Denies chest pain. Denies orthopnea. Denies PND. Denies 

palpitations


RESPIRATORY: Denies shortness of breath. 


GASTROINTESTINAL: Denies abdominal pain. Denies nausea or vomiting. 


HEMATOLOGIC: Denies bleeding disorders.


GENITOURINARY:  Denies any blood in urine.


SKIN: Denies pruitis. Denies rash.





PHYSICAL EXAM: 


VITAL SIGNS: Reviewed.


GENERAL: Well-developed in no acute distress. 


HEENT: Head is normocephalic. Pupils are equal, round. Sclerae anicteric. Mucous

membranes of the mouth are moist. Neck supple. No JVD or thyromegaly


LUNGS: Respirations even and unlabored. Lungs essentially clear to auscultation 

bilaterally.


HEART: Regular rate and rhythm.  S1 and S2 heard.  Systolic murmur noted.


ABDOMEN: Soft. Nondistended. Nontender.


EXTREMITIES: Normal range of motion.  No clubbing or cyanosis.  Peripheral 

pulses intact.  No lower extremity edema


NEUROLOGIC: Awake and alert. Oriented x 3. 





ASSESSMENT: 


Chest pain, troponins negative 3


History of anterior STEMI with stenting, 2021


Coronary artery disease


History of ischemic cardiomyopathy with improving LV function, most recently 

47%, previously 32%


Hypertension


Hyperlipidemia


Nasal congestion





PLAN: 


An acute coronary event has been ruled out


Resume home cardiac medications


Obtain 2-D echo to assess cardiac structure and function


Add Imdur 30 mg daily


Further recommendations pending patient's course








Nurse practitioner note has been reviewed by physician. Signing provider agrees 

with the documented findings, assessment, and plan of care. 








Past Medical History


Past Medical History: Asthma, Hypertension, Myocardial Infarction (MI), 

Pneumonia, Prostate Disorder


Additional Past Medical History / Comment(s): allergies


Last Myocardial Infarction Date:: 2021


History of Any Multi-Drug Resistant Organisms: None Reported


Past Surgical History: Appendectomy, Back Surgery, Heart Catheterization With 

Stent, Tonsillectomy


Additional Past Surgical History / Comment(s): cervical


Past Anesthesia/Blood Transfusion Reactions: No Reported Reaction


Additional Past Anesthesia/Blood Transfusion Reaction / Comment(s): Pt is 

clausterphobic.


Date of Last Stent Placement:: 21


Past Psychological History: No Psychological Hx Reported


Smoking Status: Former smoker


Past Alcohol Use History: None Reported


Past Drug Use History: None Reported





- Past Family History


  ** Mother


Family Medical History: No Reported History


Additional Family Medical History / Comment(s): Mother was healthy and lived to 

be 92 yrs old.





  ** Father


History Unknown: Yes


Additional Family Medical History / Comment(s): Father  at the age of 68yrs,

pt unable to say from what.





Medications and Allergies


                                Home Medications











 Medication  Instructions  Recorded  Confirmed  Type


 


Albuterol Inhaler [Ventolin Hfa 2 puff INHALATION RT-Q6H PRN 21 

History





Inhaler]    


 


Albuterol Nebulized [Ventolin 2.5 mg INHALATION RT-Q4H PRN 21 

History





Nebulized]    


 


Ammonium Lactate Lotion 1 applic TOPICAL DAILY PRN 21 History





[Lac-Hydrin 12% Lotion]    


 


Cetirizine HCl [Zyrtec] 10 mg PO DAILY PRN 21 History


 


Finasteride [Proscar] 5 mg PO HS 21 History


 


Fluticasone Nasal Spray [Flonase 1 spr EA NOSTRIL DAILY 21 

History





Nasal Spray]    


 


Fluticasone Propion/Salmeterol 1 puff INHALATION RT-BID 21 

History





[Advair 250-50 Diskus]    


 


Lidocaine 5% Patch [Lidoderm 5% 1 patch TRANSDERM DAILY PRN 21 

History





Patch]    


 


Polyvinyl Alcohol/Povidone 1 drop BOTH EYES QID PRN 21 History





[[place] Eye Drop]    


 


Diclofenac Sodium Gel [Voltaren 1 applic TOPICAL BID PRN 21 

History





Gel]    


 


Aspirin 81 mg PO DAILY #30 tab 22 Rx


 


Furosemide [Lasix] 20 mg PO DAILY #30 tab 22 Rx


 


Nitroglycerin Sl Tabs [Nitrostat] 0.4 mg SUBLINGUAL Q5M PRN #30 tab 22 Rx


 


Tamsulosin [Flomax] 0.4 mg PO DAILY #30 capsule 22 Rx


 


Acetaminophen Tab [Tylenol Tab] 1,000 mg PO TID PRN 22 History


 


Atorvastatin [Lipitor] 80 mg PO DAILY 22 History


 


Clopidogrel [Plavix] 75 mg PO DAILY 22 History


 


Folic Acid 1 mg PO DAILY 22 History


 


Ketoconazole 2% Shampoo [Nizoral] 1 applic TOPICAL DAILY 22 

History


 


Metoprolol Succinate (ER) [Toprol 25 mg PO BID 22 History





XL]    


 


Multivitamins, Thera [Multivitamin 1 tab PO DAILY 22 History





(formulary)]    


 


Thiamine [Vitamin B-1] 100 mg PO DAILY 22 History


 


lisinopriL [Zestril] 5 mg PO DAILY 22 History








                                    Allergies











Allergy/AdvReac Type Severity Reaction Status Date / Time


 


Influenza Virus Vaccines Allergy  Unknown Verified 22 12:14














Physical Exam


Vitals: 


                                   Vital Signs











  Temp Pulse Pulse Resp BP BP Pulse Ox


 


 22 07:00  97.7 F   70  16   156/78  93 L


 


 22 03:15  97.7 F   74  16   118/64  93 L


 


 22 01:28    68  18   


 


 22 20:00    68  18   


 


 22 18:58  97.6 F   68  18   108/62  98


 


 22 16:42    57 L  16   115/65  97


 


 22 12:30   58 L   22  146/74   96


 


 22 12:00   60   24  145/72   96


 


 22 11:30   51 L   22  145/72   96


 


 22 11:00   53 L   25 H  157/77   96


 


 22 10:40   59 L   20  157/77   96


 


 22 10:22   60   22  151/73   97


 


 22 09:39  97.3 F L  52 L   18  142/74   96








                                Intake and Output











 22





 22:59 06:59 14:59


 


Intake Total 500  


 


Output Total 300  


 


Balance 200  


 


Intake:   


 


  Oral 500  


 


Output:   


 


  Urine 300  


 


    Uretheral (Jama) 300  


 


Other:   


 


  Voiding Method Indwelling Catheter Indwelling Catheter 


 


  # Voids  2 














Results





                                 22 05:32





                                 22 05:32


                                 Cardiac Enzymes











  22 Range/Units





  10:06 10:06 12:57 


 


AST  37    (17-59)  U/L


 


Troponin I   <0.012  <0.012  (0.000-0.034)  ng/mL














  22 Range/Units





  16:45 


 


AST   (17-59)  U/L


 


Troponin I  <0.012  (0.000-0.034)  ng/mL








                                   Coagulation











  22 Range/Units





  10:06 


 


PT  11.7  (9.0-12.0)  sec


 


APTT  24.2  (22.0-30.0)  sec








                                       CBC











  22 Range/Units





  10:07 


 


WBC  9.7  (3.8-10.6)  k/uL


 


RBC  6.29 H  (4.30-5.90)  m/uL


 


Hgb  16.1  (13.0-17.5)  gm/dL


 


Hct  52.8  (39.0-53.0)  %


 


Plt Count  278  (150-450)  k/uL








                          Comprehensive Metabolic Panel











  22 Range/Units





  10:06 


 


Sodium  142  (137-145)  mmol/L


 


Potassium  4.5  (3.5-5.1)  mmol/L


 


Chloride  100  ()  mmol/L


 


Carbon Dioxide  32 H  (22-30)  mmol/L


 


BUN  14  (9-20)  mg/dL


 


Creatinine  0.73  (0.66-1.25)  mg/dL


 


Glucose  101 H  (74-99)  mg/dL


 


Calcium  9.6  (8.4-10.2)  mg/dL


 


AST  37  (17-59)  U/L


 


ALT  24  (4-49)  U/L


 


Alkaline Phosphatase  90  ()  U/L


 


Total Protein  7.0  (6.3-8.2)  g/dL


 


Albumin  4.3  (3.5-5.0)  g/dL








                               Current Medications











Generic Name Dose Route Start Last Admin





  Trade Name Freq  PRN Reason Stop Dose Admin


 


Acetaminophen  1,000 mg  22 16:06 





  Acetaminophen Tab 500 Mg Tab  PO  





  TID PRN  





  Pain or Fever > 100.5  


 


Albuterol Sulfate  2 puff  22 16:06 





  Albuterol Hfa Inhaler  INHALATION  





  RT-Q6H PRN  





  Shortness Of Breath  


 


Albuterol Sulfate  2.5 mg  22 16:06 





  Albuterol Nebulized 2.5 Mg/3 Ml  INHALATION  





  RT-Q4H PRN  





  Shortness Of Breath  


 


Aspirin  325 mg  22 09:00 





  Aspirin 325 Mg Tab  PO  





  DAILY [facility]  


 


Atorvastatin Calcium  80 mg  22 09:00 





  Atorvastatin 80 Mg Tab  PO  





  DAILY [facility]  


 


Budesonide/Formoterol Fumarate  2 puff  22 20:00  22 08:07





  Symbicort 80-4.5 Mcg Inhaler  INHALATION   2 puff





  RT-BID BRAXTON   Administration


 


Clopidogrel Bisulfate  75 mg  22 09:00 





  Clopidogrel 75 Mg Tab  PO  





  DAILY [facility]  


 


Finasteride  5 mg  22 21:00  22 21:12





  Finasteride 5 Mg Tab  PO   5 mg





  HS BRAXTON   Administration


 


Fluticasone Propionate  1 spray  22 09:00 





  Fluticasone 50mcg/Spray Nasal 16gm  EA NOSTRIL  





  DAILY [facility]  


 


Folic Acid  1 mg  22 09:00 





  Folic Acid 1 Mg Tab  PO  





  DAILY [facility]  


 


Furosemide  20 mg  22 09:00 





  Furosemide 20 Mg Tab  PO  





  DAILY [facility]  


 


Heparin Sodium (Porcine)  5,000 unit  22 21:00  22 21:12





  Heparin Sodium,Porcine/Pf 5,000 Unit/0.5 Ml Syringe  SQ   5,000 unit





  Q12HR [facility]   Administration


 


Lisinopril  5 mg  22 09:00 





  Lisinopril 5 Mg Tab  PO  





  DAILY [facility]  


 


Loratadine  10 mg  22 16:06 





  Loratadine 10 Mg Tab  PO  





  DAILY PRN  





  Allergy Symptoms  


 


Metoprolol Succinate  25 mg  22 21:00  22 21:12





  Metoprolol Succinate (Er) 25 Mg Tab.Er.24h  PO   25 mg





  BID BRAXTON   Administration


 


Multivitamins  1 each  22 09:00 





  Multivitamins, Thera 1 Each Tab  PO  





  DAILY [facility]  


 


Nitroglycerin  1 inch  22 18:00  22 05:48





  Nitroglycerin Oint 1 Inch/Gm Packet  TOPICAL   Not Given





  Q6HR [facility]  


 


Sodium Chloride  10 ml  22 21:00  22 21:13





  Sodium Chloride 0.9% Flush 10 Ml Syringe  IV   10 ml





  BID [facility]   Administration


 


Tamsulosin HCl  0.4 mg  22 09:00 





  Tamsulosin 0.4 Mg Cap.Er.24h  PO  





  DAILY [facility]  


 


Thiamine HCl  100 mg  22 09:00 





  Thiamine 100 Mg Tab  PO  





  DAILY [facility]  








                                Intake and Output











 22





 22:59 06:59 14:59


 


Intake Total 500  


 


Output Total 300  


 


Balance 200  


 


Intake:   


 


  Oral 500  


 


Output:   


 


  Urine 300  


 


    Uretheral (Jama) 300  


 


Other:   


 


  Voiding Method Indwelling Catheter Indwelling Catheter 


 


  # Voids  2 








                                        





                                 22 10:07 





                                 22 10:06

## 2022-09-27 NOTE — P.DS
Providers


Date of admission: 


09/26/22 11:47





Expected date of discharge: 09/27/22


Attending physician: 


Josse Torrez MD





Consults: 





                                        





09/26/22 11:47


Consult Physician Urgent 


   Consulting Provider: Farhat Pelaez


   Consult Reason/Comments: cp


   Do you want consulting provider notified?: Yes











Primary care physician: 


[facility]





Hospital Course: 





Discharge Diagnosis:





Chest pain, acute coronary event ruled out





Mildly elevated bilirubin, resolved





History of coronary artery disease status post stents





Hypertension,





Hyperlipidemia





COPD





ALLERGIC rhinitis





Chronic Jama catheter, patient reports changes out weekly and denied any 

complaints.





Hospital Course: 





Patient is a very pleasant 77-year-old male with a past medical history of CAD 

with previous stent placement, hypertension, hyperlipidemia,prostate cancer with

urinary retention and chronic Jama catheter placement, and asthma/COPD. he 

presented to the emergency department with a chief complaint of chest pain and 

palpitations. he was seen and fully evaluated.EKG was completed showing sinus 

rhythm at 65 bpm  with no significant T-wave or ST abnormalities showing no 

signs of acute ischemia. chest x-ray revealing chronic changes with prior 

granulomatous disease with no acute cardiopulmonary process. CBC and CMP showing

no acute abnormality with the exception of mildly elevated bilirubin of 1.6.  

Troponin was negative at less than 0.012.  Patient was admitted under services 

of consultation cardiology.  Troponins trended overnight all negative at less 

than 0.0123 draws.  Patient reports being free from chest pain and/or 

palpitations since arrival to our facility.  Patient states he feels that it was

mild case of heartburn.  Patient did undergo evaluation by cardiology and an 

echocardiogram was completed. cardiology recommending patient follow-up 

outpatient in their office as he is cleared from cardiac perspective at this 

time.  Echocardiogram results are not posted yet and patient to follow-up 

outpatient in their office to review these results.patient is medically stable 

for discharge at this time and remains free from any chest pain or complaints.





Patient seen and examined at bedside.





Vital signs reviewed and stable. 


General: Nontoxic, no distress and appears stated age.  


Derm: Skin warm and dry, normal coloration for ethnicity.


Head: Atraumatic, normocephalic and symmetric.  


Eyes: EOMs intact, no lid lag, and anicteric sclera


Mouth: no lip lesions, mucus membranes moist


Cardiovascular: regular rate and rhythm with normal S1S2, no murmur, positive 

posterior tibial pulses bilaterally, and cap refill < 2 seconds.  


Lungs: Respirations even, regular, and unlabored on room air. Lungs CTA 

bilaterally, no rhonchi, no rales, no wheezing, and no accessory muscle usage. 


Abdominal: soft, nontender to palpation, no guarding, no appreciable 

organomegaly


Ext: ROM intact. No gross muscle atrophy, no edema, no contractures


Neuro: Speech clear, face symmetrical and CN II-XII grossly intact with no noted

focal neuro deficits


Psych: Alert and oriented to person, place, time, and situation. Appropriate and

pleasant affect. 








A total of 35 minutes of time were spent preparing this complex discharge 

summary.





Pt was discharged on 9/27/22. 2:43 PM.








I reviewed the documentation as provided by the BERNICE above, who is the original 

author of this note.  I agree with the documented assessment and plan, with the 

following changes: none


Patient Condition at Discharge: Stable





Plan - Discharge Summary


New Discharge Prescriptions: 


New


   Isosorbide Mononitrate ER [Imdur] 30 mg PO DAILY 30 Days #30 tab





Continue


   Albuterol Nebulized [Ventolin Nebulized] 2.5 mg INHALATION RT-Q4H PRN


     PRN Reason: Shortness Of Breath


   Finasteride [Proscar] 5 mg PO HS


   Lidocaine 5% Patch [Lidoderm 5% Patch] 1 patch TRANSDERM DAILY PRN


     PRN Reason: Pain


   Polyvinyl Alcohol/Povidone [[place] Eye Drop] 1 drop BOTH EYES QID PRN


     PRN Reason: DRY EYES


   Fluticasone Propion/Salmeterol [Advair 250-50 Diskus] 1 puff INHALATION RT-

BID


   Diclofenac Sodium Gel [Voltaren Gel] 1 applic TOPICAL BID PRN


     PRN Reason: Pain


   Aspirin 81 mg PO DAILY #30 tab


   Nitroglycerin Sl Tabs [Nitrostat] 0.4 mg SUBLINGUAL Q5M PRN #30 tab


     PRN Reason: Chest Pain


   Tamsulosin [Flomax] 0.4 mg PO DAILY #30 capsule


   Ketoconazole 2% Shampoo [Nizoral] 1 applic TOPICAL DAILY


   Folic Acid 1 mg PO DAILY


   Thiamine [Vitamin B-1] 100 mg PO DAILY


   Albuterol Inhaler [Ventolin Hfa Inhaler] 2 puff INHALATION RT-Q6H PRN


     PRN Reason: Shortness Of Breath


   Cetirizine HCl [Zyrtec] 10 mg PO DAILY PRN


     PRN Reason: Allergy Symptoms


   Ammonium Lactate Lotion [Lac-Hydrin 12% Lotion] 1 applic TOPICAL DAILY PRN


     PRN Reason: DRY FEET


   Fluticasone Nasal Spray [Flonase Nasal Spray] 1 spr EA NOSTRIL DAILY


   Furosemide [Lasix] 20 mg PO DAILY #30 tab


   lisinopriL [Zestril] 5 mg PO DAILY


   Acetaminophen Tab [Tylenol] 1,000 mg PO TID PRN


     PRN Reason: Pain Or Fever > 100.5


   Clopidogrel [Plavix] 75 mg PO DAILY


   Atorvastatin [Lipitor] 80 mg PO DAILY


   Multivitamins, Thera [Multivitamin (formulary)] 1 tab PO DAILY


   Metoprolol Succinate (ER) [Toprol XL] 25 mg PO BID


Discharge Medication List





Albuterol Inhaler [Ventolin Hfa Inhaler] 2 puff INHALATION RT-Q6H PRN 12/16/21 

[History]


Albuterol Nebulized [Ventolin Nebulized] 2.5 mg INHALATION RT-Q4H PRN 12/16/21 

[History]


Ammonium Lactate Lotion [Lac-Hydrin 12% Lotion] 1 applic TOPICAL DAILY PRN 

12/16/21 [History]


Cetirizine HCl [Zyrtec] 10 mg PO DAILY PRN 12/16/21 [History]


Finasteride [Proscar] 5 mg PO HS 12/16/21 [History]


Fluticasone Nasal Spray [Flonase Nasal Spray] 1 spr EA NOSTRIL DAILY 12/16/21 

[History]


Fluticasone Propion/Salmeterol [Advair 250-50 Diskus] 1 puff INHALATION RT-BID 

12/16/21 [History]


Lidocaine 5% Patch [Lidoderm 5% Patch] 1 patch TRANSDERM DAILY PRN 12/16/21 

[History]


Polyvinyl Alcohol/Povidone [[place] Eye Drop] 1 drop BOTH EYES QID PRN 

12/16/21 [History]


Diclofenac Sodium Gel [Voltaren Gel] 1 applic TOPICAL BID PRN 12/30/21 [History]


Aspirin 81 mg PO DAILY #30 tab 01/02/22 [Rx]


Furosemide [Lasix] 20 mg PO DAILY #30 tab 01/02/22 [Rx]


Nitroglycerin Sl Tabs [Nitrostat] 0.4 mg SUBLINGUAL Q5M PRN #30 tab 01/02/22 

[Rx]


Tamsulosin [Flomax] 0.4 mg PO DAILY #30 capsule 01/02/22 [Rx]


Acetaminophen Tab [Tylenol] 1,000 mg PO TID PRN 09/26/22 [History]


Atorvastatin [Lipitor] 80 mg PO DAILY 09/26/22 [History]


Clopidogrel [Plavix] 75 mg PO DAILY 09/26/22 [History]


Folic Acid 1 mg PO DAILY 09/26/22 [History]


Ketoconazole 2% Shampoo [Nizoral] 1 applic TOPICAL DAILY 09/26/22 [History]


Metoprolol Succinate (ER) [Toprol XL] 25 mg PO BID 09/26/22 [History]


Multivitamins, Thera [Multivitamin (formulary)] 1 tab PO DAILY 09/26/22 

[History]


Thiamine [Vitamin B-1] 100 mg PO DAILY 09/26/22 [History]


lisinopriL [Zestril] 5 mg PO DAILY 09/26/22 [History]


Isosorbide Mononitrate ER [Imdur] 30 mg PO DAILY 30 Days #30 tab 09/27/22 [Rx]








Follow up Appointment(s)/Referral(s): 


Farhat Pelaez MD [STAFF PHYSICIAN] - 10/05/22 10:30 am


[facility],Clinic [Primary Care Provider] - 1-2 days


Patient Instructions/Handouts:  Chest Pain (DC)


Activity/Diet/Wound Care/Special Instructions: 





Activity:





As tolerated.  Take breaks as needed.





Diet: 





Heart healthy and carb consistent diet. Avoid salts, or foods with hidden salts 

such as canned or boxed foods and frozen dinners. Extra salt makes your heart 

work harder and traps the fluid in your body for longer.





Special Instructions:  





Take all of your medications as directed and remember to keep all of your 

doctor's appointments and follow-up as needed.  





Thank you for allowing us to participate in your care, it is always a blessing 

to have the opportunity to provide care to a  and it was truly a pleasure

having you for our patient!!! 








Discharge Disposition: HOME SELF-CARE

## 2022-09-28 NOTE — CA
Transthoracic Echo Report 

 Name: Dejuan Lemos 

 MRN:    [id number] 

 Age:    77     Gender:     M 

 

 :    1945 

 Exam Date:     2022 09:25 

 Exam Location: [facility] 

 Ht (in):     69     Wt (lb):     120 

 Ordering Physician:        Margie Wyatt 

 Attending/Referring Phys:         [id number], Edmundo 

 Technician         Mary Mancuso, SAMI 

 Procedure CPT: 

 Indications:       LV function 

 

 Cardiac Hx: 

 Technical Quality:      Good 

 Contrast 1:                                Total Dose (mL): 

 Contrast 2:                                Total Dose (mL): 

 

 MEASUREMENTS  (Male / Female) Normal Values 

 2D ECHO 

 LV Diastolic Diameter PLAX        4.9 cm                4.2 - 5.9 / 3.9 - 5.3 cm 

 LV Systolic Diameter PLAX         3.8 cm                 

 IVS Diastolic Thickness           1.0 cm                0.6 - 1.0 / 0.6 - 0.9 cm 

 LVPW Diastolic Thickness          1.6 cm                0.6 - 1.0 / 0.6 - 0.9 cm 

 LV Relative Wall Thickness        0.5                    

 RV Internal Dim ED PLAX           3.3 cm                 

 

 M-MODE 

 Aortic Root Diameter MM           3.7 cm                 

 AV Cusp Separation MM             0.9 cm                 

 

 DOPPLER 

 MV Area PHT                       3.1 cm???                

 Mitral E Point Velocity           60.5 cm/s              

 Mitral A Point Velocity           98.7 cm/s              

 Mitral E to A Ratio               0.6                    

 MV Deceleration Time              243.0 ms               

 MV E' Velocity                    4.9 cm/s               

 Mitral E to MV E' Ratio           12.3                   

 

 

 FINDINGS 

 Left Ventricle 

 Left ventricular ejection fraction is estimated at 45-50%.  Anterseptal  

 hypokinesis. 

 

 Right Ventricle 

 The right ventricle is normal in size and function. 

 

 Right Atrium 

 The right atrium is normal in size. 

 

 Left Atrium 

 The left atrium is normal in size. 

 

 Mitral Valve 

 Structurally normal mitral valve without significant stenosis or prolapse.   

 There is mild mitral regurgitation. 

 

 Aortic Valve 

 Structurally normal aortic valve without significant sclerosis or stenosis.   

 There is no aortic regurgitation. 

 

 Tricuspid Valve 

 Structurally normal tricuspid valve without significant stenosis.  Pulmonary  

 artery systolic pressure is normal. 

 

 Pulmonic Valve 

 Structurally normal pulmonic valve without significant stenosis.  There is no  

 pulmonic regurgitation. 

 

 Pericardium 

 Normal pericardium without effusion. 

 

 Aorta 

 Normal aortic root dimension. 

 

 CONCLUSIONS 

 Mildly impaired LV function was EF between 45-50% 

 Aortic sclerosis without stenosis or insufficiency 

 Thickened mitral valve leaflets with mild MR 

 Previewed by:  

 Dr. Farhat Pelaez MD 

 (Electronically Signed) 

 Final Date:      2022 07:48

## 2023-04-17 ENCOUNTER — HOSPITAL ENCOUNTER (EMERGENCY)
Dept: HOSPITAL 47 - EC | Age: 78
Discharge: HOME | End: 2023-04-17
Payer: OTHER GOVERNMENT

## 2023-04-17 VITALS — HEART RATE: 72 BPM | DIASTOLIC BLOOD PRESSURE: 71 MMHG | SYSTOLIC BLOOD PRESSURE: 124 MMHG | RESPIRATION RATE: 22 BRPM

## 2023-04-17 VITALS — TEMPERATURE: 98 F

## 2023-04-17 DIAGNOSIS — Z79.82: ICD-10-CM

## 2023-04-17 DIAGNOSIS — Z79.51: ICD-10-CM

## 2023-04-17 DIAGNOSIS — Z20.822: ICD-10-CM

## 2023-04-17 DIAGNOSIS — I10: ICD-10-CM

## 2023-04-17 DIAGNOSIS — Z79.02: ICD-10-CM

## 2023-04-17 DIAGNOSIS — Z87.891: ICD-10-CM

## 2023-04-17 DIAGNOSIS — Z79.899: ICD-10-CM

## 2023-04-17 DIAGNOSIS — Z88.7: ICD-10-CM

## 2023-04-17 DIAGNOSIS — J44.1: Primary | ICD-10-CM

## 2023-04-17 DIAGNOSIS — I25.2: ICD-10-CM

## 2023-04-17 LAB
ALBUMIN SERPL-MCNC: 3.6 G/DL (ref 3.5–5)
ALP SERPL-CCNC: 77 U/L (ref 38–126)
ALT SERPL-CCNC: 27 U/L (ref 4–49)
ANION GAP SERPL CALC-SCNC: 8 MMOL/L
APTT BLD: 24.7 SEC (ref 22–30)
AST SERPL-CCNC: 32 U/L (ref 17–59)
BASOPHILS # BLD AUTO: 0.1 K/UL (ref 0–0.2)
BASOPHILS NFR BLD AUTO: 1 %
BUN SERPL-SCNC: 15 MG/DL (ref 9–20)
CALCIUM SPEC-MCNC: 8.9 MG/DL (ref 8.4–10.2)
CHLORIDE SERPL-SCNC: 103 MMOL/L (ref 98–107)
CO2 SERPL-SCNC: 29 MMOL/L (ref 22–30)
EOSINOPHIL # BLD AUTO: 0.6 K/UL (ref 0–0.7)
EOSINOPHIL NFR BLD AUTO: 11 %
ERYTHROCYTE [DISTWIDTH] IN BLOOD BY AUTOMATED COUNT: 5.5 M/UL (ref 4.3–5.9)
ERYTHROCYTE [DISTWIDTH] IN BLOOD: 15.4 % (ref 11.5–15.5)
GLUCOSE SERPL-MCNC: 229 MG/DL (ref 74–99)
HCT VFR BLD AUTO: 47.5 % (ref 39–53)
HGB BLD-MCNC: 15.3 GM/DL (ref 13–17.5)
INR PPP: 1.1 (ref ?–1.2)
LYMPHOCYTES # SPEC AUTO: 1.1 K/UL (ref 1–4.8)
LYMPHOCYTES NFR SPEC AUTO: 21 %
MCH RBC QN AUTO: 27.9 PG (ref 25–35)
MCHC RBC AUTO-ENTMCNC: 32.2 G/DL (ref 31–37)
MCV RBC AUTO: 86.4 FL (ref 80–100)
MONOCYTES # BLD AUTO: 0.3 K/UL (ref 0–1)
MONOCYTES NFR BLD AUTO: 5 %
NEUTROPHILS # BLD AUTO: 3.4 K/UL (ref 1.3–7.7)
NEUTROPHILS NFR BLD AUTO: 61 %
PLATELET # BLD AUTO: 206 K/UL (ref 150–450)
POTASSIUM SERPL-SCNC: 4.1 MMOL/L (ref 3.5–5.1)
PROT SERPL-MCNC: 6.7 G/DL (ref 6.3–8.2)
PT BLD: 11.9 SEC (ref 9–12)
SODIUM SERPL-SCNC: 140 MMOL/L (ref 137–145)
WBC # BLD AUTO: 5.5 K/UL (ref 3.8–10.6)

## 2023-04-17 PROCEDURE — 71046 X-RAY EXAM CHEST 2 VIEWS: CPT

## 2023-04-17 PROCEDURE — 94640 AIRWAY INHALATION TREATMENT: CPT

## 2023-04-17 PROCEDURE — 83605 ASSAY OF LACTIC ACID: CPT

## 2023-04-17 PROCEDURE — 85610 PROTHROMBIN TIME: CPT

## 2023-04-17 PROCEDURE — 85025 COMPLETE CBC W/AUTO DIFF WBC: CPT

## 2023-04-17 PROCEDURE — 93005 ELECTROCARDIOGRAM TRACING: CPT

## 2023-04-17 PROCEDURE — 87636 SARSCOV2 & INF A&B AMP PRB: CPT

## 2023-04-17 PROCEDURE — 99285 EMERGENCY DEPT VISIT HI MDM: CPT

## 2023-04-17 PROCEDURE — 85379 FIBRIN DEGRADATION QUANT: CPT

## 2023-04-17 PROCEDURE — 80053 COMPREHEN METABOLIC PANEL: CPT

## 2023-04-17 PROCEDURE — 83880 ASSAY OF NATRIURETIC PEPTIDE: CPT

## 2023-04-17 PROCEDURE — 85730 THROMBOPLASTIN TIME PARTIAL: CPT

## 2023-04-17 PROCEDURE — 36415 COLL VENOUS BLD VENIPUNCTURE: CPT

## 2023-04-17 PROCEDURE — 83735 ASSAY OF MAGNESIUM: CPT

## 2023-04-17 PROCEDURE — 84484 ASSAY OF TROPONIN QUANT: CPT

## 2023-04-17 NOTE — XR
EXAMINATION TYPE: XR chest 2V

 

DATE OF EXAM: 4/17/2023 11:54 AM

 

COMPARISON: Chest radiographs from 9/26/2022, CTA chest 12/16/2021

 

TECHNIQUE: XR chest 2V Frontal and lateral views of the chest.

 

CLINICAL INDICATION:Male, 78 years old with history of difficulty breathing; 

 

FINDINGS: 

Lungs/Pleura: There is no evidence of pleural effusion, focal consolidation, or pneumothorax.  Chroni
c senescent parenchymal change. Stable calcified granuloma within the right midlung measuring up to 5
 mm.

Pulmonary vascularity: Unremarkable.

Heart/mediastinum: Cardiomediastinal silhouette is unremarkable.

Musculoskeletal: No acute osseous pathology. Cervical fusion hardware demonstrated. Degenerative shields
ges of the visualized spine.

 

IMPRESSION: 

No acute cardiopulmonary disease/process. No significant change from prior examination.

## 2023-04-17 NOTE — ED
General Adult HPI





- General


Chief complaint: Shortness of Breath


Stated complaint: sob


Time Seen by Provider: 23 10:47


Source: patient, RN notes reviewed


Mode of arrival: ambulatory


Limitations: no limitations





- History of Present Illness


Initial comments: 





Patient is a pleasant 78-year-old male presenting to the emergency department 

with concerns with difficulty breathing.  Onset of symptoms was a few days ago. 

Patient does have cough with productive sputum, yellow to green.  Patient does 

have history of COPD/asthma with similar symptoms.  Patient also has history of 

pneumonia.  No fever.  No chest pain.  No leg pain or leg swelling.





- Related Data


                                Home Medications











 Medication  Instructions  Recorded  Confirmed


 


Albuterol Inhaler [Ventolin Hfa 2 puff INHALATION RT-Q6H PRN 21





Inhaler]   


 


Albuterol Nebulized [Ventolin 2.5 mg INHALATION RT-Q4H PRN 21





Nebulized]   


 


Cetirizine HCl [Zyrtec] 10 mg PO DAILY PRN 21


 


Finasteride [Proscar] 5 mg PO HS 21


 


Fluticasone Nasal Spray [Flonase 1 spr EA NOSTRIL DAILY 21





Nasal Spray]   


 


Fluticasone Propion/Salmeterol 1 puff INHALATION RT-BID 21





[Advair 250-50 Diskus]   


 


Lidocaine 5% Patch [Lidoderm 5% 1 patch TRANSDERM DAILY PRN 21





Patch]   


 


Polyvinyl Alcohol/Povidone 1 drop BOTH EYES QID PRN 21





[[place] Eye Drop]   


 


Diclofenac Sodium Gel [Voltaren 1 applic TOPICAL BID PRN 21





Gel]   


 


Acetaminophen Tab [Tylenol] 1,000 mg PO TID PRN 22


 


Atorvastatin [Lipitor] 80 mg PO HS 22


 


Clopidogrel [Plavix] 75 mg PO DAILY 22


 


Folic Acid 1 mg PO DAILY 22


 


Ketoconazole 2% Shampoo [Nizoral] 1 applic TOPICAL DAILY PRN 22


 


Metoprolol Succinate (ER) [Toprol 25 mg PO BID 22





XL]   


 


Thiamine [Vitamin B-1] 100 mg PO DAILY 22


 


lisinopriL [Zestril] 5 mg PO BID 22


 


Aspirin 81 mg PO HS 23


 


Furosemide [Lasix] 20 mg PO DAILY PRN 23


 


Multivit-Min/FA/Lycopen/Lutein 1 tab PO DAILY 23





[Centrum Silver Men Tablet]   








                                  Previous Rx's











 Medication  Instructions  Recorded


 


Nitroglycerin Sl Tabs [Nitrostat] 0.4 mg SUBLINGUAL Q5M PRN #30 tab 22


 


Tamsulosin [Flomax] 0.4 mg PO DAILY #30 capsule 22


 


Isosorbide Mononitrate ER [Imdur] 30 mg PO DAILY 30 Days #30 tab 22


 


predniSONE [Deltasone] 20 mg PO BID #10 tab 23











                                    Allergies











Allergy/AdvReac Type Severity Reaction Status Date / Time


 


Influenza Virus Vaccines Allergy  Unknown Verified 23 12:32














Review of Systems


ROS Statement: 


Those systems with pertinent positive or pertinent negative responses have been 

documented in the HPI.





ROS Other: All systems not noted in ROS Statement are negative.


Constitutional: Denies: fever, chills


Eyes: Denies: eye pain


ENT: Denies: ear pain


Respiratory: Reports: as per HPI, cough, dyspnea


Cardiovascular: Denies: chest pain


Endocrine: Denies: fatigue


Gastrointestinal: Denies: abdominal pain


Genitourinary: Denies: dysuria


Musculoskeletal: Denies: back pain


Skin: Denies: rash


Neurological: Denies: weakness





Past Medical History


Past Medical History: Asthma, COPD, Hypertension, Myocardial Infarction (MI), 

Pneumonia, Prostate Disorder


Additional Past Medical History / Comment(s): allergies


Last Myocardial Infarction Date:: 2021


History of Any Multi-Drug Resistant Organisms: None Reported


Past Surgical History: Appendectomy, Back Surgery, Heart Catheterization With 

Stent, Tonsillectomy


Additional Past Surgical History / Comment(s): cervical


Past Anesthesia/Blood Transfusion Reactions: No Reported Reaction


Additional Past Anesthesia/Blood Transfusion Reaction / Comment(s): Pt is 

clausterphobic.


Date of Last Stent Placement:: 21


Past Psychological History: No Psychological Hx Reported


Smoking Status: Former smoker


Past Alcohol Use History: None Reported


Past Drug Use History: None Reported





- Past Family History


  ** Mother


Family Medical History: No Reported History


Additional Family Medical History / Comment(s): Mother was healthy and lived to 

be 92 yrs old.





  ** Father


History Unknown: Yes


Additional Family Medical History / Comment(s): Father  at the age of 68yrs,

 pt unable to say from what.





General Exam


Limitations: no limitations


General appearance: alert, in no apparent distress


Head exam: Present: normocephalic


Eye exam: Present: normal appearance


Neck exam: Present: normal inspection


Respiratory exam: Present: decreased breath sounds


Cardiovascular Exam: Present: regular rate, normal rhythm


GI/Abdominal exam: Present: soft.  Absent: tenderness


Extremities exam: Present: normal inspection.  Absent: pedal edema, calf 

tenderness


Back exam: Present: normal inspection


Neurological exam: Present: alert


Psychiatric exam: Present: normal affect, normal mood


Skin exam: Present: normal color





Course


                                   Vital Signs











  23





  10:09 12:06 13:27


 


Temperature 98 F  


 


Pulse Rate 76 68 71


 


Respiratory 22 18 19





Rate   


 


Blood Pressure 125/69 104/61 105/61


 


O2 Sat by Pulse 95 94 L 93 L





Oximetry   














  23





  13:38 13:52


 


Temperature  


 


Pulse Rate 68 68


 


Respiratory  





Rate  


 


Blood Pressure  


 


O2 Sat by Pulse  





Oximetry  














EKG Findings





- EKG Results:


EKG: interpreted by ERMD (Anterior septal Q waves), sinus rhythm, normal axis, 

normal ST/T





Medical Decision Making





- Medical Decision Making





Was pt. sent in by a medical professional or institution (, PA, NP, urgent 

care, hospital, or nursing home...) When possible be specific


@  -No


Did you speak to anyone other than the patient for history (EMS, parent, family,

 police, friend...)? What history was obtained from this source 


@  -Patient does have family member present who helps provide history including 

previous COPD exposure history


Did you review nursing and triage notes (agree or disagree)?  Why? 


@  -I reviewed and agree with nursing and triage notes


Were old charts reviewed (outside hosp., previous admission, EMS record, old 

EKG, old radiological studies, urgent care reports/EKG's, nursing home records)?

 Report findings 


@  -No old charts were reviewed


Differential Diagnosis (chest pain, altered mental status, abdominal pain women,

 abdominal pain men, vaginal bleeding, weakness, fever, dyspnea, syncope, 

headache, dizziness, GI bleed, back pain, seizure, CVA, palpatations, mental 

health)? 


@  -Differential Dyspnea:


Coronary syndrome, arrhythmia, tamponade, asthma, COPD, pulmonary embolism, 

pneumonia, pneumothorax, pulmonary effusion, anaphylaxis, diabetic ketoacidosis,

 flailed chest, pulmonary contusion, diaphragmatic rupture, anemia, 

neuromuscular, this is not meant to be an all-inclusive list. 


EKG interpreted by me (3pts min.).


@  -As above


X-rays interpreted by me (1pt min.).


@  -Chest x-ray shows nonspecific finding


CT interpreted by me (1pt min.).


@  -None done


U/S interpreted by me (1pt. min.).


@  -None done


What testing was considered but not performed or refused? (CT, X-rays, U/S, 

labs)? Why?


@  -None


What meds were considered but not given or refused? Why?


@  -None


Did you discuss the management of the patient with other professionals (niru ortiz i.e. , PA, NP, lab, RT, psych nurse, , , teacher, 

, )? Give summary


@  -No


Was smoking cessation discussed for >3mins.?


@  -No


Was critical care preformed (if so, how long)?


@  -No


Were there social determinants of health that impacted care today? How? 

(Homelessness, low income, unemployed, alcoholism, drug addiction, transportatio

n, low edu. Level, literacy, decrease access to med. care, [facility], rehab)?


@  -No


Was there de-escalation of care discussed even if they declined (Discuss DNR or 

withdrawal of care, Hospice)? DNR status


@  -No


What co-morbidities impacted this encounter? (DM, HTN, Smoking, COPD, CAD, 

Cancer, CVA, ARF, Chemo, Hep., AIDS, mental health diagnosis, sleep apnea, 

morbid obesity)?


@  -None


Was patient admitted / discharged? Hospital course, mention meds given and 

route, prescriptions, significant lab abnormalities, going to OR and other 

pertinent info.


@  -Patient reevaluated and feeling much better.  Pulse ox 93% on room air.  

Patient does request discharge home.  Patient and family are updated on results 

and need for follow-up as well as need to return if symptoms worsen


Undiagnosed new problem with uncertain prognosis?


@  -No


Drug Therapy requiring intensive monitoring for toxicity (Heparin, Nitro, 

Insulin, Cardizem)?


@  -No


Were any procedures done?


@  -No


Diagnosis/symptom?


@  -COPD


Acute, or Chronic, or Acute on Chronic?


@  -Acute


Uncomplicated (without systemic symptoms) or Complicated (systemic symptoms)?


@  -default


Side effects of treatment?


@  -No


Exacerbation, Progression, or Severe Exacerbation?


@  -No


Poses a threat to life or bodily function? How? (Chest pain, USA, MI, pneumonia,

 PE, COPD, DKA, ARF, appy, cholecystitis, CVA, Diverticulitis, Homicidal, 

Suicidal, threat to staff... and all critical care pts)


@  -No





- Lab Data


Result diagrams: 


                                 23 10:29





                                 23 10:29


                                   Lab Results











  23 Range/Units





  10:29 10:29 10:29 


 


WBC  5.5    (3.8-10.6)  k/uL


 


RBC  5.50    (4.30-5.90)  m/uL


 


Hgb  15.3    (13.0-17.5)  gm/dL


 


Hct  47.5    (39.0-53.0)  %


 


MCV  86.4    (80.0-100.0)  fL


 


MCH  27.9    (25.0-35.0)  pg


 


MCHC  32.2    (31.0-37.0)  g/dL


 


RDW  15.4    (11.5-15.5)  %


 


Plt Count  206    (150-450)  k/uL


 


MPV  8.7    


 


Neutrophils %  61    %


 


Lymphocytes %  21    %


 


Monocytes %  5    %


 


Eosinophils %  11    %


 


Basophils %  1    %


 


Neutrophils #  3.4    (1.3-7.7)  k/uL


 


Lymphocytes #  1.1    (1.0-4.8)  k/uL


 


Monocytes #  0.3    (0-1.0)  k/uL


 


Eosinophils #  0.6    (0-0.7)  k/uL


 


Basophils #  0.1    (0-0.2)  k/uL


 


PT   11.9   (9.0-12.0)  sec


 


INR   1.1   (<1.2)  


 


APTT   24.7   (22.0-30.0)  sec


 


D-Dimer     (<0.60)  mg/L FEU


 


Sodium    140  (137-145)  mmol/L


 


Potassium    4.1  (3.5-5.1)  mmol/L


 


Chloride    103  ()  mmol/L


 


Carbon Dioxide    29  (22-30)  mmol/L


 


Anion Gap    8  mmol/L


 


BUN    15  (9-20)  mg/dL


 


Creatinine    0.80  (0.66-1.25)  mg/dL


 


Est GFR (CKD-EPI)AfAm    >90  (>60 ml/min/1.73 sqM)  


 


Est GFR (CKD-EPI)NonAf    85  (>60 ml/min/1.73 sqM)  


 


Glucose    229 H  (74-99)  mg/dL


 


Plasma Lactic Acid Td     (0.7-2.0)  mmol/L


 


Calcium    8.9  (8.4-10.2)  mg/dL


 


Magnesium     (1.6-2.3)  mg/dL


 


Total Bilirubin    1.1  (0.2-1.3)  mg/dL


 


AST    32  (17-59)  U/L


 


ALT    27  (4-49)  U/L


 


Alkaline Phosphatase    77  ()  U/L


 


Troponin I     (0.000-0.034)  ng/mL


 


NT-Pro-B Natriuret Pep     pg/mL


 


Total Protein    6.7  (6.3-8.2)  g/dL


 


Albumin    3.6  (3.5-5.0)  g/dL


 


Influenza Type A (PCR)     (Not Detectd)  


 


Influenza Type B (PCR)     (Not Detectd)  


 


RSV (PCR)     (Not Detectd)  


 


SARS-CoV-2 (PCR)     (Not Detectd)  














  23 Range/Units





  10:29 11:28 11:28 


 


WBC     (3.8-10.6)  k/uL


 


RBC     (4.30-5.90)  m/uL


 


Hgb     (13.0-17.5)  gm/dL


 


Hct     (39.0-53.0)  %


 


MCV     (80.0-100.0)  fL


 


MCH     (25.0-35.0)  pg


 


MCHC     (31.0-37.0)  g/dL


 


RDW     (11.5-15.5)  %


 


Plt Count     (150-450)  k/uL


 


MPV     


 


Neutrophils %     %


 


Lymphocytes %     %


 


Monocytes %     %


 


Eosinophils %     %


 


Basophils %     %


 


Neutrophils #     (1.3-7.7)  k/uL


 


Lymphocytes #     (1.0-4.8)  k/uL


 


Monocytes #     (0-1.0)  k/uL


 


Eosinophils #     (0-0.7)  k/uL


 


Basophils #     (0-0.2)  k/uL


 


PT     (9.0-12.0)  sec


 


INR     (<1.2)  


 


APTT     (22.0-30.0)  sec


 


D-Dimer    0.60 H  (<0.60)  mg/L FEU


 


Sodium     (137-145)  mmol/L


 


Potassium     (3.5-5.1)  mmol/L


 


Chloride     ()  mmol/L


 


Carbon Dioxide     (22-30)  mmol/L


 


Anion Gap     mmol/L


 


BUN     (9-20)  mg/dL


 


Creatinine     (0.66-1.25)  mg/dL


 


Est GFR (CKD-EPI)AfAm     (>60 ml/min/1.73 sqM)  


 


Est GFR (CKD-EPI)NonAf     (>60 ml/min/1.73 sqM)  


 


Glucose     (74-99)  mg/dL


 


Plasma Lactic Acid Td   1.6   (0.7-2.0)  mmol/L


 


Calcium     (8.4-10.2)  mg/dL


 


Magnesium     (1.6-2.3)  mg/dL


 


Total Bilirubin     (0.2-1.3)  mg/dL


 


AST     (17-59)  U/L


 


ALT     (4-49)  U/L


 


Alkaline Phosphatase     ()  U/L


 


Troponin I  <0.012    (0.000-0.034)  ng/mL


 


NT-Pro-B Natriuret Pep     pg/mL


 


Total Protein     (6.3-8.2)  g/dL


 


Albumin     (3.5-5.0)  g/dL


 


Influenza Type A (PCR)     (Not Detectd)  


 


Influenza Type B (PCR)     (Not Detectd)  


 


RSV (PCR)     (Not Detectd)  


 


SARS-CoV-2 (PCR)     (Not Detectd)  














  23 Range/Units





  11:28 11:28 11:40 


 


WBC     (3.8-10.6)  k/uL


 


RBC     (4.30-5.90)  m/uL


 


Hgb     (13.0-17.5)  gm/dL


 


Hct     (39.0-53.0)  %


 


MCV     (80.0-100.0)  fL


 


MCH     (25.0-35.0)  pg


 


MCHC     (31.0-37.0)  g/dL


 


RDW     (11.5-15.5)  %


 


Plt Count     (150-450)  k/uL


 


MPV     


 


Neutrophils %     %


 


Lymphocytes %     %


 


Monocytes %     %


 


Eosinophils %     %


 


Basophils %     %


 


Neutrophils #     (1.3-7.7)  k/uL


 


Lymphocytes #     (1.0-4.8)  k/uL


 


Monocytes #     (0-1.0)  k/uL


 


Eosinophils #     (0-0.7)  k/uL


 


Basophils #     (0-0.2)  k/uL


 


PT     (9.0-12.0)  sec


 


INR     (<1.2)  


 


APTT     (22.0-30.0)  sec


 


D-Dimer     (<0.60)  mg/L FEU


 


Sodium     (137-145)  mmol/L


 


Potassium     (3.5-5.1)  mmol/L


 


Chloride     ()  mmol/L


 


Carbon Dioxide     (22-30)  mmol/L


 


Anion Gap     mmol/L


 


BUN     (9-20)  mg/dL


 


Creatinine     (0.66-1.25)  mg/dL


 


Est GFR (CKD-EPI)AfAm     (>60 ml/min/1.73 sqM)  


 


Est GFR (CKD-EPI)NonAf     (>60 ml/min/1.73 sqM)  


 


Glucose     (74-99)  mg/dL


 


Plasma Lactic Acid Td     (0.7-2.0)  mmol/L


 


Calcium     (8.4-10.2)  mg/dL


 


Magnesium  2.1    (1.6-2.3)  mg/dL


 


Total Bilirubin     (0.2-1.3)  mg/dL


 


AST     (17-59)  U/L


 


ALT     (4-49)  U/L


 


Alkaline Phosphatase     ()  U/L


 


Troponin I     (0.000-0.034)  ng/mL


 


NT-Pro-B Natriuret Pep   470   pg/mL


 


Total Protein     (6.3-8.2)  g/dL


 


Albumin     (3.5-5.0)  g/dL


 


Influenza Type A (PCR)    Not Detected  (Not Detectd)  


 


Influenza Type B (PCR)    Not Detected  (Not Detectd)  


 


RSV (PCR)    Not Detected  (Not Detectd)  


 


SARS-CoV-2 (PCR)    Not Detected  (Not Detectd)  














Disposition


Clinical Impression: 


 Acute exacerbation of chronic obstructive pulmonary disease





Disposition: HOME SELF-CARE


Instructions (If sedation given, give patient instructions):  COPD (Chronic 

Obstructive Pulmonary Disease) (ED)


Additional Instructions: 


Please do follow-up with your primary care physician in the next day or 2 for 

recheck.  Return for fevers, difficulty breathing, pain, weakness, worsening 

symptoms or other concerns.  Prescriptions have been sent to pharmacy.  Please 

use your nebulizer or inhalers at least 4 times daily for the next several days.


Prescriptions: 


predniSONE [Deltasone] 20 mg PO BID #10 tab


Is patient prescribed a controlled substance at d/c from ED?: No


Referrals: 


[facility] [Primary Care Provider] - 1-2 days


Time of Disposition: 14:25

## 2023-06-10 ENCOUNTER — HOSPITAL ENCOUNTER (OUTPATIENT)
Dept: HOSPITAL 47 - EC | Age: 78
Setting detail: OBSERVATION
LOS: 2 days | Discharge: HOME | End: 2023-06-12
Attending: INTERNAL MEDICINE | Admitting: INTERNAL MEDICINE
Payer: OTHER GOVERNMENT

## 2023-06-10 VITALS — BODY MASS INDEX: 17.7 KG/M2

## 2023-06-10 DIAGNOSIS — Z87.891: ICD-10-CM

## 2023-06-10 DIAGNOSIS — J45.901: ICD-10-CM

## 2023-06-10 DIAGNOSIS — Z90.49: ICD-10-CM

## 2023-06-10 DIAGNOSIS — Z79.899: ICD-10-CM

## 2023-06-10 DIAGNOSIS — I25.10: ICD-10-CM

## 2023-06-10 DIAGNOSIS — Z79.82: ICD-10-CM

## 2023-06-10 DIAGNOSIS — Z79.51: ICD-10-CM

## 2023-06-10 DIAGNOSIS — H53.50: ICD-10-CM

## 2023-06-10 DIAGNOSIS — Z87.01: ICD-10-CM

## 2023-06-10 DIAGNOSIS — I25.2: ICD-10-CM

## 2023-06-10 DIAGNOSIS — I50.23: ICD-10-CM

## 2023-06-10 DIAGNOSIS — Z95.5: ICD-10-CM

## 2023-06-10 DIAGNOSIS — Z66: ICD-10-CM

## 2023-06-10 DIAGNOSIS — N40.0: ICD-10-CM

## 2023-06-10 DIAGNOSIS — Z88.7: ICD-10-CM

## 2023-06-10 DIAGNOSIS — Z79.02: ICD-10-CM

## 2023-06-10 DIAGNOSIS — Z96.0: ICD-10-CM

## 2023-06-10 DIAGNOSIS — I11.0: ICD-10-CM

## 2023-06-10 DIAGNOSIS — E78.5: ICD-10-CM

## 2023-06-10 DIAGNOSIS — Z98.890: ICD-10-CM

## 2023-06-10 DIAGNOSIS — F40.240: ICD-10-CM

## 2023-06-10 DIAGNOSIS — J44.1: Primary | ICD-10-CM

## 2023-06-10 LAB
ALBUMIN SERPL-MCNC: 3.8 G/DL (ref 3.5–5)
ALP SERPL-CCNC: 62 U/L (ref 38–126)
ALT SERPL-CCNC: 30 U/L (ref 4–49)
ANION GAP SERPL CALC-SCNC: 7 MMOL/L
APTT BLD: 24.1 SEC (ref 22–30)
AST SERPL-CCNC: 39 U/L (ref 17–59)
BASOPHILS # BLD AUTO: 0.1 K/UL (ref 0–0.2)
BASOPHILS NFR BLD AUTO: 1 %
BUN SERPL-SCNC: 15 MG/DL (ref 9–20)
CALCIUM SPEC-MCNC: 8.9 MG/DL (ref 8.4–10.2)
CHLORIDE SERPL-SCNC: 104 MMOL/L (ref 98–107)
CO2 SERPL-SCNC: 33 MMOL/L (ref 22–30)
EOSINOPHIL # BLD AUTO: 0.5 K/UL (ref 0–0.7)
EOSINOPHIL NFR BLD AUTO: 7 %
ERYTHROCYTE [DISTWIDTH] IN BLOOD BY AUTOMATED COUNT: 5.87 M/UL (ref 4.3–5.9)
ERYTHROCYTE [DISTWIDTH] IN BLOOD: 15.2 % (ref 11.5–15.5)
GLUCOSE SERPL-MCNC: 88 MG/DL (ref 74–99)
HCT VFR BLD AUTO: 51.7 % (ref 39–53)
HGB BLD-MCNC: 16.1 GM/DL (ref 13–17.5)
INR PPP: 1.1 (ref ?–1.2)
LYMPHOCYTES # SPEC AUTO: 1.5 K/UL (ref 1–4.8)
LYMPHOCYTES NFR SPEC AUTO: 20 %
MAGNESIUM SPEC-SCNC: 2 MG/DL (ref 1.6–2.3)
MCH RBC QN AUTO: 27.5 PG (ref 25–35)
MCHC RBC AUTO-ENTMCNC: 31.2 G/DL (ref 31–37)
MCV RBC AUTO: 88 FL (ref 80–100)
MONOCYTES # BLD AUTO: 0.6 K/UL (ref 0–1)
MONOCYTES NFR BLD AUTO: 8 %
NEUTROPHILS # BLD AUTO: 4.7 K/UL (ref 1.3–7.7)
NEUTROPHILS NFR BLD AUTO: 61 %
PLATELET # BLD AUTO: 192 K/UL (ref 150–450)
POTASSIUM SERPL-SCNC: 4.6 MMOL/L (ref 3.5–5.1)
PROT SERPL-MCNC: 6.6 G/DL (ref 6.3–8.2)
PT BLD: 11.3 SEC (ref 9–12)
SODIUM SERPL-SCNC: 144 MMOL/L (ref 137–145)
WBC # BLD AUTO: 7.7 K/UL (ref 3.8–10.6)

## 2023-06-10 PROCEDURE — 85025 COMPLETE CBC W/AUTO DIFF WBC: CPT

## 2023-06-10 PROCEDURE — 93306 TTE W/DOPPLER COMPLETE: CPT

## 2023-06-10 PROCEDURE — 85730 THROMBOPLASTIN TIME PARTIAL: CPT

## 2023-06-10 PROCEDURE — 71046 X-RAY EXAM CHEST 2 VIEWS: CPT

## 2023-06-10 PROCEDURE — 80053 COMPREHEN METABOLIC PANEL: CPT

## 2023-06-10 PROCEDURE — 83880 ASSAY OF NATRIURETIC PEPTIDE: CPT

## 2023-06-10 PROCEDURE — 84145 PROCALCITONIN (PCT): CPT

## 2023-06-10 PROCEDURE — 36415 COLL VENOUS BLD VENIPUNCTURE: CPT

## 2023-06-10 PROCEDURE — 94640 AIRWAY INHALATION TREATMENT: CPT

## 2023-06-10 PROCEDURE — 80048 BASIC METABOLIC PNL TOTAL CA: CPT

## 2023-06-10 PROCEDURE — 87040 BLOOD CULTURE FOR BACTERIA: CPT

## 2023-06-10 PROCEDURE — 99285 EMERGENCY DEPT VISIT HI MDM: CPT

## 2023-06-10 PROCEDURE — 96375 TX/PRO/DX INJ NEW DRUG ADDON: CPT

## 2023-06-10 PROCEDURE — 93005 ELECTROCARDIOGRAM TRACING: CPT

## 2023-06-10 PROCEDURE — 96374 THER/PROPH/DIAG INJ IV PUSH: CPT

## 2023-06-10 PROCEDURE — 94760 N-INVAS EAR/PLS OXIMETRY 1: CPT

## 2023-06-10 PROCEDURE — 85610 PROTHROMBIN TIME: CPT

## 2023-06-10 PROCEDURE — 96376 TX/PRO/DX INJ SAME DRUG ADON: CPT

## 2023-06-10 PROCEDURE — 83605 ASSAY OF LACTIC ACID: CPT

## 2023-06-10 PROCEDURE — 84484 ASSAY OF TROPONIN QUANT: CPT

## 2023-06-10 PROCEDURE — 83735 ASSAY OF MAGNESIUM: CPT

## 2023-06-10 RX ADMIN — METHYLPREDNISOLONE SODIUM SUCCINATE SCH MG: 125 INJECTION, POWDER, FOR SOLUTION INTRAMUSCULAR; INTRAVENOUS at 21:07

## 2023-06-10 RX ADMIN — METHYLPREDNISOLONE SODIUM SUCCINATE SCH: 125 INJECTION, POWDER, FOR SOLUTION INTRAMUSCULAR; INTRAVENOUS at 21:24

## 2023-06-10 RX ADMIN — FUROSEMIDE SCH MG: 10 INJECTION, SOLUTION INTRAMUSCULAR; INTRAVENOUS at 21:07

## 2023-06-10 RX ADMIN — IPRATROPIUM BROMIDE AND ALBUTEROL SULFATE SCH ML: .5; 3 SOLUTION RESPIRATORY (INHALATION) at 21:47

## 2023-06-10 RX ADMIN — IPRATROPIUM BROMIDE AND ALBUTEROL SULFATE SCH: .5; 3 SOLUTION RESPIRATORY (INHALATION) at 15:59

## 2023-06-10 NOTE — XR
EXAMINATION TYPE: XR chest 2V

 

DATE OF EXAM: 6/10/2023

 

COMPARISON: 4/17/2023

 

HISTORY: 78 year-old male shortness of breath, difficulty breathing

 

TECHNIQUE:  AP and lateral views

 

FINDINGS:  

Heart upper limits of normal in size. ACDF hardware. Interstitial prominence is unchanged. Artery gurpreet
nt noted. Calcified granuloma periphery of the right midlung. No consolidation or pleural effusion.

 

 

IMPRESSION:  

Similar interstitial prominence. This may in part be chronic. Correlate to exclude bronchitis or asth
ma. No focal infiltrate seen.

## 2023-06-10 NOTE — P.HPIM
History of Present Illness


H&P Date: 06/10/23


Chief Complaint: Dyspnea





78-year-old man with medical history of COPD, asthma, hypertension, 

hyperlipidemia, CAD with recent MI status post PCI, BPH presented for evaluation

of dyspnea.  Patient says for the last 2 weeks she's been having shortness of 

breath with increased cough and sputum production.  He is color blind and not 

sure what color his sputum is, but also has trouble getting it all the way out. 

He says that this is gotten progressively worse and his daughter convinced him 

to come into the hospital today for further evaluation.  He denies fevers, 

chills, nausea, vomiting, chest pain, palpitations, sink to be, presyncope, 

abdominal pain, constipation, diarrhea, dysuria, dyschezia, numbness/weakness of

extremities.





In the emergency room, patient was afebrile, 128/73, heart rate 78, 92% on room 

air.  CBC was unremarkable.  Basic metabolic panel showed a CO2 of 33.  Liver 

function tests are unremarkable.  Troponin was less than 0.012.  Lactic acid was

elevated at 2.4.  Coags were unremarkable.  Chest x-ray showed interstitial 

prominence, no pleural effusions, no opacities.  EKG showed normal sinus rhythm 

with occasional PACs, deep Q waves in precordial leads consistent with old 

anteroseptal myocardial infarction.





All Systems reviewed and pertinent positives and negatives noted in HPI, all 

other symptoms are negative





Gen: in no apparent distress, resting comfortably in bed


Eyes: PERRL, no scleral injection or icterus


HENT: normocephalic, atraumatic, good hearing acuity, moist mucous membranes


Neck: no tracheal deviation, full range of motion


Resp: good air exchange, breathing comfortably with no accessory muscle use, no 

tactile fremitus


CVS: good distal perfusion x 4, bilateral pitting edema


GI: soft, NTTP, ND, no hepatosplenomegaly


: no suprapubic tenderness, no CVAT, gamble catheter not present


MSK: no clubbing, no cyanosis, no noted contractures of extremities


Skin: no noted rashes, petechiae; temperature of skin is appropriate


Neuro: moving all extremities without signs of weakness, CN II-XII intact


Psych: cooperative, euthymic mood, insight and judgment intact





Labs and imaging as above





Assessment:





COPD/Asthma exacerbation


Acute on chronic systolic heart failure exacerbation, EF 45%


Hypertension


Hyperlipidemia


CAD


BPH





Plan:





Vital signs reviewed and noted in the HPI


Lab work reviewed and noted in the HPI


EKG and CXR are personally interpreted and noted in the HPI


Case was discussed with the Emergency Room provider and decision was made to 

admit the patient for dyspnea, COPD exacerbation





Start Patient on Lasix 20 mg IV daily


Echocardiogram ordered


Solu-Medrol 60 mg IV every 6 hours


Pulmonology consulted


Duo nebs every 6 hours


Order pro-calcitonin


Augmentin ordered by ER, will discontinue pending procalcitonin





Patient is DO NOT RESUSCITATE/DO NOT INTUBATE


Daughter is next of kin and designated decision maker











Past Medical History


Past Medical History: Asthma, COPD, Hypertension, Myocardial Infarction (MI), 

Pneumonia, Prostate Disorder


Additional Past Medical History / Comment(s): allergies


Last Myocardial Infarction Date:: 2021


History of Any Multi-Drug Resistant Organisms: None Reported


Past Surgical History: Appendectomy, Back Surgery, Heart Catheterization With 

Stent, Tonsillectomy


Additional Past Surgical History / Comment(s): cervical


Past Anesthesia/Blood Transfusion Reactions: No Reported Reaction


Additional Past Anesthesia/Blood Transfusion Reaction / Comment(s): Pt is 

clausterphobic.


Date of Last Stent Placement:: 21


Past Psychological History: No Psychological Hx Reported


Smoking Status: Former smoker


Past Alcohol Use History: None Reported


Past Drug Use History: None Reported





- Past Family History


  ** Mother


Family Medical History: No Reported History


Additional Family Medical History / Comment(s): Mother was healthy and lived to 

be 92 yrs old.





  ** Father


History Unknown: Yes


Additional Family Medical History / Comment(s): Father  at the age of 68yrs,

pt unable to say from what.





Medications and Allergies


                                Home Medications











 Medication  Instructions  Recorded  Confirmed  Type


 


Albuterol Inhaler [Ventolin Hfa 2 puff INHALATION RT-Q6H PRN 12/16/21 06/10/23 

History





Inhaler]    


 


Albuterol Nebulized [Ventolin 2.5 mg INHALATION RT-Q4H PRN 12/16/21 06/10/23 

History





Nebulized]    


 


Cetirizine HCl [Zyrtec] 10 mg PO HS 12/16/21 06/10/23 History


 


Finasteride [Proscar] 5 mg PO HS 12/16/21 06/10/23 History


 


Fluticasone Nasal Spray [Flonase 1 spr EA NOSTRIL DAILY 12/16/21 06/10/23 

History





Nasal Spray]    


 


Lidocaine 5% Patch [Lidoderm 5% 1 patch TRANSDERM DAILY PRN 12/16/21 06/10/23 

History





Patch]    


 


Diclofenac Sodium Gel [Voltaren 1 applic TOPICAL BID PRN 12/30/21 06/10/23 

History





Gel]    


 


Nitroglycerin Sl Tabs [Nitrostat] 0.4 mg SUBLINGUAL Q5M PRN #30 tab 01/02/22 

06/10/23 Rx


 


Tamsulosin [Flomax] 0.4 mg PO DAILY #30 capsule 01/02/22 06/10/23 Rx


 


Atorvastatin [Lipitor] 80 mg PO HS 09/26/22 06/10/23 History


 


Clopidogrel [Plavix] 75 mg PO DAILY 09/26/22 06/10/23 History


 


Folic Acid 1 mg PO DAILY 09/26/22 06/10/23 History


 


Metoprolol Succinate (ER) [Toprol 25 mg PO BID 09/26/22 06/10/23 History





XL]    


 


Thiamine [Vitamin B-1] 100 mg PO DAILY 09/26/22 06/10/23 History


 


lisinopriL [Zestril] 5 mg PO BID 09/26/22 06/10/23 History


 


Isosorbide Mononitrate ER [Imdur] 30 mg PO DAILY 30 Days #30 tab 09/27/22 

06/10/23 Rx


 


Aspirin 81 mg PO HS 04/17/23 06/10/23 History


 


Furosemide [Lasix] 20 mg PO DAILY PRN 04/17/23 06/10/23 History


 


Multivit-Min/FA/Lycopen/Lutein 1 tab PO DAILY 04/17/23 06/10/23 History





[Centrum Silver Men Tablet]    


 


Fluticasone Propion/Salmeterol 1 puff INHALATION RT-BID 06/10/23 06/10/23 

History





[Wixela 250-50 Inhub]    








                                    Allergies











Allergy/AdvReac Type Severity Reaction Status Date / Time


 


Influenza Virus Vaccines Allergy  Unknown Verified 06/10/23 15:12














Physical Exam


Osteopathic Statement: *.  No significant issues noted on an osteopathic 

structural exam other than those noted in the History and Physical/Consult.


Vitals: 


                                   Vital Signs











  Temp Pulse Resp BP Pulse Ox


 


 06/10/23 16:12   75  18  144/81  91 L


 


 06/10/23 15:00   78  20  128/73  92 L


 


 06/10/23 14:47   67  18  


 


 06/10/23 14:34   60  18  


 


 06/10/23 14:00   64  18  128/88  92 L


 


 06/10/23 12:50  97.5 F L  77  20  104/62  93 L








                                Intake and Output











 06/10/23 06/10/23 06/10/23





 06:59 14:59 22:59


 


Other:   


 


  Weight  54.431 kg 














Results


CBC & Chem 7: 


                                 06/10/23 13:30





                                 06/10/23 13:30


Labs: 


                  Abnormal Lab Results - Last 24 Hours (Table)











  06/10/23 06/10/23 Range/Units





  13:30 13:30 


 


Carbon Dioxide  33 H   (22-30)  mmol/L


 


Plasma Lactic Acid Td   2.4 H*  (0.7-2.0)  mmol/L

## 2023-06-10 NOTE — ED
General Adult HPI





- General


Chief complaint: Shortness of Breath


Stated complaint: SOB


Time Seen by Provider: 06/10/23 13:00


Source: patient, RN notes reviewed, old records reviewed


Mode of arrival: wheelchair


Limitations: no limitations





- History of Present Illness


Initial comments: 





This is a 78-year-old male who presents emergency Department stating he has a 

history of COPD and asthma.  Patient states the last week he's been feeling sick

and he states that his difficulty breathing has worsened over that period of 

time.  Patient denies fever chills per patient states he is coughing and 

coughing up quite a bit of sputum.  Patient denies chest pain or palpitations.  

Patient denies any abdominal pain.  Patient denies headache patient denies 

numbness weakness per patient denies lightheadedness dizziness.  Patient denies 

any swelling to the legs normally but in the last day or 2 he had a little bit 

of edema bilaterally.  Patient states when this occurs he takes Lasix and he 

took Lasix today.





- Related Data


                                Home Medications











 Medication  Instructions  Recorded  Confirmed


 


Albuterol Inhaler [Ventolin Hfa 2 puff INHALATION RT-Q6H PRN 21





Inhaler]   


 


Albuterol Nebulized [Ventolin 2.5 mg INHALATION RT-Q4H PRN 21





Nebulized]   


 


Cetirizine HCl [Zyrtec] 10 mg PO DAILY PRN 21


 


Finasteride [Proscar] 5 mg PO HS 21


 


Fluticasone Nasal Spray [Flonase 1 spr EA NOSTRIL DAILY 21





Nasal Spray]   


 


Fluticasone Propion/Salmeterol 1 puff INHALATION RT-BID 21





[Advair 250-50 Diskus]   


 


Lidocaine 5% Patch [Lidoderm 5% 1 patch TRANSDERM DAILY PRN 21





Patch]   


 


Polyvinyl Alcohol/Povidone 1 drop BOTH EYES QID PRN 21





[[place] Eye Drop]   


 


Diclofenac Sodium Gel [Voltaren 1 applic TOPICAL BID PRN 21





Gel]   


 


Acetaminophen Tab [Tylenol] 1,000 mg PO TID PRN 22


 


Atorvastatin [Lipitor] 80 mg PO HS 22


 


Clopidogrel [Plavix] 75 mg PO DAILY 22


 


Folic Acid 1 mg PO DAILY 22


 


Ketoconazole 2% Shampoo [Nizoral] 1 applic TOPICAL DAILY PRN 22


 


Metoprolol Succinate (ER) [Toprol 25 mg PO BID 22





XL]   


 


Thiamine [Vitamin B-1] 100 mg PO DAILY 22


 


lisinopriL [Zestril] 5 mg PO BID 22


 


Aspirin 81 mg PO HS 23


 


Furosemide [Lasix] 20 mg PO DAILY PRN 23


 


Multivit-Min/FA/Lycopen/Lutein 1 tab PO DAILY 23





[Centrum Silver Men Tablet]   








                                  Previous Rx's











 Medication  Instructions  Recorded


 


Nitroglycerin Sl Tabs [Nitrostat] 0.4 mg SUBLINGUAL Q5M PRN #30 tab 22


 


Tamsulosin [Flomax] 0.4 mg PO DAILY #30 capsule 22


 


Isosorbide Mononitrate ER [Imdur] 30 mg PO DAILY 30 Days #30 tab 22


 


predniSONE [Deltasone] 20 mg PO BID #10 tab 23











                                    Allergies











Allergy/AdvReac Type Severity Reaction Status Date / Time


 


Influenza Virus Vaccines Allergy  Unknown Verified 06/10/23 15:12














Review of Systems


ROS Statement: 


Those systems with pertinent positive or pertinent negative responses have been 

documented in the HPI.





ROS Other: All systems not noted in ROS Statement are negative.





Past Medical History


Past Medical History: Asthma, COPD, Hypertension, Myocardial Infarction (MI), 

Pneumonia, Prostate Disorder


Additional Past Medical History / Comment(s): allergies


Last Myocardial Infarction Date:: 2021


History of Any Multi-Drug Resistant Organisms: None Reported


Past Surgical History: Appendectomy, Back Surgery, Heart Catheterization With 

Stent, Tonsillectomy


Additional Past Surgical History / Comment(s): cervical


Past Anesthesia/Blood Transfusion Reactions: No Reported Reaction


Additional Past Anesthesia/Blood Transfusion Reaction / Comment(s): Pt is 

clausterphobic.


Date of Last Stent Placement:: 21


Past Psychological History: No Psychological Hx Reported


Smoking Status: Former smoker


Past Alcohol Use History: None Reported


Past Drug Use History: None Reported





- Past Family History


  ** Mother


Family Medical History: No Reported History


Additional Family Medical History / Comment(s): Mother was healthy and lived to 

be 92 yrs old.





  ** Father


History Unknown: Yes


Additional Family Medical History / Comment(s): Father  at the age of 68yrs,

 pt unable to say from what.





General Exam





- General Exam Comments


Initial Comments: 





GENERAL:


Patient is well-developed and well-nourished.  Patient is nontoxic and well-

hydrated and is in mild distress.





ENT:


Neck is soft and supple.  No significant lymphadenopathy is noted.  Oropharynx 

is clear.  Moist mucous membranes.  Neck has full range of motion without elici

ting any pain.  





EYES:


The sclera were anicteric and conjunctiva were pink and moist.  Extraocular 

movements were intact and pupils were equal round and reactive to light.  

Eyelids were unremarkable.





PULMONARY:


Patient has expiratory wheezing diffusely





CARDIOVASCULAR:


There is a regular rate and rhythm without any murmurs gallops or rubs.  





ABDOMEN:


Soft and nontender with normal bowel sounds.  





SKIN:


Skin is clear with no lesions or rashes and otherwise unremarkable.





NEUROLOGIC:


Patient is alert and oriented x3.  Cranial nerves II through XII are grossly 

intact.  Motor and sensory are also intact.  Normal speech, volume and content. 

 Symmetrical smile.  





MUSCULOSKELETAL:


Normal extremities with adequate strength and full range of motion.  1+ edema 

bilaterally





LYMPHATICS:


No significant lymphadenopathy is noted





PSYCHIATRIC:


Normal psychiatric evaluation. 


Limitations: no limitations





Course


                                   Vital Signs











  06/10/23 06/10/23 06/10/23





  12:50 14:00 14:34


 


Temperature 97.5 F L  


 


Pulse Rate 77 64 60


 


Respiratory 20 18 18





Rate   


 


Blood Pressure 104/62 128/88 


 


O2 Sat by Pulse 93 L 92 L 





Oximetry   














  06/10/23 06/10/23





  14:47 15:00


 


Temperature  


 


Pulse Rate 67 78


 


Respiratory 18 20





Rate  


 


Blood Pressure  128/73


 


O2 Sat by Pulse  92 L





Oximetry  














Medical Decision Making





- Medical Decision Making





EKG as interpreted by myself shows a sinus rhythm with occasional PAC at 73 bpm 

ME interval 160 QRS is 86 QT interval 362 QTC is 37.  Patient's EKG shows no ST 

segment elevation or depression.





Was pt. sent in by a medical professional or institution (, PA, NP, urgent 

care, hospital, or nursing home...) When possible be specific


@  -No


Did you speak to anyone other than the patient for history (EMS, parent, family,

 police, friend...)? What history was obtained from this source 


@  -Daughter gave part of the past medical history


Did you review nursing and triage notes (agree or disagree)?  Why? 


@  -I reviewed and agree with nursing and triage notes


Were old charts reviewed (outside hosp., previous admission, EMS record, old 

EKG, old radiological studies, urgent care reports/EKG's, nursing home records)?

 Report findings 


@  -No old charts were reviewed


Differential Diagnosis (chest pain, altered mental status, abdominal pain women,

 abdominal pain men, vaginal bleeding, weakness, fever, dyspnea, syncope, 

headache, dizziness, GI bleed, back pain, seizure, CVA, palpatations, mental 

health, musculoskeletal)? 


@  -Differential Dyspnea:


Coronary syndrome, arrhythmia, tamponade, asthma, COPD, pulmonary embolism, 

pneumonia, pneumothorax, pulmonary effusion, anaphylaxis, diabetic ketoacidosis,

 flailed chest, pulmonary contusion, diaphragmatic rupture, anemia, 

neuromuscular, this is not meant to be an all-inclusive list. 


EKG interpreted by me (3pts min.).


@  -As above


X-rays interpreted by me (1pt min.).


@  -Chest x-ray shows no acute abnormality


CT interpreted by me (1pt min.).


@  -None done


U/S interpreted by me (1pt. min.).


@  -None done


What testing was considered but not performed or refused? (CT, X-rays, U/S, 

labs)? Why?


@  -None


What meds were considered but not given or refused? Why?


@  -None


Did you discuss the management of the patient with other professionals 

(professionals i.e. RYLAN Gu, NP, lab, RT, psych nurse, , , 

teacher, , )? Give summary


@  -I spoke with sounds physicians and they agreed to admit the patient


Was smoking cessation discussed for >3mins.?


@  -No


Was critical care preformed (if so, how long)?


@  -No


Were there social determinants of health that impacted care today? How? 

(Homelessness, low income, unemployed, alcoholism, drug addiction, 

transportation, low edu. Level, literacy, decrease access to med. care, [facility], 

rehab)?


@  -No


Was there de-escalation of care discussed even if they declined (Discuss DNR or 

withdrawal of care, Hospice)? DNR status


@  -No


What co-morbidities impacted this encounter? (DM, HTN, Smoking, COPD, CAD, 

Cancer, CVA, ARF, Chemo, Hep., AIDS, mental health diagnosis, sleep apnea, 

morbid obesity)?


@  -None


Was patient admitted / discharged? Hospital course, mention meds given and 

route, prescriptions, significant lab abnormalities, going to OR and other 

pertinent info.


@  -Patient received 2 breathing treatments steroids in the emergency department

 and he continued to wheeze diffusely.  Chest x-ray showed no pneumonia I spoke 

with [facility] physicians agree to admit the patient admitted the patient I 

consulted the pulmonologist


Undiagnosed new problem with uncertain prognosis?


@  -No


Drug Therapy requiring intensive monitoring for toxicity (Heparin, Nitro, 

Insulin, Cardizem)?


@  -No


Were any procedures done?


@  -No


Diagnosis/symptom?


@  -Exacerbation COPD


Acute, or Chronic, or Acute on Chronic?


@  -Acute


Uncomplicated (without systemic symptoms) or Complicated (systemic symptoms)?


@  -default


Side effects of treatment?


@  -No


Exacerbation, Progression, or Severe Exacerbation?


@  -Severe exacerbation


Poses a threat to life or bodily function? How? (Chest pain, USA, MI, pneumonia,

 PE, COPD, DKA, ARF, appy, cholecystitis, CVA, Diverticulitis, Homicidal, 

Suicidal, threat to staff... and all critical care pts)


@  -Yes this could lead to hypoxia and end organ dysfunction





- Lab Data


Result diagrams: 


                                 06/10/23 13:30





                                   Lab Results











  06/10/23 06/10/23 Range/Units





  13:30 13:30 


 


WBC  7.7   (3.8-10.6)  k/uL


 


RBC  5.87   (4.30-5.90)  m/uL


 


Hgb  16.1   (13.0-17.5)  gm/dL


 


Hct  51.7   (39.0-53.0)  %


 


MCV  88.0   (80.0-100.0)  fL


 


MCH  27.5   (25.0-35.0)  pg


 


MCHC  31.2   (31.0-37.0)  g/dL


 


RDW  15.2   (11.5-15.5)  %


 


Plt Count  192   (150-450)  k/uL


 


MPV  8.7   


 


Neutrophils %  61   %


 


Lymphocytes %  20   %


 


Monocytes %  8   %


 


Eosinophils %  7   %


 


Basophils %  1   %


 


Neutrophils #  4.7   (1.3-7.7)  k/uL


 


Lymphocytes #  1.5   (1.0-4.8)  k/uL


 


Monocytes #  0.6   (0-1.0)  k/uL


 


Eosinophils #  0.5   (0-0.7)  k/uL


 


Basophils #  0.1   (0-0.2)  k/uL


 


Hypochromasia  Slight   


 


PT   11.3  (9.0-12.0)  sec


 


INR   1.1  (<1.2)  


 


APTT   24.1  (22.0-30.0)  sec














Disposition


Clinical Impression: 


 Acute exacerbation of chronic obstructive pulmonary disease





Disposition: ADMITTED AS IP TO THIS HOSP


Referrals: 


[facility],Clinic [Primary Care Provider] - 1-2 days


Time of Disposition: 15:26

## 2023-06-11 LAB
ANION GAP SERPL CALC-SCNC: 12 MMOL/L
BASOPHILS # BLD AUTO: 0 K/UL (ref 0–0.2)
BASOPHILS NFR BLD AUTO: 0 %
BUN SERPL-SCNC: 26 MG/DL (ref 9–20)
CALCIUM SPEC-MCNC: 9.1 MG/DL (ref 8.4–10.2)
CHLORIDE SERPL-SCNC: 95 MMOL/L (ref 98–107)
CO2 SERPL-SCNC: 31 MMOL/L (ref 22–30)
EOSINOPHIL # BLD AUTO: 0 K/UL (ref 0–0.7)
EOSINOPHIL NFR BLD AUTO: 0 %
ERYTHROCYTE [DISTWIDTH] IN BLOOD BY AUTOMATED COUNT: 5.99 M/UL (ref 4.3–5.9)
ERYTHROCYTE [DISTWIDTH] IN BLOOD: 15 % (ref 11.5–15.5)
GLUCOSE SERPL-MCNC: 279 MG/DL (ref 74–99)
HCT VFR BLD AUTO: 52.6 % (ref 39–53)
HGB BLD-MCNC: 16.3 GM/DL (ref 13–17.5)
LYMPHOCYTES # SPEC AUTO: 0.5 K/UL (ref 1–4.8)
LYMPHOCYTES NFR SPEC AUTO: 6 %
MAGNESIUM SPEC-SCNC: 1.9 MG/DL (ref 1.6–2.3)
MCH RBC QN AUTO: 27.1 PG (ref 25–35)
MCHC RBC AUTO-ENTMCNC: 30.9 G/DL (ref 31–37)
MCV RBC AUTO: 87.8 FL (ref 80–100)
MONOCYTES # BLD AUTO: 0.1 K/UL (ref 0–1)
MONOCYTES NFR BLD AUTO: 1 %
NEUTROPHILS # BLD AUTO: 7.6 K/UL (ref 1.3–7.7)
NEUTROPHILS NFR BLD AUTO: 92 %
PLATELET # BLD AUTO: 197 K/UL (ref 150–450)
POTASSIUM SERPL-SCNC: 4.2 MMOL/L (ref 3.5–5.1)
SODIUM SERPL-SCNC: 138 MMOL/L (ref 137–145)
WBC # BLD AUTO: 8.3 K/UL (ref 3.8–10.6)

## 2023-06-11 RX ADMIN — METHYLPREDNISOLONE SODIUM SUCCINATE SCH MG: 125 INJECTION, POWDER, FOR SOLUTION INTRAMUSCULAR; INTRAVENOUS at 11:48

## 2023-06-11 RX ADMIN — METOPROLOL SUCCINATE SCH MG: 25 TABLET, EXTENDED RELEASE ORAL at 01:06

## 2023-06-11 RX ADMIN — IPRATROPIUM BROMIDE AND ALBUTEROL SULFATE SCH ML: .5; 3 SOLUTION RESPIRATORY (INHALATION) at 12:10

## 2023-06-11 RX ADMIN — METHYLPREDNISOLONE SODIUM SUCCINATE SCH MG: 125 INJECTION, POWDER, FOR SOLUTION INTRAMUSCULAR; INTRAVENOUS at 06:03

## 2023-06-11 RX ADMIN — IPRATROPIUM BROMIDE AND ALBUTEROL SULFATE SCH ML: .5; 3 SOLUTION RESPIRATORY (INHALATION) at 19:12

## 2023-06-11 RX ADMIN — LORATADINE SCH MG: 10 TABLET ORAL at 01:06

## 2023-06-11 RX ADMIN — FOLIC ACID SCH MG: 1 TABLET ORAL at 07:25

## 2023-06-11 RX ADMIN — ATORVASTATIN CALCIUM SCH MG: 80 TABLET, FILM COATED ORAL at 01:06

## 2023-06-11 RX ADMIN — FINASTERIDE SCH MG: 5 TABLET, FILM COATED ORAL at 01:06

## 2023-06-11 RX ADMIN — BUDESONIDE AND FORMOTEROL FUMARATE DIHYDRATE SCH PUFF: 80; 4.5 AEROSOL RESPIRATORY (INHALATION) at 09:04

## 2023-06-11 RX ADMIN — BUDESONIDE AND FORMOTEROL FUMARATE DIHYDRATE SCH PUFF: 80; 4.5 AEROSOL RESPIRATORY (INHALATION) at 00:22

## 2023-06-11 RX ADMIN — IPRATROPIUM BROMIDE AND ALBUTEROL SULFATE SCH ML: .5; 3 SOLUTION RESPIRATORY (INHALATION) at 15:54

## 2023-06-11 RX ADMIN — METOPROLOL SUCCINATE SCH MG: 25 TABLET, EXTENDED RELEASE ORAL at 07:27

## 2023-06-11 RX ADMIN — LORATADINE SCH MG: 10 TABLET ORAL at 20:40

## 2023-06-11 RX ADMIN — FUROSEMIDE SCH MG: 10 INJECTION, SOLUTION INTRAMUSCULAR; INTRAVENOUS at 08:45

## 2023-06-11 RX ADMIN — METHYLPREDNISOLONE SODIUM SUCCINATE SCH MG: 125 INJECTION, POWDER, FOR SOLUTION INTRAMUSCULAR; INTRAVENOUS at 17:06

## 2023-06-11 RX ADMIN — ASPIRIN 81 MG CHEWABLE TABLET SCH MG: 81 TABLET CHEWABLE at 20:40

## 2023-06-11 RX ADMIN — IPRATROPIUM BROMIDE AND ALBUTEROL SULFATE SCH ML: .5; 3 SOLUTION RESPIRATORY (INHALATION) at 09:05

## 2023-06-11 RX ADMIN — FINASTERIDE SCH MG: 5 TABLET, FILM COATED ORAL at 20:40

## 2023-06-11 RX ADMIN — METOPROLOL SUCCINATE SCH MG: 25 TABLET, EXTENDED RELEASE ORAL at 20:40

## 2023-06-11 RX ADMIN — CLOPIDOGREL BISULFATE SCH MG: 75 TABLET ORAL at 07:25

## 2023-06-11 RX ADMIN — METHYLPREDNISOLONE SODIUM SUCCINATE SCH MG: 125 INJECTION, POWDER, FOR SOLUTION INTRAMUSCULAR; INTRAVENOUS at 23:52

## 2023-06-11 RX ADMIN — ASPIRIN 81 MG CHEWABLE TABLET SCH MG: 81 TABLET CHEWABLE at 01:06

## 2023-06-11 RX ADMIN — ISOSORBIDE MONONITRATE SCH MG: 30 TABLET, EXTENDED RELEASE ORAL at 07:25

## 2023-06-11 RX ADMIN — TAMSULOSIN HYDROCHLORIDE SCH MG: 0.4 CAPSULE ORAL at 07:25

## 2023-06-11 RX ADMIN — ATORVASTATIN CALCIUM SCH MG: 80 TABLET, FILM COATED ORAL at 20:40

## 2023-06-11 RX ADMIN — BUDESONIDE AND FORMOTEROL FUMARATE DIHYDRATE SCH PUFF: 80; 4.5 AEROSOL RESPIRATORY (INHALATION) at 19:12

## 2023-06-11 RX ADMIN — Medication SCH MG: at 07:25

## 2023-06-11 NOTE — P.PN
Subjective


Progress Note Date: 06/11/23





Patient reports improvement in his breathing





Gen: awake, alert


HEENT: normocephalic, atraumatic, good hearing acuity, moist mucous membranes


Resp: good air exchange, breathing comfortably with no accessory muscle use


CVS: good distal perfusion x 4,


GI: soft, NTTP, ND


: no SPT, no CVAT, gamble catheter not present


MSK: no pitting edema, no clubbing


Neuro: non-focal, moving all extremities


Psych: cooperative, euthymic mood





Hospital course:





78-year-old man with medical history of COPD, asthma, hypertension, 

hyperlipidemia, CAD with recent MI status post PCI, BPH presented for evaluation

of dyspnea.  In the emergency room, patient was afebrile, 128/73, heart rate 78,

92% on room air.  CBC was unremarkable.  Basic metabolic panel showed a CO2 of 

33.  Liver function tests are unremarkable.  Troponin was less than 0.012.  

Lactic acid was elevated at 2.4.  Coags were unremarkable.  Chest x-ray showed 

interstitial prominence, no pleural effusions, no opacities.  EKG showed normal 

sinus rhythm with occasional PACs, deep Q waves in precordial leads consistent 

with old anteroseptal myocardial infarction.








Assessment:





COPD/Asthma exacerbation


Acute on chronic systolic heart failure exacerbation, EF 45%


Hypertension


Hyperlipidemia


CAD


BPH





Plan:





Today, patient is afebrile, 127/68, heart rate 67, 93% on room air


BNP is 730, repeat lactic acid is 2.0


Pro-calcitonin is 0.04


CBC, basic metabolic panel, magnesium ordered for tomorrow


Echocardiogram is pending





Continue Lasix 20 mg IV daily


Solu-Medrol 60 mg IV every 6 hours


Pulmonology consulted


Duo nebs every 6 hours





Patient is DO NOT RESUSCITATE/DO NOT INTUBATE


Daughter is next of kin and designated decision maker








Objective





- Vital Signs


Vital signs: 


                                   Vital Signs











Temp  98.1 F   06/11/23 06:48


 


Pulse  72   06/11/23 09:15


 


Resp  18   06/11/23 06:48


 


BP  127/68   06/11/23 06:48


 


Pulse Ox  97   06/11/23 09:05


 


FiO2      








                                 Intake & Output











 06/10/23 06/11/23 06/11/23





 18:59 06:59 18:59


 


Output Total  1300 


 


Balance  -1300 


 


Weight 54.431 kg 54.431 kg 


 


Output:   


 


  Urine  1300 


 


Other:   


 


  Voiding Method  Indwelling Catheter 














- Labs


CBC & Chem 7: 


                                 06/10/23 13:30





                                 06/10/23 13:30


Labs: 


                  Abnormal Lab Results - Last 24 Hours (Table)











  06/10/23 06/10/23 Range/Units





  13:30 13:30 


 


Carbon Dioxide  33 H   (22-30)  mmol/L


 


Plasma Lactic Acid Td   2.4 H*  (0.7-2.0)  mmol/L

## 2023-06-11 NOTE — P.CNPUL
History of Present Illness


Consult date: 23


Reason for consult: dyspnea, cough, COPD


Chief complaint: Shortness of breath


History of present illness: 





Patient is a pleasant 78-year-old male well-known to me he has a history of COPD

along with coronary artery disease history of MI and stent placement.  Patient 

has hypertension hypertensive cardiovascular disease along with dyslipidemia, 

patient is having increased shortness of breath along with cough which is pr

oductive for sputum production of unknown nature for the last 2 weeks since the 

weather was hot and the temperature up with humidity.  He has been using his 

nebulizer without any significant relief decided to come into the hospital for 

further evaluation his oxygen saturation was 92% on arrival CBC and chemistry 

including troponin was normal, chest x-ray no acute process and fried however 

prominent interstitium seen an EKG has evidence of old MI patient was admitted 

into the hospital and has been placed on bronchodilators and IV steroids and 

continuation of his home medications appears to be tolerating well however since

yesterday reveals significant improvement in breathing





Review of Systems


All systems: negative





Past Medical History


Past Medical History: Asthma, COPD, Hypertension, Myocardial Infarction (MI), 

Pneumonia, Prostate Disorder


Additional Past Medical History / Comment(s): allergies, "bladder does not 

empty" patient has chronic catheter.


Last Myocardial Infarction Date:: 2021


History of Any Multi-Drug Resistant Organisms: None Reported


Past Surgical History: Appendectomy, Back Surgery, Heart Catheterization With 

Stent, Tonsillectomy


Additional Past Surgical History / Comment(s): cervical


Past Anesthesia/Blood Transfusion Reactions: No Reported Reaction


Additional Past Anesthesia/Blood Transfusion Reaction / Comment(s): Pt is 

clausterphobic.


Date of Last Stent Placement:: 21


Past Psychological History: No Psychological Hx Reported


Additional Psychological History / Comment(s): Pt resides with his daughter and 

her spouse and her children.  Pt uses a cane  or walker to ambulate. He does not

drive, but states getting rides is not a problem for him.  He is otherwise 

independent.  Pt served in the [employer].


Smoking Status: Former smoker


Past Alcohol Use History: None Reported


Additional Past Alcohol Use History / Comment(s): Pt started smoking in  and

quit in .


Past Drug Use History: None Reported





- Past Family History


  ** Mother


Family Medical History: No Reported History


Additional Family Medical History / Comment(s): Mother was healthy and lived to 

be 92 yrs old.





  ** Father


History Unknown: Yes


Additional Family Medical History / Comment(s): Father  at the age of 68yrs,

pt unable to say from what.





Medications and Allergies


                                Home Medications











 Medication  Instructions  Recorded  Confirmed  Type


 


Albuterol Inhaler [Ventolin Hfa 2 puff INHALATION RT-Q6H PRN 12/16/21 06/10/23 

History





Inhaler]    


 


Albuterol Nebulized [Ventolin 2.5 mg INHALATION RT-Q4H PRN 12/16/21 06/10/23 

History





Nebulized]    


 


Cetirizine HCl [Zyrtec] 10 mg PO HS 12/16/21 06/10/23 History


 


Finasteride [Proscar] 5 mg PO HS 12/16/21 06/10/23 History


 


Fluticasone Nasal Spray [Flonase 1 spr EA NOSTRIL DAILY 12/16/21 06/10/23 

History





Nasal Spray]    


 


Lidocaine 5% Patch [Lidoderm 5% 1 patch TRANSDERM DAILY PRN 12/16/21 06/10/23 

History





Patch]    


 


Diclofenac Sodium Gel [Voltaren 1 applic TOPICAL BID PRN 12/30/21 06/10/23 

History





Gel]    


 


Nitroglycerin Sl Tabs [Nitrostat] 0.4 mg SUBLINGUAL Q5M PRN #30 tab 01/02/22 

06/10/23 Rx


 


Tamsulosin [Flomax] 0.4 mg PO DAILY #30 capsule 01/02/22 06/10/23 Rx


 


Atorvastatin [Lipitor] 80 mg PO HS 09/26/22 06/10/23 History


 


Clopidogrel [Plavix] 75 mg PO DAILY 09/26/22 06/10/23 History


 


Folic Acid 1 mg PO DAILY 09/26/22 06/10/23 History


 


Metoprolol Succinate (ER) [Toprol 25 mg PO BID 09/26/22 06/10/23 History





XL]    


 


Thiamine [Vitamin B-1] 100 mg PO DAILY 09/26/22 06/10/23 History


 


lisinopriL [Zestril] 5 mg PO BID 09/26/22 06/10/23 History


 


Isosorbide Mononitrate ER [Imdur] 30 mg PO DAILY 30 Days #30 tab 09/27/22 

06/10/23 Rx


 


Aspirin 81 mg PO HS 04/17/23 06/10/23 History


 


Furosemide [Lasix] 20 mg PO DAILY PRN 04/17/23 06/10/23 History


 


Multivit-Min/FA/Lycopen/Lutein 1 tab PO DAILY 04/17/23 06/10/23 History





[Centrum Silver Men Tablet]    


 


Fluticasone Propion/Salmeterol 1 puff INHALATION RT-BID 06/10/23 06/10/23 

History





[Wixela 250-50 Inhub]    








                                    Allergies











Allergy/AdvReac Type Severity Reaction Status Date / Time


 


Influenza Virus Vaccines Allergy  Unknown Verified 06/10/23 15:12














Physical Exam


Vitals: 


                                   Vital Signs











  Temp Pulse Pulse Resp BP BP Pulse Ox


 


 23 12:20   80     


 


 23 12:10   79     


 


 23 09:15   72     


 


 23 09:05   74      97


 


 23 06:48  98.1 F   67  18   127/68  93 L


 


 23 01:32  98.3 F   88  18   125/66  93 L


 


 06/10/23 21:59   88     


 


 06/10/23 21:48   89     


 


 06/10/23 18:50  97.9 F   73  22   158/85  94 L


 


 06/10/23 17:39   79   18  137/79   92 L


 


 06/10/23 16:12   75   18  144/81   91 L


 


 06/10/23 15:00   78   20  128/73   92 L


 


 06/10/23 14:47   67   18   


 


 06/10/23 14:34   60   18   








                                Intake and Output











 06/10/23 06/11/23 06/11/23





 22:59 06:59 14:59


 


Output Total  1300 


 


Balance  -1300 


 


Output:   


 


  Urine  1300 


 


Other:   


 


  Voiding Method Indwelling Catheter  


 


  Weight 54.431 kg  














- Constitutional


General appearance: average body habitus, cooperative, disheveled





- EENT


Eyes: EOMI, PERRLA


ENT: normal oropharynx


Ears: bilateral: normal





- Neck


Neck: normal ROM


Carotids: bilateral: upstroke normal


Thyroid: bilateral: normal size





- Respiratory


Respiratory: bilateral: diminished





- Cardiovascular


Rhythm: regular


Heart sounds: normal: S1, S2





- Gastrointestinal


General gastrointestinal: decreased bowel sounds, soft





- Integumentary


Integumentary: normal turgor





- Neurologic


Neurologic: CNII-XII intact





- Musculoskeletal


Musculoskeletal: gait normal, generalized weakness, strength equal bilaterally





- Psychiatric


Psychiatric: A&O x's 3, appropriate affect, intact judgment & insight





Results





- Laboratory Findings


CBC and BMP: 


                                 23 09:46





                                 23 09:46


PT/INR, D-dimer











PT  11.3 sec (9.0-12.0)   06/10/23  13:30    


 


INR  1.1  (<1.2)   06/10/23  13:30    








Abnormal lab findings: 


                                  Abnormal Labs











  06/10/23 06/10/23 06/11/23





  13:30 13:30 09:46


 


RBC    5.99 H


 


MCHC    30.9 L


 


Lymphocytes #    0.5 L


 


Chloride   


 


Carbon Dioxide  33 H  


 


BUN   


 


Glucose   


 


Plasma Lactic Acid Td   2.4 H* 














  23





  09:46


 


RBC 


 


MCHC 


 


Lymphocytes # 


 


Chloride  95 L


 


Carbon Dioxide  31 H


 


BUN  26 H


 


Glucose  279 H


 


Plasma Lactic Acid Td 














- Diagnostic Findings


Chest x-ray: report reviewed, image reviewed





Assessment and Plan


Assessment: 





Acute exacerbation of COPD





Coronary artery disease with history of PCI





History of MI





Hypertension and hypertensive cardiovascular disease





BPH


Plan: 





Continue bronchodilator, IV steroids follow clinical course closely however in 

next 24 hours can be switched to by mouth and if remains stable can be 

discharged to follow-up with outpatient





We will sign off for now and reevaluate as outpatient


Time with Patient: Greater than 30

## 2023-06-12 VITALS — HEART RATE: 78 BPM

## 2023-06-12 VITALS — SYSTOLIC BLOOD PRESSURE: 111 MMHG | DIASTOLIC BLOOD PRESSURE: 67 MMHG

## 2023-06-12 VITALS — RESPIRATION RATE: 16 BRPM | TEMPERATURE: 97.5 F

## 2023-06-12 LAB
ANION GAP SERPL CALC-SCNC: 6 MMOL/L
BASOPHILS # BLD AUTO: 0 K/UL (ref 0–0.2)
BASOPHILS NFR BLD AUTO: 0 %
BUN SERPL-SCNC: 32 MG/DL (ref 9–20)
CALCIUM SPEC-MCNC: 9.2 MG/DL (ref 8.4–10.2)
CHLORIDE SERPL-SCNC: 97 MMOL/L (ref 98–107)
CO2 SERPL-SCNC: 37 MMOL/L (ref 22–30)
EOSINOPHIL # BLD AUTO: 0 K/UL (ref 0–0.7)
EOSINOPHIL NFR BLD AUTO: 0 %
ERYTHROCYTE [DISTWIDTH] IN BLOOD BY AUTOMATED COUNT: 6.18 M/UL (ref 4.3–5.9)
ERYTHROCYTE [DISTWIDTH] IN BLOOD: 15.1 % (ref 11.5–15.5)
GLUCOSE SERPL-MCNC: 171 MG/DL (ref 74–99)
HCT VFR BLD AUTO: 53.9 % (ref 39–53)
HGB BLD-MCNC: 16.4 GM/DL (ref 13–17.5)
LYMPHOCYTES # SPEC AUTO: 0.6 K/UL (ref 1–4.8)
LYMPHOCYTES NFR SPEC AUTO: 4 %
MAGNESIUM SPEC-SCNC: 2.2 MG/DL (ref 1.6–2.3)
MCH RBC QN AUTO: 26.5 PG (ref 25–35)
MCHC RBC AUTO-ENTMCNC: 30.4 G/DL (ref 31–37)
MCV RBC AUTO: 87.1 FL (ref 80–100)
MONOCYTES # BLD AUTO: 0.3 K/UL (ref 0–1)
MONOCYTES NFR BLD AUTO: 2 %
NEUTROPHILS # BLD AUTO: 12.7 K/UL (ref 1.3–7.7)
NEUTROPHILS NFR BLD AUTO: 93 %
PLATELET # BLD AUTO: 230 K/UL (ref 150–450)
POTASSIUM SERPL-SCNC: 5.1 MMOL/L (ref 3.5–5.1)
SODIUM SERPL-SCNC: 140 MMOL/L (ref 137–145)
WBC # BLD AUTO: 13.7 K/UL (ref 3.8–10.6)

## 2023-06-12 RX ADMIN — METHYLPREDNISOLONE SODIUM SUCCINATE SCH MG: 125 INJECTION, POWDER, FOR SOLUTION INTRAMUSCULAR; INTRAVENOUS at 12:18

## 2023-06-12 RX ADMIN — Medication SCH MG: at 08:17

## 2023-06-12 RX ADMIN — IPRATROPIUM BROMIDE AND ALBUTEROL SULFATE SCH ML: .5; 3 SOLUTION RESPIRATORY (INHALATION) at 11:57

## 2023-06-12 RX ADMIN — TAMSULOSIN HYDROCHLORIDE SCH MG: 0.4 CAPSULE ORAL at 08:17

## 2023-06-12 RX ADMIN — METHYLPREDNISOLONE SODIUM SUCCINATE SCH MG: 125 INJECTION, POWDER, FOR SOLUTION INTRAMUSCULAR; INTRAVENOUS at 06:48

## 2023-06-12 RX ADMIN — CLOPIDOGREL BISULFATE SCH MG: 75 TABLET ORAL at 08:17

## 2023-06-12 RX ADMIN — METOPROLOL SUCCINATE SCH MG: 25 TABLET, EXTENDED RELEASE ORAL at 08:17

## 2023-06-12 RX ADMIN — ISOSORBIDE MONONITRATE SCH MG: 30 TABLET, EXTENDED RELEASE ORAL at 08:17

## 2023-06-12 RX ADMIN — IPRATROPIUM BROMIDE AND ALBUTEROL SULFATE SCH ML: .5; 3 SOLUTION RESPIRATORY (INHALATION) at 08:51

## 2023-06-12 RX ADMIN — FOLIC ACID SCH MG: 1 TABLET ORAL at 08:17

## 2023-06-12 RX ADMIN — BUDESONIDE AND FORMOTEROL FUMARATE DIHYDRATE SCH PUFF: 80; 4.5 AEROSOL RESPIRATORY (INHALATION) at 08:51

## 2023-06-13 NOTE — CA
Transthoracic Echo Report 

 Name: Dejuan Lemos 

 MRN:    [id number] 

 Age:    78     Gender:     M 

 

 :    1945 

 Exam Date:     2023 14:13 

 Exam Location: [facility] 

 Ht (in):     69     Wt (lb):     120 

 Ordering Physician:        Richard Tafoya MD 

 Attending/Referring Phys: 

 Technician         Av Willis 

 Procedure CPT: 

 Indications:       HF exac 

 

 Cardiac Hx: 

 Technical Quality:      Fair 

 Contrast 1:                                Total Dose (mL): 

 Contrast 2:                                Total Dose (mL): 

 

 MEASUREMENTS  (Male / Female) Normal Values 

 2D ECHO 

 LV Diastolic Diameter PLAX        4.6 cm                4.2 - 5.9 / 3.9 - 5.3 cm 

 LV Systolic Diameter PLAX         3.1 cm                 

 IVS Diastolic Thickness           0.9 cm                0.6 - 1.0 / 0.6 - 0.9 cm 

 LVPW Diastolic Thickness          1.2 cm                0.6 - 1.0 / 0.6 - 0.9 cm 

 LV Relative Wall Thickness        0.5                    

 RV Internal Dim ED PLAX           2.4 cm                 

 LVOT Diameter                     2.0 cm                 

 Aortic Root Diameter              3.2 cm                 

 LA Systolic Diameter LX           2.5 cm                3.0 - 4.0 / 2.7 - 3.8 cm 

 LV Diastolic Volume MOD BP        67.8 cm???              67 - 155 / 56 - 104 cm??? 

 LV Systolic Volume MOD BP         36.8 cm???              22 - 58 / 19 - 49 cm??? 

 LV Ejection Fraction MOD BP       45.8 %                >= 55  % 

 LV Diastolic Volume MOD 4C        69.4 cm???               

 LV Systolic Volume MOD 4C         40.3 cm???               

 LV Ejection Fraction MOD 4C       42.0 %                 

 LV Diastolic Length 4C            7.3 cm                 

 LV Systolic Length 4C             6.1 cm                 

 LV Diastolic Volume MOD 2C        62.5 cm???               

 LV Systolic Volume MOD 2C         32.9 cm???               

 LV Ejection Fraction MOD 2C       47.3 %                 

 LV Diastolic Length 2C            6.8 cm                 

 LV Systolic Length 2C             6.3 cm                 

 LA Volume                         33.2 cm???              18 - 58 / 22 - 52 cm??? 

 

 DOPPLER 

 LVOT Peak Velocity                109.9 cm/s             

 LVOT Peak Gradient                4.8 mmHg               

 MV Peak Velocity                  127.6 cm/s             

 MV Peak Gradient                  6.5 mmHg               

 MV Mean Velocity                  57.9 cm/s              

 MV Mean Gradient                  1.7 mmHg               

 MV Velocity Time Integral         29.5 cm                

 Mitral E Point Velocity           81.0 cm/s              

 Mitral A Point Velocity           115.5 cm/s             

 Mitral E to A Ratio               0.7                    

 MV Deceleration Time              184.1 ms               

 TR Peak Velocity                  170.5 cm/s             

 TR Peak Gradient                  11.6 mmHg              

 Right Ventricular Systolic Press  16.8 mmHg              

 

 

 FINDINGS 

 Left Ventricle 

 Left ventricular ejection fraction is estimated at 40-45%.  There is  

 anteroapical and anteroseptal hypokinesis.left ventricular cavity size normal. 

 

 Right Ventricle 

 Normal right ventricular size. 

 

 Right Atrium 

 Normal right atrial size. 

 

 Left Atrium 

 Normal left atrial size. 

 

 Mitral Valve 

 Mitral annular calcification. Trace to mild mitral regurgitation. 

 

 Aortic Valve 

 Trileaflet aortic valve. Diffuse thickening (sclerosis) of the aortic valve  

 cusps without reduced excursion. 

 

 Tricuspid Valve 

 Structurally normal tricuspid valve.mild tricuspid regurgitation. 

 

 Pulmonic Valve 

 Pulmonic valve not well visualized. Trace pulmonic regurgitation. 

 

 Pericardium 

 Not well visualized. 

 

 Aorta 

 Aortic root and proximal ascending aorta not well visualized. 

 

 CONCLUSIONS 

 1.  Moderately impaired left ventricular systolic function with segmental wall  

 motion abnormality, consistent with CAD 

 2.  Mild mitral, and tricuspid regurgitation with no pulmonary hypertension 

 Previewed by:  

 Dr. Yvan Lin MD 

 (Electronically Signed) 

 Final Date:      2023 11:51

## 2023-08-18 ENCOUNTER — HOSPITAL ENCOUNTER (OUTPATIENT)
Dept: HOSPITAL 47 - LAB | Age: 78
Discharge: HOME | End: 2023-08-18
Attending: PSYCHIATRY & NEUROLOGY
Payer: OTHER GOVERNMENT

## 2023-08-18 DIAGNOSIS — Z79.899: ICD-10-CM

## 2023-08-18 DIAGNOSIS — R26.0: Primary | ICD-10-CM

## 2023-08-18 LAB
CK SERPL-CCNC: 93 U/L (ref 55–170)
VIT B12 SERPL-MCNC: 963 PG/ML (ref 200–944)

## 2023-08-18 PROCEDURE — 83036 HEMOGLOBIN GLYCOSYLATED A1C: CPT

## 2023-08-18 PROCEDURE — 82607 VITAMIN B-12: CPT

## 2023-08-18 PROCEDURE — 82550 ASSAY OF CK (CPK): CPT

## 2023-08-18 PROCEDURE — 82652 VIT D 1 25-DIHYDROXY: CPT

## 2023-09-09 ENCOUNTER — HOSPITAL ENCOUNTER (OUTPATIENT)
Dept: HOSPITAL 47 - LABWHC1 | Age: 78
Discharge: HOME | End: 2023-09-09
Attending: INTERNAL MEDICINE
Payer: OTHER GOVERNMENT

## 2023-09-09 DIAGNOSIS — E78.2: Primary | ICD-10-CM

## 2023-09-09 LAB
ALT SERPL-CCNC: 28 U/L (ref 10–49)
AST SERPL-CCNC: 24 U/L (ref 14–35)
CHOLEST SERPL-MCNC: 109 MG/DL (ref 0–200)
HDLC SERPL-MCNC: 43.7 MG/DL (ref 40–60)
LDLC SERPL CALC-MCNC: 49.6 MG/DL (ref 0–131)
TRIGL SERPL-MCNC: 78.7 MG/DL (ref 0–149)
VLDLC SERPL CALC-MCNC: 15.74 MG/DL (ref 5–40)

## 2023-09-09 PROCEDURE — 84460 ALANINE AMINO (ALT) (SGPT): CPT

## 2023-09-09 PROCEDURE — 80061 LIPID PANEL: CPT

## 2023-09-09 PROCEDURE — 84450 TRANSFERASE (AST) (SGOT): CPT

## 2023-09-09 PROCEDURE — 36415 COLL VENOUS BLD VENIPUNCTURE: CPT

## 2023-12-02 ENCOUNTER — HOSPITAL ENCOUNTER (INPATIENT)
Dept: HOSPITAL 47 - EC | Age: 78
LOS: 5 days | Discharge: HOME HEALTH | DRG: 871 | End: 2023-12-07
Attending: HOSPITALIST | Admitting: HOSPITALIST
Payer: OTHER GOVERNMENT

## 2023-12-02 VITALS — BODY MASS INDEX: 18.6 KG/M2

## 2023-12-02 DIAGNOSIS — Z79.84: ICD-10-CM

## 2023-12-02 DIAGNOSIS — J44.9: ICD-10-CM

## 2023-12-02 DIAGNOSIS — Z51.5: ICD-10-CM

## 2023-12-02 DIAGNOSIS — J44.1: ICD-10-CM

## 2023-12-02 DIAGNOSIS — I50.9: ICD-10-CM

## 2023-12-02 DIAGNOSIS — I11.0: ICD-10-CM

## 2023-12-02 DIAGNOSIS — D75.1: ICD-10-CM

## 2023-12-02 DIAGNOSIS — Z79.02: ICD-10-CM

## 2023-12-02 DIAGNOSIS — Z95.5: ICD-10-CM

## 2023-12-02 DIAGNOSIS — J18.9: ICD-10-CM

## 2023-12-02 DIAGNOSIS — Z99.81: ICD-10-CM

## 2023-12-02 DIAGNOSIS — Z79.82: ICD-10-CM

## 2023-12-02 DIAGNOSIS — E11.65: ICD-10-CM

## 2023-12-02 DIAGNOSIS — E87.8: ICD-10-CM

## 2023-12-02 DIAGNOSIS — Z20.822: ICD-10-CM

## 2023-12-02 DIAGNOSIS — E78.5: ICD-10-CM

## 2023-12-02 DIAGNOSIS — J96.01: ICD-10-CM

## 2023-12-02 DIAGNOSIS — A41.9: Primary | ICD-10-CM

## 2023-12-02 DIAGNOSIS — R65.20: ICD-10-CM

## 2023-12-02 DIAGNOSIS — Z79.899: ICD-10-CM

## 2023-12-02 DIAGNOSIS — N40.0: ICD-10-CM

## 2023-12-02 DIAGNOSIS — I25.10: ICD-10-CM

## 2023-12-02 DIAGNOSIS — I25.2: ICD-10-CM

## 2023-12-02 DIAGNOSIS — J44.0: ICD-10-CM

## 2023-12-02 LAB
ALBUMIN SERPL-MCNC: 4.2 G/DL (ref 3.5–5)
ALP SERPL-CCNC: 111 U/L (ref 38–126)
ALT SERPL-CCNC: 39 U/L (ref 4–49)
ANION GAP SERPL CALC-SCNC: 15 MMOL/L
APTT BLD: 25.5 SEC (ref 22–30)
AST SERPL-CCNC: 55 U/L (ref 17–59)
BASOPHILS # BLD AUTO: 0 K/UL (ref 0–0.2)
BASOPHILS NFR BLD AUTO: 0 %
BUN SERPL-SCNC: 14 MG/DL (ref 9–20)
CALCIUM SPEC-MCNC: 9.4 MG/DL (ref 8.4–10.2)
CHLORIDE SERPL-SCNC: 96 MMOL/L (ref 98–107)
CO2 SERPL-SCNC: 28 MMOL/L (ref 22–30)
EOSINOPHIL # BLD AUTO: 0.1 K/UL (ref 0–0.7)
EOSINOPHIL NFR BLD AUTO: 1 %
ERYTHROCYTE [DISTWIDTH] IN BLOOD BY AUTOMATED COUNT: 6.25 M/UL (ref 4.3–5.9)
ERYTHROCYTE [DISTWIDTH] IN BLOOD: 14.3 % (ref 11.5–15.5)
GLUCOSE SERPL-MCNC: 208 MG/DL (ref 74–99)
HCT VFR BLD AUTO: 55.3 % (ref 39–53)
HGB BLD-MCNC: 17.9 GM/DL (ref 13–17.5)
INR PPP: 1 (ref ?–1.2)
LYMPHOCYTES # SPEC AUTO: 0.4 K/UL (ref 1–4.8)
LYMPHOCYTES NFR SPEC AUTO: 3 %
MCH RBC QN AUTO: 28.6 PG (ref 25–35)
MCHC RBC AUTO-ENTMCNC: 32.3 G/DL (ref 31–37)
MCV RBC AUTO: 88.5 FL (ref 80–100)
MONOCYTES # BLD AUTO: 1 K/UL (ref 0–1)
MONOCYTES NFR BLD AUTO: 7 %
NEUTROPHILS # BLD AUTO: 12 K/UL (ref 1.3–7.7)
NEUTROPHILS NFR BLD AUTO: 88 %
PLATELET # BLD AUTO: 261 K/UL (ref 150–450)
POTASSIUM SERPL-SCNC: 4.1 MMOL/L (ref 3.5–5.1)
PROT SERPL-MCNC: 7.6 G/DL (ref 6.3–8.2)
PT BLD: 11.3 SEC (ref 10–12.5)
SODIUM SERPL-SCNC: 139 MMOL/L (ref 137–145)
WBC # BLD AUTO: 13.6 K/UL (ref 3.8–10.6)

## 2023-12-02 PROCEDURE — 96375 TX/PRO/DX INJ NEW DRUG ADDON: CPT

## 2023-12-02 PROCEDURE — 84145 PROCALCITONIN (PCT): CPT

## 2023-12-02 PROCEDURE — 94640 AIRWAY INHALATION TREATMENT: CPT

## 2023-12-02 PROCEDURE — 85610 PROTHROMBIN TIME: CPT

## 2023-12-02 PROCEDURE — 83605 ASSAY OF LACTIC ACID: CPT

## 2023-12-02 PROCEDURE — 93005 ELECTROCARDIOGRAM TRACING: CPT

## 2023-12-02 PROCEDURE — 85027 COMPLETE CBC AUTOMATED: CPT

## 2023-12-02 PROCEDURE — 83735 ASSAY OF MAGNESIUM: CPT

## 2023-12-02 PROCEDURE — 80053 COMPREHEN METABOLIC PANEL: CPT

## 2023-12-02 PROCEDURE — 96361 HYDRATE IV INFUSION ADD-ON: CPT

## 2023-12-02 PROCEDURE — 36415 COLL VENOUS BLD VENIPUNCTURE: CPT

## 2023-12-02 PROCEDURE — 87205 SMEAR GRAM STAIN: CPT

## 2023-12-02 PROCEDURE — 81003 URINALYSIS AUTO W/O SCOPE: CPT

## 2023-12-02 PROCEDURE — 85730 THROMBOPLASTIN TIME PARTIAL: CPT

## 2023-12-02 PROCEDURE — 94760 N-INVAS EAR/PLS OXIMETRY 1: CPT

## 2023-12-02 PROCEDURE — 87636 SARSCOV2 & INF A&B AMP PRB: CPT

## 2023-12-02 PROCEDURE — 83036 HEMOGLOBIN GLYCOSYLATED A1C: CPT

## 2023-12-02 PROCEDURE — 96365 THER/PROPH/DIAG IV INF INIT: CPT

## 2023-12-02 PROCEDURE — 94667 MNPJ CHEST WALL 1ST: CPT

## 2023-12-02 PROCEDURE — 85025 COMPLETE CBC W/AUTO DIFF WBC: CPT

## 2023-12-02 PROCEDURE — 96367 TX/PROPH/DG ADDL SEQ IV INF: CPT

## 2023-12-02 PROCEDURE — 87040 BLOOD CULTURE FOR BACTERIA: CPT

## 2023-12-02 PROCEDURE — 87070 CULTURE OTHR SPECIMN AEROBIC: CPT

## 2023-12-02 PROCEDURE — 71045 X-RAY EXAM CHEST 1 VIEW: CPT

## 2023-12-02 PROCEDURE — 96366 THER/PROPH/DIAG IV INF ADDON: CPT

## 2023-12-02 PROCEDURE — 99291 CRITICAL CARE FIRST HOUR: CPT

## 2023-12-02 PROCEDURE — 71046 X-RAY EXAM CHEST 2 VIEWS: CPT

## 2023-12-02 PROCEDURE — 87449 NOS EACH ORGANISM AG IA: CPT

## 2023-12-02 RX ADMIN — METHYLPREDNISOLONE SODIUM SUCCINATE SCH MG: 125 INJECTION, POWDER, FOR SOLUTION INTRAMUSCULAR; INTRAVENOUS at 23:10

## 2023-12-02 RX ADMIN — CEFAZOLIN SCH: 330 INJECTION, POWDER, FOR SOLUTION INTRAMUSCULAR; INTRAVENOUS at 23:12

## 2023-12-02 RX ADMIN — CEFAZOLIN SCH MLS/HR: 330 INJECTION, POWDER, FOR SOLUTION INTRAMUSCULAR; INTRAVENOUS at 17:43

## 2023-12-02 NOTE — XR
EXAMINATION TYPE: XR chest 2V

 

DATE OF EXAM: 12/2/2023 6:26 PM

 

CLINICAL INDICATION:Male, 78 years old with history of Fever; [facility]

 

COMPARISON: 6/10/2023

 

TECHNIQUE: XR chest 2V. Frontal PA and lateral views of the chest.

 

FINDINGS: 

Lines/Tubes:

EKG leads overlie the chest.  No indwelling lines are seen.

 

Heart/mediastinum: Cardiomediastinal silhouette is well defined.  Heart appears mildly enlarged. At l
east one coronary stent can be seen. Mediastinum appears normal.

 

Pulmonary vascularity: Mild pulmonary vascular congestion. Increased interstitial markings can be see
n with edema or pneumonitis, with an element of chronic change possible.

 

Lungs/Pleura: Vague patch of opacity suggested over the right upper lobe and hazy opacity at the left
 lung base, could relate to emerging infiltrates. Costophrenic angles are slightly blunted, there cou
ld be small effusions. No visualized pneumothorax. 

 

Musculoskeletal: No acute osseous abnormality demonstrated in the limits of the exam.  Degenerative c
hanges of the spine and shoulders. Partially seen fusion hardware in the lower cervical spine.

 

Other findings: None.

 

IMPRESSION: 

1.  Mild cardiomegaly with mild pulmonary vascular congestion.

2.  Mildly prominent interstitial lung markings, could relate to edema or pneumonitis, with an elemen
t of chronic change possible.

3.  Vague patch of opacity suggested over the right upper lobe and hazy opacity at the left lung base
, could relate to emerging infiltrates. Correlate clinically for infectious or inflammatory process.

 

FOLLOW-UP: Follow-up as clinically warranted.

## 2023-12-03 LAB
GLUCOSE BLD-MCNC: 119 MG/DL (ref 70–110)
GLUCOSE BLD-MCNC: 171 MG/DL (ref 70–110)
GLUCOSE BLD-MCNC: 241 MG/DL (ref 70–110)
GLUCOSE BLD-MCNC: 245 MG/DL (ref 70–110)
GLUCOSE UR QL: (no result)
PH UR: 5.5 [PH] (ref 5–8)
SP GR UR: <1.005 (ref 1–1.03)
UROBILINOGEN UR QL STRIP: <2 MG/DL (ref ?–2)

## 2023-12-03 RX ADMIN — CEFAZOLIN SCH MLS/HR: 330 INJECTION, POWDER, FOR SOLUTION INTRAMUSCULAR; INTRAVENOUS at 11:37

## 2023-12-03 RX ADMIN — IPRATROPIUM BROMIDE AND ALBUTEROL SULFATE SCH ML: .5; 3 SOLUTION RESPIRATORY (INHALATION) at 08:46

## 2023-12-03 RX ADMIN — METHYLPREDNISOLONE SODIUM SUCCINATE SCH MG: 125 INJECTION, POWDER, FOR SOLUTION INTRAMUSCULAR; INTRAVENOUS at 18:26

## 2023-12-03 RX ADMIN — ENOXAPARIN SODIUM SCH MG: 40 INJECTION SUBCUTANEOUS at 11:36

## 2023-12-03 RX ADMIN — INSULIN ASPART SCH UNIT: 100 INJECTION, SOLUTION INTRAVENOUS; SUBCUTANEOUS at 18:27

## 2023-12-03 RX ADMIN — IPRATROPIUM BROMIDE AND ALBUTEROL SULFATE SCH ML: .5; 3 SOLUTION RESPIRATORY (INHALATION) at 20:50

## 2023-12-03 RX ADMIN — CEFAZOLIN SCH MLS/HR: 330 INJECTION, POWDER, FOR SOLUTION INTRAMUSCULAR; INTRAVENOUS at 18:27

## 2023-12-03 RX ADMIN — METHYLPREDNISOLONE SODIUM SUCCINATE SCH MG: 125 INJECTION, POWDER, FOR SOLUTION INTRAMUSCULAR; INTRAVENOUS at 23:25

## 2023-12-03 RX ADMIN — ASPIRIN 81 MG CHEWABLE TABLET SCH MG: 81 TABLET CHEWABLE at 20:47

## 2023-12-03 RX ADMIN — TAMSULOSIN HYDROCHLORIDE SCH MG: 0.4 CAPSULE ORAL at 11:37

## 2023-12-03 RX ADMIN — ISOSORBIDE MONONITRATE SCH MG: 30 TABLET, EXTENDED RELEASE ORAL at 11:36

## 2023-12-03 RX ADMIN — METHYLPREDNISOLONE SODIUM SUCCINATE SCH MG: 125 INJECTION, POWDER, FOR SOLUTION INTRAMUSCULAR; INTRAVENOUS at 12:15

## 2023-12-03 RX ADMIN — METOPROLOL SUCCINATE SCH MG: 25 TABLET, EXTENDED RELEASE ORAL at 11:36

## 2023-12-03 RX ADMIN — ATORVASTATIN CALCIUM SCH MG: 80 TABLET, FILM COATED ORAL at 20:47

## 2023-12-03 RX ADMIN — INSULIN ASPART SCH UNIT: 100 INJECTION, SOLUTION INTRAVENOUS; SUBCUTANEOUS at 20:48

## 2023-12-03 RX ADMIN — LORATADINE SCH MG: 10 TABLET ORAL at 20:47

## 2023-12-03 RX ADMIN — METHYLPREDNISOLONE SODIUM SUCCINATE SCH MG: 125 INJECTION, POWDER, FOR SOLUTION INTRAMUSCULAR; INTRAVENOUS at 06:32

## 2023-12-03 RX ADMIN — FLUTICASONE PROPIONATE SCH SPRAY: 50 SPRAY, METERED NASAL at 12:16

## 2023-12-03 RX ADMIN — BUDESONIDE AND FORMOTEROL FUMARATE DIHYDRATE SCH PUFF: 80; 4.5 AEROSOL RESPIRATORY (INHALATION) at 20:50

## 2023-12-03 RX ADMIN — METOPROLOL SUCCINATE SCH MG: 25 TABLET, EXTENDED RELEASE ORAL at 20:47

## 2023-12-03 RX ADMIN — AZITHROMYCIN SCH MG: 500 TABLET, FILM COATED ORAL at 20:47

## 2023-12-03 RX ADMIN — INSULIN ASPART SCH UNIT: 100 INJECTION, SOLUTION INTRAVENOUS; SUBCUTANEOUS at 12:17

## 2023-12-03 RX ADMIN — FOLIC ACID SCH MG: 1 TABLET ORAL at 12:15

## 2023-12-03 RX ADMIN — BUDESONIDE AND FORMOTEROL FUMARATE DIHYDRATE SCH PUFF: 80; 4.5 AEROSOL RESPIRATORY (INHALATION) at 08:46

## 2023-12-03 RX ADMIN — INSULIN ASPART SCH: 100 INJECTION, SOLUTION INTRAVENOUS; SUBCUTANEOUS at 06:25

## 2023-12-03 RX ADMIN — IPRATROPIUM BROMIDE AND ALBUTEROL SULFATE SCH ML: .5; 3 SOLUTION RESPIRATORY (INHALATION) at 16:35

## 2023-12-03 RX ADMIN — Medication SCH MG: at 11:36

## 2023-12-03 RX ADMIN — IPRATROPIUM BROMIDE AND ALBUTEROL SULFATE SCH ML: .5; 3 SOLUTION RESPIRATORY (INHALATION) at 12:54

## 2023-12-03 RX ADMIN — LIDOCAINE SCH: 4 PATCH TOPICAL at 12:16

## 2023-12-03 NOTE — P.PN
Subjective


Progress Note Date: 12/03/23








Hospital course:





Patient is a very pleasant 78-year-old male with a past medical history of CAD 

with stents, hypertension, hyperlipidemia, congestive heart failure, type II 

non-insulin-dependent diabetes mellitus, BPH, and COPD not on home oxygen 

dependent.  He presented to the emergency department on 12/2/23 secondary to 

reports of difficulty breathing.  Patient reports progressively worsening 

shortness of breath 1 week associated with worsening of his nonproductive 

chronic cough, fevers, and chills.  He underwent full evaluation in the 

emergency department.  Upon arrival vital signs show patient to be tachycardic 

with heart rate in the 125, temp 100.4, respiratory rate 26, blood pressure 

122/64, and SpO2 of 93% on room air.  EKG completed showing sinus tachycardia at

124 bpm.  Chest x-ray completed in radiology report reviewed showing mild 

cardiomegaly with mild pulmonary vascular congestion, mildly prominent 

interstitial lung markings concerning for pulmonary edema versus pneumonitis, 

and aphasic patch of opacity suggested over the right upper lobe and hazy opacit

y of the left lung base concerning for emerging infiltrates suspect infectious 

or inflammatory process.  Labs completed and reviewed.  CBC showing leukocytosis

with WBC count of 13.6 and polycythemia with elevated hemoglobin of 17.9.  

Coagulation profile normal findings.  BMP showing mild hypochloremia with 

chloride of 96 otherwise normal findings.  Glucose slightly elevated at 208.  

Initial lactic acid 3.1.  Liver profile normal findings.  Influenza A, influenza

B, RSV, Covid PCR negative.  Patient was provided with IV fluids, antibiotics 

with azithromycin and Rocephin, steroids and nebulizer treatments.  Patient 

admitted under our services with consultation to pulmonology.





Physical exam:





Vital signs reviewed and stable. 


General: Nontoxic, no distress and appears stated age.  


Derm: Skin warm and dry, normal coloration for ethnicity.


Head: Atraumatic, normocephalic and symmetric.  


Eyes: EOMs intact, no lid lag, and anicteric sclera


Mouth: no lip lesions, mucus membranes moist


Cardiovascular: regular rate and rhythm with normal S1S2, systolic murmur, 

positive posterior tibial pulses bilaterally, and cap refill < 2 seconds.  


Lungs: Respirations even, regular, and unlabored.  Lungs diminished with diffuse

expiratory wheezes bilaterally. No accessory muscle usage. 


Abdominal: soft, nontender to palpation, no guarding, no appreciable 

organomegaly


Ext: ROM intact. No gross muscle atrophy, no edema, no contractures


Neuro: Speech clear, face symmetrical and CN II-XII grossly intact with no noted

focal neuro deficits


Psych: Alert and oriented to person, place, time, and situation. Appropriate and

pleasant affect. 





Assessment and Plan of Care:





Multifocal pneumonia


COPD with acute exacerbation secondary to community-acquired pneumonia


Severe sepsis secondary to above


-Pulmonary consulted, appreciate recommendations


-Oxygenation to be administered and titrated as needed to maintain SPO2 equal to

or greater than 92%


-Telemetry monitoring.


-Continuous Pulse-oximetry


-Duonebs scheduled for times daily and as needed for SOB and/or wheezing


-Incentive Spirometry


-Steroids: Solu-Medrol 60 mg IVP every 6 hours


-Antibiotics: Azithromycin and Rocephin


-Sputum culture and Legionella antigen to be obtained





Type 2 diabetes mellitus with hyperglycemia


Morning glucose 171.  Blood glucose levels have ranged between 119 and 208 

since admission.  Hold Jardiance.  Patient placed on glycemic protocol with 

NovoLog sliding scale.





History of CAD with stents


Hypertension


Hyperlipidemia


Congestive heart failure


Continue daily medication regimen with aspirin 81 mg daily, atorvastatin 80 mg 

nightly, isosorbide mononitrate 30 mg daily, lisinopril 5 mg twice daily, and 

metoprolol 25 mg twice daily.





BPH


Continue Flomax 0.4 mg daily.








Data and imaging reviewed:


Pro-calcitonin slightly elevated at 0.11.  Glucose 171.  Urinalysis negative 

for infection.


Vital signs reviewed.  Blood pressure 145/77, heart rate 94, respiratory rate 1

8, temp 97.7F, SpO2 of 94% on 2 L.








CODE STATUS: Full code


DVT prophylaxis: Lovenox


Anticipated discharge date: Clinical course to determine


Anticipated discharge place: Clinical course to determine


Patient was seen independently by Nurse Pracitioner.


This document was prepared using Dragon dictation software.  Please allow for 

errors in transcription, while rare they do occur.











Objective





- Vital Signs


Vital signs: 


                                   Vital Signs











Temp  98.8 F   12/03/23 01:05


 


Pulse  93   12/03/23 09:02


 


Resp  18   12/03/23 09:02


 


BP  83/46   12/03/23 05:59


 


Pulse Ox  94 L  12/03/23 08:47


 


FiO2      








                                 Intake & Output











 12/02/23 12/03/23 12/03/23





 18:59 06:59 18:59


 


Output Total  600 


 


Balance  -600 


 


Weight 58.967 kg 58.967 kg 


 


Output:   


 


  Urine  600 


 


Other:   


 


  # Voids  1 














- Labs


CBC & Chem 7: 


                                 12/02/23 17:25





                                 12/02/23 17:25


Labs: 


                  Abnormal Lab Results - Last 24 Hours (Table)











  12/02/23 12/02/23 12/02/23 Range/Units





  17:25 17:25 17:25 


 


WBC  13.6 H    (3.8-10.6)  k/uL


 


RBC  6.25 H    (4.30-5.90)  m/uL


 


Hgb  17.9 H    (13.0-17.5)  gm/dL


 


Hct  55.3 H    (39.0-53.0)  %


 


Neutrophils #  12.0 H    (1.3-7.7)  k/uL


 


Lymphocytes #  0.4 L    (1.0-4.8)  k/uL


 


Chloride   96 L   ()  mmol/L


 


Glucose   208 H   (74-99)  mg/dL


 


POC Glucose (mg/dL)     ()  mg/dL


 


Plasma Lactic Acid Td    3.1 H*  (0.7-2.0)  mmol/L














  12/03/23 Range/Units





  06:17 


 


WBC   (3.8-10.6)  k/uL


 


RBC   (4.30-5.90)  m/uL


 


Hgb   (13.0-17.5)  gm/dL


 


Hct   (39.0-53.0)  %


 


Neutrophils #   (1.3-7.7)  k/uL


 


Lymphocytes #   (1.0-4.8)  k/uL


 


Chloride   ()  mmol/L


 


Glucose   (74-99)  mg/dL


 


POC Glucose (mg/dL)  119 H  ()  mg/dL


 


Plasma Lactic Acid Td   (0.7-2.0)  mmol/L

## 2023-12-03 NOTE — P.CNPUL
History of Present Illness


Consult date: 23


Requesting physician: Neal Guillen


Reason for consult: dyspnea, COPD


Chief complaint: Shortness of breath


History of present illness: 





This is a very pleasant 78-year-old male patient who follows with the [facility] as

for his primary care needs.  He has a history of COPD, hypertension, chronic 

catheter, coronary disease with previous stent placement, former smoker and quit

back in .  No home oxygen.  He presented here to the emergency room 

yesterday with complaints of increasing shortness of breath, cough and 

congestion.  His x-ray revealed mild cardiomegaly with mild pulmonary vascular 

congestion.  Vague patchy opacity in the right upper lobe and hazy opacity in 

the left lung base.  Count 13.6.  Hemoglobin 17.9.  Platelets 261.  Sodium 139. 

Potassium 4.1.  Bicarb 28.  BUN 14.  Creatinine 0.68.  Glucose 171.  Lactic acid

0.7.  Pro-calcitonin 0.11.  Viral screen negative.  He is seen today in 

consultation on the regular medical floor.  He is currently sitting up in bed.  

Awake and alert in no acute distress.  He is breathing a bit easier today 

compared to yesterday.  He is maintaining good O2 saturations in the mid 90s on 

2 L/m per nasal cannula.  He is afebrile.  Hemodynamically stable.  He has been 

initiated and DuoNeb inhalations, Symbicort, Solu-Medrol.  Antibiotics in the 

form of ceftriaxone and azithromycin.  Normal saline at 100 ML's per hour.





Review of Systems





REVIEW OF SYSTEMS:


CONSTITUTIONAL: Denies any recent significant weight loss or weight gain.


EYES: Denies change in vision.


EARS, NOSE, MOUTH, THROAT: Denies headaches, denies sore throat.


CARDIOVASCULAR: Denies chest pain, palpitations or syncopal episodes.


RESPIRATORY: Positive for shortness of breath, cough, congestion no hemoptysis.


GASTROINTESTINAL: Denies change in appetite, denies abdominal pain


GENITOURINARY: Denies hematuria, denies infections.


MUSKULOSKELETAL: Denies pain, denies swelling.


INTEGUMENTARY: Denies rash, denies eczema.


NEUROLOGICAL: Denies recent memory loss, no recent seizure activity. 


PSYCHIATRIC: Denies anxiety, denies depression.


HEMATOLOGIC/LYMPHATIC: Denies anemia, denies enlarged lymph nodes.








Past Medical History


Past Medical History: Asthma, COPD, Hypertension, Myocardial Infarction (MI), 

Pneumonia, Prostate Disorder


Additional Past Medical History / Comment(s): allergies, "bladder does not 

empty" patient has chronic catheter.


Last Myocardial Infarction Date:: 2021


History of Any Multi-Drug Resistant Organisms: None Reported


Past Surgical History: Appendectomy, Back Surgery, Heart Catheterization With 

Stent, Tonsillectomy


Additional Past Surgical History / Comment(s): cervical, 3 heart stents


Past Anesthesia/Blood Transfusion Reactions: No Reported Reaction


Additional Past Anesthesia/Blood Transfusion Reaction / Comment(s): Pt is 

clausterphobic.


Date of Last Stent Placement:: 21


Past Psychological History: No Psychological Hx Reported


Additional Psychological History / Comment(s): Pt resides with his daughter and 

her spouse and her children.  Pt uses a cane  or walker to ambulate. He does not

drive, but states getting rides is not a problem for him.  He is otherwise 

independent.  Pt served in the [employer].


Smoking Status: Former smoker


Past Alcohol Use History: None Reported


Additional Past Alcohol Use History / Comment(s): Pt started smoking in  and

quit in .


Past Drug Use History: None Reported





- Past Family History


  ** Mother


Family Medical History: No Reported History


Additional Family Medical History / Comment(s): Mother was healthy and lived to 

be 92 yrs old.





  ** Father


History Unknown: Yes


Additional Family Medical History / Comment(s): Father  at the age of 68yrs,

pt unable to say from what.





Medications and Allergies


                                Home Medications











 Medication  Instructions  Recorded  Confirmed  Type


 


Albuterol Inhaler [Ventolin Hfa 2 puff INHALATION RT-QID 21 

History





Inhaler]    


 


Albuterol Nebulized [Ventolin 2.5 mg INHALATION RT-QID 21 History





Nebulized]    


 


Cetirizine HCl [Zyrtec] 10 mg PO HS 21 History


 


Fluticasone Nasal Spray [Flonase 1 spr EA NOSTRIL DAILY 21 

History





Nasal Spray]    


 


L.idocaine 5% Patch [Lidoderm 5% 1 patch TRANSDERM DAILY 21 

History





Patch]    


 


Diclofenac Sodium Gel [Voltaren 1% 1 applic TOPICAL DAILY 21 

History





Gel]    


 


Tamsulosin [Flomax] 0.4 mg PO DAILY #30 capsule 22 Rx


 


Atorvastatin [Lipitor] 80 mg PO HS 22 History


 


Folic Acid 1 mg PO DAILY 22 History


 


Metoprolol Succinate (ER) [Toprol 25 mg PO BID 22 History





XL]    


 


Thiamine [Vitamin B-1] 100 mg PO DAILY 22 History


 


lisinopriL [Zestril] 5 mg PO BID 22 History


 


Isosorbide Mononitrate ER [Imdur] 30 mg PO DAILY 30 Days #30 tab 22 Rx


 


Aspirin 81 mg PO HS 23 History


 


Furosemide [Lasix] 20 mg PO BID@0900,1300 23 History


 


Fluticasone Propion/Salmeterol 1 puff INHALATION RT-BID 06/10/23 12/02/23 

History





[Wixela 250-50 Inhub]    


 


Acetaminophen Tab [Tylenol Tab] 1,000 mg PO Q6H PRN 23 History


 


Empagliflozin [Jardiance] 10 mg PO DAILY 23 History


 


Ketoconazole 2% Shampoo [Nizoral] 1 applic TOPICAL DAILY 23 

History


 


Triamcinolone 0.1% Cream [Kenalog 1 applic TOPICAL DAILY 23 

History





0.1% Cream]    


 


methocarbamoL [Robaxin-750] 750 mg PO QID 23 History








                                    Allergies











Allergy/AdvReac Type Severity Reaction Status Date / Time


 


Influenza Virus Vaccines Allergy  Anaphylaxis Verified 23 19:57


 


tomato Allergy  Anaphylaxis Verified 23 19:57





   /Hives  














Physical Exam


Vitals: 


                                   Vital Signs











  Temp Pulse Pulse Resp BP BP Pulse Ox


 


 23 09:02   93   18   


 


 23 08:47   93   18    94 L


 


 23 07:59  97.7 F   94  18   145/77  94 L


 


 23 05:59       83/46 


 


 23 05:00       77/44 


 


 23 01:05  98.8 F   97  18   134/64  96


 


 23 21:50  98.4 F   100  18   121/71  96


 


 23 19:37  99.2 F  105 H   18  108/63   94 L


 


 23 17:43   125 H     


 


 23 17:28   125 H     


 


 23 16:45   117 H   17    99


 


 23 15:42  100.4 F H  125 H   26 H  122/64   93 L








                                Intake and Output











 23





 22:59 06:59 14:59


 


Output Total  600 280


 


Balance  -600 -280


 


Output:   


 


  Urine  600 280


 


Other:   


 


  # Voids  1 


 


  Weight 58.967 kg  














GENERAL EXAM: Alert, pleasant 78-year-old male patient, on 2 L nasal cannula, 

comfortable in no apparent distress.


HEAD: Normocephalic.


EYES: Normal reaction of pupils, equal size.


NOSE: Clear with pink turbinates.


THROAT: No erythema or exudates.


NECK: No masses, no JVD.


CHEST: No chest wall deformity.


LUNGS: Equal air entry with faint end extremely wheeze, few scattered rhonchi.


CVS: S1 and S2 normal with no audible murmur, regular rhythm.


ABDOMEN: No hepatosplenomegaly, normal bowel sounds, no guarding or rigidity.


SPINE: No scoliosis or deformity


SKIN: No rashes


CENTRAL NERVOUS SYSTEM: No focal deficits, tone is normal in all 4 extremities.


EXTREMITIES: There is no peripheral edema.  No clubbing, no cyanosis.  

Peripheral pulses are intact.





Results





- Laboratory Findings


CBC and BMP: 


                                 23 17:25





                                 23 17:25


PT/INR, D-dimer











PT  11.3 sec (10.0-12.5)   23  17:25    


 


INR  1.0  (<1.2)   23  17:25    








Abnormal lab findings: 


                                  Abnormal Labs











  23





  17:25 17:25 17:25


 


WBC  13.6 H  


 


RBC  6.25 H  


 


Hgb  17.9 H  


 


Hct  55.3 H  


 


Neutrophils #  12.0 H  


 


Lymphocytes #  0.4 L  


 


Chloride   96 L 


 


Glucose   208 H 


 


POC Glucose (mg/dL)   


 


Plasma Lactic Acid Td    3.1 H*


 


Procalcitonin   














  23





  01:47 06:17 11:52


 


WBC   


 


RBC   


 


Hgb   


 


Hct   


 


Neutrophils #   


 


Lymphocytes #   


 


Chloride   


 


Glucose   


 


POC Glucose (mg/dL)   119 H  171 H


 


Plasma Lactic Acid Td   


 


Procalcitonin  0.11 H  














- Diagnostic Findings


Chest x-ray: image reviewed





Assessment and Plan


Assessment: 





Acute hypoxemic respiratory failure secondary to suspected chronic obstructive 

pulmonary disease exacerbation in addition to an early pneumonia. Procalcitonin 

0.11.  Viral screen negative





Former smoker





Hypertension





Hyperlipidemia





Coronary disease with previous stent placement





Chronic Jama catheter





Plan:





The patient was seen and evaluated


Chest x-ray, labs and medications reviewed


Procalcitonin 0.11


Continue antibiotics for now


Continue bronchodilators, steroids


Titrate down the FiO2 as tolerated


Increase his activity as tolerated


We'll continue to follow and make further recommendations based on his clinical 

status





I have personally seen and examined the patient, performed the documentation and

the assessment and plan as written.  Number of minutes spent on the visit: 20.

## 2023-12-03 NOTE — XR
EXAMINATION TYPE: XR chest 2V

 

DATE OF EXAM: 12/3/2023

 

COMPARISON: 12/2/2023

 

INDICATION: Pneumonia

 

TECHNIQUE:  Frontal and lateral views of the chest are obtained.

 

FINDINGS:  

The heart size is normal.  

The pulmonary vasculature is normal.

Patchy infiltrates are developing in the right mid and upper lung field worsening over the interval. 
Pneumonia and atypical pneumonia should be considered.

 

IMPRESSION:  

1. Worsening right mid and upper lung field patchy infiltrate. Follow-up is recommended

## 2023-12-03 NOTE — P.HPIM
History of Present Illness


H&P Date: 23


Chief Complaint: SOB





78-year-old male with COPD not on home oxygen, hypertension





Patient coming in reporting difficulty in breathing which has started about a 

week ago but suddenly got worse this evening and decided to come in for 

evaluation he reports associated fevers and chills worsening coughing 

nonproductive no hemoptysis but otherwise he denies any known sick contacts 

denies any chest pain denies any sore throat runny nose denies any nausea 

vomiting diarrhea denies any abdominal pain or bleeding denies any hematuria.





He is known to have COPD he does not use oxygen denies any recent travel or 

hospital stay denies any history of blood clots denies any known sick contacts





Patient reports quitting smoking many many years ago denies any illicit drugs or

alcohol





Patient has chronic Gamble catheter reports that she replace Gamble catheter every

month last time he replace it was a week ago denies any hematuria denies any 

lower abdominal pain or flank pain








review of systems


Pertinent positives as noted in HPI. All other systems were reviewed and are 

negative








on exam


Constitutional:          No acute distress,  looks sick and tired, and clammy 


Eyes:      Anicteric sclerae, moist conjunctiva, 


         Pupils equal round reactive to light





ENMT:      NC/AT


         Oropharynx clear, no erythema, or exudates





Neck:      Supple,  no masses, or JVD


         No carotid bruits


         No thyromegaly





Lungs:      expiratory rhonchi, occasional wheezing


         Clear to percussion


         Normal respiratory effort, no accessory muscle use 





Cardiovascular:      Heart regular in rate and rhythm, 


         No murmurs, gallops, or rubs


         No peripheral edema





Abdominal:       Soft, gamble cath in place


         Nontender, no guarding, rebound or rigidity


         Abdomen moving with respiration


         Normoactive bowel sounds


         No hepatomegaly, No splenomegaly


         No palpable mass 


         No abdominal wall hernia noted 





 





Extremities:      No digital cyanosis 


         No clubbing


         Pedal pulses intact and symmetrical


         Radial pulses intact and symmetrical 


         No calf tenderness 





Psychiatric:      Alert and oriented to person, place  


      


Neuro      Muscles Strength 4/5 in all 4 extremities 


         Sensation to light touch grossly present throughout


         Cranial nerves II-XII grossly intact 


Lymphatics:       no palpable cervical or supraclavicular   lymph nodes  





Past Medical History


Past Medical History: Asthma, COPD, Hypertension, Myocardial Infarction (MI), 

Pneumonia, Prostate Disorder


Additional Past Medical History / Comment(s): allergies, "bladder does not 

empty" patient has chronic catheter.


Last Myocardial Infarction Date:: 2021


History of Any Multi-Drug Resistant Organisms: None Reported


Past Surgical History: Appendectomy, Back Surgery, Heart Catheterization With 

Stent, Tonsillectomy


Additional Past Surgical History / Comment(s): cervical, 3 heart stents


Past Anesthesia/Blood Transfusion Reactions: No Reported Reaction


Additional Past Anesthesia/Blood Transfusion Reaction / Comment(s): Pt is 

clausterphobic.


Date of Last Stent Placement:: 21


Past Psychological History: No Psychological Hx Reported


Additional Psychological History / Comment(s): Pt resides with his daughter and 

her spouse and her children.  Pt uses a cane  or walker to ambulate. He does not

drive, but states getting rides is not a problem for him.  He is otherwise 

independent.  Pt served in the [employer].


Smoking Status: Former smoker


Past Alcohol Use History: None Reported


Additional Past Alcohol Use History / Comment(s): Pt started smoking in  and

quit in .


Past Drug Use History: None Reported





- Past Family History


  ** Mother


Family Medical History: No Reported History


Additional Family Medical History / Comment(s): Mother was healthy and lived to 

be 92 yrs old.





  ** Father


History Unknown: Yes


Additional Family Medical History / Comment(s): Father  at the age of 68yrs,

pt unable to say from what.





Medications and Allergies


                                Home Medications











 Medication  Instructions  Recorded  Confirmed  Type


 


Albuterol Inhaler [Ventolin Hfa 2 puff INHALATION RT-QID 21 

History





Inhaler]    


 


Albuterol Nebulized [Ventolin 2.5 mg INHALATION RT-QID 21 History





Nebulized]    


 


Cetirizine HCl [Zyrtec] 10 mg PO HS 21 History


 


Fluticasone Nasal Spray [Flonase 1 spr EA NOSTRIL DAILY 21 

History





Nasal Spray]    


 


L.idocaine 5% Patch [Lidoderm 5% 1 patch TRANSDERM DAILY 21 

History





Patch]    


 


Diclofenac Sodium Gel [Voltaren 1% 1 applic TOPICAL DAILY 21 

History





Gel]    


 


Tamsulosin [Flomax] 0.4 mg PO DAILY #30 capsule 22 Rx


 


Atorvastatin [Lipitor] 80 mg PO HS 22 History


 


Folic Acid 1 mg PO DAILY 22 History


 


Metoprolol Succinate (ER) [Toprol 25 mg PO BID 22 History





XL]    


 


Thiamine [Vitamin B-1] 100 mg PO DAILY 22 History


 


lisinopriL [Zestril] 5 mg PO BID 22 History


 


Isosorbide Mononitrate ER [Imdur] 30 mg PO DAILY 30 Days #30 tab 22 Rx


 


Aspirin 81 mg PO HS 23 History


 


Furosemide [Lasix] 20 mg PO BID@0900,1300 23 History


 


Fluticasone Propion/Salmeterol 1 puff INHALATION RT-BID 06/10/23 12/02/23 

History





[Wixela 250-50 Inhub]    


 


Acetaminophen Tab [Tylenol Tab] 1,000 mg PO Q6H PRN 23 History


 


Empagliflozin [Jardiance] 10 mg PO DAILY 23 History


 


Ketoconazole 2% Shampoo [Nizoral] 1 applic TOPICAL DAILY 23 

History


 


Triamcinolone 0.1% Cream [Kenalog 1 applic TOPICAL DAILY 23 

History





0.1% Cream]    


 


methocarbamoL [Robaxin-750] 750 mg PO QID 23 History








                                    Allergies











Allergy/AdvReac Type Severity Reaction Status Date / Time


 


Influenza Virus Vaccines Allergy  Anaphylaxis Verified 23 19:57


 


tomato Allergy  Anaphylaxis Verified 23 19:57





   /Hives  














Physical Exam


Vitals: 


                                   Vital Signs











  Temp Pulse Pulse Resp BP BP Pulse Ox


 


 23 01:05  98.8 F   97  18   134/64  96


 


 23 21:50  98.4 F   100  18   121/71  96


 


 23 19:37  99.2 F  105 H   18  108/63   94 L


 


 23 17:43   125 H     


 


 23 17:28   125 H     


 


 23 16:45   117 H   17    99


 


 23 15:42  100.4 F H  125 H   26 H  122/64   93 L








                                Intake and Output











 23





 14:59 22:59 06:59


 


Output Total   600


 


Balance   -600


 


Output:   


 


  Urine   600


 


Other:   


 


  # Voids   1


 


  Weight  58.967 kg 














Results


CBC & Chem 7: 


                                 23 17:25





                                 23 17:25


Labs: 


                  Abnormal Lab Results - Last 24 Hours (Table)











  23 Range/Units





  17:25 17:25 17:25 


 


WBC  13.6 H    (3.8-10.6)  k/uL


 


RBC  6.25 H    (4.30-5.90)  m/uL


 


Hgb  17.9 H    (13.0-17.5)  gm/dL


 


Hct  55.3 H    (39.0-53.0)  %


 


Neutrophils #  12.0 H    (1.3-7.7)  k/uL


 


Lymphocytes #  0.4 L    (1.0-4.8)  k/uL


 


Chloride   96 L   ()  mmol/L


 


Glucose   208 H   (74-99)  mg/dL


 


Plasma Lactic Acid Td    3.1 H*  (0.7-2.0)  mmol/L














Thrombosis Risk Factor Assmnt





- Choose All That Apply


Each Risk Factor Represents 3 Points: Age 75 years or older


Thrombosis Risk Factor Assessment Total Risk Factor Score: 3


Thrombosis Risk Factor Assessment Level: Moderate Risk





Assessment and Plan


Assessment: 





78-year-old male with COPD neurogenic bladder with chronic indwelling catheter 

coming in for one week history of difficulty breathing, got worse short of 

breath suddenly this evening which he came in for evaluation I discussed the 

case with the ED doctor accepted the admission for sepsis secondary to pneumonia





Sepsis secondary to.  Community acquired pneumonia


COPD exacerbation secondary to above





Chest x-ray showed right upper lobe infiltrates


Follow-up cultures


Check urine Legionella antigen


Initiated on azithromycin 500 mg by mouth daily


Rocephin 2 g IV piggyback daily


Tylenol for fever


Check urine analysis patient has chronic indwelling catheter present upon 

admission


Leukocytosis 13.6, lactic acid 3.1, tachycardia 125, fever 100.4


IV fluid hydration with normal saline 100 mL per hour


Systemic IV steroids with methylprednisolone 60 mg IV every 6 hours


DuoNeb's scheduled and as needed


Acute respiratory viral panel negative to influenza RSV and Covid





Chronic conditions


Cranial artery disease status post stents


Continue with aspirin and statin, Imdur home medications





Hypertension


Continue with lisinopril metoprolol home medications





Diabetes mellitus


Hold oral hypoglycemic agents


Insulin sliding scale





Hemoglobin unremarkable 17.9


Renal function unremarkable BUN 14 creatinine 0.68


Sodium 139 potassium 4.1





Full code


DVT prophylaxis Lovenox subcu 40 mg daily

## 2023-12-04 LAB
GLUCOSE BLD-MCNC: 101 MG/DL (ref 70–110)
GLUCOSE BLD-MCNC: 155 MG/DL (ref 70–110)
GLUCOSE BLD-MCNC: 213 MG/DL (ref 70–110)
GLUCOSE BLD-MCNC: 276 MG/DL (ref 70–110)

## 2023-12-04 RX ADMIN — METHYLPREDNISOLONE SODIUM SUCCINATE SCH MG: 125 INJECTION, POWDER, FOR SOLUTION INTRAMUSCULAR; INTRAVENOUS at 12:41

## 2023-12-04 RX ADMIN — METOPROLOL SUCCINATE SCH MG: 25 TABLET, EXTENDED RELEASE ORAL at 09:03

## 2023-12-04 RX ADMIN — CEFAZOLIN SCH: 330 INJECTION, POWDER, FOR SOLUTION INTRAMUSCULAR; INTRAVENOUS at 16:25

## 2023-12-04 RX ADMIN — BUDESONIDE AND FORMOTEROL FUMARATE DIHYDRATE SCH PUFF: 80; 4.5 AEROSOL RESPIRATORY (INHALATION) at 21:51

## 2023-12-04 RX ADMIN — BUDESONIDE AND FORMOTEROL FUMARATE DIHYDRATE SCH PUFF: 80; 4.5 AEROSOL RESPIRATORY (INHALATION) at 09:18

## 2023-12-04 RX ADMIN — ASPIRIN 81 MG CHEWABLE TABLET SCH MG: 81 TABLET CHEWABLE at 21:24

## 2023-12-04 RX ADMIN — METOPROLOL SUCCINATE SCH MG: 25 TABLET, EXTENDED RELEASE ORAL at 21:24

## 2023-12-04 RX ADMIN — Medication SCH MG: at 09:03

## 2023-12-04 RX ADMIN — FLUTICASONE PROPIONATE SCH SPRAY: 50 SPRAY, METERED NASAL at 09:04

## 2023-12-04 RX ADMIN — FOLIC ACID SCH MG: 1 TABLET ORAL at 09:03

## 2023-12-04 RX ADMIN — INSULIN ASPART SCH UNIT: 100 INJECTION, SOLUTION INTRAVENOUS; SUBCUTANEOUS at 21:24

## 2023-12-04 RX ADMIN — CEFAZOLIN SCH: 330 INJECTION, POWDER, FOR SOLUTION INTRAMUSCULAR; INTRAVENOUS at 04:05

## 2023-12-04 RX ADMIN — IPRATROPIUM BROMIDE AND ALBUTEROL SULFATE SCH ML: .5; 3 SOLUTION RESPIRATORY (INHALATION) at 12:26

## 2023-12-04 RX ADMIN — INSULIN ASPART SCH: 100 INJECTION, SOLUTION INTRAVENOUS; SUBCUTANEOUS at 17:18

## 2023-12-04 RX ADMIN — IPRATROPIUM BROMIDE AND ALBUTEROL SULFATE SCH ML: .5; 3 SOLUTION RESPIRATORY (INHALATION) at 21:51

## 2023-12-04 RX ADMIN — METHYLPREDNISOLONE SODIUM SUCCINATE SCH MG: 125 INJECTION, POWDER, FOR SOLUTION INTRAMUSCULAR; INTRAVENOUS at 17:46

## 2023-12-04 RX ADMIN — ATORVASTATIN CALCIUM SCH MG: 80 TABLET, FILM COATED ORAL at 21:23

## 2023-12-04 RX ADMIN — INSULIN ASPART SCH UNIT: 100 INJECTION, SOLUTION INTRAVENOUS; SUBCUTANEOUS at 06:15

## 2023-12-04 RX ADMIN — TAMSULOSIN HYDROCHLORIDE SCH MG: 0.4 CAPSULE ORAL at 09:03

## 2023-12-04 RX ADMIN — IPRATROPIUM BROMIDE AND ALBUTEROL SULFATE SCH ML: .5; 3 SOLUTION RESPIRATORY (INHALATION) at 09:18

## 2023-12-04 RX ADMIN — IPRATROPIUM BROMIDE AND ALBUTEROL SULFATE SCH ML: .5; 3 SOLUTION RESPIRATORY (INHALATION) at 16:26

## 2023-12-04 RX ADMIN — LORATADINE SCH MG: 10 TABLET ORAL at 21:23

## 2023-12-04 RX ADMIN — LIDOCAINE SCH: 4 PATCH TOPICAL at 09:11

## 2023-12-04 RX ADMIN — METHYLPREDNISOLONE SODIUM SUCCINATE SCH MG: 125 INJECTION, POWDER, FOR SOLUTION INTRAMUSCULAR; INTRAVENOUS at 06:15

## 2023-12-04 RX ADMIN — INSULIN ASPART SCH UNIT: 100 INJECTION, SOLUTION INTRAVENOUS; SUBCUTANEOUS at 12:40

## 2023-12-04 RX ADMIN — ENOXAPARIN SODIUM SCH MG: 40 INJECTION SUBCUTANEOUS at 09:04

## 2023-12-04 RX ADMIN — AZITHROMYCIN SCH MG: 500 TABLET, FILM COATED ORAL at 21:23

## 2023-12-04 RX ADMIN — METHYLPREDNISOLONE SODIUM SUCCINATE SCH MG: 125 INJECTION, POWDER, FOR SOLUTION INTRAMUSCULAR; INTRAVENOUS at 23:14

## 2023-12-04 RX ADMIN — ISOSORBIDE MONONITRATE SCH MG: 30 TABLET, EXTENDED RELEASE ORAL at 09:03

## 2023-12-04 NOTE — P.PN
Subjective


Progress Note Date: 12/04/23








Hospital course:





Patient is a very pleasant 78-year-old male with a past medical history of CAD 

with stents, hypertension, hyperlipidemia, congestive heart failure, type II 

non-insulin-dependent diabetes mellitus, BPH, and COPD not on home oxygen 

dependent.  He presented to the emergency department on 12/2/23 secondary to 

reports of difficulty breathing.  Patient reports progressively worsening 

shortness of breath 1 week associated with worsening of his nonproductive 

chronic cough, fevers, and chills.  He underwent full evaluation in the 

emergency department.  Upon arrival vital signs show patient to be tachycardic 

with heart rate in the 125, temp 100.4, respiratory rate 26, blood pressure 

122/64, and SpO2 of 93% on room air.  EKG completed showing sinus tachycardia at

124 bpm.  Chest x-ray completed in radiology report reviewed showing mild 

cardiomegaly with mild pulmonary vascular congestion, mildly prominent 

interstitial lung markings concerning for pulmonary edema versus pneumonitis, 

and aphasic patch of opacity suggested over the right upper lobe and hazy opacit

y of the left lung base concerning for emerging infiltrates suspect infectious 

or inflammatory process.  Labs completed and reviewed.  CBC showing leukocytosis

with WBC count of 13.6 and polycythemia with elevated hemoglobin of 17.9.  

Coagulation profile normal findings.  BMP showing mild hypochloremia with 

chloride of 96 otherwise normal findings.  Glucose slightly elevated at 208.  

Initial lactic acid 3.1.  Liver profile normal findings.  Influenza A, influenza

B, RSV, Covid PCR negative.  Patient was provided with IV fluids, antibiotics 

with azithromycin and Rocephin, steroids and nebulizer treatments.  Patient 

admitted under our services with consultation to pulmonology.





Physical exam:





Patient seen and fully evaluated at bedside this morning.  He was sitting up in 

the chair and appears to be improving.  But he remains on oxygen supplementation

at 4 L O2 via nasal cannula at this time.  Patient denies having any dizziness, 

lightheadedness, chest pain, palpitations, or any other complaints at this time.





Vital signs reviewed and stable. 


General: Nontoxic, no distress and appears stated age.  


Derm: Skin warm and dry, normal coloration for ethnicity.


Head: Atraumatic, normocephalic and symmetric.  


Eyes: EOMs intact, no lid lag, and anicteric sclera


Mouth: no lip lesions, mucus membranes moist


Cardiovascular: regular rate and rhythm with normal S1S2, systolic murmur, posit

paul posterior tibial pulses bilaterally, and cap refill < 2 seconds.  


Lungs: Respirations even, regular, and unlabored.  Lungs diminished with diffuse

expiratory wheezes bilaterally. No accessory muscle usage. 


Abdominal: soft, nontender to palpation, no guarding, no appreciable 

organomegaly


Ext: ROM intact. No gross muscle atrophy, no edema, no contractures


Neuro: Speech clear, face symmetrical and CN II-XII grossly intact with no noted

focal neuro deficits


Psych: Alert and oriented to person, place, time, and situation. Appropriate and

pleasant affect. 





Assessment and Plan of Care:





Multifocal pneumonia


COPD with acute exacerbation secondary to community-acquired pneumonia


Severe sepsis secondary to above


-Pulmonary following, reviewed documentation in chart.


-Oxygenation to be administered and titrated as needed to maintain SPO2 equal to

or greater than 92%


-Telemetry monitoring.


-Monitor Pulse-oximetry


-Duonebs scheduled four times daily and as needed for SOB and/or wheezing


-Incentive Spirometry and encourage use 10-15 times hourly


-Steroids: Solu-Medrol 60 mg IVP every 6 hours


-Antibiotics: Azithromycin 500 mg daily and Rocephin 2 g IVPB daily


-Sputum culture pending.


-Blood cultures showing no growth to date.


 -Legionella antigen it is.





Type 2 diabetes mellitus with hyperglycemia


Morning glucose 155.  Continue to Jardiance and continue with glycemic protocol

with NovoLog sliding scale.





History of CAD with stents


Hypertension


Hyperlipidemia


Congestive heart failure


Continue daily medication regimen with aspirin 81 mg daily, atorvastatin 80 mg 

nightly, isosorbide mononitrate 30 mg daily, lisinopril 5 mg twice daily, and 

metoprolol 25 mg twice daily.





BPH


Continue Flomax 0.4 mg daily.








Data and imaging reviewed:


Vital signs reviewed.  Blood pressure 128/89, heart rate 79, respiratory rate 

21, temperature 98.3F, and SpO2 96% on 4 L O2 via nasal cannula.








CODE STATUS: Full code


DVT prophylaxis: Lovenox


Anticipated discharge date: Clinical course to determine


Anticipated discharge place: Clinical course to determine


Patient was seen independently by Nurse Pracitioner.


This document was prepared using Dragon dictation software.  Please allow for 

errors in transcription, while rare they do occur.





I reviewed the documentation as provided by the BERNICE above, who is the original 

author of this note.  I agree with the documented assessment and plan, with the 

following changes: none





Objective





- Vital Signs


Vital signs: 


                                   Vital Signs











Temp  98.3 F   12/04/23 07:22


 


Pulse  79   12/04/23 07:22


 


Resp  21   12/04/23 07:22


 


BP  128/89   12/04/23 07:22


 


Pulse Ox  96   12/04/23 07:22


 


FiO2      








                                 Intake & Output











 12/03/23 12/04/23 12/04/23





 18:59 06:59 18:59


 


Output Total 630 1600 


 


Balance -630 -1600 


 


Output:   


 


  Urine 630 1600 


 


Other:   


 


  Voiding Method Indwelling Catheter Indwelling Catheter 


 


  # Voids  1 


 


  # Bowel Movements  1 














- Labs


CBC & Chem 7: 


                                 12/02/23 17:25





                                 12/02/23 17:25


Labs: 


                  Abnormal Lab Results - Last 24 Hours (Table)











  12/03/23 12/03/23 12/03/23 Range/Units





  01:47 11:52 16:56 


 


POC Glucose (mg/dL)   171 H  245 H  ()  mg/dL


 


Procalcitonin  0.11 H    (0.02-0.09)  ng/mL














  12/03/23 12/04/23 Range/Units





  19:29 06:07 


 


POC Glucose (mg/dL)  241 H  155 H  ()  mg/dL


 


Procalcitonin    (0.02-0.09)  ng/mL








                      Microbiology - Last 24 Hours (Table)











 12/02/23 17:25 Blood Culture - Preliminary





 Blood 


 


 12/02/23 17:25 Blood Culture - Preliminary





 Blood

## 2023-12-04 NOTE — P.PN
Subjective


Progress Note Date: 12/04/23











This is a very pleasant 78-year-old male patient who follows with the [facility] as

for his primary care needs.  He has a history of COPD, hypertension, chronic 

catheter, coronary disease with previous stent placement, former smoker and quit

back in 1980.  No home oxygen.  He presented here to the emergency room 

yesterday with complaints of increasing shortness of breath, cough and 

congestion.  His x-ray revealed mild cardiomegaly with mild pulmonary vascular 

congestion.  Vague patchy opacity in the right upper lobe and hazy opacity in 

the left lung base.  Count 13.6.  Hemoglobin 17.9.  Platelets 261.  Sodium 139. 

Potassium 4.1.  Bicarb 28.  BUN 14.  Creatinine 0.68.  Glucose 171.  Lactic acid

0.7.  Pro-calcitonin 0.11.  Viral screen negative.  [facility] floor.  He is currently sitting up in bed.  Awake 

and alert in no acute distress.  He is breathing a bit easier today compared to 

yesterday.  He is maintaining good O2 saturations in the mid 90s on 2 L/m per 

nasal cannula.  He is afebrile.  Hemodynamically stable.  He has been initiated 

and DuoNeb inhalations, Symbicort, Solu-Medrol.  Antibiotics in the form of 

ceftriaxone and azithromycin.  Normal saline at 100 ML's per hour.





On today's evaluation of 12/04/2023, the patient is feeling slightly improved 

compared to yesterday.  The patient is oxygenating is currently on 4 L nasal 

cannula.  Doing well.  No specific complaints.  His copd, CAD, previous coronary

intervention and stenting and hypertension.  He has a chronic indwelling Jama 

catheter in place.  His chest x-ray showed some increased pulmonary vascular 

marking in a vague infiltrate in the right upper lobe and left lung base.The 

patient remains on IV Rocephin and Zithromax.  The patient is on Symbicort.  The

patient on IV Solu-Medrol 60 mg every 6 hours.  He is also on DuoNeb updrafts.  

Echocardiogram showed an ejection fraction of 40-45%.





Objective





- Vital Signs


Vital signs: 


                                   Vital Signs











Temp  98.3 F   12/04/23 07:22


 


Pulse  92   12/04/23 12:36


 


Resp  21   12/04/23 07:22


 


BP  128/89   12/04/23 07:22


 


Pulse Ox  96   12/04/23 07:22


 


FiO2      








                                 Intake & Output











 12/03/23 12/04/23 12/04/23





 18:59 06:59 18:59


 


Output Total 630 1600 


 


Balance -630 -1600 


 


Output:   


 


  Urine 630 1600 


 


Other:   


 


  Voiding Method Indwelling Catheter Indwelling Catheter Indwelling Catheter


 


  # Voids  1 


 


  # Bowel Movements  1 














- Exam











GENERAL EXAM: Alert, pleasant 78-year-old male patient, on 2 L nasal cannula, 

comfortable in no apparent distress.


HEAD: Normocephalic.


EYES: Normal reaction of pupils, equal size.


NOSE: Clear with pink turbinates.


THROAT: No erythema or exudates.


NECK: No masses, no JVD.


CHEST: No chest wall deformity.


LUNGS: Equal air entry with faint end extremely wheeze, few scattered rhonchi.


CVS: S1 and S2 normal with no audible murmur, regular rhythm.


ABDOMEN: No hepatosplenomegaly, normal bowel sounds, no guarding or rigidity.


SPINE: No scoliosis or deformity


SKIN: No rashes


CENTRAL NERVOUS SYSTEM: No focal deficits, tone is normal in all 4 extremities.


EXTREMITIES: There is no peripheral edema.  No clubbing, no cyanosis.  

Peripheral pulses are intact.





- Labs


CBC & Chem 7: 


                                 12/02/23 17:25





                                 12/02/23 17:25


Labs: 


                  Abnormal Lab Results - Last 24 Hours (Table)











  12/02/23 12/03/23 12/03/23 Range/Units





  17:25 16:56 19:29 


 


POC Glucose (mg/dL)   245 H  241 H  ()  mg/dL


 


Hemoglobin A1c  7.2 H    (<=6.0)  %














  12/04/23 12/04/23 Range/Units





  06:07 11:26 


 


POC Glucose (mg/dL)  155 H  276 H  ()  mg/dL


 


Hemoglobin A1c    (<=6.0)  %








                      Microbiology - Last 24 Hours (Table)











 12/02/23 23:20 Gram Stain - Preliminary





 Sputum 


 


 12/02/23 17:25 Blood Culture - Preliminary





 Blood 


 


 12/02/23 17:25 Blood Culture - Preliminary





 Blood 














Assessment and Plan


Plan: 











Acute hypoxemic respiratory failure secondary to suspected chronic obstructive 

pulmonary disease exacerbation in addition to an early pneumonia. Procalcitonin 

0.11.  Viral screen negative





Acute COPD exacerbation





Former smoker





Hypertension





Hyperlipidemia





Coronary disease with previous stent placement





Chronic Jama catheter





Plan:





Clinically improving


Procalcitonin 0.11


Continue antibiotics for now


Continue bronchodilators, steroids


Titrate down the FiO2 as tolerated


Increase his activity as tolerated


We'll continue to follow and make further recommendations based on his clinical 

status

## 2023-12-05 LAB
ALBUMIN SERPL-MCNC: 3.2 G/DL (ref 3.8–4.9)
ALBUMIN/GLOB SERPL: 1.28 RATIO (ref 1.6–3.17)
ALP SERPL-CCNC: 103 U/L (ref 41–126)
ALT SERPL-CCNC: 65 U/L (ref 10–49)
ANION GAP SERPL CALC-SCNC: 8.7 MMOL/L (ref 4–12)
AST SERPL-CCNC: 54 U/L (ref 14–35)
BUN SERPL-SCNC: 15.1 MG/DL (ref 9–27)
BUN/CREAT SERPL: 21.57 RATIO (ref 12–20)
CALCIUM SPEC-MCNC: 8.7 MG/DL (ref 8.7–10.3)
CHLORIDE SERPL-SCNC: 108 MMOL/L (ref 96–109)
CO2 SERPL-SCNC: 26.3 MMOL/L (ref 21.6–31.8)
ERYTHROCYTE [DISTWIDTH] IN BLOOD BY AUTOMATED COUNT: 5.69 X 10*6/UL (ref 4.4–5.6)
ERYTHROCYTE [DISTWIDTH] IN BLOOD: 15.3 % (ref 11.5–14.5)
GLOBULIN SER CALC-MCNC: 2.5 G/DL (ref 1.6–3.3)
GLUCOSE BLD-MCNC: 126 MG/DL (ref 70–110)
GLUCOSE BLD-MCNC: 133 MG/DL (ref 70–110)
GLUCOSE BLD-MCNC: 176 MG/DL (ref 70–110)
GLUCOSE BLD-MCNC: 179 MG/DL (ref 70–110)
GLUCOSE SERPL-MCNC: 151 MG/DL (ref 70–110)
HCT VFR BLD AUTO: 50.9 % (ref 39.6–50)
HGB BLD-MCNC: 15.8 G/DL (ref 13–17)
MAGNESIUM SPEC-SCNC: 2.2 MG/DL (ref 1.5–2.4)
MCH RBC QN AUTO: 27.8 PG (ref 27–32)
MCHC RBC AUTO-ENTMCNC: 31 G/DL (ref 32–37)
MCV RBC AUTO: 89.5 FL (ref 80–97)
NRBC BLD AUTO-RTO: 0 X 10*3/UL (ref 0–0.01)
PLATELET # BLD AUTO: 219 X 10*3/UL (ref 140–440)
POTASSIUM SERPL-SCNC: 4.6 MMOL/L (ref 3.5–5.5)
PROT SERPL-MCNC: 5.7 G/DL (ref 6.2–8.2)
SODIUM SERPL-SCNC: 143 MMOL/L (ref 135–145)
WBC # BLD AUTO: 17.13 X 10*3/UL (ref 4.5–10)

## 2023-12-05 RX ADMIN — TAMSULOSIN HYDROCHLORIDE SCH MG: 0.4 CAPSULE ORAL at 08:51

## 2023-12-05 RX ADMIN — CEFAZOLIN SCH: 330 INJECTION, POWDER, FOR SOLUTION INTRAMUSCULAR; INTRAVENOUS at 12:34

## 2023-12-05 RX ADMIN — OXYMETAZOLINE HCL SCH SPRAY: 0.05 SPRAY NASAL at 21:46

## 2023-12-05 RX ADMIN — ASPIRIN 81 MG CHEWABLE TABLET SCH MG: 81 TABLET CHEWABLE at 21:46

## 2023-12-05 RX ADMIN — ATORVASTATIN CALCIUM SCH MG: 80 TABLET, FILM COATED ORAL at 21:45

## 2023-12-05 RX ADMIN — ENOXAPARIN SODIUM SCH MG: 40 INJECTION SUBCUTANEOUS at 08:50

## 2023-12-05 RX ADMIN — ISOSORBIDE MONONITRATE SCH MG: 30 TABLET, EXTENDED RELEASE ORAL at 08:51

## 2023-12-05 RX ADMIN — IPRATROPIUM BROMIDE AND ALBUTEROL SULFATE SCH ML: .5; 3 SOLUTION RESPIRATORY (INHALATION) at 22:04

## 2023-12-05 RX ADMIN — METHYLPREDNISOLONE SODIUM SUCCINATE SCH MG: 125 INJECTION, POWDER, FOR SOLUTION INTRAMUSCULAR; INTRAVENOUS at 12:48

## 2023-12-05 RX ADMIN — LORATADINE SCH MG: 10 TABLET ORAL at 21:45

## 2023-12-05 RX ADMIN — METHYLPREDNISOLONE SODIUM SUCCINATE SCH MG: 125 INJECTION, POWDER, FOR SOLUTION INTRAMUSCULAR; INTRAVENOUS at 06:02

## 2023-12-05 RX ADMIN — OXYMETAZOLINE HCL SCH SPRAY: 0.05 SPRAY NASAL at 12:48

## 2023-12-05 RX ADMIN — FOLIC ACID SCH MG: 1 TABLET ORAL at 08:52

## 2023-12-05 RX ADMIN — IPRATROPIUM BROMIDE AND ALBUTEROL SULFATE SCH ML: .5; 3 SOLUTION RESPIRATORY (INHALATION) at 12:18

## 2023-12-05 RX ADMIN — METHYLPREDNISOLONE SODIUM SUCCINATE SCH MG: 125 INJECTION, POWDER, FOR SOLUTION INTRAMUSCULAR; INTRAVENOUS at 17:58

## 2023-12-05 RX ADMIN — IPRATROPIUM BROMIDE AND ALBUTEROL SULFATE SCH ML: .5; 3 SOLUTION RESPIRATORY (INHALATION) at 16:21

## 2023-12-05 RX ADMIN — IPRATROPIUM BROMIDE AND ALBUTEROL SULFATE SCH ML: .5; 3 SOLUTION RESPIRATORY (INHALATION) at 07:48

## 2023-12-05 RX ADMIN — INSULIN ASPART SCH: 100 INJECTION, SOLUTION INTRAVENOUS; SUBCUTANEOUS at 05:50

## 2023-12-05 RX ADMIN — METOPROLOL SUCCINATE SCH MG: 25 TABLET, EXTENDED RELEASE ORAL at 08:51

## 2023-12-05 RX ADMIN — INSULIN ASPART SCH UNIT: 100 INJECTION, SOLUTION INTRAVENOUS; SUBCUTANEOUS at 17:58

## 2023-12-05 RX ADMIN — CEFAZOLIN SCH: 330 INJECTION, POWDER, FOR SOLUTION INTRAMUSCULAR; INTRAVENOUS at 02:14

## 2023-12-05 RX ADMIN — BUDESONIDE AND FORMOTEROL FUMARATE DIHYDRATE SCH PUFF: 80; 4.5 AEROSOL RESPIRATORY (INHALATION) at 07:48

## 2023-12-05 RX ADMIN — INSULIN ASPART SCH UNIT: 100 INJECTION, SOLUTION INTRAVENOUS; SUBCUTANEOUS at 21:46

## 2023-12-05 RX ADMIN — INSULIN ASPART SCH: 100 INJECTION, SOLUTION INTRAVENOUS; SUBCUTANEOUS at 12:34

## 2023-12-05 RX ADMIN — CEFAZOLIN SCH: 330 INJECTION, POWDER, FOR SOLUTION INTRAMUSCULAR; INTRAVENOUS at 21:46

## 2023-12-05 RX ADMIN — BUDESONIDE AND FORMOTEROL FUMARATE DIHYDRATE SCH PUFF: 80; 4.5 AEROSOL RESPIRATORY (INHALATION) at 22:04

## 2023-12-05 RX ADMIN — Medication SCH MG: at 08:51

## 2023-12-05 RX ADMIN — METHYLPREDNISOLONE SODIUM SUCCINATE SCH MG: 125 INJECTION, POWDER, FOR SOLUTION INTRAMUSCULAR; INTRAVENOUS at 23:41

## 2023-12-05 RX ADMIN — METOPROLOL SUCCINATE SCH MG: 25 TABLET, EXTENDED RELEASE ORAL at 21:45

## 2023-12-05 RX ADMIN — LIDOCAINE SCH: 4 PATCH TOPICAL at 08:51

## 2023-12-05 RX ADMIN — FLUTICASONE PROPIONATE SCH SPRAY: 50 SPRAY, METERED NASAL at 08:50

## 2023-12-05 NOTE — P.PN
Subjective


Progress Note Date: 12/05/23





Hospital course:





Patient is a very pleasant 78-year-old male with a past medical history of CAD 

with stents, hypertension, hyperlipidemia, congestive heart failure, type II 

non-insulin-dependent diabetes mellitus, BPH, and COPD not on home oxygen 

dependent.  He presented to the emergency department on 12/2/23 secondary to 

reports of difficulty breathing.  Patient reports progressively worsening 

shortness of breath 1 week associated with worsening of his nonproductive 

chronic cough, fevers, and chills.  He underwent full evaluation in the 

emergency department.  Upon arrival vital signs show patient to be tachycardic 

with heart rate in the 125, temp 100.4, respiratory rate 26, blood pressure 

122/64, and SpO2 of 93% on room air.  EKG completed showing sinus tachycardia at

124 bpm.  Chest x-ray completed in radiology report reviewed showing mild 

cardiomegaly with mild pulmonary vascular congestion, mildly prominent 

interstitial lung markings concerning for pulmonary edema versus pneumonitis, 

and aphasic patch of opacity suggested over the right upper lobe and hazy 

opacity of the left lung base concerning for emerging infiltrates suspect 

infectious or inflammatory process.  Labs completed and reviewed.  CBC showing 

leukocytosis with WBC count of 13.6 and polycythemia with elevated hemoglobin of

17.9.  Coagulation profile normal findings.  BMP showing mild hypochloremia with

chloride of 96 otherwise normal findings.  Glucose slightly elevated at 208.  

Initial lactic acid 3.1.  Liver profile normal findings.  Influenza A, influenza

B, RSV, Covid PCR negative.  Patient was provided with IV fluids, antibiotics 

with azithromycin and Rocephin, steroids and nebulizer treatments.  Patient 

admitted under our services with consultation to pulmonology.





Physical exam:





Patient seen and fully evaluated at bedside this morning.  He was in bed and 

reports having a nosebleed because everything is so dry and states this mucus so

sick.  Patient unable to clear nasal passages and having a difficult time brin

ging up any sputum resulting in extensive coughing fits.  Patient denies having 

any dizziness, lightheadedness, chest pain, palpitations, or any other 

complaints at this time.





Vital signs reviewed and stable. 


General: Nontoxic, no distress and appears stated age.  


Derm: Skin warm and dry, normal coloration for ethnicity.


Head: Atraumatic, normocephalic and symmetric.  


Eyes: EOMs intact, no lid lag, and anicteric sclera


Mouth: no lip lesions, mucus membranes moist


Cardiovascular: regular rate and rhythm with normal S1S2, systolic murmur, 

positive posterior tibial pulses bilaterally, and cap refill < 2 seconds.  


Lungs: Respirations even, regular, and unlabored.  Lungs diminished with diffuse

expiratory wheezes bilaterally. No accessory muscle usage. 


Abdominal: soft, nontender to palpation, no guarding, no appreciable 

organomegaly


Ext: ROM intact. No gross muscle atrophy, no edema, no contractures


Neuro: Speech clear, face symmetrical and CN II-XII grossly intact with no noted

focal neuro deficits


Psych: Alert and oriented to person, place, time, and situation. Appropriate and

pleasant affect. 





Assessment and Plan of Care:





Multifocal pneumonia


COPD with acute exacerbation secondary to community-acquired pneumonia


Severe sepsis secondary to above


-Pulmonary following, reviewed documentation in chart.


-Oxygenation to be administered and titrated as needed to maintain SPO2 equal to

or greater than 92%


-Telemetry monitoring.


-Monitor Pulse-oximetry


-Duonebs scheduled four times daily and as needed for SOB and/or wheezing


-Incentive Spirometry and encourage use 10-15 times hourly


-Order placed for flutter valve


-Order placed for Flonase and Afrin


-Steroids: Solu-Medrol 60 mg IVP every 6 hours


-Antibiotics: Azithromycin 500 mg daily and Rocephin 2 g IVPB daily


-Sputum culture pending.


-Blood cultures showing no growth to date.


 -Legionella antigen is negative.





Type 2 diabetes mellitus with hyperglycemia


Morning glucose 133.  Continue to Jardiance and continue with glycemic protocol

with NovoLog sliding scale.





History of CAD with stents


Hypertension


Hyperlipidemia


Congestive heart failure


Continue daily medication regimen with aspirin 81 mg daily, atorvastatin 80 mg 

nightly, isosorbide mononitrate 30 mg daily, lisinopril 5 mg twice daily, and 

metoprolol 25 mg twice daily.





BPH


Continue Flomax 0.4 mg daily.








Data and imaging reviewed:


Vital signs reviewed.  Blood pressure 156/86, heart rate 91, respiratory rate 

19, temp 97.5F, and SpO2 of 92% on 2 L O2.


Labs completed and reviewed.  CBC showing leukocytosis with WBC count of 17.13.

 BMP unremarkable.  Liver profile showing slight elevation of AST at 54 and ALT 

65.





CODE STATUS: Full code


DVT prophylaxis: Lovenox


Anticipated discharge date: Clinical course to determine


Anticipated discharge place: Clinical course to determine


Patient was seen independently by Nurse Pracitioner.


This document was prepared using Dragon dictation software.  Please allow for 

errors in transcription, while rare they do occur.





Eric Caro NP rendered care for this patient independently, reviewed the 

findings and plan as documented in the note above.  I did not physically speak 

with or examine the patient on this date. 





Objective





- Vital Signs


Vital signs: 


                                   Vital Signs











Temp  97.6 F   12/05/23 01:33


 


Pulse  80   12/05/23 08:07


 


Resp  20   12/05/23 01:33


 


BP  123/74   12/05/23 01:33


 


Pulse Ox  92 L  12/05/23 07:48


 


FiO2      








                                 Intake & Output











 12/04/23 12/05/23 12/05/23





 18:59 06:59 18:59


 


Output Total 900 1550 


 


Balance -900 -1550 


 


Output:   


 


  Urine 900 1550 


 


Other:   


 


  Voiding Method Indwelling Catheter Indwelling Catheter 














- Labs


CBC & Chem 7: 


                                 12/05/23 05:37





                                 12/05/23 05:37


Labs: 


                  Abnormal Lab Results - Last 24 Hours (Table)











  12/02/23 12/04/23 12/04/23 Range/Units





  17:25 11:26 20:56 


 


POC Glucose (mg/dL)   276 H  213 H  ()  mg/dL


 


Hemoglobin A1c  7.2 H    (<=6.0)  %














  12/05/23 Range/Units





  05:44 


 


POC Glucose (mg/dL)  133 H  ()  mg/dL


 


Hemoglobin A1c   (<=6.0)  %








                      Microbiology - Last 24 Hours (Table)











 12/02/23 17:25 Blood Culture - Preliminary





 Blood 


 


 12/02/23 17:25 Blood Culture - Preliminary





 Blood 


 


 12/02/23 23:20 Gram Stain - Preliminary





 Sputum

## 2023-12-06 LAB
GLUCOSE BLD-MCNC: 133 MG/DL (ref 70–110)
GLUCOSE BLD-MCNC: 152 MG/DL (ref 70–110)
GLUCOSE BLD-MCNC: 189 MG/DL (ref 70–110)
GLUCOSE BLD-MCNC: 219 MG/DL (ref 70–110)

## 2023-12-06 RX ADMIN — OXYMETAZOLINE HCL SCH SPRAY: 0.05 SPRAY NASAL at 21:54

## 2023-12-06 RX ADMIN — Medication SCH MG: at 09:53

## 2023-12-06 RX ADMIN — LORATADINE SCH MG: 10 TABLET ORAL at 21:53

## 2023-12-06 RX ADMIN — LIDOCAINE SCH: 4 PATCH TOPICAL at 09:54

## 2023-12-06 RX ADMIN — ENOXAPARIN SODIUM SCH MG: 40 INJECTION SUBCUTANEOUS at 09:53

## 2023-12-06 RX ADMIN — METOPROLOL SUCCINATE SCH MG: 25 TABLET, EXTENDED RELEASE ORAL at 21:54

## 2023-12-06 RX ADMIN — CEFAZOLIN SCH: 330 INJECTION, POWDER, FOR SOLUTION INTRAMUSCULAR; INTRAVENOUS at 06:19

## 2023-12-06 RX ADMIN — ATORVASTATIN CALCIUM SCH MG: 80 TABLET, FILM COATED ORAL at 21:53

## 2023-12-06 RX ADMIN — BUDESONIDE AND FORMOTEROL FUMARATE DIHYDRATE SCH PUFF: 80; 4.5 AEROSOL RESPIRATORY (INHALATION) at 21:23

## 2023-12-06 RX ADMIN — IPRATROPIUM BROMIDE AND ALBUTEROL SULFATE SCH ML: .5; 3 SOLUTION RESPIRATORY (INHALATION) at 08:46

## 2023-12-06 RX ADMIN — BUDESONIDE AND FORMOTEROL FUMARATE DIHYDRATE SCH PUFF: 80; 4.5 AEROSOL RESPIRATORY (INHALATION) at 08:46

## 2023-12-06 RX ADMIN — METOPROLOL SUCCINATE SCH MG: 25 TABLET, EXTENDED RELEASE ORAL at 09:53

## 2023-12-06 RX ADMIN — INSULIN ASPART SCH UNIT: 100 INJECTION, SOLUTION INTRAVENOUS; SUBCUTANEOUS at 13:13

## 2023-12-06 RX ADMIN — ISOSORBIDE MONONITRATE SCH MG: 30 TABLET, EXTENDED RELEASE ORAL at 09:53

## 2023-12-06 RX ADMIN — OXYMETAZOLINE HCL SCH SPRAY: 0.05 SPRAY NASAL at 09:55

## 2023-12-06 RX ADMIN — METHYLPREDNISOLONE SODIUM SUCCINATE SCH MG: 125 INJECTION, POWDER, FOR SOLUTION INTRAMUSCULAR; INTRAVENOUS at 05:50

## 2023-12-06 RX ADMIN — METHYLPREDNISOLONE SODIUM SUCCINATE SCH MG: 125 INJECTION, POWDER, FOR SOLUTION INTRAMUSCULAR; INTRAVENOUS at 21:53

## 2023-12-06 RX ADMIN — TAMSULOSIN HYDROCHLORIDE SCH MG: 0.4 CAPSULE ORAL at 09:53

## 2023-12-06 RX ADMIN — METHYLPREDNISOLONE SODIUM SUCCINATE SCH MG: 125 INJECTION, POWDER, FOR SOLUTION INTRAMUSCULAR; INTRAVENOUS at 13:13

## 2023-12-06 RX ADMIN — INSULIN ASPART SCH UNIT: 100 INJECTION, SOLUTION INTRAVENOUS; SUBCUTANEOUS at 21:54

## 2023-12-06 RX ADMIN — CEFAZOLIN SCH MLS/HR: 330 INJECTION, POWDER, FOR SOLUTION INTRAMUSCULAR; INTRAVENOUS at 17:45

## 2023-12-06 RX ADMIN — IPRATROPIUM BROMIDE AND ALBUTEROL SULFATE SCH ML: .5; 3 SOLUTION RESPIRATORY (INHALATION) at 16:25

## 2023-12-06 RX ADMIN — FOLIC ACID SCH MG: 1 TABLET ORAL at 09:53

## 2023-12-06 RX ADMIN — INSULIN ASPART SCH: 100 INJECTION, SOLUTION INTRAVENOUS; SUBCUTANEOUS at 06:15

## 2023-12-06 RX ADMIN — FLUTICASONE PROPIONATE PRN SPRAY: 50 SPRAY, METERED NASAL at 09:53

## 2023-12-06 RX ADMIN — ASPIRIN 81 MG CHEWABLE TABLET SCH MG: 81 TABLET CHEWABLE at 21:53

## 2023-12-06 RX ADMIN — INSULIN ASPART SCH UNIT: 100 INJECTION, SOLUTION INTRAVENOUS; SUBCUTANEOUS at 17:44

## 2023-12-06 RX ADMIN — IPRATROPIUM BROMIDE AND ALBUTEROL SULFATE SCH ML: .5; 3 SOLUTION RESPIRATORY (INHALATION) at 21:23

## 2023-12-06 RX ADMIN — IPRATROPIUM BROMIDE AND ALBUTEROL SULFATE SCH ML: .5; 3 SOLUTION RESPIRATORY (INHALATION) at 11:44

## 2023-12-06 NOTE — P.PN
Subjective


Progress Note Date: 12/06/23





Hospital course:





Patient is a very pleasant 78-year-old male with a past medical history of CAD 

with stents, hypertension, hyperlipidemia, congestive heart failure, type II 

non-insulin-dependent diabetes mellitus, BPH, and COPD not on home oxygen 

dependent.  He presented to the emergency department on 12/2/23 secondary to 

reports of difficulty breathing.  Patient reports progressively worsening 

shortness of breath 1 week associated with worsening of his nonproductive 

chronic cough, fevers, and chills.  He underwent full evaluation in the 

emergency department.  Upon arrival vital signs show patient to be tachycardic 

with heart rate in the 125, temp 100.4, respiratory rate 26, blood pressure 

122/64, and SpO2 of 93% on room air.  EKG completed showing sinus tachycardia at

124 bpm.  Chest x-ray completed in radiology report reviewed showing mild 

cardiomegaly with mild pulmonary vascular congestion, mildly prominent 

interstitial lung markings concerning for pulmonary edema versus pneumonitis, 

and aphasic patch of opacity suggested over the right upper lobe and hazy 

opacity of the left lung base concerning for emerging infiltrates suspect 

infectious or inflammatory process.  Labs completed and reviewed.  CBC showing 

leukocytosis with WBC count of 13.6 and polycythemia with elevated hemoglobin of

17.9.  Coagulation profile normal findings.  BMP showing mild hypochloremia with

chloride of 96 otherwise normal findings.  Glucose slightly elevated at 208.  

Initial lactic acid 3.1.  Liver profile normal findings.  Influenza A, influenza

B, RSV, Covid PCR negative.  Patient was provided with IV fluids, antibiotics 

with azithromycin and Rocephin, steroids and nebulizer treatments.  Patient 

admitted under our services with consultation to pulmonology.





Physical exam:





Patient seen and fully evaluated at bedside this morning.  He was in bed and 

reports having a nosebleed because everything is so dry and states this mucus so

sick.  Patient unable to clear nasal passages and having a difficult time brin

ging up any sputum resulting in extensive coughing fits.  Patient denies having 

any dizziness, lightheadedness, chest pain, palpitations, or any other 

complaints at this time.





Vital signs reviewed and stable. 


General: Nontoxic, no distress and appears stated age.  


Derm: Skin warm and dry, normal coloration for ethnicity.


Head: Atraumatic, normocephalic and symmetric.  


Eyes: EOMs intact, no lid lag, and anicteric sclera


Mouth: no lip lesions, mucus membranes moist


Cardiovascular: regular rate and rhythm with normal S1S2, systolic murmur, 

positive posterior tibial pulses bilaterally, and cap refill < 2 seconds.  


Lungs: Respirations even, regular, and unlabored.  Lungs diminished with diffuse

expiratory wheezes bilaterally. No accessory muscle usage. 


Abdominal: soft, nontender to palpation, no guarding, no appreciable 

organomegaly


Ext: ROM intact. No gross muscle atrophy, no edema, no contractures


Neuro: Speech clear, face symmetrical and CN II-XII grossly intact with no noted

focal neuro deficits


Psych: Alert and oriented to person, place, time, and situation. Appropriate and

pleasant affect. 





Assessment and Plan of Care:





Multifocal pneumonia


Advanced COPD with acute exacerbation secondary to community-acquired pneumonia


Severe sepsis secondary to above


-Pulmonary following, reviewed documentation in chart.


-Oxygenation to be administered and titrated as needed to maintain SPO2 equal to

or greater than 92%


-Telemetry monitoring.


-Monitor Pulse-oximetry


-Duonebs scheduled four times daily and as needed for SOB and/or wheezing


-Incentive Spirometry and encourage use 10-15 times hourly


-Continue use of flutter valve


-Continue Flonase and Afrin


-Steroids: Solu-Medrol dose decreased to 60 mg every 12 hours and plan to 

transition to oral prednisone tomorrow morning.


-Antibiotics: Completed three-day course of Azithromycin 500 mg daily and is on 

Rocephin 2 g IVPB daily day 5/5. 


-Sputum culture negative showing few normal respiratory clifton with no 

Staphylococcus aureus or pseudomonas aeruginosa reported.


-Blood cultures showing no growth to date.


-Legionella antigen is negative.





Type 2 diabetes mellitus with hyperglycemia


Morning glucose 133.  Continue to Jardiance and continue with glycemic protocol

with NovoLog sliding scale.





History of CAD with stents


Hypertension


Hyperlipidemia


Congestive heart failure


Continue daily medication regimen with aspirin 81 mg daily, atorvastatin 80 mg 

nightly, isosorbide mononitrate 30 mg daily, lisinopril 5 mg twice daily, and 

metoprolol 25 mg twice daily.





BPH


Continue Flomax 0.4 mg daily.








Data and imaging reviewed:


Vital signs reviewed.  Blood pressure 163/96, heart rate 72, respiratory rate 

17, temp 97.8F, and SpO2 of 90% on 2 L.








Home oxygen evaluation was completed.  Patient 86% on room air at rest 

desaturating down to 84% with ambulation.  Secondary to patient's advanced COPD 

he will require discharge home on continuous home oxygen.








CODE STATUS: Full code


DVT prophylaxis: Lovenox


Anticipated discharge date: Likely tomorrow morning


Anticipated discharge place: Home with COPD Navigator and palliative care


Patient was seen independently by Nurse Pracitioner.


This document was prepared using Dragon dictation software.  Please allow for 

errors in transcription, while rare they do occur.





Eric Caro NP rendered care for this patient independently, reviewed the 

findings and plan as documented in the note above.  I did not physically speak 

with or examine the patient on this date. 





Objective





- Vital Signs


Vital signs: 


                                   Vital Signs











Temp  97.8 F   12/06/23 07:11


 


Pulse  72   12/06/23 07:11


 


Resp  17   12/06/23 07:11


 


BP  163/96   12/06/23 07:11


 


Pulse Ox  90 L  12/06/23 07:11


 


FiO2      








                                 Intake & Output











 12/05/23 12/06/23 12/06/23





 18:59 06:59 18:59


 


Output Total 1100 1250 


 


Balance -1100 -1250 


 


Weight 58.967 kg  


 


Output:   


 


  Urine 1100 1250 


 


Other:   


 


  Voiding Method Indwelling Catheter Indwelling Catheter 














- Labs


CBC & Chem 7: 


                                 12/07/23 06:48





                                 12/07/23 06:48


Labs: 


                  Abnormal Lab Results - Last 24 Hours (Table)











  12/05/23 12/05/23 12/05/23 Range/Units





  05:37 05:37 11:52 


 


WBC  17.13 H    (4.50-10.00)  X 10*3/uL


 


RBC  5.69 H    (4.40-5.60)  X 10*6/uL


 


Hct  50.9 H    (39.6-50.0)  %


 


MCHC  31.0 L    (32.0-37.0)  g/dL


 


RDW  15.3 H    (11.5-14.5)  %


 


BUN/Creatinine Ratio   21.57 H   (12.00-20.00)  Ratio


 


Glucose   151 H   ()  mg/dL


 


POC Glucose (mg/dL)    126 H  ()  mg/dL


 


AST   54 H   (14-35)  U/L


 


ALT   65 H   (10-49)  U/L


 


Total Protein   5.7 L   (6.2-8.2)  g/dL


 


Albumin   3.2 L   (3.8-4.9)  g/dL


 


Albumin/Globulin Ratio   1.28 L   (1.60-3.17)  Ratio














  12/05/23 12/05/23 12/06/23 Range/Units





  17:06 20:44 06:04 


 


WBC     (4.50-10.00)  X 10*3/uL


 


RBC     (4.40-5.60)  X 10*6/uL


 


Hct     (39.6-50.0)  %


 


MCHC     (32.0-37.0)  g/dL


 


RDW     (11.5-14.5)  %


 


BUN/Creatinine Ratio     (12.00-20.00)  Ratio


 


Glucose     ()  mg/dL


 


POC Glucose (mg/dL)  176 H  179 H  133 H  ()  mg/dL


 


AST     (14-35)  U/L


 


ALT     (10-49)  U/L


 


Total Protein     (6.2-8.2)  g/dL


 


Albumin     (3.8-4.9)  g/dL


 


Albumin/Globulin Ratio     (1.60-3.17)  Ratio








                      Microbiology - Last 24 Hours (Table)











 12/02/23 17:25 Blood Culture - Preliminary





 Blood 


 


 12/02/23 17:25 Blood Culture - Preliminary





 Blood 


 


 12/02/23 23:20 Gram Stain - Final





 Sputum Sputum Culture - Final

## 2023-12-06 NOTE — XR
EXAMINATION TYPE: XR chest 1V portable

 

DATE OF EXAM: 12/6/2023

 

COMPARISON: 12/3/2023

 

HISTORY: Follow-up abnormal x-ray

 

TECHNIQUE: Single frontal view of the chest is obtained.

 

FINDINGS:  There is marked interval progression of consolidation left lung base with small bilateral 
effusions.

Right basilar subsegmental elevation. Stable 8 mm nodule right midlung. Patchy intrauterine gestation
. Underlying COPD suspected. Heart is enlarged. Limited inspiration. Arthropathy of the shoulders wit
h diffuse osteopenia postsurgical change overlying the cervical spine. Degenerative changes of the ve
rtebral canal.

 

IMPRESSION:  

1. Interval left lower lobe consolidation and small bilateral effusions correlate for pneumonia.

2. Ill-defined patchy infiltrate right upper lobe. Neoplasm is not excluded.

## 2023-12-06 NOTE — P.PN
Subjective


Progress Note Date: 12/06/23











This is a very pleasant 78-year-old male patient who follows with the [facility] as

for his primary care needs.  He has a history of COPD, hypertension, chronic 

catheter, coronary disease with previous stent placement, former smoker and quit

back in 1980.  No home oxygen.  He presented here to the emergency room 

yesterday with complaints of increasing shortness of breath, cough and 

congestion.  His x-ray revealed mild cardiomegaly with mild pulmonary vascular 

congestion.  Vague patchy opacity in the right upper lobe and hazy opacity in 

the left lung base.  Count 13.6.  Hemoglobin 17.9.  Platelets 261.  Sodium 139. 

Potassium 4.1.  Bicarb 28.  BUN 14.  Creatinine 0.68.  Glucose 171.  Lactic acid

0.7.  Pro-calcitonin 0.11.  Viral screen negative.  [facility]  He is currently sitting up in bed.  Awake 

and alert in no acute distress.  He is breathing a bit easier today compared to 

yesterday.  He is maintaining good O2 saturations in the mid 90s on 2 L/m per 

nasal cannula.  He is afebrile.  Hemodynamically stable.  He has been initiated 

and DuoNeb inhalations, Symbicort, Solu-Medrol.  Antibiotics in the form of 

ceftriaxone and azithromycin.  Normal saline at 100 ML's per hour.





On today's evaluation of 12/04/2023, the patient is feeling slightly improved 

compared to yesterday.  The patient is oxygenating is currently on 4 L nasal 

cannula.  Doing well.  No specific complaints.  His copd, CAD, previous coronary

intervention and stenting and hypertension.  He has a chronic indwelling Jama 

catheter in place.  His chest x-ray showed some increased pulmonary vascular 

marking in a vague infiltrate in the right upper lobe and left lung base.The 

patient remains on IV Rocephin and Zithromax.  The patient is on Symbicort.  The

patient on IV Solu-Medrol 60 mg every 6 hours.  He is also on DuoNeb updrafts.  

Echocardiogram showed an ejection fraction of 40-45%.








on 12/05/2023, slightly improved compared to yesterday.  No new complaints.  He 

is having nasal congestion and he was started on Flonase and Afrin nasal spray. 

He remains on O2 at 2 L per minute nasal cannula.  He remains on Rocephin and 

Zithromax.  He remains on bronchodilators.  He remains on steroids.Labs from 

today showed a white cell count of 17, hemoglobin of 15.8 and a platelet count 

of 219, sodium is at 143, BUN is at 60 with a creatinine of 0.7.  UA was 

negative.  The viral screen was negative.  AST is at 54, ALTs at 65, alkaline 

phosphatase is 103.  Normal coagulation profile.  Normal urinalysis.





On 12/06/2023, the patient is being seen for a follow-up.  No new complaints.  

Less short of breath.  Nasal congestion is improved.  Continues to receive 

treatment for an acute COPD exacerbation.  He is on DuoNeb updrafts.  He is also

on IV Solu-Medrol 60 mg every 12 hours.  No new labs from today.  Blood sugars 

under adequate control for the time being.  He is being evaluated for home O2.





Objective





- Vital Signs


Vital signs: 


                                   Vital Signs











Temp  97.8 F   12/06/23 13:15


 


Pulse  87   12/06/23 13:15


 


Resp  19   12/06/23 13:15


 


BP  144/78   12/06/23 13:15


 


Pulse Ox  86 L  12/06/23 14:51


 


FiO2      








                                 Intake & Output











 12/05/23 12/06/23 12/06/23





 18:59 06:59 18:59


 


Output Total 1100 1250 1100


 


Balance -1100 -1250 -1100


 


Weight 58.967 kg  


 


Output:   


 


  Urine 1100 1250 1100


 


Other:   


 


  Voiding Method Indwelling Catheter Indwelling Catheter Indwelling Catheter














- Exam











GENERAL EXAM: Alert, pleasant 78-year-old male patient, on 2 L nasal cannula, 

comfortable in no apparent distress.


HEAD: Normocephalic.


EYES: Normal reaction of pupils, equal size.


NOSE: Clear with pink turbinates.


THROAT: No erythema or exudates.


NECK: No masses, no JVD.


CHEST: No chest wall deformity.


LUNGS: Equal air entry with faint end extremely wheeze, few scattered rhonchi.


CVS: S1 and S2 normal with no audible murmur, regular rhythm.


ABDOMEN: No hepatosplenomegaly, normal bowel sounds, no guarding or rigidity.


SPINE: No scoliosis or deformity


SKIN: No rashes


CENTRAL NERVOUS SYSTEM: No focal deficits, tone is normal in all 4 extremities.


EXTREMITIES: There is no peripheral edema.  No clubbing, no cyanosis.  

Peripheral pulses are intact.





- Labs


CBC & Chem 7: 


                                 12/05/23 05:37





                                 12/05/23 05:37


Labs: 


                  Abnormal Lab Results - Last 24 Hours (Table)











  12/05/23 12/05/23 12/06/23 Range/Units





  17:06 20:44 06:04 


 


POC Glucose (mg/dL)  176 H  179 H  133 H  ()  mg/dL














  12/06/23 Range/Units





  11:57 


 


POC Glucose (mg/dL)  219 H  ()  mg/dL








                      Microbiology - Last 24 Hours (Table)











 12/02/23 17:25 Blood Culture - Preliminary





 Blood 


 


 12/02/23 17:25 Blood Culture - Preliminary





 Blood 














Assessment and Plan


Plan: 











Acute hypoxemic respiratory failure secondary to suspected chronic obstructive 

pulmonary disease exacerbation in addition to an early pneumonia. Procalcitonin 

0.11.  Viral screen negative, clinical improving and he has no worsening in the 

breathing and the patient's overall condition is stable





Acute hypoxic respiratory failure currently on 3 L O2 nasal cannula





Acute COPD exacerbation with secondary shortness of breath and hypoxemia





Former smoker





Hypertension





Hyperlipidemia





Coronary disease with previous stent placement





Chronic Jama catheter





Plan:





Clinically improving, we'll continue to same treatment for now


Evaluate for home O2


Procalcitonin 0.11


Continue IV Solu-Medrol 60 mg every 12 hours


Continue bronchodilators, steroids and the patient remains on IV Solu-Medrol


Flonase and Afrin for nasal congestion


Titrate down the FiO2 as tolerated


Increase his activity as tolerated


We'll continue to follow and make further recommendations based on his clinical 

status

## 2023-12-07 VITALS — RESPIRATION RATE: 19 BRPM | TEMPERATURE: 97.6 F | SYSTOLIC BLOOD PRESSURE: 137 MMHG | DIASTOLIC BLOOD PRESSURE: 85 MMHG

## 2023-12-07 VITALS — HEART RATE: 85 BPM

## 2023-12-07 LAB
ALBUMIN SERPL-MCNC: 3.1 G/DL (ref 3.8–4.9)
ALBUMIN/GLOB SERPL: 1.24 RATIO (ref 1.6–3.17)
ALP SERPL-CCNC: 96 U/L (ref 41–126)
ALT SERPL-CCNC: 96 U/L (ref 10–49)
ANION GAP SERPL CALC-SCNC: 7.2 MMOL/L (ref 4–12)
AST SERPL-CCNC: 45 U/L (ref 14–35)
BUN SERPL-SCNC: 20.2 MG/DL (ref 9–27)
BUN/CREAT SERPL: 28.86 RATIO (ref 12–20)
CALCIUM SPEC-MCNC: 8.8 MG/DL (ref 8.7–10.3)
CHLORIDE SERPL-SCNC: 103 MMOL/L (ref 96–109)
CO2 SERPL-SCNC: 28.8 MMOL/L (ref 21.6–31.8)
ERYTHROCYTE [DISTWIDTH] IN BLOOD BY AUTOMATED COUNT: 6.06 X 10*6/UL (ref 4.4–5.6)
ERYTHROCYTE [DISTWIDTH] IN BLOOD: 15.8 % (ref 11.5–14.5)
GLOBULIN SER CALC-MCNC: 2.5 G/DL (ref 1.6–3.3)
GLUCOSE BLD-MCNC: 153 MG/DL (ref 70–110)
GLUCOSE BLD-MCNC: 161 MG/DL (ref 70–110)
GLUCOSE SERPL-MCNC: 170 MG/DL (ref 70–110)
HCT VFR BLD AUTO: 54.1 % (ref 39.6–50)
HGB BLD-MCNC: 16.8 G/DL (ref 13–17)
MAGNESIUM SPEC-SCNC: 2.4 MG/DL (ref 1.5–2.4)
MCH RBC QN AUTO: 27.7 PG (ref 27–32)
MCHC RBC AUTO-ENTMCNC: 31.1 G/DL (ref 32–37)
MCV RBC AUTO: 89.3 FL (ref 80–97)
NRBC BLD AUTO-RTO: 0 X 10*3/UL (ref 0–0.01)
PLATELET # BLD AUTO: 256 X 10*3/UL (ref 140–440)
POTASSIUM SERPL-SCNC: 5.1 MMOL/L (ref 3.5–5.5)
PROT SERPL-MCNC: 5.6 G/DL (ref 6.2–8.2)
SODIUM SERPL-SCNC: 139 MMOL/L (ref 135–145)
WBC # BLD AUTO: 11.8 X 10*3/UL (ref 4.5–10)

## 2023-12-07 RX ADMIN — FLUTICASONE PROPIONATE PRN SPRAY: 50 SPRAY, METERED NASAL at 09:25

## 2023-12-07 RX ADMIN — IPRATROPIUM BROMIDE AND ALBUTEROL SULFATE SCH ML: .5; 3 SOLUTION RESPIRATORY (INHALATION) at 08:16

## 2023-12-07 RX ADMIN — FOLIC ACID SCH MG: 1 TABLET ORAL at 09:20

## 2023-12-07 RX ADMIN — LIDOCAINE SCH: 4 PATCH TOPICAL at 09:26

## 2023-12-07 RX ADMIN — INSULIN ASPART SCH UNIT: 100 INJECTION, SOLUTION INTRAVENOUS; SUBCUTANEOUS at 06:48

## 2023-12-07 RX ADMIN — ISOSORBIDE MONONITRATE SCH MG: 30 TABLET, EXTENDED RELEASE ORAL at 09:21

## 2023-12-07 RX ADMIN — IPRATROPIUM BROMIDE AND ALBUTEROL SULFATE SCH ML: .5; 3 SOLUTION RESPIRATORY (INHALATION) at 11:24

## 2023-12-07 RX ADMIN — CEFAZOLIN SCH: 330 INJECTION, POWDER, FOR SOLUTION INTRAMUSCULAR; INTRAVENOUS at 03:32

## 2023-12-07 RX ADMIN — BUDESONIDE AND FORMOTEROL FUMARATE DIHYDRATE SCH PUFF: 80; 4.5 AEROSOL RESPIRATORY (INHALATION) at 08:16

## 2023-12-07 RX ADMIN — ENOXAPARIN SODIUM SCH MG: 40 INJECTION SUBCUTANEOUS at 09:21

## 2023-12-07 RX ADMIN — METHYLPREDNISOLONE SODIUM SUCCINATE SCH MG: 125 INJECTION, POWDER, FOR SOLUTION INTRAMUSCULAR; INTRAVENOUS at 09:20

## 2023-12-07 RX ADMIN — METOPROLOL SUCCINATE SCH MG: 25 TABLET, EXTENDED RELEASE ORAL at 09:21

## 2023-12-07 RX ADMIN — Medication SCH MG: at 09:21

## 2023-12-07 RX ADMIN — INSULIN ASPART SCH UNIT: 100 INJECTION, SOLUTION INTRAVENOUS; SUBCUTANEOUS at 12:33

## 2023-12-07 RX ADMIN — OXYMETAZOLINE HCL SCH SPRAY: 0.05 SPRAY NASAL at 09:19

## 2023-12-07 RX ADMIN — TAMSULOSIN HYDROCHLORIDE SCH MG: 0.4 CAPSULE ORAL at 09:20

## 2023-12-07 NOTE — P.DS
Providers


Date of admission: 


12/02/23 20:42





Expected date of discharge: 12/07/23


Attending physician: 


Neal Guillen MD





Consults: 





                                        





12/02/23 20:40


Consult Physician Routine 


   Consulting Provider: Madhav Ray


   Consult Reason/Comments: copd, sepsis


   Do you want consulting provider notified?: Yes











Primary care physician: 


[facility]





Hospital Course: 





Discharge Diagnosis:





Multifocal pneumonia.  Completed three-day course of azithromycin 500 mg daily 

followed by 5 day course of Rocephin 2 g IVPB daily.





Advanced COPD with acute exacerbation secondary to community-acquired pneumonia.

Home oxygen evaluation was completed.  Patient 86% on room air at rest 

desaturating down to 84% with ambulation.  Secondary to patient's advanced COPD 

he required discharge home on continuous home oxygen at 2 L O2 via nasal 

cannula.  Patient also discharged home with prednisone taper and being 

discharged home with palliative care secondary to advanced COPD and home oxygen 

dependence.  Patient to follow up outpatient with pulmonologist in 1 week.





Severe sepsis secondary to above.  Resolved.  Blood cultures remain negative to 

date.





Type 2 diabetes mellitus with hyperglycemia. Continue to Jardiance 10 mg daily.





History of CAD with stents. Continue daily medication regimen with aspirin 81 mg

daily, atorvastatin 80 mg nightly, isosorbide mononitrate 30 mg daily, 

lisinopril 5 mg twice daily, and metoprolol 25 mg twice daily.





Hypertension. Continue daily medication regimen with aspirin 81 mg daily, 

atorvastatin 80 mg nightly, isosorbide mononitrate 30 mg daily, lisinopril 5 mg 

twice daily, and metoprolol 25 mg twice daily.





Hyperlipidemia. Continue daily medication regimen with aspirin 81 mg daily, 

atorvastatin 80 mg nightly, isosorbide mononitrate 30 mg daily, lisinopril 5 mg 

twice daily, and metoprolol 25 mg twice daily.





Congestive heart failure. Continue daily medication regimen with aspirin 81 mg 

daily, atorvastatin 80 mg nightly, isosorbide mononitrate 30 mg daily, 

lisinopril 5 mg twice daily, and metoprolol 25 mg twice daily.





BPH. Continue Flomax 0.4 mg daily.





Hospital Course: 





Patient is a very pleasant 78-year-old male with a past medical history of CAD 

with stents, hypertension, hyperlipidemia, congestive heart failure, type II 

non-insulin-dependent diabetes mellitus, BPH, and COPD not on home oxygen 

dependent.  He presented to the emergency department on 12/2/23 secondary to 

reports of difficulty breathing.  Patient reports progressively worsening 

shortness of breath 1 week associated with worsening of his nonproductive 

chronic cough, fevers, and chills.  He underwent full evaluation in the 

emergency department.  Upon arrival vital signs show patient to be tachycardic 

with heart rate in the 125, temp 100.4, respiratory rate 26, blood pressure 

122/64, and SpO2 of 93% on room air.  EKG completed showing sinus tachycardia at

124 bpm.  Chest x-ray completed in radiology report reviewed showing mild 

cardiomegaly with mild pulmonary vascular congestion, mildly prominent 

interstitial lung markings concerning for pulmonary edema versus pneumonitis, 

and aphasic patch of opacity suggested over the right upper lobe and hazy 

opacity of the left lung base concerning for emerging infiltrates suspect 

infectious or inflammatory process.  Labs completed and reviewed.  CBC showing 

leukocytosis with WBC count of 13.6 and polycythemia with elevated hemoglobin of

17.9.  Coagulation profile normal findings.  BMP showing mild hypochloremia with

chloride of 96 otherwise normal findings.  Glucose slightly elevated at 208.  

Initial lactic acid 3.1.  Liver profile normal findings.  Influenza A, influenza

B, RSV, Covid PCR negative.  Patient was provided with IV fluids, antibiotics 

with azithromycin and Rocephin, steroids and nebulizer treatments.  Patient 

admitted under our services with consultation to pulmonology.  Patient completed

three-day course of azithromycin 500 mg daily followed by 5 day course of 

Rocephin 2 g IVPB daily.  He was evaluated by pulmonology. Home oxygen 

evaluation was completed.  Patient 86% on room air at rest desaturating down to 

84% with ambulation.  Secondary to patient's advanced COPD he required discharge

home on continuous home oxygen at 2 L O2 via nasal cannula.  Patient also 

discharged home with prednisone taper and being discharged home with palliative 

care secondary to advanced COPD and home oxygen dependence.  Patient to follow 

up outpatient with PCP in 1-2 days and pulmonologist in 1 week.





Physical exam:





Vital signs reviewed and stable. 


General: Nontoxic, no distress and appears stated age.  


Derm: Skin warm and dry, normal coloration for ethnicity.


Head: Atraumatic, normocephalic and symmetric.  


Eyes: EOMs intact, no lid lag, and anicteric sclera


Mouth: no lip lesions, mucus membranes moist


Cardiovascular: regular rate and rhythm with normal S1S2, systolic murmur, 

positive posterior tibial pulses bilaterally, and cap refill < 2 seconds.  


Lungs: Respirations even, regular, and unlabored.  Lungs diminished with diffuse

expiratory wheezes bilaterally. No accessory muscle usage. 


Abdominal: soft, nontender to palpation, no guarding, no appreciable 

organomegaly


Ext: ROM intact. No gross muscle atrophy, no edema, no contractures


Neuro: Speech clear, face symmetrical and CN II-XII grossly intact with no noted

focal neuro deficits


Psych: Alert and oriented to person, place, time, and situation. Appropriate and

pleasant affect. 








A total of 39 minutes of time were spent preparing this complex discharge 

summary.


Pt was discharged on 12/7/23 at 9:57 AM.


Patient was seen independently by Nurse Practitioner.


This document was prepared using Dragon dictation software.  Please allow for 

errors in transcription while rare they do occur.














Eric Caro NP rendered care for this patient independently, reviewed the 

findings and plan as documented in the note above.  I did not physically speak 

with or examine the patient on this date. 


Patient Condition at Discharge: Stable





Plan - Discharge Summary


Discharge Rx Participant: Yes


New Discharge Prescriptions: 


New


   predniSONE See Taper PO AS DIRECTED 12 Days #30 tab





Continue


   Albuterol Nebulized [Ventolin Nebulized] 2.5 mg INHALATION RT-QID


   Lidocaine 5% Patch [Lidoderm 5% Patch] 1 patch TRANSDERM DAILY


   Diclofenac Sodium Gel [Voltaren 1% Gel] 1 applic TOPICAL DAILY


   Tamsulosin [Flomax] 0.4 mg PO DAILY #30 capsule


   Folic Acid 1 mg PO DAILY


   Thiamine [Vitamin B-1] 100 mg PO DAILY


   Furosemide [Lasix] 20 mg PO BID@0900,1300


   Aspirin 81 mg PO HS


   methocarbamoL [Robaxin-750] 750 mg PO QID


   Albuterol Inhaler [Ventolin Hfa Inhaler] 2 puff INHALATION RT-QID


   Cetirizine HCl [Zyrtec] 10 mg PO HS


   Fluticasone Nasal Spray [Flonase Nasal Spray] 1 spr EA NOSTRIL DAILY


   lisinopriL [Zestril] 5 mg PO BID


   Atorvastatin [Lipitor] 80 mg PO HS


   Metoprolol Succinate (ER) [Toprol XL] 25 mg PO BID


   Isosorbide Mononitrate ER [Imdur] 30 mg PO DAILY 30 Days #30 tab


   Fluticasone Propion/Salmeterol [Wixela 250-50 Inhub] 1 puff INHALATION RT-BID


   Acetaminophen Tab [Tylenol] 1,000 mg PO Q6H PRN


     PRN Reason: Pain


   Empagliflozin [Jardiance] 10 mg PO DAILY


   Ketoconazole 2% Shampoo [Nizoral] 1 applic TOPICAL DAILY


   Triamcinolone 0.1% Cream [Kenalog 0.1% Cream] 1 applic TOPICAL DAILY


Discharge Medication List





Albuterol Inhaler [Ventolin Hfa Inhaler] 2 puff INHALATION RT-QID 12/16/21 

[History]


Albuterol Nebulized [Ventolin Nebulized] 2.5 mg INHALATION RT-QID 12/16/21 

[History]


Cetirizine HCl [Zyrtec] 10 mg PO HS 12/16/21 [History]


Fluticasone Nasal Spray [Flonase Nasal Spray] 1 spr EA NOSTRIL DAILY 12/16/21 

[History]


Lidocaine 5% Patch [Lidoderm 5% Patch] 1 patch TRANSDERM DAILY 12/16/21 [History

]


Diclofenac Sodium Gel [Voltaren 1% Gel] 1 applic TOPICAL DAILY 12/30/21 

[History]


Tamsulosin [Flomax] 0.4 mg PO DAILY #30 capsule 01/02/22 [Rx]


Atorvastatin [Lipitor] 80 mg PO HS 09/26/22 [History]


Folic Acid 1 mg PO DAILY 09/26/22 [History]


Metoprolol Succinate (ER) [Toprol XL] 25 mg PO BID 09/26/22 [History]


Thiamine [Vitamin B-1] 100 mg PO DAILY 09/26/22 [History]


lisinopriL [Zestril] 5 mg PO BID 09/26/22 [History]


Isosorbide Mononitrate ER [Imdur] 30 mg PO DAILY 30 Days #30 tab 09/27/22 [Rx]


Aspirin 81 mg PO HS 04/17/23 [History]


Furosemide [Lasix] 20 mg PO BID@0900,1300 04/17/23 [History]


Fluticasone Propion/Salmeterol [Wixela 250-50 Inhub] 1 puff INHALATION RT-BID 

06/10/23 [History]


Acetaminophen Tab [Tylenol] 1,000 mg PO Q6H PRN 12/02/23 [History]


Empagliflozin [Jardiance] 10 mg PO DAILY 12/02/23 [History]


Ketoconazole 2% Shampoo [Nizoral] 1 applic TOPICAL DAILY 12/02/23 [History]


Triamcinolone 0.1% Cream [Kenalog 0.1% Cream] 1 applic TOPICAL DAILY 12/02/23 

[History]


methocarbamoL [Robaxin-750] 750 mg PO QID 12/02/23 [History]


predniSONE See Taper PO AS DIRECTED 12 Days #30 tab 12/07/23 [Rx]








Follow up Appointment(s)/Referral(s): 


[facility] [NON-STAFF] - As Needed


Madhav Ray DO [Doctor of Osteopathic Medicine] - 1 Week


[facility] [Primary Care Provider] - 1-2 days (Office closed when attempting

to make appointment, please call and schedule an appointment)


Ambulatory/Diagnostic Orders: 


Basic Metabolic Panel [LAB.AMB] Location: None Selected


Complete Blood Count w/diff [LAB.AMB] Location: None Selected


Patient Instructions/Handouts:  COPD (Chronic Obstructive Pulmonary Disease) 

(DC), Chronic Lung Disease and Infection Prevention (DC)


Activity/Diet/Wound Care/Special Instructions: 





Oxygen ordered through [facility]: #[phone]








Activity:





As tolerated.  Take breaks as needed.





Diet: 





Heart healthy and carb consistent diet. Avoid salts, or foods with hidden salts 

such as canned or boxed foods and frozen dinners. Extra salt makes your heart 

work harder and traps the fluid in your body for longer.





Special Instructions:  





Take all of your medications as directed and remember to keep all of your 

doctor's appointments and follow-up as needed.  





You are being discharged home with continuous oxygen, please wear at all times 

including while at rest, sleeping, and while showering.





In addition you are also being discharged home with home in palliative care 

secondary to year advanced COPD.





Thank you for allowing us to participate in your care, it was truly a pleasure 

having you for our patient!!! 





Discharge Disposition: HOME WITH HOME HEALTH SERVICES

## 2024-03-07 ENCOUNTER — HOSPITAL ENCOUNTER (INPATIENT)
Dept: HOSPITAL 47 - EC | Age: 79
LOS: 4 days | Discharge: HOME HEALTH | DRG: 177 | End: 2024-03-11
Attending: INTERNAL MEDICINE | Admitting: INTERNAL MEDICINE
Payer: OTHER GOVERNMENT

## 2024-03-07 DIAGNOSIS — Z88.7: ICD-10-CM

## 2024-03-07 DIAGNOSIS — Z86.73: ICD-10-CM

## 2024-03-07 DIAGNOSIS — Z98.1: ICD-10-CM

## 2024-03-07 DIAGNOSIS — Z66: ICD-10-CM

## 2024-03-07 DIAGNOSIS — Z79.82: ICD-10-CM

## 2024-03-07 DIAGNOSIS — Z87.01: ICD-10-CM

## 2024-03-07 DIAGNOSIS — E86.0: ICD-10-CM

## 2024-03-07 DIAGNOSIS — I25.2: ICD-10-CM

## 2024-03-07 DIAGNOSIS — Z28.310: ICD-10-CM

## 2024-03-07 DIAGNOSIS — U07.1: Primary | ICD-10-CM

## 2024-03-07 DIAGNOSIS — Z95.5: ICD-10-CM

## 2024-03-07 DIAGNOSIS — N42.9: ICD-10-CM

## 2024-03-07 DIAGNOSIS — J96.01: ICD-10-CM

## 2024-03-07 DIAGNOSIS — I10: ICD-10-CM

## 2024-03-07 DIAGNOSIS — A08.39: ICD-10-CM

## 2024-03-07 DIAGNOSIS — J12.82: ICD-10-CM

## 2024-03-07 DIAGNOSIS — Z79.899: ICD-10-CM

## 2024-03-07 DIAGNOSIS — I25.10: ICD-10-CM

## 2024-03-07 DIAGNOSIS — J44.1: ICD-10-CM

## 2024-03-07 DIAGNOSIS — Z79.84: ICD-10-CM

## 2024-03-07 DIAGNOSIS — Z85.46: ICD-10-CM

## 2024-03-07 DIAGNOSIS — J44.0: ICD-10-CM

## 2024-03-07 LAB
ALBUMIN SERPL-MCNC: 3.5 G/DL (ref 3.5–5)
ALP SERPL-CCNC: 85 U/L (ref 38–126)
ALT SERPL-CCNC: 22 U/L (ref 4–49)
ANION GAP SERPL CALC-SCNC: 4 MMOL/L
AST SERPL-CCNC: 36 U/L (ref 17–59)
BASOPHILS # BLD AUTO: 0 K/UL (ref 0–0.2)
BASOPHILS NFR BLD AUTO: 0 %
BUN SERPL-SCNC: 13 MG/DL (ref 9–20)
CALCIUM SPEC-MCNC: 8.4 MG/DL (ref 8.4–10.2)
CHLORIDE SERPL-SCNC: 103 MMOL/L (ref 98–107)
CO2 BLDA-SCNC: 35 MMOL/L (ref 19–24)
CO2 SERPL-SCNC: 34 MMOL/L (ref 22–30)
EOSINOPHIL # BLD AUTO: 0.1 K/UL (ref 0–0.7)
EOSINOPHIL NFR BLD AUTO: 2 %
ERYTHROCYTE [DISTWIDTH] IN BLOOD BY AUTOMATED COUNT: 5.97 M/UL (ref 4.3–5.9)
ERYTHROCYTE [DISTWIDTH] IN BLOOD: 14.8 % (ref 11.5–15.5)
GLUCOSE SERPL-MCNC: 107 MG/DL (ref 74–99)
HCO3 BLDA-SCNC: 33 MMOL/L (ref 21–25)
HCO3 BLDA-SCNC: 34 MMOL/L (ref 21–25)
HCO3 BLDV-SCNC: 34 MMOL/L (ref 24–28)
HCT VFR BLD AUTO: 53 % (ref 39–53)
HGB BLD-MCNC: 17.2 GM/DL (ref 13–17.5)
LYMPHOCYTES # SPEC AUTO: 0.4 K/UL (ref 1–4.8)
LYMPHOCYTES NFR SPEC AUTO: 5 %
MAGNESIUM SPEC-SCNC: 1.8 MG/DL (ref 1.6–2.3)
MCH RBC QN AUTO: 28.7 PG (ref 25–35)
MCHC RBC AUTO-ENTMCNC: 32.4 G/DL (ref 31–37)
MCV RBC AUTO: 88.8 FL (ref 80–100)
MONOCYTES # BLD AUTO: 0.6 K/UL (ref 0–1)
MONOCYTES NFR BLD AUTO: 8 %
NEUTROPHILS # BLD AUTO: 6.2 K/UL (ref 1.3–7.7)
NEUTROPHILS NFR BLD AUTO: 84 %
PCO2 BLDA: 63 MMHG (ref 35–45)
PCO2 BLDA: 67 MMHG (ref 35–45)
PCO2 BLDV: 70 MMHG (ref 37–51)
PH BLDA: 7.31 [PH] (ref 7.35–7.45)
PH BLDA: 7.33 [PH] (ref 7.35–7.45)
PH BLDV: 7.29 [PH] (ref 7.31–7.41)
PLATELET # BLD AUTO: 170 K/UL (ref 150–450)
PO2 BLDA: 39 MMHG (ref 83–108)
PO2 BLDA: 42 MMHG (ref 83–108)
POTASSIUM SERPL-SCNC: 3.4 MMOL/L (ref 3.5–5.1)
PROT SERPL-MCNC: 6.4 G/DL (ref 6.3–8.2)
SODIUM SERPL-SCNC: 141 MMOL/L (ref 137–145)
WBC # BLD AUTO: 7.3 K/UL (ref 3.8–10.6)

## 2024-03-07 PROCEDURE — 85025 COMPLETE CBC W/AUTO DIFF WBC: CPT

## 2024-03-07 PROCEDURE — 36600 WITHDRAWAL OF ARTERIAL BLOOD: CPT

## 2024-03-07 PROCEDURE — 96361 HYDRATE IV INFUSION ADD-ON: CPT

## 2024-03-07 PROCEDURE — 71045 X-RAY EXAM CHEST 1 VIEW: CPT

## 2024-03-07 PROCEDURE — 80053 COMPREHEN METABOLIC PANEL: CPT

## 2024-03-07 PROCEDURE — 36415 COLL VENOUS BLD VENIPUNCTURE: CPT

## 2024-03-07 PROCEDURE — 82805 BLOOD GASES W/O2 SATURATION: CPT

## 2024-03-07 PROCEDURE — 83735 ASSAY OF MAGNESIUM: CPT

## 2024-03-07 PROCEDURE — 71275 CT ANGIOGRAPHY CHEST: CPT

## 2024-03-07 PROCEDURE — 83036 HEMOGLOBIN GLYCOSYLATED A1C: CPT

## 2024-03-07 PROCEDURE — 71046 X-RAY EXAM CHEST 2 VIEWS: CPT

## 2024-03-07 PROCEDURE — 99285 EMERGENCY DEPT VISIT HI MDM: CPT

## 2024-03-07 PROCEDURE — 84145 PROCALCITONIN (PCT): CPT

## 2024-03-07 PROCEDURE — 94640 AIRWAY INHALATION TREATMENT: CPT

## 2024-03-07 PROCEDURE — 80048 BASIC METABOLIC PNL TOTAL CA: CPT

## 2024-03-07 PROCEDURE — 94660 CPAP INITIATION&MGMT: CPT

## 2024-03-07 PROCEDURE — 96374 THER/PROPH/DIAG INJ IV PUSH: CPT

## 2024-03-07 PROCEDURE — 87636 SARSCOV2 & INF A&B AMP PRB: CPT

## 2024-03-07 PROCEDURE — 82803 BLOOD GASES ANY COMBINATION: CPT

## 2024-03-07 PROCEDURE — 85379 FIBRIN DEGRADATION QUANT: CPT

## 2024-03-07 RX ADMIN — CEFAZOLIN ONE MLS/HR: 330 INJECTION, POWDER, FOR SOLUTION INTRAMUSCULAR; INTRAVENOUS at 19:01

## 2024-03-07 RX ADMIN — DIPHENOXYLATE HYDROCHLORIDE AND ATROPINE SULFATE STA EACH: 2.5; .025 TABLET ORAL at 15:24

## 2024-03-07 RX ADMIN — NICARDIPINE HYDROCHLORIDE ONE MLS/HR: 2.5 INJECTION INTRAVENOUS at 14:12

## 2024-03-07 RX ADMIN — IPRATROPIUM BROMIDE AND ALBUTEROL SULFATE STA ML: .5; 3 SOLUTION RESPIRATORY (INHALATION) at 16:55

## 2024-03-07 RX ADMIN — ONDANSETRON STA MG: 2 INJECTION INTRAMUSCULAR; INTRAVENOUS at 14:08

## 2024-03-07 RX ADMIN — ONDANSETRON STA EACH: 4 TABLET, ORALLY DISINTEGRATING ORAL at 15:24

## 2024-03-07 RX ADMIN — DIPHENOXYLATE HYDROCHLORIDE AND ATROPINE SULFATE STA EACH: 2.5; .025 TABLET ORAL at 14:12

## 2024-03-07 RX ADMIN — CEFAZOLIN ONE MLS/HR: 330 INJECTION, POWDER, FOR SOLUTION INTRAMUSCULAR; INTRAVENOUS at 14:07

## 2024-03-07 NOTE — CT
EXAMINATION TYPE: CT chest angio for PE

 

DATE OF EXAM: 3/7/2024

 

COMPARISON: None

 

HISTORY: SOB

 

CT DLP: 301.5 mGycm. Automated Exposure Control for Dose Reduction was Utilized.

 

CONTRAST: CTA scan of the thorax is performed with IV Contrast, patient injected with 100 ml mL of Is
ovue 300.  MIP Images are created on CT scanner and reviewed. 3D reconstructed images are created on 
an independent workstation and reviewed.

 

FINDINGS:

 

LUNGS: There is no major atelectasis or focal lung consolidation. However, there is prominent and dif
fuse bilateral bronchial wall thickening and small scattered ill-defined opacities in the periphery w
hich can correlate with a clinical diagnosis of developing multifocal consistent with developing pneu
monia. Pleural spaces are negative.

 

MEDIASTINUM: There is satisfactory enhancement of the pulmonary artery and its branches; there is no 
CT evidence for pulmonary embolism.  No acute aortic process. Prominent coronary calcifications noted
. There is mild cardiomegaly. No pericardial effusion. 

 

There are no greater than 1 cm hilar or mediastinal lymph nodes. Within the soft tissues are unremark
able.

 

OTHER:  No additional significant abnormality is seen.

 

IMPRESSION: 

Negative for pulmonary embolism.

 

Prominent coronary calcifications.

 

Airway/pulmonary findings as noted

## 2024-03-07 NOTE — XR
EXAMINATION: XR chest 2V:  3/7/2024 4:38 PM

 

CLINICAL INDICATION: Difficulty breathing 

 

TECHNIQUE: AP and lateral views

 

COMPARISON: 12/6/2023

 

FINDINGS: 

There are no large areas of atelectasis or lung consolidation. However, there are bilateral multifoca
l ill-defined added opacities throughout the lungs, seen particularly well in the lung bases on the l
ateral view, consistent with a clinical diagnosis of multifocal bronchopneumonia.

 

The pleural spaces are negative.

 

The cardiac silhouette appears borderline enlarged, stable. 

 

The skeletal structures and soft tissues are negative for acute findings.

 

 

IMPRESSION:

Suspect developing multifocal bronchopneumonia.

## 2024-03-07 NOTE — ED
General Adult HPI





- General


Chief complaint: Nausea/Vomiting/Diarrhea


Stated complaint: Diarrhea/Nausea


Time Seen by Provider: 24 13:10


Source: patient, RN notes reviewed, old records reviewed


Mode of arrival: ambulatory


Limitations: no limitations





- History of Present Illness


Initial comments: 





This is an 79-year-old male presents emergency department stating that for the 

last 9 days he has been having diarrhea at least twice a day.  Patient states he

has had no abdominal pain but he has been nauseous.  Patient states he tried to 

drink a lot of water but he feels dehydrated.  Patient states he has not 

vomited.  Patient denies any fever or chills.  Patient Nuys any back pain.  

Patient denies any dysuria hematuria urinary frequency.  Patient denies any 

blood in the stool or dark black stools.  Patient denies chest pain difficulty 

breathing or shortness of breath





- Related Data


                                Home Medications











 Medication  Instructions  Recorded  Confirmed


 


Albuterol Inhaler [Ventolin Hfa 2 puff INHALATION RT-QID 21





Inhaler]   


 


Albuterol Nebulized [Ventolin 2.5 mg INHALATION RT-QID 21





Nebulized]   


 


Cetirizine HCl [Zyrtec] 10 mg PO HS 21


 


Fluticasone Nasal Spray [Flonase 1 spr EA NOSTRIL DAILY 21





Nasal Spray]   


 


Lidocaine 5% Patch [Lidoderm 5% 1 patch TRANSDERM DAILY 21





Patch]   


 


Diclofenac Sodium Gel [Voltaren 1% 1 applic TOPICAL DAILY 21





Gel]   


 


Atorvastatin [Lipitor] 80 mg PO HS 22


 


Folic Acid 1 mg PO DAILY 22


 


Metoprolol Succinate (ER) [Toprol 25 mg PO BID 22





XL]   


 


Thiamine [Vitamin B-1] 100 mg PO DAILY 22


 


lisinopriL [Zestril] 5 mg PO BID 22


 


Aspirin 81 mg PO HS 23


 


Furosemide [Lasix] 20 mg PO BID@0900,1300 23


 


Fluticasone Propion/Salmeterol 1 puff INHALATION RT-BID 06/10/23 12/02/23





[Wixela 250-50 Inhub]   


 


Acetaminophen Tab [Tylenol] 1,000 mg PO Q6H PRN 23


 


Empagliflozin [Jardiance] 10 mg PO DAILY 23


 


Ketoconazole 2% Shampoo [Nizoral] 1 applic TOPICAL DAILY 23


 


Triamcinolone 0.1% Cream [Kenalog 1 applic TOPICAL DAILY 23





0.1% Cream]   


 


methocarbamoL [Robaxin-750] 750 mg PO QID 23








                                  Previous Rx's











 Medication  Instructions  Recorded


 


Tamsulosin [Flomax] 0.4 mg PO DAILY #30 capsule 22


 


Isosorbide Mononitrate ER [Imdur] 30 mg PO DAILY 30 Days #30 tab 22


 


predniSONE See Taper PO AS DIRECTED 12 Days 23





 #30 tab 











                                    Allergies











Allergy/AdvReac Type Severity Reaction Status Date / Time


 


Influenza Virus Vaccines Allergy  Anaphylaxis Verified 24 13:02


 


tomato Allergy  Anaphylaxis Verified 24 13:02





   /Hives  














Review of Systems


ROS Statement: 


Those systems with pertinent positive or pertinent negative responses have been 

documented in the HPI.





ROS Other: All systems not noted in ROS Statement are negative.





Past Medical History


Past Medical History: Asthma, COPD, Hypertension, Myocardial Infarction (MI), 

Pneumonia, Prostate Disorder


Additional Past Medical History / Comment(s): allergies, "bladder does not 

empty" patient has chronic catheter.


Last Myocardial Infarction Date:: 2021


History of Any Multi-Drug Resistant Organisms: None Reported


Past Surgical History: Appendectomy, Back Surgery, Heart Catheterization With 

Stent, Tonsillectomy


Additional Past Surgical History / Comment(s): cervical, 3 heart stents


Past Anesthesia/Blood Transfusion Reactions: No Reported Reaction


Additional Past Anesthesia/Blood Transfusion Reaction / Comment(s): Pt is 

clausterphobic.


Date of Last Stent Placement:: 21


Past Psychological History: No Psychological Hx Reported


Smoking Status: Former smoker


Past Alcohol Use History: None Reported


Past Drug Use History: None Reported





- Past Family History


  ** Mother


Family Medical History: No Reported History


Additional Family Medical History / Comment(s): Mother was healthy and lived to 

be 92 yrs old.





  ** Father


History Unknown: Yes


Additional Family Medical History / Comment(s): Father  at the age of 68yrs,

 pt unable to say from what.





General Exam





- General Exam Comments


Initial Comments: 





GENERAL:


Patient is well-developed and well-nourished.  Patient is nontoxic and well-

hydrated and is in mild distress.





ENT:


Neck is soft and supple.  No significant lymphadenopathy is noted.  Oropharynx 

is clear.  Dry mucous membranes.  Neck has full range of motion without 

eliciting any pain.  





EYES:


The sclera were anicteric and conjunctiva were pink and moist.  Extraocular 

movements were intact and pupils were equal round and reactive to light.  

Eyelids were unremarkable.





PULMONARY:


Unlabored respirations.  Good breath sounds bilaterally.  No audible rales 

rhonchi or wheezing was noted.





CARDIOVASCULAR:


There is a regular rate and rhythm without any murmurs gallops or rubs. 





ABDOMEN:


Soft and nontender with normal bowel sounds.  





SKIN:


Skin is clear with no lesions or rashes and otherwise unremarkable.





NEUROLOGIC:


Patient is alert and oriented x3.  Cranial nerves II through XII are grossly 

intact.  Motor and sensory are also intact.  Normal speech, volume and content. 

 Symmetrical smile.





MUSCULOSKELETAL:


Normal extremities with adequate strength and full range of motion.  No lower 

extremity swelling or edema.  No calf tenderness.





LYMPHATICS:


No significant lymphadenopathy is noted





PSYCHIATRIC:


Normal psychiatric evaluation. 


Limitations: no limitations





Course


                                   Vital Signs











  24





  12:58 13:38 15:45


 


Temperature 97.6 F 98.6 F 


 


Pulse Rate 91  95


 


Respiratory 18  20





Rate   


 


Blood Pressure 86/49  101/59


 


O2 Sat by Pulse 95  79 L





Oximetry   














  24





  16:30 16:55 17:03


 


Temperature   


 


Pulse Rate  86 89


 


Respiratory   





Rate   


 


Blood Pressure   


 


O2 Sat by Pulse 96  





Oximetry   














Medical Decision Making





- Medical Decision Making





Was pt. sent in by a medical professional or institution (, PA, NP, urgent 

care, hospital, or nursing home...) When possible be specific


@ -No


Did you speak to anyone other than the patient for history (EMS, parent, family,

 police, friend...)? What history was obtained from this source 


@ -No


Did you review nursing and triage notes (agree or disagree)? Why? 


@ -I reviewed and agree with nursing and triage notes


Were old charts reviewed (outside hosp., previous admission, EMS record, old 

EKG, old radiological studies, urgent care reports/EKG's, nursing home records)?

 Report findings 


@ -No old charts were reviewed


Differential Diagnosis (chest pain, altered mental status, abdominal pain women,

 abdominal pain men, vaginal bleeding, weakness, fever, dyspnea, syncope, 

headache, dizziness, GI bleed, back pain, seizure, CVA, palpatations, mental 

health, musculoskeletal)? 


@ -Differential Abdominal Pain Men:


Appendicitis, cholecystitis, diverticulosis, ischemic bowel, pancreatitis, 

hepatitis, UTI, gastroenteritis, AAA, incarcerated hernia, bowel obstruction, 

constipation, inflammatory bowel, hepatitis, peptic ulcer disease, splenic 

infarction, perforated viscus, testicular torsion, this is not meant to be an 

all-inclusive list





EKG interpreted by me (3pts min.).


@ -As above


X-rays interpreted by me (1pt min.).


@ -None done


CT interpreted by me (1pt min.).


@ -Patient CT of the chest shows some patchy infiltrates consistent with COVID


U/S interpreted by me (1pt. min.).


@ -None done


What testing was considered but not performed or refused? (CT, X-rays, U/S, 

labs)? Why?


@ -None


What meds were considered but not given or refused? Why?


@ -None


Did you discuss the management of the patient with other professionals 

(professionals i.e. , PA, NP, lab, RT, psych nurse, , , 

teacher, , )? Give summary


@ -I spoke with [facility] they agreed to admit the patient


Was smoking cessation discussed for >3mins.?


@ -No


Was critical care preformed (if so, how long)?


@ -No


Were there social determinants of health that impacted care today? How? 

(Homelessness, low income, unemployed, alcoholism, drug addiction, transportatio

n, low edu. Level, literacy, decrease access to med. care, detention, rehab)?


@ -No


Was there de-escalation of care discussed even if they declined (Discuss DNR or 

withdrawal of care, Hospice)? DNR status


@ -No


What co-morbidities impacted this encounter? (DM, HTN, Smoking, COPD, CAD, 

Cancer, CVA, ARF, Chemo, Hep., AIDS, mental health diagnosis, sleep apnea, 

morbid obesity)?


@ -None


Was patient admitted / discharged? Hospital course, mention meds given and 

route, prescriptions, significant lab abnormalities, going to OR and other 

pertinent info.


@ -Patient was given Zofran and he stated that helped his nausea.  Patient was 

given Lomotil while in the emergency department and a liter and a half of normal

 saline.  Patient stated he had no more diarrhea at this time patient was 

comfortable going home lab work was within normal range.  We initially were 

going to send the patient home however when we did discharge vitals patient's 

pulse ox was in the 70 we ran COVID testing and the patient was COVID-positive. 

 Chest x-ray shows no acute normality.  Patient was started on Decadron.  Spoke 

with [facility] they agreed to admit the patient admit the patient was 

admitting orders


Undiagnosed new problem with uncertain prognosis?


@ -No


Drug Therapy requiring intensive monitoring for toxicity (Heparin, Nitro, 

Insulin, Cardizem)?


@ -No


Were any procedures done?


@ -No


Diagnosis/symptom?


@ -Diarrhea


Acute, or Chronic, or Acute on Chronic?


@ -Acute


Uncomplicated (without systemic symptoms) or Complicated (systemic symptoms)?


@ -Complicated


Side effects of treatment?


@ -No


Exacerbation, Progression, or Severe Exacerbation?


@ -No


Poses a threat to life or bodily function? How? (Chest pain, USA, MI, pneumonia,

 PE, COPD, DKA, ARF, appy, cholecystitis, CVA, Diverticulitis, Homicidal, 

Suicidal, threat to staff... and all critical care pts)


@ -No





- Lab Data


Result diagrams: 


                                 24 13:58





                                 24 13:58


                                   Lab Results











  24 Range/Units





  13:58 13:58 15:53 


 


WBC  7.3    (3.8-10.6)  k/uL


 


RBC  5.97 H    (4.30-5.90)  m/uL


 


Hgb  17.2    (13.0-17.5)  gm/dL


 


Hct  53.0    (39.0-53.0)  %


 


MCV  88.8    (80.0-100.0)  fL


 


MCH  28.7    (25.0-35.0)  pg


 


MCHC  32.4    (31.0-37.0)  g/dL


 


RDW  14.8    (11.5-15.5)  %


 


Plt Count  170    (150-450)  k/uL


 


MPV  8.9    


 


Neutrophils %  84    %


 


Lymphocytes %  5    %


 


Monocytes %  8    %


 


Eosinophils %  2    %


 


Basophils %  0    %


 


Neutrophils #  6.2    (1.3-7.7)  k/uL


 


Lymphocytes #  0.4 L    (1.0-4.8)  k/uL


 


Monocytes #  0.6    (0-1.0)  k/uL


 


Eosinophils #  0.1    (0-0.7)  k/uL


 


Basophils #  0.0    (0-0.2)  k/uL


 


D-Dimer     (<0.60)  mg/L FEU


 


Sample Site    Right Radial  


 


ABG pH    7.33 L  (7.35-7.45)  


 


ABG pCO2    63 H  (35-45)  mmHg


 


ABG pO2    39 L*  ()  mmHg


 


ABG HCO3    33 H  (21-25)  mmol/L


 


ABG Total CO2    35 H  (19-24)  mmol/L


 


ABG O2 Saturation    70.4 L  (94-97)  %


 


ABG Base Excess    7.1  mmol/L


 


Sunday Test    Yes  


 


VBG pH     (7.31-7.41)  


 


VBG pCO2     (37-51)  mmHg


 


VBG HCO3     (24-28)  mmol/L


 


FiO2    21  %


 


Sodium   141   (137-145)  mmol/L


 


Potassium   3.4 L   (3.5-5.1)  mmol/L


 


Chloride   103   ()  mmol/L


 


Carbon Dioxide   34 H   (22-30)  mmol/L


 


Anion Gap   4   mmol/L


 


BUN   13   (9-20)  mg/dL


 


Creatinine   0.59 L   (0.66-1.25)  mg/dL


 


Est GFR (CKD-EPI)AfAm   >90   (>60 ml/min/1.73 sqM)  


 


Est GFR (CKD-EPI)NonAf   >90   (>60 ml/min/1.73 sqM)  


 


Glucose   107 H   (74-99)  mg/dL


 


Calcium   8.4   (8.4-10.2)  mg/dL


 


Magnesium   1.8   (1.6-2.3)  mg/dL


 


Total Bilirubin   1.6 H   (0.2-1.3)  mg/dL


 


AST   36   (17-59)  U/L


 


ALT   22   (4-49)  U/L


 


Alkaline Phosphatase   85   ()  U/L


 


Total Protein   6.4   (6.3-8.2)  g/dL


 


Albumin   3.5   (3.5-5.0)  g/dL


 


Influenza Type A (PCR)     (Not Detectd)  


 


Influenza Type B (PCR)     (Not Detectd)  


 


RSV (PCR)     (Not Detectd)  


 


SARS-CoV-2 (PCR)     (Not Detectd)  














  24 Range/Units





  16:17 16:54 16:54 


 


WBC     (3.8-10.6)  k/uL


 


RBC     (4.30-5.90)  m/uL


 


Hgb     (13.0-17.5)  gm/dL


 


Hct     (39.0-53.0)  %


 


MCV     (80.0-100.0)  fL


 


MCH     (25.0-35.0)  pg


 


MCHC     (31.0-37.0)  g/dL


 


RDW     (11.5-15.5)  %


 


Plt Count     (150-450)  k/uL


 


MPV     


 


Neutrophils %     %


 


Lymphocytes %     %


 


Monocytes %     %


 


Eosinophils %     %


 


Basophils %     %


 


Neutrophils #     (1.3-7.7)  k/uL


 


Lymphocytes #     (1.0-4.8)  k/uL


 


Monocytes #     (0-1.0)  k/uL


 


Eosinophils #     (0-0.7)  k/uL


 


Basophils #     (0-0.2)  k/uL


 


D-Dimer   1.01 H   (<0.60)  mg/L FEU


 


Sample Site  Right Radial    


 


ABG pH  7.31 L    (7.35-7.45)  


 


ABG pCO2  67 H    (35-45)  mmHg


 


ABG pO2  42 L*    ()  mmHg


 


ABG HCO3  34 H    (21-25)  mmol/L


 


ABG Total CO2     (19-24)  mmol/L


 


ABG O2 Saturation  75.3 L    (94-97)  %


 


ABG Base Excess  5.0    mmol/L


 


Sunday Test     


 


VBG pH     (7.31-7.41)  


 


VBG pCO2     (37-51)  mmHg


 


VBG HCO3     (24-28)  mmol/L


 


FiO2  21    %


 


Sodium     (137-145)  mmol/L


 


Potassium     (3.5-5.1)  mmol/L


 


Chloride     ()  mmol/L


 


Carbon Dioxide     (22-30)  mmol/L


 


Anion Gap     mmol/L


 


BUN     (9-20)  mg/dL


 


Creatinine     (0.66-1.25)  mg/dL


 


Est GFR (CKD-EPI)AfAm     (>60 ml/min/1.73 sqM)  


 


Est GFR (CKD-EPI)NonAf     (>60 ml/min/1.73 sqM)  


 


Glucose     (74-99)  mg/dL


 


Calcium     (8.4-10.2)  mg/dL


 


Magnesium     (1.6-2.3)  mg/dL


 


Total Bilirubin     (0.2-1.3)  mg/dL


 


AST     (17-59)  U/L


 


ALT     (4-49)  U/L


 


Alkaline Phosphatase     ()  U/L


 


Total Protein     (6.3-8.2)  g/dL


 


Albumin     (3.5-5.0)  g/dL


 


Influenza Type A (PCR)    Not Detected  (Not Detectd)  


 


Influenza Type B (PCR)    Not Detected  (Not Detectd)  


 


RSV (PCR)    Not Detected  (Not Detectd)  


 


SARS-CoV-2 (PCR)    Detected A  (Not Detectd)  














  24 Range/Units





  16:55 


 


WBC   (3.8-10.6)  k/uL


 


RBC   (4.30-5.90)  m/uL


 


Hgb   (13.0-17.5)  gm/dL


 


Hct   (39.0-53.0)  %


 


MCV   (80.0-100.0)  fL


 


MCH   (25.0-35.0)  pg


 


MCHC   (31.0-37.0)  g/dL


 


RDW   (11.5-15.5)  %


 


Plt Count   (150-450)  k/uL


 


MPV   


 


Neutrophils %   %


 


Lymphocytes %   %


 


Monocytes %   %


 


Eosinophils %   %


 


Basophils %   %


 


Neutrophils #   (1.3-7.7)  k/uL


 


Lymphocytes #   (1.0-4.8)  k/uL


 


Monocytes #   (0-1.0)  k/uL


 


Eosinophils #   (0-0.7)  k/uL


 


Basophils #   (0-0.2)  k/uL


 


D-Dimer   (<0.60)  mg/L FEU


 


Sample Site   


 


ABG pH   (7.35-7.45)  


 


ABG pCO2   (35-45)  mmHg


 


ABG pO2   ()  mmHg


 


ABG HCO3   (21-25)  mmol/L


 


ABG Total CO2   (19-24)  mmol/L


 


ABG O2 Saturation   (94-97)  %


 


ABG Base Excess   mmol/L


 


Sunday Test   


 


VBG pH  7.29 L  (7.31-7.41)  


 


VBG pCO2  70 H*  (37-51)  mmHg


 


VBG HCO3  34 H  (24-28)  mmol/L


 


FiO2   %


 


Sodium   (137-145)  mmol/L


 


Potassium   (3.5-5.1)  mmol/L


 


Chloride   ()  mmol/L


 


Carbon Dioxide   (22-30)  mmol/L


 


Anion Gap   mmol/L


 


BUN   (9-20)  mg/dL


 


Creatinine   (0.66-1.25)  mg/dL


 


Est GFR (CKD-EPI)AfAm   (>60 ml/min/1.73 sqM)  


 


Est GFR (CKD-EPI)NonAf   (>60 ml/min/1.73 sqM)  


 


Glucose   (74-99)  mg/dL


 


Calcium   (8.4-10.2)  mg/dL


 


Magnesium   (1.6-2.3)  mg/dL


 


Total Bilirubin   (0.2-1.3)  mg/dL


 


AST   (17-59)  U/L


 


ALT   (4-49)  U/L


 


Alkaline Phosphatase   ()  U/L


 


Total Protein   (6.3-8.2)  g/dL


 


Albumin   (3.5-5.0)  g/dL


 


Influenza Type A (PCR)   (Not Detectd)  


 


Influenza Type B (PCR)   (Not Detectd)  


 


RSV (PCR)   (Not Detectd)  


 


SARS-CoV-2 (PCR)   (Not Detectd)  














Disposition


Clinical Impression: 


 Acute diarrhea, COVID-19





Disposition: ADMITTED AS IP TO THIS HOSP


Referrals: 


[facility],Clinic [Primary Care Provider] - 1-2 days


Time of Disposition: 15:14

## 2024-03-08 LAB
GLUCOSE BLD-MCNC: 101 MG/DL (ref 70–110)
GLUCOSE BLD-MCNC: 135 MG/DL (ref 70–110)
GLUCOSE BLD-MCNC: 153 MG/DL (ref 70–110)
GLUCOSE BLD-MCNC: 156 MG/DL (ref 70–110)

## 2024-03-08 RX ADMIN — ENOXAPARIN SODIUM SCH MG: 40 INJECTION SUBCUTANEOUS at 09:45

## 2024-03-08 RX ADMIN — METOPROLOL SUCCINATE SCH MG: 25 TABLET, EXTENDED RELEASE ORAL at 09:45

## 2024-03-08 RX ADMIN — BUDESONIDE AND FORMOTEROL FUMARATE DIHYDRATE SCH PUFF: 80; 4.5 AEROSOL RESPIRATORY (INHALATION) at 09:31

## 2024-03-08 RX ADMIN — INSULIN ASPART SCH: 100 INJECTION, SOLUTION INTRAVENOUS; SUBCUTANEOUS at 06:18

## 2024-03-08 RX ADMIN — ISOSORBIDE MONONITRATE SCH MG: 30 TABLET, EXTENDED RELEASE ORAL at 09:46

## 2024-03-08 RX ADMIN — TAMSULOSIN HYDROCHLORIDE SCH MG: 0.4 CAPSULE ORAL at 09:45

## 2024-03-08 RX ADMIN — AZITHROMYCIN MONOHYDRATE SCH MLS/HR: 500 INJECTION, POWDER, LYOPHILIZED, FOR SOLUTION INTRAVENOUS at 12:29

## 2024-03-08 RX ADMIN — ASPIRIN 81 MG CHEWABLE TABLET SCH MG: 81 TABLET CHEWABLE at 09:46

## 2024-03-08 RX ADMIN — ATORVASTATIN CALCIUM SCH MG: 80 TABLET, FILM COATED ORAL at 20:07

## 2024-03-08 NOTE — XR
EXAMINATION TYPE: XR chest 1V portable

 

DATE OF EXAM: 3/8/2024

 

Comparison: 3/7/2024

 

Clinical History: 79-year-old male pneumonia

 

Findings:

ACDF hardware. Heart normal size. Mild diffuse interstitial density. Suggestion of trace effusions. P
atchy left basilar retrocardiac opacity.

 

 

Impression:

Suggestion of trace pleural effusions. Patchy left basilar and retrocardiac opacity, possible pneumon
ia.

## 2024-03-08 NOTE — P.HPIM
History of Present Illness


H&P Date: 24


Chief Complaint: Diarrhea





79-year-old male with multiple comorbidities





Patient coming in due to 10-day history of diarrhea denies any GI bleeding 

denies any nausea vomiting patient reports all his family was sick with upper 

respiratory infection last month.  He started having diarrhea 10 days ago at 

least twice a day watery diarrhea without any abdominal pain no GI bleeding.  He

denies any fevers or chills he denies any trouble breathing denies any coughing 

denies any chest pain.  However over the past day he was having increased large-

volume diarrhea for which she decided to come into the hospital for evaluation





Patient was found positive for COVID.  He has history of COPD and asthma not on 

home oxygen.  However while in the ED he was found to be hypoxic for which she 

was admitted for further care





Patient does admit to cardiac history with stents he denies any tobacco smoking 

illicit drugs or heavy alcohol








review of systems


Pertinent positives as noted in HPI. All other systems were reviewed and are 

negative








on exam


Constitutional:          No acute distress, conversant, pleasant


Eyes:      Anicteric sclerae, moist conjunctiva, 


         Pupils equal round reactive to light





ENMT:      NC/AT


         Oropharynx clear, no erythema, or exudates





Neck:      Supple,  no masses, or JVD


         No carotid bruits


         No thyromegaly





Lungs:      Clear to auscultation


         Clear to percussion


         Normal respiratory effort, no accessory muscle use 





Cardiovascular:      Heart regular in rate and rhythm, 


         No murmurs, gallops, or rubs


         No peripheral edema





Abdominal:       Soft


         Nontender, no guarding, rebound or rigidity


         Abdomen moving with respiration


         Normoactive bowel sounds


          





Extremities:      No digital cyanosis 


         No clubbing


         Pedal pulses intact and symmetrical


         Radial pulses intact and symmetrical 


         No calf tenderness 





Psychiatric:      Alert and oriented to person, place and time


         Appropriate affect


         fair judgement   


      


Neuro      Muscles Strength 5/5 in all 4 extremities 


         Sensation to light touch grossly present throughout


         Cranial nerves II-XII grossly intact  





Past Medical History


Past Medical History: Asthma, COPD, Hypertension, Myocardial Infarction (MI), 

Pneumonia, Prostate Disorder


Additional Past Medical History / Comment(s): allergies, "bladder does not 

empty" patient has chronic catheter.


Last Myocardial Infarction Date:: 2021


History of Any Multi-Drug Resistant Organisms: None Reported


Past Surgical History: Appendectomy, Back Surgery, Heart Catheterization With 

Stent, Tonsillectomy


Additional Past Surgical History / Comment(s): cervical, 3 heart stents


Past Anesthesia/Blood Transfusion Reactions: No Reported Reaction


Additional Past Anesthesia/Blood Transfusion Reaction / Comment(s): Pt is 

clausterphobic.


Date of Last Stent Placement:: 21


Past Psychological History: No Psychological Hx Reported


Smoking Status: Former smoker


Past Alcohol Use History: None Reported


Past Drug Use History: None Reported





- Past Family History


  ** Mother


Family Medical History: No Reported History


Additional Family Medical History / Comment(s): Mother was healthy and lived to 

be 92 yrs old.





  ** Father


History Unknown: Yes


Additional Family Medical History / Comment(s): Father  at the age of 68yrs,

pt unable to say from what.





Medications and Allergies


                                Home Medications











 Medication  Instructions  Recorded  Confirmed  Type


 


Albuterol Inhaler [Ventolin Hfa 2 puff INHALATION RT-QID PRN 21 

History





Inhaler]    


 


Albuterol Nebulized [Ventolin 2.5 mg INHALATION RT-Q4H PRN 21 

History





Nebulized]    


 


Cetirizine HCl [Zyrtec] 10 mg PO DAILY PRN 21 History


 


Fluticasone Nasal Spray [Flonase 1 spr EA NOSTRIL DAILY 21 

History





Nasal Spray]    


 


Lidocaine 5% Patch [Lidoderm 5% 1 patch TRANSDERM DAILY 21 

History





Patch]    


 


Tamsulosin [Flomax] 0.4 mg PO DAILY #30 capsule 22 Rx


 


Atorvastatin [Lipitor] 80 mg PO HS 22 History


 


Folic Acid 1 mg PO DAILY 22 History


 


Metoprolol Succinate (ER) [Toprol 25 mg PO BID 22 History





XL]    


 


Thiamine [Vitamin B-1] 100 mg PO DAILY 22 History


 


lisinopriL [Zestril] 5 mg PO BID 22 History


 


Isosorbide Mononitrate ER [Imdur] 30 mg PO DAILY 30 Days #30 tab 22 Rx


 


Aspirin 81 mg PO DAILY 23 History


 


Furosemide [Lasix] 20 mg PO BID@0900,1300 23 History


 


Fluticasone Propion/Salmeterol 1 puff INHALATION RT-BID 06/10/23 03/07/24 

History





[Wixela 250-50 Inhub]    


 


Acetaminophen Tab [Tylenol] 1,000 mg PO Q6H PRN 23 History


 


Empagliflozin [Jardiance] 10 mg PO DAILY 23 History


 


Ketoconazole 2% Shampoo [Nizoral] 1 applic TOPICAL DAILY 23 

History


 


Triamcinolone 0.1% Cream [Kenalog 1 applic TOPICAL DAILY PRN 23 

History





0.1% Cream]    


 


Nitroglycerin Sl Tabs [Nitrostat] 0.4 mg SUBLINGUAL Q5M PRN 24 

History








                                    Allergies











Allergy/AdvReac Type Severity Reaction Status Date / Time


 


Influenza Virus Vaccines Allergy  Anaphylaxis Verified 24 13:02


 


tomato Allergy  Anaphylaxis Verified 24 13:02





   /Hives  














Physical Exam


Vitals: 


                                   Vital Signs











  Temp Pulse Pulse Resp BP BP Pulse Ox


 


 24 20:00    97  16   101/68  97


 


 24 19:03   84   20  115/66   93 L


 


 24 17:03   89     


 


 24 16:55   86     


 


 24 16:30        96


 


 24 15:45   95   20  101/59   79 L


 


 24 13:38  98.6 F      


 


 24 12:58  97.6 F  91   18  86/49   95








                                Intake and Output











 24





 06:59 14:59 22:59


 


Output Total   1000


 


Balance   -1000


 


Output:   


 


  Urine   1000


 


    Uretheral (Jama)   1000


 


Other:   


 


  Weight  58.967 kg 














Results


CBC & Chem 7: 


                                 24 13:58





                                 24 13:58


Labs: 


                  Abnormal Lab Results - Last 24 Hours (Table)











  24 Range/Units





  13:58 13:58 15:53 


 


RBC  5.97 H    (4.30-5.90)  m/uL


 


Lymphocytes #  0.4 L    (1.0-4.8)  k/uL


 


D-Dimer     (<0.60)  mg/L FEU


 


ABG pH    7.33 L  (7.35-7.45)  


 


ABG pCO2    63 H  (35-45)  mmHg


 


ABG pO2    39 L*  ()  mmHg


 


ABG HCO3    33 H  (21-25)  mmol/L


 


ABG Total CO2    35 H  (19-24)  mmol/L


 


ABG O2 Saturation    70.4 L  (94-97)  %


 


VBG pH     (7.31-7.41)  


 


VBG pCO2     (37-51)  mmHg


 


VBG HCO3     (24-28)  mmol/L


 


Potassium   3.4 L   (3.5-5.1)  mmol/L


 


Carbon Dioxide   34 H   (22-30)  mmol/L


 


Creatinine   0.59 L   (0.66-1.25)  mg/dL


 


Glucose   107 H   (74-99)  mg/dL


 


Total Bilirubin   1.6 H   (0.2-1.3)  mg/dL


 


SARS-CoV-2 (PCR)     (Not Detectd)  














  24 Range/Units





  16:17 16:54 16:54 


 


RBC     (4.30-5.90)  m/uL


 


Lymphocytes #     (1.0-4.8)  k/uL


 


D-Dimer   1.01 H   (<0.60)  mg/L FEU


 


ABG pH  7.31 L    (7.35-7.45)  


 


ABG pCO2  67 H    (35-45)  mmHg


 


ABG pO2  42 L*    ()  mmHg


 


ABG HCO3  34 H    (21-25)  mmol/L


 


ABG Total CO2     (19-24)  mmol/L


 


ABG O2 Saturation  75.3 L    (94-97)  %


 


VBG pH     (7.31-7.41)  


 


VBG pCO2     (37-51)  mmHg


 


VBG HCO3     (24-28)  mmol/L


 


Potassium     (3.5-5.1)  mmol/L


 


Carbon Dioxide     (22-30)  mmol/L


 


Creatinine     (0.66-1.25)  mg/dL


 


Glucose     (74-99)  mg/dL


 


Total Bilirubin     (0.2-1.3)  mg/dL


 


SARS-CoV-2 (PCR)    Detected A  (Not Detectd)  














  24 Range/Units





  16:55 


 


RBC   (4.30-5.90)  m/uL


 


Lymphocytes #   (1.0-4.8)  k/uL


 


D-Dimer   (<0.60)  mg/L FEU


 


ABG pH   (7.35-7.45)  


 


ABG pCO2   (35-45)  mmHg


 


ABG pO2   ()  mmHg


 


ABG HCO3   (21-25)  mmol/L


 


ABG Total CO2   (19-24)  mmol/L


 


ABG O2 Saturation   (94-97)  %


 


VBG pH  7.29 L  (7.31-7.41)  


 


VBG pCO2  70 H*  (37-51)  mmHg


 


VBG HCO3  34 H  (24-28)  mmol/L


 


Potassium   (3.5-5.1)  mmol/L


 


Carbon Dioxide   (22-30)  mmol/L


 


Creatinine   (0.66-1.25)  mg/dL


 


Glucose   (74-99)  mg/dL


 


Total Bilirubin   (0.2-1.3)  mg/dL


 


SARS-CoV-2 (PCR)   (Not Detectd)  














Assessment and Plan


Assessment: 





79-year-old male with COPD not on home oxygen CVA/CAD status post stents coming 

in for worsening diarrhea over the past 10 days with positive sick contact with 

upper respiratory infection I discussed case with ED doctor and accepted the 

admission for COVID with hypoxemia and GI symptoms with anticipated length of 

stay more than 2 midnights





Acute hypoxic respiratory failure patient dropped down to 79% on room air while 

in the ED


CT angio of the chest negative for acute PE


Chest x-ray showed some diffuse opacities suspicious for pneumonia


COVID test positive


Supplemental oxygen as needed


Dexamethasone 6 mg p.o. daily


DVT prophylaxis with Lovenox 40 mg subcu daily


D-dimer +1.01, CT angio of the chest no acute PE


Continue with home inhalers


Supportive care


Monitor vital signs


White count unremarkable 7.3


Hemoglobin 17.2 unremarkable


Symptomatic control of diarrhea with Imodium as needed





Chronic conditions


CAD status post stents


Continue cardiac meds aspirin and atorvastatin


Continue with Imdur 30 mg p.o. daily





Hypertension, controlled


Continue with metoprolol 25 mg twice daily


Continue lisinopril 5 mg p.o. twice daily


Renal function overall unremarkable sodium 141 potassium 3.4 BUN 13 creatinine 0

.59





Diabetes mellitus


Insulin sliding scale





Full code


DVT prophylaxis Lovenox 40 mg subcu daily

## 2024-03-08 NOTE — P.PN
Subjective


Progress Note Date: 03/08/24





No new complaints today.  Breathing is improved per patient.





Gen: In NAD, non-toxic


HEENT: normocephalic, atraumatic, hearing acuity is intant, mucous membranes 

moist


CVS: perfusing all extremities well, no pitting edema, 


Respiratory: symmetric chest expansion, no accessory muscle use, 


GI: soft, NTTP, ND, 


: no suprapubic tenderness, no CVA tenderness


MSK/Derm: no rashes, cyanosis


Neuro: CN II-XII intact, no motor weakness, 


Psych: cooperative, euthymic mood, judgment and insight is intact








Hospital course:





79-year-old male with multiple comorbidities including COPD not on home oxygen 

CVA/CAD status post stents presented for worsening diarrhea over the past 10 

days with positive sick contact with upper respiratory infection.





White count unremarkable 7.3


Hemoglobin 17.2 unremarkable


Renal function overall unremarkable sodium 141 potassium 3.4 BUN 13 creatinine 0

.59


CT angio of the chest negative for acute PE, did show some bronchial thickness 

concerning for developing pneumonia


Chest x-ray showed some diffuse opacities suspicious for pneumonia


COVID test positive





Assessment/plan:





Acute hypoxic respiratory failure patient dropped down to 79% on room air while 

in the ED


COPD Exacerbation


COVID-19 Pneumonia


Dexamethasone 6 mg p.o. daily


Continue with home inhalers


Procalcitonin added


Defer abx at this time


Repeat CXR today


Initiate azithromycin 500mg daily








Diarrhea


Imodium as needed


s/p IVF: 2.5L total of NS bolus








CAD status post stents


Continue cardiac meds aspirin and atorvastatin


Continue with Imdur 30 mg p.o. daily





Hypertension, controlled


Continue with metoprolol 25 mg twice daily


Continue lisinopril 5 mg p.o. twice daily








Diabetes mellitus


Insulin sliding scale





No code


DVT prophylaxis Lovenox 40 mg subcu daily








Objective





- Vital Signs


Vital signs: 


                                   Vital Signs











Temp  97.7 F   03/08/24 09:40


 


Pulse  81   03/08/24 09:40


 


Resp  16   03/08/24 09:40


 


BP  102/55   03/08/24 09:40


 


Pulse Ox  92 L  03/08/24 09:40


 


FiO2      








                                 Intake & Output











 03/07/24 03/08/24 03/08/24





 18:59 06:59 18:59


 


Intake Total   225


 


Output Total  1850 


 


Balance  -1850 225


 


Weight 58.967 kg 58.967 kg 


 


Intake:   


 


  Oral   225


 


Output:   


 


  Urine  1850 


 


    Uretheral (Jama)  1000 


 


Other:   


 


  Voiding Method  Indwelling Catheter 














- Labs


CBC & Chem 7: 


                                 03/07/24 13:58





                                 03/07/24 13:58


Labs: 


                  Abnormal Lab Results - Last 24 Hours (Table)











  03/07/24 03/07/24 03/07/24 Range/Units





  13:58 13:58 15:53 


 


RBC  5.97 H    (4.30-5.90)  m/uL


 


Lymphocytes #  0.4 L    (1.0-4.8)  k/uL


 


D-Dimer     (<0.60)  mg/L FEU


 


ABG pH    7.33 L  (7.35-7.45)  


 


ABG pCO2    63 H  (35-45)  mmHg


 


ABG pO2    39 L*  ()  mmHg


 


ABG HCO3    33 H  (21-25)  mmol/L


 


ABG Total CO2    35 H  (19-24)  mmol/L


 


ABG O2 Saturation    70.4 L  (94-97)  %


 


VBG pH     (7.31-7.41)  


 


VBG pCO2     (37-51)  mmHg


 


VBG HCO3     (24-28)  mmol/L


 


Potassium   3.4 L   (3.5-5.1)  mmol/L


 


Carbon Dioxide   34 H   (22-30)  mmol/L


 


Creatinine   0.59 L   (0.66-1.25)  mg/dL


 


Glucose   107 H   (74-99)  mg/dL


 


Total Bilirubin   1.6 H   (0.2-1.3)  mg/dL


 


SARS-CoV-2 (PCR)     (Not Detectd)  














  03/07/24 03/07/24 03/07/24 Range/Units





  16:17 16:54 16:54 


 


RBC     (4.30-5.90)  m/uL


 


Lymphocytes #     (1.0-4.8)  k/uL


 


D-Dimer   1.01 H   (<0.60)  mg/L FEU


 


ABG pH  7.31 L    (7.35-7.45)  


 


ABG pCO2  67 H    (35-45)  mmHg


 


ABG pO2  42 L*    ()  mmHg


 


ABG HCO3  34 H    (21-25)  mmol/L


 


ABG Total CO2     (19-24)  mmol/L


 


ABG O2 Saturation  75.3 L    (94-97)  %


 


VBG pH     (7.31-7.41)  


 


VBG pCO2     (37-51)  mmHg


 


VBG HCO3     (24-28)  mmol/L


 


Potassium     (3.5-5.1)  mmol/L


 


Carbon Dioxide     (22-30)  mmol/L


 


Creatinine     (0.66-1.25)  mg/dL


 


Glucose     (74-99)  mg/dL


 


Total Bilirubin     (0.2-1.3)  mg/dL


 


SARS-CoV-2 (PCR)    Detected A  (Not Detectd)  














  03/07/24 Range/Units





  16:55 


 


RBC   (4.30-5.90)  m/uL


 


Lymphocytes #   (1.0-4.8)  k/uL


 


D-Dimer   (<0.60)  mg/L FEU


 


ABG pH   (7.35-7.45)  


 


ABG pCO2   (35-45)  mmHg


 


ABG pO2   ()  mmHg


 


ABG HCO3   (21-25)  mmol/L


 


ABG Total CO2   (19-24)  mmol/L


 


ABG O2 Saturation   (94-97)  %


 


VBG pH  7.29 L  (7.31-7.41)  


 


VBG pCO2  70 H*  (37-51)  mmHg


 


VBG HCO3  34 H  (24-28)  mmol/L


 


Potassium   (3.5-5.1)  mmol/L


 


Carbon Dioxide   (22-30)  mmol/L


 


Creatinine   (0.66-1.25)  mg/dL


 


Glucose   (74-99)  mg/dL


 


Total Bilirubin   (0.2-1.3)  mg/dL


 


SARS-CoV-2 (PCR)   (Not Detectd)

## 2024-03-09 LAB
ANION GAP SERPL CALC-SCNC: 1 MMOL/L
BASOPHILS # BLD AUTO: 0 K/UL (ref 0–0.2)
BASOPHILS NFR BLD AUTO: 0 %
BUN SERPL-SCNC: 18 MG/DL (ref 9–20)
CALCIUM SPEC-MCNC: 8.1 MG/DL (ref 8.4–10.2)
CHLORIDE SERPL-SCNC: 106 MMOL/L (ref 98–107)
CO2 SERPL-SCNC: 35 MMOL/L (ref 22–30)
EOSINOPHIL # BLD AUTO: 0 K/UL (ref 0–0.7)
EOSINOPHIL NFR BLD AUTO: 0 %
ERYTHROCYTE [DISTWIDTH] IN BLOOD BY AUTOMATED COUNT: 5.62 M/UL (ref 4.3–5.9)
ERYTHROCYTE [DISTWIDTH] IN BLOOD: 14.8 % (ref 11.5–15.5)
GLUCOSE BLD-MCNC: 103 MG/DL (ref 70–110)
GLUCOSE BLD-MCNC: 127 MG/DL (ref 70–110)
GLUCOSE BLD-MCNC: 132 MG/DL (ref 70–110)
GLUCOSE BLD-MCNC: 147 MG/DL (ref 70–110)
GLUCOSE BLD-MCNC: 152 MG/DL (ref 70–110)
GLUCOSE SERPL-MCNC: 115 MG/DL (ref 74–99)
HCT VFR BLD AUTO: 52.4 % (ref 39–53)
HGB BLD-MCNC: 16.2 GM/DL (ref 13–17.5)
LYMPHOCYTES # SPEC AUTO: 0.7 K/UL (ref 1–4.8)
LYMPHOCYTES NFR SPEC AUTO: 6 %
MAGNESIUM SPEC-SCNC: 2.1 MG/DL (ref 1.6–2.3)
MCH RBC QN AUTO: 28.9 PG (ref 25–35)
MCHC RBC AUTO-ENTMCNC: 30.9 G/DL (ref 31–37)
MCV RBC AUTO: 93.4 FL (ref 80–100)
MONOCYTES # BLD AUTO: 0.8 K/UL (ref 0–1)
MONOCYTES NFR BLD AUTO: 7 %
NEUTROPHILS # BLD AUTO: 9.5 K/UL (ref 1.3–7.7)
NEUTROPHILS NFR BLD AUTO: 86 %
PLATELET # BLD AUTO: 160 K/UL (ref 150–450)
POTASSIUM SERPL-SCNC: 4.2 MMOL/L (ref 3.5–5.1)
SODIUM SERPL-SCNC: 142 MMOL/L (ref 137–145)
WBC # BLD AUTO: 11 K/UL (ref 3.8–10.6)

## 2024-03-09 PROCEDURE — 5A09357 ASSISTANCE WITH RESPIRATORY VENTILATION, LESS THAN 24 CONSECUTIVE HOURS, CONTINUOUS POSITIVE AIRWAY PRESSURE: ICD-10-PCS

## 2024-03-09 RX ADMIN — FLUTICASONE PROPIONATE SCH SPRAY: 50 SPRAY, METERED NASAL at 18:45

## 2024-03-09 RX ADMIN — BUDESONIDE AND FORMOTEROL FUMARATE DIHYDRATE SCH PUFF: 160; 4.5 AEROSOL RESPIRATORY (INHALATION) at 20:35

## 2024-03-09 RX ADMIN — ALBUTEROL SULFATE PRN PUFF: 90 AEROSOL, METERED RESPIRATORY (INHALATION) at 07:49

## 2024-03-09 NOTE — P.PN
Subjective


Progress Note Date: 03/09/24





Pt described episode of coughing and aspiration as he was trying to drink water 

last night.  O2 requirement increased to 6L.  CXR personally interpreted today, 

appears to have increased diffuse reticulonodular opacities, and retrocardiac 

opacity.





Gen: In NAD, non-toxic


HEENT: normocephalic, atraumatic, hearing acuity is intant, mucous membranes 

moist


CVS: perfusing all extremities well, no pitting edema, 


Respiratory: symmetric chest expansion, no accessory muscle use, crackles heard 

best in left lung field


GI: soft, NTTP, ND, 


: no suprapubic tenderness, no CVA tenderness


MSK/Derm: no rashes, cyanosis


Neuro: CN II-XII intact, no motor weakness, 


Psych: cooperative, euthymic mood, judgment and insight is intact








Hospital course:





79-year-old male with multiple comorbidities including COPD not on home oxygen 

CVA/CAD status post stents presented for worsening diarrhea over the past 10 

days with positive sick contact with upper respiratory infection.





White count unremarkable 7.3


Hemoglobin 17.2 unremarkable


Renal function overall unremarkable sodium 141 potassium 3.4 BUN 13 creatinine 

0.59


CT angio of the chest negative for acute PE, did show some bronchial thickness 

concerning for developing pneumonia


Chest x-ray showed some diffuse opacities suspicious for pneumonia


COVID test positive





Assessment/plan:





Acute hypoxic respiratory failure patient dropped down to 79% on room air while 

in the ED


COPD Exacerbation


COVID-19 Pneumonia


Dexamethasone 6 mg p.o. daily


Continue with home inhalers


Procalcitonin added, and low


Initiated ceftriaxone due to aspiration event and worsening hypoxia


Repeat CXR today, interpreted above


Initiate azithromycin 500mg daily for COPD exacerbation


Pulmonology consulted





Diarrhea


Imodium as needed


s/p IVF: 2.5L total of NS bolus








CAD status post stents


Continue cardiac meds aspirin and atorvastatin


Continue with Imdur 30 mg p.o. daily





Hypertension, controlled


Continue with metoprolol 25 mg twice daily


Continue lisinopril 5 mg p.o. twice daily








Diabetes mellitus


Insulin sliding scale





No code


DVT prophylaxis Lovenox 40 mg subcu daily








Objective





- Vital Signs


Vital signs: 


                                   Vital Signs











Temp  98.2 F   03/09/24 03:15


 


Pulse  91   03/09/24 03:58


 


Resp  24   03/09/24 03:58


 


BP  130/61   03/09/24 03:58


 


Pulse Ox  90 L  03/09/24 03:58


 


FiO2  44   03/09/24 04:55








                                 Intake & Output











 03/08/24 03/09/24 03/09/24





 18:59 06:59 18:59


 


Intake Total 1175 118 


 


Output Total 600 1100 375


 


Balance 575 -982 -375


 


Intake:   


 


  Intake, IV Titration 250  





  Amount   


 


    Azithromycin 500 mg In 250  





    Sodium Chloride 0.9% 250   





    ml @ 250 mls/hr IVPB   





    DAILY [facility] Rx#:[id number]   


 


  Oral 925 118 


 


Output:   


 


  Urine 600 1100 375


 


Other:   


 


  Voiding Method Indwelling Catheter Indwelling Catheter 














- Labs


CBC & Chem 7: 


                                 03/07/24 13:58





                                 03/07/24 13:58


Labs: 


                  Abnormal Lab Results - Last 24 Hours (Table)











  03/08/24 03/08/24 03/08/24 Range/Units





  11:43 16:42 20:00 


 


POC Glucose (mg/dL)  135 H  156 H  153 H  ()  mg/dL














  03/09/24 03/09/24 Range/Units





  03:40 05:59 


 


POC Glucose (mg/dL)  132 H  127 H  ()  mg/dL

## 2024-03-09 NOTE — P.CNPUL
History of Present Illness


Consult date: 24


Requesting physician: Alexandro Badillo


Reason for consult: other (Acute COVID-19 infection and COPD exacerbation)


Chief complaint: Diarrhea


History of present illness: 





This is an 79-year-old white male, known history of COPD, has been a smoker over

the years, patient was brought into the ER on 3/7/2024, his chief complaint was 

mostly symptoms of diarrhea for the last 9 days.  Patient had diarrhea episodes 

at least twice a day.  And spite of trying to drink lots of water, patient felt 

dehydrated, he had no episodes of vomiting, no melena, no hematemesis.  Patient 

had occasional cough, wheezing, and apparently multiple family members had 

symptoms of URI.  What brought in the patient to the ER was mostly his GI 

symptoms not his pulmonary symptoms, nonetheless he was noted to have positive 

COVID-19 screening, and on CT of the chest showed evidence of scattered ill-

defined opacities in the periphery of both lungs, consistent with multifocal 

pneumonia.  Patient was admitted, and this consult was initiated patient is 

requiring oxygen he is now on 6 L nasal cannula and his O2 saturation is 100%.  

Blood pressure is marginal with 86/50 and mean arterial pressure of 63 in spite 

of his abnormal CT of the chest showing multifocal infiltrates, his 

procalcitonin level is normal  0.06.  Patient is receiving albuterol, he is on 

Rocephin and Zithromax, patient is also on Decadron 6 mg daily, receiving IV 

fluids for his diarrhea and dehydration, patient is obviously out of the window 

for remdesivir at this point.





Review of Systems





CONSTITUTIONAL: Weakness fatigue, feels dehydrated


EYES: Negative. 


ENT: Negative. 


CARDIAC: Negative. 


PULMONARY: Intermittent cough, no fever no chills no hemoptysis.  Minimal 

shortness of breath


GI: Diarrhea but no nausea no vomiting.


GENITOURINARY: Negative. 


MUSCULOSKELETAL: Vague aches and pains


SKIN: Negative. 


NEUROPSYCH: Negative. 


ENDOCRINE: Negative. 


HEMATOLOGIC: Negative. 








Past Medical History


Past Medical History: Asthma, COPD, Hypertension, Myocardial Infarction (MI), P

neumonia, Prostate Disorder


Additional Past Medical History / Comment(s): allergies, "bladder does not 

empty" patient has chronic catheter.


Last Myocardial Infarction Date:: 2021


History of Any Multi-Drug Resistant Organisms: None Reported


Past Surgical History: Appendectomy, Back Surgery, Heart Catheterization With 

Stent, Tonsillectomy


Additional Past Surgical History / Comment(s): cervical, 3 heart stents


Past Anesthesia/Blood Transfusion Reactions: No Reported Reaction


Additional Past Anesthesia/Blood Transfusion Reaction / Comment(s): Pt is 

clausterphobic.


Date of Last Stent Placement:: 21


Past Psychological History: No Psychological Hx Reported


Smoking Status: Former smoker


Past Alcohol Use History: None Reported


Past Drug Use History: None Reported





- Past Family History


  ** Mother


Family Medical History: No Reported History


Additional Family Medical History / Comment(s): Mother was healthy and lived to 

be 92 yrs old.





  ** Father


History Unknown: Yes


Additional Family Medical History / Comment(s): Father  at the age of 68yrs,

pt unable to say from what.





Medications and Allergies


                                Home Medications











 Medication  Instructions  Recorded  Confirmed  Type


 


Albuterol Inhaler [Ventolin Hfa 2 puff INHALATION RT-QID PRN 21 

History





Inhaler]    


 


Albuterol Nebulized [Ventolin 2.5 mg INHALATION RT-Q4H PRN 21 

History





Nebulized]    


 


Cetirizine HCl [Zyrtec] 10 mg PO DAILY PRN 21 History


 


Fluticasone Nasal Spray [Flonase 1 spr EA NOSTRIL DAILY 21 

History





Nasal Spray]    


 


Lidocaine 5% Patch [Lidoderm 5% 1 patch TRANSDERM DAILY 21 

History





Patch]    


 


Tamsulosin [Flomax] 0.4 mg PO DAILY #30 capsule 22 Rx


 


Atorvastatin [Lipitor] 80 mg PO HS 22 History


 


Folic Acid 1 mg PO DAILY 22 History


 


Metoprolol Succinate (ER) [Toprol 25 mg PO BID 22 History





XL]    


 


Thiamine [Vitamin B-1] 100 mg PO DAILY 22 History


 


lisinopriL [Zestril] 5 mg PO BID 22 History


 


Isosorbide Mononitrate ER [Imdur] 30 mg PO DAILY 30 Days #30 tab 22 Rx


 


Aspirin 81 mg PO DAILY 23 History


 


Furosemide [Lasix] 20 mg PO BID@0900,1300 23 History


 


Fluticasone Propion/Salmeterol 1 puff INHALATION RT-BID 06/10/23 03/07/24 

History





[Wixela 250-50 Inhub]    


 


Acetaminophen Tab [Tylenol] 1,000 mg PO Q6H PRN 23 History


 


Empagliflozin [Jardiance] 10 mg PO DAILY 23 History


 


Ketoconazole 2% Shampoo [Nizoral] 1 applic TOPICAL DAILY 23 

History


 


Triamcinolone 0.1% Cream [Kenalog 1 applic TOPICAL DAILY PRN 23 

History





0.1% Cream]    


 


Nitroglycerin Sl Tabs [Nitrostat] 0.4 mg SUBLINGUAL Q5M PRN 24 

History








                                    Allergies











Allergy/AdvReac Type Severity Reaction Status Date / Time


 


Influenza Virus Vaccines Allergy  Anaphylaxis Verified 24 13:02


 


tomato Allergy  Anaphylaxis Verified 24 13:02





   /Hives  














Physical Exam


Vitals: 


                                   Vital Signs











  Temp Pulse Resp BP BP Pulse Ox FiO2


 


 24 11:45   67  20  81/54  86/50  100 


 


 24 09:45   66  20  115/59   94 L 


 


 24 04:55        44


 


 24 03:58   91  24  130/61   90 L 


 


 24 03:15  98.2 F  69  19  101/58   97 


 


 24 23:54  98.1 F  68  20  99/53   97 


 


 24 20:00  98.2 F  78  19  113/58   94 L 


 


 24 17:30   62  18  101/55   96 








                                Intake and Output











 24





 22:59 06:59 14:59


 


Intake Total 958  


 


Output Total 600 1100 375


 


Balance 358 -1100 -375


 


Intake:   


 


  Intake, IV Titration 250  





  Amount   


 


    Azithromycin 500 mg In 250  





    Sodium Chloride 0.9% 250   





    ml @ 250 mls/hr IVPB   





    DAILY [facility] Rx#:[id number]   


 


  Oral 708  


 


Output:   


 


  Urine 600 1100 375


 


Other:   


 


  Voiding Method Indwelling Catheter Indwelling Catheter Indwelling Catheter














General: Revealed a very pleasant 79-year-old white male in no distress, on 6 L 

nasal cannula


Skin:  Skin is warm and dry and no rashes or lesions are noted. 


Eye:  Pupils are equal, round and reactive to light, extra-ocular movements are 

intact; there is normal conjunctiva bilaterally.  


Ears, nose, mouth and throat:  There are moist mucous membranes and no oral 

lesions. 


Neck:  The neck is supple, there is no tenderness  or JVD.  


Cardiovascular: Normal S1-S2, no S3 gallop.


Respiratory: Scattered rhonchi and wheezing noted bilaterally


Gastrointestinal:  Soft, non-distended, non-tender abdomen without masses or 

organomegaly noted. There is no rebound or guarding present. Bowel sounds are 

unremarkable. 


Musculoskeletal:  Normal ROM, no tenderness, There is no pedal edema. There is 

no calf tenderness or swelling. No cords were appreciated.  


Neurological:  CN II-XII intact, Cranial nerves III through XII are intact. 

There are no obvious motor or sensory deficits. Coordination appears grossly 

intact. Speech is normal.


Psychiatric:  Cooperative, appropriate mood & affect, normal judgment.  








Results





- Laboratory Findings


CBC and BMP: 


                                 24 08:40





                                 24 13:58


ABG











ABG pH  7.31  (7.35-7.45)  L  24  16:17    


 


ABG pCO2  67 mmHg (35-45)  H  24  16:17    


 


ABG pO2  42 mmHg ()  L*  24  16:17    


 


ABG O2 Saturation  75.3 % (94-97)  L  24  16:17    





PT/INR, D-dimer











D-Dimer  1.01 mg/L FEU (<0.60)  H  24  16:54    








Abnormal lab findings: 


                                  Abnormal Labs











  24





  13:58 13:58 15:53


 


WBC   


 


RBC  5.97 H  


 


MCHC   


 


Neutrophils #   


 


Lymphocytes #  0.4 L  


 


D-Dimer   


 


ABG pH    7.33 L


 


ABG pCO2    63 H


 


ABG pO2    39 L*


 


ABG HCO3    33 H


 


ABG Total CO2    35 H


 


ABG O2 Saturation    70.4 L


 


VBG pH   


 


VBG pCO2   


 


VBG HCO3   


 


Potassium   3.4 L 


 


Carbon Dioxide   34 H 


 


Creatinine   0.59 L 


 


Glucose   107 H 


 


POC Glucose (mg/dL)   


 


Total Bilirubin   1.6 H 


 


SARS-CoV-2 (PCR)   














  24





  16:17 16:54 16:54


 


WBC   


 


RBC   


 


MCHC   


 


Neutrophils #   


 


Lymphocytes #   


 


D-Dimer   1.01 H 


 


ABG pH  7.31 L  


 


ABG pCO2  67 H  


 


ABG pO2  42 L*  


 


ABG HCO3  34 H  


 


ABG Total CO2   


 


ABG O2 Saturation  75.3 L  


 


VBG pH   


 


VBG pCO2   


 


VBG HCO3   


 


Potassium   


 


Carbon Dioxide   


 


Creatinine   


 


Glucose   


 


POC Glucose (mg/dL)   


 


Total Bilirubin   


 


SARS-CoV-2 (PCR)    Detected A














  24





  16:55 11:43 16:42


 


WBC   


 


RBC   


 


MCHC   


 


Neutrophils #   


 


Lymphocytes #   


 


D-Dimer   


 


ABG pH   


 


ABG pCO2   


 


ABG pO2   


 


ABG HCO3   


 


ABG Total CO2   


 


ABG O2 Saturation   


 


VBG pH  7.29 L  


 


VBG pCO2  70 H*  


 


VBG HCO3  34 H  


 


Potassium   


 


Carbon Dioxide   


 


Creatinine   


 


Glucose   


 


POC Glucose (mg/dL)   135 H  156 H


 


Total Bilirubin   


 


SARS-CoV-2 (PCR)   














  24





  20:00 03:40 05:59


 


WBC   


 


RBC   


 


MCHC   


 


Neutrophils #   


 


Lymphocytes #   


 


D-Dimer   


 


ABG pH   


 


ABG pCO2   


 


ABG pO2   


 


ABG HCO3   


 


ABG Total CO2   


 


ABG O2 Saturation   


 


VBG pH   


 


VBG pCO2   


 


VBG HCO3   


 


Potassium   


 


Carbon Dioxide   


 


Creatinine   


 


Glucose   


 


POC Glucose (mg/dL)  153 H  132 H  127 H


 


Total Bilirubin   


 


SARS-CoV-2 (PCR)   














  24





  08:40


 


WBC  11.0 H


 


RBC 


 


MCHC  30.9 L


 


Neutrophils #  9.5 H


 


Lymphocytes #  0.7 L


 


D-Dimer 


 


ABG pH 


 


ABG pCO2 


 


ABG pO2 


 


ABG HCO3 


 


ABG Total CO2 


 


ABG O2 Saturation 


 


VBG pH 


 


VBG pCO2 


 


VBG HCO3 


 


Potassium 


 


Carbon Dioxide 


 


Creatinine 


 


Glucose 


 


POC Glucose (mg/dL) 


 


Total Bilirubin 


 


SARS-CoV-2 (PCR) 














- Diagnostic Findings


Chest x-ray: image reviewed


CT scan - chest: image reviewed (As noted in HPI)





Assessment and Plan


Assessment: 





Impression:


Acute hypoxic respiratory failure


Acute COVID-19 pneumonia, doubt bacterial pneumonia with normal procalcitonin 

level


Acute exacerbation of COPD


Acute gastroenteritis, likely viral in nature related to COVID-19 infection


Acute dehydration





Recommendation:


Continue present treatment plan including antibiotics, bronchodilators, 

steroids,


Patient is out of the window for remdesivir.


Hold blood pressure medications since his blood pressure is marginal


Titrate oxygen accordingly


Close monitoring for worsening bilateral peripheral infiltrates as noted on the 

CT of the chest but not clearly seen on chest x-ray


Continue albuterol,


Change Symbicort to 160/4.52 puffs twice daily


Consider stopping antibiotics


Will continue to follow

## 2024-03-10 LAB
ANION GAP SERPL CALC-SCNC: 5 MMOL/L
BASOPHILS # BLD AUTO: 0 K/UL (ref 0–0.2)
BASOPHILS NFR BLD AUTO: 0 %
BUN SERPL-SCNC: 18 MG/DL (ref 9–20)
CALCIUM SPEC-MCNC: 8.8 MG/DL (ref 8.4–10.2)
CHLORIDE SERPL-SCNC: 108 MMOL/L (ref 98–107)
CO2 SERPL-SCNC: 30 MMOL/L (ref 22–30)
EOSINOPHIL # BLD AUTO: 0 K/UL (ref 0–0.7)
EOSINOPHIL NFR BLD AUTO: 0 %
ERYTHROCYTE [DISTWIDTH] IN BLOOD BY AUTOMATED COUNT: 5.83 M/UL (ref 4.3–5.9)
ERYTHROCYTE [DISTWIDTH] IN BLOOD: 14.6 % (ref 11.5–15.5)
GLUCOSE BLD-MCNC: 135 MG/DL (ref 70–110)
GLUCOSE BLD-MCNC: 137 MG/DL (ref 70–110)
GLUCOSE BLD-MCNC: 152 MG/DL (ref 70–110)
GLUCOSE BLD-MCNC: 185 MG/DL (ref 70–110)
GLUCOSE SERPL-MCNC: 177 MG/DL (ref 74–99)
HCT VFR BLD AUTO: 52.6 % (ref 39–53)
HGB BLD-MCNC: 16.3 GM/DL (ref 13–17.5)
LYMPHOCYTES # SPEC AUTO: 0.5 K/UL (ref 1–4.8)
LYMPHOCYTES NFR SPEC AUTO: 5 %
MAGNESIUM SPEC-SCNC: 2.2 MG/DL (ref 1.6–2.3)
MCH RBC QN AUTO: 28 PG (ref 25–35)
MCHC RBC AUTO-ENTMCNC: 31 G/DL (ref 31–37)
MCV RBC AUTO: 90.3 FL (ref 80–100)
MONOCYTES # BLD AUTO: 0.7 K/UL (ref 0–1)
MONOCYTES NFR BLD AUTO: 7 %
NEUTROPHILS # BLD AUTO: 8.5 K/UL (ref 1.3–7.7)
NEUTROPHILS NFR BLD AUTO: 87 %
PLATELET # BLD AUTO: 181 K/UL (ref 150–450)
POTASSIUM SERPL-SCNC: 4 MMOL/L (ref 3.5–5.1)
SODIUM SERPL-SCNC: 143 MMOL/L (ref 137–145)
WBC # BLD AUTO: 9.8 K/UL (ref 3.8–10.6)

## 2024-03-10 RX ADMIN — Medication SCH MG: at 07:52

## 2024-03-10 RX ADMIN — FUROSEMIDE SCH MG: 20 TABLET ORAL at 07:51

## 2024-03-10 RX ADMIN — METOPROLOL SUCCINATE SCH MG: 25 TABLET, EXTENDED RELEASE ORAL at 07:52

## 2024-03-10 RX ADMIN — LIDOCAINE SCH PATCH: 4 PATCH TOPICAL at 07:52

## 2024-03-10 RX ADMIN — FOLIC ACID SCH MG: 1 TABLET ORAL at 07:52

## 2024-03-10 NOTE — P.PN
Subjective


Progress Note Date: 03/10/24





Pts o2 requirement improved from yesterday.  On abx, bronchodilators, steroids. 

Pulmonology input appreciated





Gen: In NAD, non-toxic


HEENT: normocephalic, atraumatic, hearing acuity is intant, mucous membranes 

moist


CVS: perfusing all extremities well, no pitting edema, 


Respiratory: symmetric chest expansion, no accessory muscle use, crackles heard 

best in left lung field


GI: soft, NTTP, ND, 


: no suprapubic tenderness, no CVA tenderness


MSK/Derm: no rashes, cyanosis


Neuro: CN II-XII intact, no motor weakness, 


Psych: cooperative, euthymic mood, judgment and insight is intact








Hospital course:





79-year-old male with multiple comorbidities including COPD not on home oxygen 

CVA/CAD status post stents presented for worsening diarrhea over the past 10 

days with positive sick contact with upper respiratory infection.





White count unremarkable 7.3


Hemoglobin 17.2 unremarkable


Renal function overall unremarkable sodium 141 potassium 3.4 BUN 13 creatinine 

0.59


CT angio of the chest negative for acute PE, did show some bronchial thickness 

concerning for developing pneumonia


Chest x-ray showed some diffuse opacities suspicious for pneumonia


COVID test positive





Assessment/plan:





Acute hypoxic respiratory failure patient dropped down to 79% on room air while 

in the ED


COPD Exacerbation


COVID-19 Pneumonia


Dexamethasone 6 mg p.o. daily


Continue with home inhalers


Procalcitonin added, and low


Initiated ceftriaxone due to aspiration event and worsening hypoxia


Repeat CXR today, interpreted above


Initiate azithromycin 500mg daily for COPD exacerbation


Pulmonology consulted





Diarrhea


Imodium as needed


s/p IVF: 2.5L total of NS bolus








CAD status post stents


Continue cardiac meds aspirin and atorvastatin


Continue with Imdur 30 mg p.o. daily





Hypertension, controlled


Continue with metoprolol 25 mg twice daily


Continue lisinopril 5 mg p.o. twice daily








Diabetes mellitus


Insulin sliding scale





No code


DVT prophylaxis Lovenox 40 mg subcu daily








Objective





- Vital Signs


Vital signs: 


                                   Vital Signs











Temp  96.7 F L  03/10/24 07:39


 


Pulse  92   03/10/24 07:39


 


Resp  18   03/10/24 07:39


 


BP  171/82   03/10/24 07:39


 


Pulse Ox  92 L  03/10/24 07:39


 


FiO2  44   03/09/24 04:55








                                 Intake & Output











 03/09/24 03/10/24 03/10/24





 17:59 06:59 18:59


 


Intake Total   


 


Output Total   1100


 


Balance   -1100


 


Intake:   


 


  Oral   


 


Output:   


 


  Urine   1100


 


Other:   


 


  Voiding Method   Indwelling Catheter














- Labs


CBC & Chem 7: 


                                 03/10/24 08:05





                                 03/10/24 08:05


Labs: 


                  Abnormal Lab Results - Last 24 Hours (Table)











  03/09/24 03/09/24 03/09/24 Range/Units





  08:40 08:40 12:31 


 


WBC   11.0 H   (3.8-10.6)  k/uL


 


MCHC   30.9 L   (31.0-37.0)  g/dL


 


Neutrophils #   9.5 H   (1.3-7.7)  k/uL


 


Lymphocytes #   0.7 L   (1.0-4.8)  k/uL


 


Chloride     ()  mmol/L


 


Carbon Dioxide    35 H  (22-30)  mmol/L


 


Creatinine     (0.66-1.25)  mg/dL


 


Glucose    115 H  (74-99)  mg/dL


 


POC Glucose (mg/dL)     ()  mg/dL


 


Hemoglobin A1c  7.2 H    (<=6.0)  %


 


Calcium    8.1 L  (8.4-10.2)  mg/dL














  03/09/24 03/09/24 03/10/24 Range/Units





  15:45 20:20 06:01 


 


WBC     (3.8-10.6)  k/uL


 


MCHC     (31.0-37.0)  g/dL


 


Neutrophils #     (1.3-7.7)  k/uL


 


Lymphocytes #     (1.0-4.8)  k/uL


 


Chloride     ()  mmol/L


 


Carbon Dioxide     (22-30)  mmol/L


 


Creatinine     (0.66-1.25)  mg/dL


 


Glucose     (74-99)  mg/dL


 


POC Glucose (mg/dL)  152 H  147 H  135 H  ()  mg/dL


 


Hemoglobin A1c     (<=6.0)  %


 


Calcium     (8.4-10.2)  mg/dL














  03/10/24 03/10/24 03/10/24 Range/Units





  08:05 08:05 11:09 


 


WBC     (3.8-10.6)  k/uL


 


MCHC     (31.0-37.0)  g/dL


 


Neutrophils #  8.5 H    (1.3-7.7)  k/uL


 


Lymphocytes #  0.5 L    (1.0-4.8)  k/uL


 


Chloride   108 H   ()  mmol/L


 


Carbon Dioxide     (22-30)  mmol/L


 


Creatinine   0.62 L   (0.66-1.25)  mg/dL


 


Glucose   177 H   (74-99)  mg/dL


 


POC Glucose (mg/dL)    152 H  ()  mg/dL


 


Hemoglobin A1c     (<=6.0)  %


 


Calcium     (8.4-10.2)  mg/dL

## 2024-03-10 NOTE — P.PN
Subjective


Progress Note Date: 03/10/24


Principal diagnosis: 





Acute hypoxic respiratory failure and acute COVID-19 pneumonia








This is an 79-year-old white male, known history of COPD, has been a smoker over

the years, patient was brought into the ER on 3/7/2024, his chief complaint was 

mostly symptoms of diarrhea for the last 9 days.  Patient had diarrhea episodes 

at least twice a day.  And spite of trying to drink lots of water, patient felt 

dehydrated, he had no episodes of vomiting, no melena, no hematemesis.  Patient 

had occasional cough, wheezing, and apparently multiple family members had 

symptoms of URI.  What brought in the patient to the ER was mostly his GI 

symptoms not his pulmonary symptoms, nonetheless he was noted to have positive 

COVID-19 screening, and on CT of the chest showed evidence of scattered ill-

defined opacities in the periphery of both lungs, consistent with multifocal 

pneumonia.  Patient was admitted, and this consult was initiated patient is 

requiring oxygen he is now on 6 L nasal cannula and his O2 saturation is 100%.  

Blood pressure is marginal with 86/50 and mean arterial pressure of 63 in spite 

of his abnormal CT of the chest showing multifocal infiltrates, his procalci

tonin level is normal  0.06.  Patient is receiving albuterol, he is on Rocephin 

and Zithromax, patient is also on Decadron 6 mg daily, receiving IV fluids for 

his diarrhea and dehydration, patient is obviously out of the window for 

remdesivir at this point.





Seen today on 3/10/2024, patient was seen yesterday in consultation, and today 

he seems to be doing better, breathing easier, less cough less wheezing less 

shortness of breath.Patient remains on oxygen at 2 L, O2 sats is 92%.  Labs 

today are basically unremarkable including a CBC and a complete metabolic 

profile.  Renal profile is also normal procalcitonin level was normal on 

admission 0.06








Objective





- Vital Signs


Vital signs: 


                                   Vital Signs











Temp  96.7 F L  03/10/24 07:39


 


Pulse  90   03/10/24 11:50


 


Resp  18   03/10/24 11:50


 


BP  166/81   03/10/24 11:50


 


Pulse Ox  92 L  03/10/24 11:50


 


FiO2  44   03/09/24 04:55








                                 Intake & Output











 03/09/24 03/10/24 03/10/24





 17:59 06:59 18:59


 


Intake Total   118


 


Output Total   1100


 


Balance   -982


 


Intake:   


 


  Oral   118


 


Output:   


 


  Urine   1100


 


Other:   


 


  Voiding Method   Indwelling Catheter














- Exam








General: Revealed a very pleasant 79-year-old white male in no distress, on 2 L 

nasal cannula


Skin:  Skin is warm and dry and no rashes or lesions are noted. 


Eye:  Pupils are equal, round and reactive to light, extra-ocular movements are 

intact; there is normal conjunctiva bilaterally.  


Ears, nose, mouth and throat:  There are moist mucous membranes and no oral le

sions. 


Neck:  The neck is supple, there is no tenderness  or JVD.  


Cardiovascular: Normal S1-S2, no S3 gallop.


Respiratory: Wheezing on forced expiratory maneuver with rhonchi.  Much improved

compared to yesterday


Gastrointestinal:  Soft, non-distended, non-tender abdomen without masses or 

organomegaly noted. There is no rebound or guarding present. Bowel sounds are 

unremarkable. 


Musculoskeletal:  Normal ROM, no tenderness, There is no pedal edema. There is 

no calf tenderness or swelling. No cords were appreciated.  


Neurological:  CN II-XII intact, Cranial nerves III through XII are intact. 

There are no obvious motor or sensory deficits. Coordination appears grossly 

intact. Speech is normal.


Psychiatric:  Cooperative, appropriate mood & affect, normal judgment.  








- Labs


CBC & Chem 7: 


                                 03/10/24 08:05





                                 03/10/24 08:05


Labs: 


                  Abnormal Lab Results - Last 24 Hours (Table)











  03/09/24 03/09/24 03/09/24 Range/Units





  08:40 12:31 15:45 


 


Neutrophils #     (1.3-7.7)  k/uL


 


Lymphocytes #     (1.0-4.8)  k/uL


 


Chloride     ()  mmol/L


 


Carbon Dioxide   35 H   (22-30)  mmol/L


 


Creatinine     (0.66-1.25)  mg/dL


 


Glucose   115 H   (74-99)  mg/dL


 


POC Glucose (mg/dL)    152 H  ()  mg/dL


 


Hemoglobin A1c  7.2 H    (<=6.0)  %


 


Calcium   8.1 L   (8.4-10.2)  mg/dL














  03/09/24 03/10/24 03/10/24 Range/Units





  20:20 06:01 08:05 


 


Neutrophils #    8.5 H  (1.3-7.7)  k/uL


 


Lymphocytes #    0.5 L  (1.0-4.8)  k/uL


 


Chloride     ()  mmol/L


 


Carbon Dioxide     (22-30)  mmol/L


 


Creatinine     (0.66-1.25)  mg/dL


 


Glucose     (74-99)  mg/dL


 


POC Glucose (mg/dL)  147 H  135 H   ()  mg/dL


 


Hemoglobin A1c     (<=6.0)  %


 


Calcium     (8.4-10.2)  mg/dL














  03/10/24 03/10/24 Range/Units





  08:05 11:09 


 


Neutrophils #    (1.3-7.7)  k/uL


 


Lymphocytes #    (1.0-4.8)  k/uL


 


Chloride  108 H   ()  mmol/L


 


Carbon Dioxide    (22-30)  mmol/L


 


Creatinine  0.62 L   (0.66-1.25)  mg/dL


 


Glucose  177 H   (74-99)  mg/dL


 


POC Glucose (mg/dL)   152 H  ()  mg/dL


 


Hemoglobin A1c    (<=6.0)  %


 


Calcium    (8.4-10.2)  mg/dL














Assessment and Plan


Assessment: 





Impression:


Acute hypoxic respiratory failure


Acute COVID-19 pneumonia, doubt bacterial pneumonia with normal procalcitonin 

level


Acute exacerbation of COPD


Acute gastroenteritis, likely viral in nature related to COVID-19 infection


Acute dehydration





Recommendation:


Continue present , bronchodilators, steroids,


Patient is out of the window for remdesivir.


Titrate oxygen accordingly


Continue albuterol,


Continue Symbicort


Restart lisinopril 10 mg daily for elevated blood pressure


Consider stopping antibiotics


Will continue to follow





Time with Patient: Less than 30

## 2024-03-11 VITALS — RESPIRATION RATE: 18 BRPM

## 2024-03-11 VITALS — TEMPERATURE: 97.8 F

## 2024-03-11 VITALS — SYSTOLIC BLOOD PRESSURE: 142 MMHG | DIASTOLIC BLOOD PRESSURE: 94 MMHG | HEART RATE: 94 BPM

## 2024-03-11 LAB
GLUCOSE BLD-MCNC: 135 MG/DL (ref 70–110)
GLUCOSE BLD-MCNC: 142 MG/DL (ref 70–110)
GLUCOSE BLD-MCNC: 179 MG/DL (ref 70–110)

## 2024-03-11 NOTE — P.PN
Subjective


Progress Note Date: 03/11/24


Principal diagnosis: 





Coronavirus infection.





This is an 79-year-old white male, known history of COPD, has been a smoker over

the years, patient was brought into the ER on 3/7/2024, his chief complaint was 

mostly symptoms of diarrhea for the last 9 days.  Patient had diarrhea episodes 

at least twice a day.  And spite of trying to drink lots of water, patient felt 

dehydrated, he had no episodes of vomiting, no melena, no hematemesis.  Patient 

had occasional cough, wheezing, and apparently multiple family members had 

symptoms of URI.  What brought in the patient to the ER was mostly his GI 

symptoms not his pulmonary symptoms, nonetheless he was noted to have positive 

COVID-19 screening, and on CT of the chest showed evidence of scattered ill-

defined opacities in the periphery of both lungs, consistent with multifocal 

pneumonia.  Patient was admitted, and this consult was initiated patient is 

requiring oxygen he is now on 6 L nasal cannula and his O2 saturation is 100%.  

Blood pressure is marginal with 86/50 and mean arterial pressure of 63 in spite 

of his abnormal CT of the chest showing multifocal infiltrates, his 

procalcitonin level is normal  0.06.  Patient is receiving albuterol, he is on 

Rocephin and Zithromax, patient is also on Decadron 6 mg daily, receiving IV 

fluids for his diarrhea and dehydration, patient is obviously out of the window 

for remdesivir at this point.





Seen today on 3/10/2024, patient was seen yesterday in consultation, and today 

he seems to be doing better, breathing easier, less cough less wheezing less 

shortness of breath.Patient remains on oxygen at 2 L, O2 sats is 92%.  Labs 

today are basically unremarkable including a CBC and a complete metabolic 

profile.  Renal profile is also normal procalcitonin level was normal on adm

ission 0.06





Progress note dated March 11, 2024.





The patient is seen today in room 359.  Currently he is on 3 L of oxygen.  He is

not receiving any IV fluids.  The patient was seen in consultation a couple days

ago, with complaints of mostly diarrhea for about 9 days.  The patient's 

respiratory status is improved.  He is much less short of breath, and has less 

wheezing.  He continues on 3 L of oxygen.  No new labs today other than a 

glucose of 179.  Chest x-ray from a few days ago shows trace pleural effusions, 

and patchy left basilar and retrocardiac infiltrates or atelectasis.





Objective





- Vital Signs


Vital signs: 


                                   Vital Signs











Temp  97.8 F   03/11/24 08:20


 


Pulse  87   03/11/24 11:19


 


Resp  18   03/11/24 11:19


 


BP  157/84   03/11/24 11:19


 


Pulse Ox  95   03/11/24 11:19


 


FiO2  44   03/09/24 04:55








                                 Intake & Output











 03/10/24 03/11/24 03/11/24





 18:59 06:59 18:59


 


Intake Total 118  


 


Output Total 2700 2125 900


 


Balance -3413 -2123 -753


 


Intake:   


 


  Oral 118  


 


Output:   


 


  Urine 2700 2125 900


 


Other:   


 


  Voiding Method Indwelling Catheter Indwelling Catheter Indwelling Catheter














- Exam





No acute distress, confused, currently on 3 L of oxygen.





HEENT examination is grossly unremarkable.  Mucous membranes are moist.  No oral

lesions.





Neck supple.  Full range of motion.  No adenopathy thyromegaly or neck vein 

distention.





Cardiovascular examination reveals regular rhythm rate.  S1-S2 normal.  No S3 or

S4.  No discernible murmur noted.  Heart rate is 87 bpm.





Lungs reveal clear sleep clear breath sounds.  Scattered rhonchi.  No wheezes or

crackles.  Breath sounds are equal bilaterally.  3 L saturation is 95%.  Room 

air saturation was only 81%.





Abdomen soft bowel sounds are heard.  No masses or tenderness.





Extremities are intact.  No cyanosis clubbing or edema.





Skin is without rash or lesion.





Neurologic examination feels this patient to be confused.  He is very agitated 

as well.





- Labs


CBC & Chem 7: 


                                 03/10/24 08:05





                                 03/10/24 08:05


Labs: 


                  Abnormal Lab Results - Last 24 Hours (Table)











  03/10/24 03/10/24 03/11/24 Range/Units





  15:43 20:35 06:18 


 


POC Glucose (mg/dL)  185 H  137 H  135 H  ()  mg/dL














  03/11/24 Range/Units





  11:44 


 


POC Glucose (mg/dL)  179 H  ()  mg/dL














Assessment and Plan


Assessment: 





Acute hypoxemic respiratory failure.





Acute coronavirus associated pneumonia, doubt bacterial pneumonia, with a normal

procalcitonin level.





Acute exacerbation of chronic obstructive pulmonary disease.





Acute gastroenteritis with diarrhea, secondary to coronavirus infection.





Acute dehydration.


Plan: 





Plan dated March 11, 2024.





The patient was very confused and agitated, we went into the room.  He actually 

told us to leave the room.  He was wearing oxygen at 3 L.  The patient continues

on appropriate medications.  He was outside the window for remdesivir.  The 

patient continues on oxygen at 3 L.  He also continues on albuterol inhaler, and

Symbicort.  Additional recommendations and suggestions are forthcoming.  Labs, 

x-rays, and medications are reviewed.  The patient is currently on Rocephin, but

it should be discontinued, in lieu of the normal procalcitonin level.  We will 

continue to follow and make recommendations along the way.


Time with Patient: Less than 30

## 2024-03-11 NOTE — P.DS
Providers


Date of admission: 


03/07/24 18:11





Expected date of discharge: 03/11/24


Attending physician: 


Richard Tafoya MD





Consults: 





                                        





03/09/24 08:19


Consult Physician Routine 


   Consulting Provider: Aurelia Castro


   Consult Reason/Comments: pneumonia


   Do you want consulting provider notified?: Yes











Primary care physician: 


[facility]





Hospital Course: 








Acute hypoxic respiratory failure patient dropped down to 79% on room air while 

in the ED


COPD Exacerbation


COVID-19 Pneumonia


Diarrhea


CAD status post stents


Hypertension, controlled


Diabetes mellitus





Gen: In NAD, non-toxic


HEENT: normocephalic, atraumatic, hearing acuity is intant, mucous membranes 

moist


CVS: perfusing all extremities well, no pitting edema, 


Respiratory: symmetric chest expansion, no accessory muscle use, crackles heard 

best in left lung field


GI: soft, NTTP, ND, 


: no suprapubic tenderness, no CVA tenderness


MSK/Derm: no rashes, cyanosis


Neuro: CN II-XII intact, no motor weakness, 


Psych: cooperative, euthymic mood, judgment and insight is intact








Hospital course:





79-year-old male with multiple comorbidities including COPD not on home oxygen 

CVA/CAD status post stents presented for worsening diarrhea over the past 10 

days with positive sick contact with upper respiratory infection.





White count unremarkable 7.3


Hemoglobin 17.2 unremarkable


Renal function overall unremarkable sodium 141 potassium 3.4 BUN 13 creatinine 

0.59


CT angio of the chest negative for acute PE, did show some bronchial thickness 

concerning for developing pneumonia


Chest x-ray showed some diffuse opacities suspicious for pneumonia


COVID test positive





Pt was treated for COPD exacerbation and COVID pneumonia with steroids, 

inhalers.  Pt did have an aspiration event while hospitalized and required abx 

briefly.  Pt felt ready enough to be discharged, but was still requiring oxygen.

 [facility] and was discharged with 6 more 

days of decadron.  Pt should f/u with his PCP and pulmonology.





I spent 40 minutes coordinating this discharge on 3/11











Plan - Discharge Summary


Discharge Rx Participant: Yes


New Discharge Prescriptions: 


New


   dexAMETHasone ORAL [Hexadrol] 6 mg PO BID 6 Days #18 tab


   Cefdinir [Omnicef] 300 mg PO Q12HR #6 capsule


   Budesonide-Formot 160-4.5 Mcg [Symbicort 160-4.5 Mcg Inhaler] 2 puff 

INHALATION RT-BID #1 each





Continue


   Albuterol Nebulized [Ventolin Nebulized] 2.5 mg INHALATION RT-Q4H PRN


     PRN Reason: Shortness Of Breath


   Lidocaine 5% Patch [Lidoderm 5% Patch] 1 patch TRANSDERM DAILY


   Tamsulosin [Flomax] 0.4 mg PO DAILY #30 capsule


   Folic Acid 1 mg PO DAILY


   Thiamine [Vitamin B-1] 100 mg PO DAILY


   Furosemide [Lasix] 20 mg PO BID@0900,1300


   Aspirin 81 mg PO DAILY


   Albuterol Inhaler [Ventolin Hfa Inhaler] 2 puff INHALATION RT-QID PRN


     PRN Reason: Shortness Of Breath


   Cetirizine HCl [Zyrtec] 10 mg PO DAILY PRN


     PRN Reason: Allergy Symptoms


   Fluticasone Nasal Spray [Flonase Nasal Spray] 1 spr EA NOSTRIL DAILY


   lisinopriL [Zestril] 5 mg PO BID


   Atorvastatin [Lipitor] 80 mg PO HS


   Fluticasone Propion/Salmeterol [Wixela 250-50 Inhub] 1 puff INHALATION RT-BID


   Acetaminophen Tab [Tylenol] 1,000 mg PO Q6H PRN


     PRN Reason: Pain


   Empagliflozin [Jardiance] 10 mg PO DAILY


   Ketoconazole 2% Shampoo [Nizoral] 1 applic TOPICAL DAILY


   Triamcinolone 0.1% Cream [Kenalog 0.1% Cream] 1 applic TOPICAL DAILY PRN


     PRN Reason: Rash


   Nitroglycerin Sl Tabs [Nitrostat] 0.4 mg SUBLINGUAL Q5M PRN


     PRN Reason: Chest Pain





Changed


   Metoprolol Succinate (ER) [Toprol XL] 25 mg PO DAILY #0





Discontinued


   Isosorbide Mononitrate ER [Imdur] 30 mg PO DAILY 30 Days #30 tab


Discharge Medication List





Albuterol Inhaler [Ventolin Hfa Inhaler] 2 puff INHALATION RT-QID PRN 12/16/21 

[History]


Albuterol Nebulized [Ventolin Nebulized] 2.5 mg INHALATION RT-Q4H PRN 12/16/21 

[History]


Cetirizine HCl [Zyrtec] 10 mg PO DAILY PRN 12/16/21 [History]


Fluticasone Nasal Spray [Flonase Nasal Spray] 1 spr EA NOSTRIL DAILY 12/16/21 

[History]


Lidocaine 5% Patch [Lidoderm 5% Patch] 1 patch TRANSDERM DAILY 12/16/21 

[History]


Tamsulosin [Flomax] 0.4 mg PO DAILY #30 capsule 01/02/22 [Rx]


Atorvastatin [Lipitor] 80 mg PO HS 09/26/22 [History]


Folic Acid 1 mg PO DAILY 09/26/22 [History]


Thiamine [Vitamin B-1] 100 mg PO DAILY 09/26/22 [History]


lisinopriL [Zestril] 5 mg PO BID 09/26/22 [History]


Aspirin 81 mg PO DAILY 04/17/23 [History]


Furosemide [Lasix] 20 mg PO BID@0900,1300 04/17/23 [History]


Fluticasone Propion/Salmeterol [Wixela 250-50 Inhub] 1 puff INHALATION RT-BID 

06/10/23 [History]


Acetaminophen Tab [Tylenol] 1,000 mg PO Q6H PRN 12/02/23 [History]


Empagliflozin [Jardiance] 10 mg PO DAILY 12/02/23 [History]


Ketoconazole 2% Shampoo [Nizoral] 1 applic TOPICAL DAILY 12/02/23 [History]


Triamcinolone 0.1% Cream [Kenalog 0.1% Cream] 1 applic TOPICAL DAILY PRN 

12/02/23 [History]


Nitroglycerin Sl Tabs [Nitrostat] 0.4 mg SUBLINGUAL Q5M PRN 03/07/24 [History]


Budesonide-Formot 160-4.5 Mcg [Symbicort 160-4.5 Mcg Inhaler] 2 puff INHALATION 

RT-BID #1 each 03/11/24 [Rx]


Cefdinir [Omnicef] 300 mg PO Q12HR #6 capsule 03/11/24 [Rx]


Metoprolol Succinate (ER) [Toprol XL] 25 mg PO DAILY #0 03/11/24 [Rx]


dexAMETHasone ORAL [Hexadrol] 6 mg PO BID 6 Days #18 tab 03/11/24 [Rx]








Follow up Appointment(s)/Referral(s): 


[facility] [NON-STAFF] - 


[facility] [Primary Care Provider] - 1-2 days


()


Patient Instructions/Handouts:  COVID-19 (Coronavirus Disease 2019) (DC)


Discharge Disposition: HOME WITH HOME HEALTH SERVICES

## 2024-05-01 ENCOUNTER — HOSPITAL ENCOUNTER (INPATIENT)
Dept: HOSPITAL 47 - EC | Age: 79
LOS: 7 days | Discharge: HOME HEALTH | DRG: 177 | End: 2024-05-08
Attending: INTERNAL MEDICINE | Admitting: INTERNAL MEDICINE
Payer: OTHER GOVERNMENT

## 2024-05-01 VITALS — BODY MASS INDEX: 18.4 KG/M2

## 2024-05-01 DIAGNOSIS — R91.1: ICD-10-CM

## 2024-05-01 DIAGNOSIS — T38.0X5A: ICD-10-CM

## 2024-05-01 DIAGNOSIS — Z79.899: ICD-10-CM

## 2024-05-01 DIAGNOSIS — Z86.16: ICD-10-CM

## 2024-05-01 DIAGNOSIS — T17.890A: ICD-10-CM

## 2024-05-01 DIAGNOSIS — G89.29: ICD-10-CM

## 2024-05-01 DIAGNOSIS — I11.0: ICD-10-CM

## 2024-05-01 DIAGNOSIS — Z71.3: ICD-10-CM

## 2024-05-01 DIAGNOSIS — J96.21: ICD-10-CM

## 2024-05-01 DIAGNOSIS — Z88.7: ICD-10-CM

## 2024-05-01 DIAGNOSIS — N42.9: ICD-10-CM

## 2024-05-01 DIAGNOSIS — Z79.84: ICD-10-CM

## 2024-05-01 DIAGNOSIS — R00.0: ICD-10-CM

## 2024-05-01 DIAGNOSIS — J98.11: ICD-10-CM

## 2024-05-01 DIAGNOSIS — I25.2: ICD-10-CM

## 2024-05-01 DIAGNOSIS — I50.22: ICD-10-CM

## 2024-05-01 DIAGNOSIS — Z95.5: ICD-10-CM

## 2024-05-01 DIAGNOSIS — Z99.81: ICD-10-CM

## 2024-05-01 DIAGNOSIS — I25.5: ICD-10-CM

## 2024-05-01 DIAGNOSIS — J44.1: ICD-10-CM

## 2024-05-01 DIAGNOSIS — J44.0: ICD-10-CM

## 2024-05-01 DIAGNOSIS — Z66: ICD-10-CM

## 2024-05-01 DIAGNOSIS — D72.829: ICD-10-CM

## 2024-05-01 DIAGNOSIS — M54.2: ICD-10-CM

## 2024-05-01 DIAGNOSIS — J15.69: Primary | ICD-10-CM

## 2024-05-01 DIAGNOSIS — Z79.51: ICD-10-CM

## 2024-05-01 DIAGNOSIS — Z87.891: ICD-10-CM

## 2024-05-01 DIAGNOSIS — I25.10: ICD-10-CM

## 2024-05-01 DIAGNOSIS — F40.240: ICD-10-CM

## 2024-05-01 DIAGNOSIS — Z87.01: ICD-10-CM

## 2024-05-01 DIAGNOSIS — Z79.82: ICD-10-CM

## 2024-05-01 DIAGNOSIS — E11.9: ICD-10-CM

## 2024-05-01 LAB
ALBUMIN SERPL-MCNC: 3.6 G/DL (ref 3.5–5)
ALP SERPL-CCNC: 77 U/L (ref 38–126)
ALT SERPL-CCNC: 29 U/L (ref 4–49)
ANION GAP SERPL CALC-SCNC: 4 MMOL/L
APTT BLD: 23.5 SEC (ref 22–30)
AST SERPL-CCNC: 42 U/L (ref 17–59)
BASOPHILS # BLD AUTO: 0.1 K/UL (ref 0–0.2)
BASOPHILS NFR BLD AUTO: 1 %
BUN SERPL-SCNC: 12 MG/DL (ref 9–20)
CALCIUM SPEC-MCNC: 9 MG/DL (ref 8.4–10.2)
CHLORIDE SERPL-SCNC: 98 MMOL/L (ref 98–107)
CO2 SERPL-SCNC: 37 MMOL/L (ref 22–30)
EOSINOPHIL # BLD AUTO: 0.4 K/UL (ref 0–0.7)
EOSINOPHIL NFR BLD AUTO: 6 %
ERYTHROCYTE [DISTWIDTH] IN BLOOD BY AUTOMATED COUNT: 5.58 M/UL (ref 4.3–5.9)
ERYTHROCYTE [DISTWIDTH] IN BLOOD: 14.4 % (ref 11.5–15.5)
GLUCOSE SERPL-MCNC: 125 MG/DL (ref 74–99)
HCT VFR BLD AUTO: 50.3 % (ref 39–53)
HGB BLD-MCNC: 15.6 GM/DL (ref 13–17.5)
INR PPP: 1 (ref ?–1.2)
LYMPHOCYTES # SPEC AUTO: 0.5 K/UL (ref 1–4.8)
LYMPHOCYTES NFR SPEC AUTO: 7 %
MAGNESIUM SPEC-SCNC: 2 MG/DL (ref 1.6–2.3)
MCH RBC QN AUTO: 27.9 PG (ref 25–35)
MCHC RBC AUTO-ENTMCNC: 31 G/DL (ref 31–37)
MCV RBC AUTO: 90.1 FL (ref 80–100)
MONOCYTES # BLD AUTO: 0.7 K/UL (ref 0–1)
MONOCYTES NFR BLD AUTO: 10 %
NEUTROPHILS # BLD AUTO: 5.2 K/UL (ref 1.3–7.7)
NEUTROPHILS NFR BLD AUTO: 73 %
NT-PROBNP SERPL-MCNC: 427 PG/ML
PLATELET # BLD AUTO: 200 K/UL (ref 150–450)
POTASSIUM SERPL-SCNC: 4.4 MMOL/L (ref 3.5–5.1)
PROT SERPL-MCNC: 6.4 G/DL (ref 6.3–8.2)
PT BLD: 11.3 SEC (ref 10–12.5)
SODIUM SERPL-SCNC: 139 MMOL/L (ref 137–145)
WBC # BLD AUTO: 7.2 K/UL (ref 3.8–10.6)

## 2024-05-01 PROCEDURE — 80053 COMPREHEN METABOLIC PANEL: CPT

## 2024-05-01 PROCEDURE — 85027 COMPLETE CBC AUTOMATED: CPT

## 2024-05-01 PROCEDURE — 80048 BASIC METABOLIC PNL TOTAL CA: CPT

## 2024-05-01 PROCEDURE — 31623 DX BRONCHOSCOPE/BRUSH: CPT

## 2024-05-01 PROCEDURE — 85610 PROTHROMBIN TIME: CPT

## 2024-05-01 PROCEDURE — 85025 COMPLETE CBC W/AUTO DIFF WBC: CPT

## 2024-05-01 PROCEDURE — 87102 FUNGUS ISOLATION CULTURE: CPT

## 2024-05-01 PROCEDURE — 36415 COLL VENOUS BLD VENIPUNCTURE: CPT

## 2024-05-01 PROCEDURE — 96361 HYDRATE IV INFUSION ADD-ON: CPT

## 2024-05-01 PROCEDURE — 96365 THER/PROPH/DIAG IV INF INIT: CPT

## 2024-05-01 PROCEDURE — 94640 AIRWAY INHALATION TREATMENT: CPT

## 2024-05-01 PROCEDURE — 88108 CYTOPATH CONCENTRATE TECH: CPT

## 2024-05-01 PROCEDURE — 87116 MYCOBACTERIA CULTURE: CPT

## 2024-05-01 PROCEDURE — 31624 DX BRONCHOSCOPE/LAVAGE: CPT

## 2024-05-01 PROCEDURE — 83880 ASSAY OF NATRIURETIC PEPTIDE: CPT

## 2024-05-01 PROCEDURE — 84484 ASSAY OF TROPONIN QUANT: CPT

## 2024-05-01 PROCEDURE — 83735 ASSAY OF MAGNESIUM: CPT

## 2024-05-01 PROCEDURE — 88305 TISSUE EXAM BY PATHOLOGIST: CPT

## 2024-05-01 PROCEDURE — 96375 TX/PRO/DX INJ NEW DRUG ADDON: CPT

## 2024-05-01 PROCEDURE — 87206 SMEAR FLUORESCENT/ACID STAI: CPT

## 2024-05-01 PROCEDURE — 71045 X-RAY EXAM CHEST 1 VIEW: CPT

## 2024-05-01 PROCEDURE — 93005 ELECTROCARDIOGRAM TRACING: CPT

## 2024-05-01 PROCEDURE — 83036 HEMOGLOBIN GLYCOSYLATED A1C: CPT

## 2024-05-01 PROCEDURE — 71046 X-RAY EXAM CHEST 2 VIEWS: CPT

## 2024-05-01 PROCEDURE — 71250 CT THORAX DX C-: CPT

## 2024-05-01 PROCEDURE — 99285 EMERGENCY DEPT VISIT HI MDM: CPT

## 2024-05-01 PROCEDURE — 87070 CULTURE OTHR SPECIMN AEROBIC: CPT

## 2024-05-01 PROCEDURE — 85730 THROMBOPLASTIN TIME PARTIAL: CPT

## 2024-05-01 PROCEDURE — 83605 ASSAY OF LACTIC ACID: CPT

## 2024-05-01 PROCEDURE — 87205 SMEAR GRAM STAIN: CPT

## 2024-05-01 PROCEDURE — 94760 N-INVAS EAR/PLS OXIMETRY 1: CPT

## 2024-05-01 PROCEDURE — 88104 CYTOPATH FL NONGYN SMEARS: CPT

## 2024-05-01 PROCEDURE — 96376 TX/PRO/DX INJ SAME DRUG ADON: CPT

## 2024-05-01 PROCEDURE — 31645 BRNCHSC W/THER ASPIR 1ST: CPT

## 2024-05-01 RX ADMIN — NICARDIPINE HYDROCHLORIDE STA MLS/HR: 2.5 INJECTION INTRAVENOUS at 12:15

## 2024-05-01 RX ADMIN — IPRATROPIUM BROMIDE AND ALBUTEROL SULFATE SCH ML: .5; 3 SOLUTION RESPIRATORY (INHALATION) at 15:29

## 2024-05-01 RX ADMIN — CEFAZOLIN ONE MLS/HR: 330 INJECTION, POWDER, FOR SOLUTION INTRAMUSCULAR; INTRAVENOUS at 21:21

## 2024-05-01 RX ADMIN — CEFAZOLIN SCH MLS/HR: 330 INJECTION, POWDER, FOR SOLUTION INTRAMUSCULAR; INTRAVENOUS at 21:27

## 2024-05-01 RX ADMIN — ATORVASTATIN CALCIUM SCH MG: 80 TABLET, FILM COATED ORAL at 20:14

## 2024-05-01 RX ADMIN — ISODIUM CHLORIDE STA MG: 0.03 SOLUTION RESPIRATORY (INHALATION) at 12:37

## 2024-05-01 RX ADMIN — AMOXICILLIN AND CLAVULANATE POTASSIUM SCH EACH: 875; 125 TABLET, FILM COATED ORAL at 20:14

## 2024-05-01 RX ADMIN — CEFAZOLIN ONE MLS/HR: 330 INJECTION, POWDER, FOR SOLUTION INTRAMUSCULAR; INTRAVENOUS at 13:19

## 2024-05-01 RX ADMIN — METHYLPREDNISOLONE SODIUM SUCCINATE SCH MG: 125 INJECTION, POWDER, FOR SOLUTION INTRAMUSCULAR; INTRAVENOUS at 17:41

## 2024-05-01 RX ADMIN — IPRATROPIUM BROMIDE STA MG: 0.5 SOLUTION RESPIRATORY (INHALATION) at 12:37

## 2024-05-01 RX ADMIN — BUDESONIDE SCH MG: 1 SUSPENSION RESPIRATORY (INHALATION) at 20:39

## 2024-05-01 RX ADMIN — Medication SCH MG: at 20:14

## 2024-05-01 RX ADMIN — METHYLPREDNISOLONE SODIUM SUCCINATE STA MG: 125 INJECTION, POWDER, FOR SOLUTION INTRAMUSCULAR; INTRAVENOUS at 12:15

## 2024-05-01 RX ADMIN — FORMOTEROL FUMARATE DIHYDRATE SCH MCG: 20 SOLUTION RESPIRATORY (INHALATION) at 20:39

## 2024-05-01 NOTE — P.HPIM
History of Present Illness


H&P Date: 24


Patient is a 79-year-old male with a history of COPD, recent COVID-19 pneumonia 

in 2024, chronic hypoxic respiratory failure on 3 L, chronic indwelling 

Jama, and coronary artery disease who presented to the hospital with complaints

of worsening shortness of breath.  On arrival to the emergency department the 

patient was tachycardic with a pulse of 102.  Laboratory analysis included CBC, 

coags, CMP, and troponin which were unremarkable for lactic acid of 3.1.  Chest 

x-ray demonstrated patchy right upper lobe infiltrate with possible adjacent 8 

mm nodule.  In the ER he was given Solu-Medrol, bronchodilators, and 

ceftriaxone.  Arrangements were made for admission. 





Patient seen and examined at bedside.  He reports that for the last 2 days he 

has had worsening sitting shortness of breath.  Worse with exertion better with 

rest.  It has been unresponsive to his albuterol inhaler and his nebulizer.  He 

has a chronic cough which is unchanged.  He denies any unusual chest discomfort.

 No fevers or chills.  He follows with Dr. Moses out of pulmonary.  He quit 

smoking in the .  He follows with Dr. Baldwin and has a chronic Jama 

catheter which has no problems or issues.  No other complaints currently.





Vital signs reviewed


General: nontoxic, no distress, appears at stated age


Derm: warm, dry


Eyes: EOMI, no lid lag, anicteric sclera, pupils equal round reactive to light


ENT: Nose and ears atraumatic


Cardiovascular: S1S2 reg, no murmur, 1+ edema b/l LE


Lungs: Diffuse wheeze bilateral, 3 word conversational dyspnea


Abdominal: soft, nontender to palpation, no guarding


Ext: no gross muscle atrophy, no contractures


Neuro: CN II-XII grossly intact, No focal neuro deficits


Psych: Alert, oriented, appropriate affect





Assessment/Plan: 


Acute exacerbation of COPD


Chronic hypoxic respiratory failure


Possible Pneumonia vs residual change form COVID


-Admit to general medical


-Solu-Medrol 40 mg IV every 8 hours


-DuoNeb 4 times daily scheduled and every 2 hours as needed


-Budesonide 1 mg twice daily, piriformis 20 mcg twice daily


-Augmentin 875 mg BID, monitor fever profile and recheck CXR in AM if infiltrate

improved would consider early stopping of ABX as patient does not report change 

in sputum/coough


-Consult pulmonary





Chronic systolic congestive heart failure with ejection fraction 40 to 45%


Hypertension


Dyslipidemia


Coronary artery disease


-Lisinopril 5 mg twice daily, Toprol 25 mg daily, Lasix 20 mg twice daily, 

Jardiance 10 mg daily, patient is not chronically on a mineralocorticoid


-Lipitor 80 mg








Chronic Jama catheter


Imaging: 


As per HPI


Data Review: 


As per HPI





The patient is admitted with an anticipated greater than 2 midnight stay for 

evaluation of acute exacerbation of COPD.


Surrogate decision-maker: Daughter


CODE STATUS: DO NOT RESUSCITATE


DVT prophylaxis: Lovenox


Anticipated discharge date: 24 to 48 hours


Anticipated discharge place: Home


This dictation was prepared using dragon medical voice recognition software. 

Though every attempt is made to correct errors during dictation some may still 

exist. 








Past Medical History


Past Medical History: Asthma, COPD, Hypertension, Myocardial Infarction (MI), 

Pneumonia, Prostate Disorder


Additional Past Medical History / Comment(s): allergies, "bladder does not 

empty" patient has chronic catheter.


Last Myocardial Infarction Date:: 2021


History of Any Multi-Drug Resistant Organisms: None Reported


Past Surgical History: Appendectomy, Back Surgery, Heart Catheterization With 

Stent, Tonsillectomy


Additional Past Surgical History / Comment(s): cervical, 3 heart stents


Past Anesthesia/Blood Transfusion Reactions: No Reported Reaction


Additional Past Anesthesia/Blood Transfusion Reaction / Comment(s): Pt is 

clausterphobic.


Date of Last Stent Placement:: 21


Past Psychological History: No Psychological Hx Reported


Smoking Status: Former smoker


Past Alcohol Use History: None Reported


Past Drug Use History: None Reported





- Past Family History


  ** Mother


Family Medical History: No Reported History


Additional Family Medical History / Comment(s): Mother was healthy and lived to 

be 92 yrs old.





  ** Father


History Unknown: Yes


Additional Family Medical History / Comment(s): Father  at the age of 68yrs,

pt unable to say from what.





Medications and Allergies


                                Home Medications











 Medication  Instructions  Recorded  Confirmed  Type


 


Albuterol Inhaler [Ventolin Hfa 2 puff INHALATION RT-QID PRN 21 

History





Inhaler]    


 


Albuterol Nebulized [Ventolin 2.5 mg INHALATION RT-Q4H PRN 21 

History





Nebulized]    


 


Cetirizine HCl [Zyrtec] 10 mg PO DAILY PRN 21 History


 


Fluticasone Nasal Spray [Flonase 1 spr EA NOSTRIL DAILY 21 

History





Nasal Spray]    


 


Lidocaine 5% Patch [Lidoderm 5% 1 patch TRANSDERM DAILY PRN 21 

History





Patch]    


 


Tamsulosin [Flomax] 0.4 mg PO DAILY #30 capsule 22 Rx


 


Atorvastatin [Lipitor] 80 mg PO HS 22 History


 


Folic Acid 1 mg PO DAILY 22 History


 


Thiamine [Vitamin B-1] 100 mg PO DAILY 22 History


 


lisinopriL [Zestril] 5 mg PO BID 22 History


 


Aspirin 81 mg PO DAILY 23 History


 


Furosemide [Lasix] 20 mg PO BID@0900,1300 23 History


 


Fluticasone Propion/Salmeterol 1 puff INHALATION RT-BID 06/10/23 05/01/24 

History





[Wixela 250-50 Inhub]    


 


Acetaminophen Tab [Tylenol] 1,000 mg PO Q6H PRN 23 History


 


Empagliflozin [Jardiance] 10 mg PO DAILY 23 History


 


Ketoconazole 2% Shampoo [Nizoral] 1 applic TOPICAL DAILY PRN 23 

History


 


Triamcinolone 0.1% Cream [Kenalog 1 applic TOPICAL DAILY PRN 23 

History





0.1% Cream]    


 


Nitroglycerin Sl Tabs [Nitrostat] 0.4 mg SUBLINGUAL Q5M PRN 24 

History


 


Budesonide-Formot 160-4.5 Mcg 2 puff INHALATION RT-BID #1 each 24

 Rx





[Symbicort 160-4.5 Mcg Inhaler]    


 


Cholecalciferol [Vitamin D3 (25 25 mcg PO DAILY 24 History





Mcg = 1000 Iu)]    


 


Metoprolol Succinate (ER) [Toprol 25 mg PO DAILY 24 History





XL]    








                                    Allergies











Allergy/AdvReac Type Severity Reaction Status Date / Time


 


Influenza Virus Vaccines Allergy  Anaphylaxis Verified 24 12:27


 


tomato Allergy  Anaphylaxis Verified 24 12:27





   /Hives  














Physical Exam


Osteopathic Statement: *.  No significant issues noted on an osteopathic 

structural exam other than those noted in the History and Physical/Consult.


Vitals: 


                                   Vital Signs











  Temp Pulse Resp BP Pulse Ox


 


 24 14:34   95  20  105/58  100


 


 24 13:01   90   


 


 24 12:58   91   


 


 24 12:45   91  20  


 


 24 12:38   88  20  


 


 24 11:08  98.1 F  102 H  20  114/65  95








                                Intake and Output











 24





 06:59 14:59 22:59


 


Other:   


 


  Weight  56.699 kg 














Results


CBC & Chem 7: 


                                 24 12:08





                                 24 12:08


Labs: 


                  Abnormal Lab Results - Last 24 Hours (Table)











  24 Range/Units





  12:08 12:08 12:08 


 


Lymphocytes #  0.5 L    (1.0-4.8)  k/uL


 


Carbon Dioxide   37 H   (22-30)  mmol/L


 


Creatinine   0.62 L   (0.66-1.25)  mg/dL


 


Glucose   125 H   (74-99)  mg/dL


 


Plasma Lactic Acid Td    3.1 H*  (0.7-2.0)  mmol/L

## 2024-05-01 NOTE — ED
General Adult HPI





- General


Chief complaint: Shortness of Breath


Stated complaint: SOB


Time Seen by Provider: 24 11:10


Source: patient, RN notes reviewed, old records reviewed


Mode of arrival: wheelchair


Limitations: no limitations





- History of Present Illness


Initial comments: 





This is an 79-year-old male who presents to the emergency department complaining

of shortness of breath over the last couple of days.  Patient states he has a 

history of 3 stent placements and this COPD.  Patient denies any fever or 

chills.  Patient has any chest pain or palpitations today.  Patient denies any 

significant cough.  Patient states he just feels more short of breath and his 

home nurse told him yesterday he was wheezing significantly.  Patient denies any

abdominal pain patient has any nausea vomiting.





- Related Data


                                Home Medications











 Medication  Instructions  Recorded  Confirmed


 


Albuterol Inhaler [Ventolin Hfa 2 puff INHALATION RT-QID PRN 21





Inhaler]   


 


Albuterol Nebulized [Ventolin 2.5 mg INHALATION RT-Q4H PRN 21





Nebulized]   


 


Cetirizine HCl [Zyrtec] 10 mg PO DAILY PRN 21


 


Fluticasone Nasal Spray [Flonase 1 spr EA NOSTRIL DAILY 21





Nasal Spray]   


 


Lidocaine 5% Patch [Lidoderm 5% 1 patch TRANSDERM DAILY PRN 21





Patch]   


 


Atorvastatin [Lipitor] 80 mg PO HS 22


 


Folic Acid 1 mg PO DAILY 22


 


Thiamine [Vitamin B-1] 100 mg PO DAILY 22


 


lisinopriL [Zestril] 5 mg PO BID 22


 


Aspirin 81 mg PO DAILY 23


 


Furosemide [Lasix] 20 mg PO BID@0900,1300 23


 


Fluticasone Propion/Salmeterol 1 puff INHALATION RT-BID 06/10/23 05/01/24





[Wixela 250-50 Inhub]   


 


Acetaminophen Tab [Tylenol] 1,000 mg PO Q6H PRN 23


 


Empagliflozin [Jardiance] 10 mg PO DAILY 23


 


Ketoconazole 2% Shampoo [Nizoral] 1 applic TOPICAL DAILY PRN 23


 


Triamcinolone 0.1% Cream [Kenalog 1 applic TOPICAL DAILY PRN 23





0.1% Cream]   


 


Nitroglycerin Sl Tabs [Nitrostat] 0.4 mg SUBLINGUAL Q5M PRN 24


 


Cholecalciferol [Vitamin D3 (25 25 mcg PO DAILY 24





Mcg = 1000 Iu)]   


 


Metoprolol Succinate (ER) [Toprol 25 mg PO DAILY 24





XL]   








                                  Previous Rx's











 Medication  Instructions  Recorded


 


Tamsulosin [Flomax] 0.4 mg PO DAILY #30 capsule 22


 


Budesonide-Formot 160-4.5 Mcg 2 puff INHALATION RT-BID #1 each 24





[Symbicort 160-4.5 Mcg Inhaler]  











                                    Allergies











Allergy/AdvReac Type Severity Reaction Status Date / Time


 


Influenza Virus Vaccines Allergy  Anaphylaxis Verified 24 12:27


 


tomato Allergy  Anaphylaxis Verified 24 12:27





   /Hives  














Review of Systems


ROS Statement: 


Those systems with pertinent positive or pertinent negative responses have been 

documented in the HPI.





ROS Other: All systems not noted in ROS Statement are negative.





Past Medical History


Past Medical History: Asthma, COPD, Hypertension, Myocardial Infarction (MI), 

Pneumonia, Prostate Disorder


Additional Past Medical History / Comment(s): allergies, "bladder does not 

empty" patient has chronic catheter.


Last Myocardial Infarction Date:: 2021


History of Any Multi-Drug Resistant Organisms: None Reported


Past Surgical History: Appendectomy, Back Surgery, Heart Catheterization With 

Stent, Tonsillectomy


Additional Past Surgical History / Comment(s): cervical, 3 heart stents


Past Anesthesia/Blood Transfusion Reactions: No Reported Reaction


Additional Past Anesthesia/Blood Transfusion Reaction / Comment(s): Pt is claust

erphobic.


Date of Last Stent Placement:: 21


Past Psychological History: No Psychological Hx Reported


Smoking Status: Former smoker


Past Alcohol Use History: None Reported


Past Drug Use History: None Reported





- Past Family History


  ** Mother


Family Medical History: No Reported History


Additional Family Medical History / Comment(s): Mother was healthy and lived to 

be 92 yrs old.





  ** Father


History Unknown: Yes


Additional Family Medical History / Comment(s): Father  at the age of 68yrs,

pt unable to say from what.





General Exam





- General Exam Comments


Initial Comments: 





GENERAL:


Patient is well-developed and well-nourished.  Patient is nontoxic and well-

hydrated and is in mild distress.





ENT:


Neck is soft and supple.  No significant lymphadenopathy is noted.  Oropharynx 

is clear.  Moist mucous membranes.  Neck has full range of motion without 

eliciting any pain.  





EYES:


The sclera were anicteric and conjunctiva were pink and moist.  Extraocular 

movements were intact and pupils were equal round and reactive to light.  

Eyelids were unremarkable.





PULMONARY:


Patient has expiratory wheezing





CARDIOVASCULAR:


There is a regular rate and rhythm without any murmurs gallops or rubs.  





ABDOMEN:


Soft and nontender with normal bowel sounds. 





SKIN:


Skin is clear with no lesions or rashes and otherwise unremarkable.





NEUROLOGIC:


Patient is alert and oriented x3.  Cranial nerves II through XII are grossly 

intact.  Motor and sensory are also intact.  Normal speech, volume and content. 

Symmetrical smile.





MUSCULOSKELETAL:


Normal extremities with adequate strength and full range of motion.  No lower 

extremity swelling or edema.  No calf tenderness.





LYMPHATICS:


No significant lymphadenopathy is noted





PSYCHIATRIC:


Normal psychiatric evaluation. 


Limitations: no limitations





Course


                                   Vital Signs











  24





  11:08 12:38 12:45


 


Temperature 98.1 F  


 


Pulse Rate 102 H 88 91


 


Respiratory 20 20 20





Rate   


 


Blood Pressure 114/65  


 


O2 Sat by Pulse 95  





Oximetry   














  24





  12:58 13:01


 


Temperature  


 


Pulse Rate 91 90


 


Respiratory  





Rate  


 


Blood Pressure  


 


O2 Sat by Pulse  





Oximetry  














Medical Decision Making





- Medical Decision Making





EKG is interpreted by myself.  EKG shows a sinus tachycardia at 106 bpm parables

154 QRS is 88 QT interval 303 QTc is 365.  Patient's EKG shows no ST segment 

ovation or depression.





Was pt. sent in by a medical professional or institution (, PA, NP, urgent 

care, hospital, or nursing home...) When possible be specific


@ -No


Did you speak to anyone other than the patient for history (EMS, parent, family,

police, friend...)? What history was obtained from this source 


@ -No


Did you review nursing and triage notes (agree or disagree)? Why? 


@ -I reviewed and agree with nursing and triage notes


Were old charts reviewed (outside hosp., previous admission, EMS record, old 

EKG, old radiological studies, urgent care reports/EKG's, nursing home records)?

Report findings 


@ -No old charts were reviewed


Differential Diagnosis (chest pain, altered mental status, abdominal pain women,

abdominal pain men, vaginal bleeding, weakness, fever, dyspnea, syncope, 

headache, dizziness, GI bleed, back pain, seizure, CVA, palpatations, mental 

health, musculoskeletal)? 


@ -Differential Dyspnea:


Coronary syndrome, arrhythmia, tamponade, asthma, COPD, pulmonary embolism, 

pneumonia, pneumothorax, pulmonary effusion, anaphylaxis, diabetic ketoacidosis,

flailed chest, pulmonary contusion, diaphragmatic rupture, anemia, 

neuromuscular, this is not meant to be an all-inclusive list. 





EKG interpreted by me (3pts min.).


@ -As above


X-rays interpreted by me (1pt min.).


@ -Chest x-ray shows possible right upper lobe pneumonia


CT interpreted by me (1pt min.).


@ -None done


U/S interpreted by me (1pt. min.).


@ -None done


What testing was considered but not performed or refused? (CT, X-rays, U/S, 

labs)? Why?


@ -None


What meds were considered but not given or refused? Why?


@ -None


Did you discuss the management of the patient with other professionals (pr

ofessionals i.e. , PA, NP, lab, RT, psych nurse, , , 

teacher, , )? Give summary


@ -I spoke with Dr. Carlson she agreed to admit the patient admit the patient 

wrote admitting orders


Was smoking cessation discussed for >3mins.?


@ -No


Was critical care preformed (if so, how long)?


@ -No


Were there social determinants of health that impacted care today? How? 

(Homelessness, low income, unemployed, alcoholism, drug addiction, 

transportation, low edu. Level, literacy, decrease access to med. care, long-term, 

rehab)?


@ -No


Was there de-escalation of care discussed even if they declined (Discuss DNR or 

withdrawal of care, Hospice)? DNR status


@ -No


What co-morbidities impacted this encounter? (DM, HTN, Smoking, COPD, CAD, 

Cancer, CVA, ARF, Chemo, Hep., AIDS, mental health diagnosis, sleep apnea, 

morbid obesity)?


@ -None


Was patient admitted / discharged? Hospital course, mention meds given and 

route, prescriptions, significant lab abnormalities, going to OR and other 

pertinent info.


@ -Patient has expiratory wheezing on arrival was given breathing treatment and 

steroids as well as antibiotics.  Patient was reevaluated by myself he was 

feeling considerably better but still not good enough to be discharged.  I spoke

with Dr. Carlson she agreed admit the patient admit the patient wrote admitting 

orders


Undiagnosed new problem with uncertain prognosis?


@ -No


Drug Therapy requiring intensive monitoring for toxicity (Heparin, Nitro, 

Insulin, Cardizem)?


@ -No


Were any procedures done?


@ -No


Diagnosis/symptom?


@ -Exacerbation of COPD


Acute, or Chronic, or Acute on Chronic?


@ -Acute


Uncomplicated (without systemic symptoms) or Complicated (systemic symptoms)?


@ -Complicated


Side effects of treatment?


@ -No


Exacerbation, Progression, or Severe Exacerbation?


@ -Severe exacerbation


Poses a threat to life or bodily function? How? (Chest pain, USA, MI, pneumonia,

PE, COPD, DKA, ARF, appy, cholecystitis, CVA, Diverticulitis, Homicidal, 

Suicidal, threat to staff... and all critical care pts)


@ -Yes this can lead to hypoxia and then end organ dysfunction





- Lab Data


Result diagrams: 


                                 24 12:08





                                 24 12:08


                                   Lab Results











  24 Range/Units





  12:08 12:08 12:08 


 


WBC  7.2    (3.8-10.6)  k/uL


 


RBC  5.58    (4.30-5.90)  m/uL


 


Hgb  15.6    (13.0-17.5)  gm/dL


 


Hct  50.3    (39.0-53.0)  %


 


MCV  90.1    (80.0-100.0)  fL


 


MCH  27.9    (25.0-35.0)  pg


 


MCHC  31.0    (31.0-37.0)  g/dL


 


RDW  14.4    (11.5-15.5)  %


 


Plt Count  200    (150-450)  k/uL


 


MPV  8.8    


 


Neutrophils %  73    %


 


Lymphocytes %  7    %


 


Monocytes %  10    %


 


Eosinophils %  6    %


 


Basophils %  1    %


 


Neutrophils #  5.2    (1.3-7.7)  k/uL


 


Lymphocytes #  0.5 L    (1.0-4.8)  k/uL


 


Monocytes #  0.7    (0-1.0)  k/uL


 


Eosinophils #  0.4    (0-0.7)  k/uL


 


Basophils #  0.1    (0-0.2)  k/uL


 


PT   11.3   (10.0-12.5)  sec


 


INR   1.0   (<1.2)  


 


APTT   23.5   (22.0-30.0)  sec


 


Sodium    139  (137-145)  mmol/L


 


Potassium    4.4  (3.5-5.1)  mmol/L


 


Chloride    98  ()  mmol/L


 


Carbon Dioxide    37 H  (22-30)  mmol/L


 


Anion Gap    4  mmol/L


 


BUN    12  (9-20)  mg/dL


 


Creatinine    0.62 L  (0.66-1.25)  mg/dL


 


Est GFR (CKD-EPI)AfAm    >90  (>60 ml/min/1.73 sqM)  


 


Est GFR (CKD-EPI)NonAf    >90  (>60 ml/min/1.73 sqM)  


 


Glucose    125 H  (74-99)  mg/dL


 


Plasma Lactic Acid Td     (0.7-2.0)  mmol/L


 


Calcium    9.0  (8.4-10.2)  mg/dL


 


Magnesium    2.0  (1.6-2.3)  mg/dL


 


Total Bilirubin    1.3  (0.2-1.3)  mg/dL


 


AST    42  (17-59)  U/L


 


ALT    29  (4-49)  U/L


 


Alkaline Phosphatase    77  ()  U/L


 


Troponin I     (0.000-0.034)  ng/mL


 


NT-Pro-B Natriuret Pep    427  pg/mL


 


Total Protein    6.4  (6.3-8.2)  g/dL


 


Albumin    3.6  (3.5-5.0)  g/dL














  24 Range/Units





  12:08 12:08 


 


WBC    (3.8-10.6)  k/uL


 


RBC    (4.30-5.90)  m/uL


 


Hgb    (13.0-17.5)  gm/dL


 


Hct    (39.0-53.0)  %


 


MCV    (80.0-100.0)  fL


 


MCH    (25.0-35.0)  pg


 


MCHC    (31.0-37.0)  g/dL


 


RDW    (11.5-15.5)  %


 


Plt Count    (150-450)  k/uL


 


MPV    


 


Neutrophils %    %


 


Lymphocytes %    %


 


Monocytes %    %


 


Eosinophils %    %


 


Basophils %    %


 


Neutrophils #    (1.3-7.7)  k/uL


 


Lymphocytes #    (1.0-4.8)  k/uL


 


Monocytes #    (0-1.0)  k/uL


 


Eosinophils #    (0-0.7)  k/uL


 


Basophils #    (0-0.2)  k/uL


 


PT    (10.0-12.5)  sec


 


INR    (<1.2)  


 


APTT    (22.0-30.0)  sec


 


Sodium    (137-145)  mmol/L


 


Potassium    (3.5-5.1)  mmol/L


 


Chloride    ()  mmol/L


 


Carbon Dioxide    (22-30)  mmol/L


 


Anion Gap    mmol/L


 


BUN    (9-20)  mg/dL


 


Creatinine    (0.66-1.25)  mg/dL


 


Est GFR (CKD-EPI)AfAm    (>60 ml/min/1.73 sqM)  


 


Est GFR (CKD-EPI)NonAf    (>60 ml/min/1.73 sqM)  


 


Glucose    (74-99)  mg/dL


 


Plasma Lactic Acid Td  3.1 H*   (0.7-2.0)  mmol/L


 


Calcium    (8.4-10.2)  mg/dL


 


Magnesium    (1.6-2.3)  mg/dL


 


Total Bilirubin    (0.2-1.3)  mg/dL


 


AST    (17-59)  U/L


 


ALT    (4-49)  U/L


 


Alkaline Phosphatase    ()  U/L


 


Troponin I   <0.012  (0.000-0.034)  ng/mL


 


NT-Pro-B Natriuret Pep    pg/mL


 


Total Protein    (6.3-8.2)  g/dL


 


Albumin    (3.5-5.0)  g/dL














Disposition


Clinical Impression: 


 COPD exacerbation





Disposition: ADMITTED AS IP TO THIS HOSP


Referrals: 


[facility],Clinic [Primary Care Provider] - 1-2 days


Time of Disposition: 13:38

## 2024-05-02 LAB
ALBUMIN SERPL-MCNC: 3.6 G/DL (ref 3.8–4.9)
ALBUMIN/GLOB SERPL: 1.57 RATIO (ref 1.6–3.17)
ALP SERPL-CCNC: 89 U/L (ref 41–126)
ALT SERPL-CCNC: 33 U/L (ref 10–49)
ANION GAP SERPL CALC-SCNC: 7.9 MMOL/L (ref 4–12)
AST SERPL-CCNC: 42 U/L (ref 14–35)
BUN SERPL-SCNC: 13.4 MG/DL (ref 9–27)
BUN/CREAT SERPL: 22.33 RATIO (ref 12–20)
CALCIUM SPEC-MCNC: 8.9 MG/DL (ref 8.7–10.3)
CHLORIDE SERPL-SCNC: 103 MMOL/L (ref 96–109)
CO2 SERPL-SCNC: 30.1 MMOL/L (ref 21.6–31.8)
ERYTHROCYTE [DISTWIDTH] IN BLOOD BY AUTOMATED COUNT: 5.48 X 10*6/UL (ref 4.4–5.6)
ERYTHROCYTE [DISTWIDTH] IN BLOOD: 15 % (ref 11.5–14.5)
GLOBULIN SER CALC-MCNC: 2.3 G/DL (ref 1.6–3.3)
GLUCOSE BLD-MCNC: 136 MG/DL (ref 70–110)
GLUCOSE BLD-MCNC: 167 MG/DL (ref 70–110)
GLUCOSE BLD-MCNC: 192 MG/DL (ref 70–110)
GLUCOSE SERPL-MCNC: 178 MG/DL (ref 70–110)
HCT VFR BLD AUTO: 48.4 % (ref 39.6–50)
HGB BLD-MCNC: 15 G/DL (ref 13–17)
MCH RBC QN AUTO: 27.4 PG (ref 27–32)
MCHC RBC AUTO-ENTMCNC: 31 G/DL (ref 32–37)
MCV RBC AUTO: 88.3 FL (ref 80–97)
NRBC BLD AUTO-RTO: 0 X 10*3/UL (ref 0–0.01)
PLATELET # BLD AUTO: 190 X 10*3/UL (ref 140–440)
POTASSIUM SERPL-SCNC: 4.5 MMOL/L (ref 3.5–5.5)
PROT SERPL-MCNC: 5.9 G/DL (ref 6.2–8.2)
SODIUM SERPL-SCNC: 141 MMOL/L (ref 135–145)
WBC # BLD AUTO: 5.36 X 10*3/UL (ref 4.5–10)

## 2024-05-02 RX ADMIN — TAMSULOSIN HYDROCHLORIDE SCH MG: 0.4 CAPSULE ORAL at 10:38

## 2024-05-02 RX ADMIN — DAPAGLIFLOZIN SCH MG: 10 TABLET, FILM COATED ORAL at 10:38

## 2024-05-02 RX ADMIN — ENOXAPARIN SODIUM SCH MG: 40 INJECTION SUBCUTANEOUS at 10:37

## 2024-05-02 RX ADMIN — ASPIRIN 81 MG CHEWABLE TABLET SCH MG: 81 TABLET CHEWABLE at 10:37

## 2024-05-02 RX ADMIN — FUROSEMIDE SCH MG: 20 TABLET ORAL at 10:37

## 2024-05-02 RX ADMIN — METOPROLOL SUCCINATE SCH MG: 25 TABLET, EXTENDED RELEASE ORAL at 10:38

## 2024-05-02 RX ADMIN — FLUTICASONE PROPIONATE SCH SPRAY: 50 SPRAY, METERED NASAL at 10:38

## 2024-05-02 NOTE — P.CNPUL
History of Present Illness


Consult date: 24


Reason for consult: dyspnea, cough, COPD, hypoxemia


Chief complaint: Shortness of breath


History of present illness: 


Patient is a pleasant 79-year-old from my office he has end-stage COPD with 

oxygen dependent and nebulizer dependent and intermittently has been on 

steroids.  He is on chronic oxygen 3 L nasal cannula, he has a history of 

coronary artery disease, prior acute COPD exacerbation several weeks ago 

requiring hospitalization.  Came into the hospital with 2 to 3-day history of 

increasing shortness of breath and cough which is light yellow sputum 

production, chest x-ray right upper lobe consolidation and small pleural 

effusion suggesting underlying pneumonia along with 8 mm nodule in the right 

upper lobe.  Currently patient is on bronchodilator along with oral antibiotics 

IV steroids and bronchodilators and continuation of home medication lactic acid 

is 2.4 came down to 1.7, white cell count 5.3








Review of Systems


All systems: negative





Past Medical History


Past Medical History: Asthma, COPD, Hypertension, Myocardial Infarction (MI), 

Pneumonia, Prostate Disorder


Additional Past Medical History / Comment(s): allergies, "bladder does not 

empty" patient has chronic catheter.


Last Myocardial Infarction Date:: 2021


History of Any Multi-Drug Resistant Organisms: None Reported


Past Surgical History: Appendectomy, Back Surgery, Heart Catheterization With 

Stent, Tonsillectomy


Additional Past Surgical History / Comment(s): cervical, 3 heart stents


Past Anesthesia/Blood Transfusion Reactions: No Reported Reaction


Additional Past Anesthesia/Blood Transfusion Reaction / Comment(s): Pt is 

clausterphobic.


Date of Last Stent Placement:: 21


Past Psychological History: No Psychological Hx Reported


Additional Psychological History / Comment(s): Pt resides with his daughter and 

her spouse and her children.  Pt uses a cane  or walker to ambulate. He does not

drive, but states getting rides is not a problem for him.  He is otherwise 

independent.  Pt served in the [employer].


Smoking Status: Former smoker


Past Alcohol Use History: None Reported


Additional Past Alcohol Use History / Comment(s): Pt started smoking in  and

quit in .


Past Drug Use History: None Reported





- Past Family History


  ** Mother


Family Medical History: No Reported History


Additional Family Medical History / Comment(s): Mother was healthy and lived to 

be 92 yrs old.





  ** Father


History Unknown: Yes


Additional Family Medical History / Comment(s): Father  at the age of 68yrs,

pt unable to say from what.





Medications and Allergies


                                Home Medications











 Medication  Instructions  Recorded  Confirmed  Type


 


Albuterol Inhaler [Ventolin Hfa 2 puff INHALATION RT-QID PRN 21 

History





Inhaler]    


 


Albuterol Nebulized [Ventolin 2.5 mg INHALATION RT-Q4H PRN 21 

History





Nebulized]    


 


Cetirizine HCl [Zyrtec] 10 mg PO DAILY PRN 21 History


 


Fluticasone Nasal Spray [Flonase 1 spr EA NOSTRIL DAILY 21 

History





Nasal Spray]    


 


Lidocaine 5% Patch [Lidoderm 5% 1 patch TRANSDERM DAILY PRN 21 

History





Patch]    


 


Tamsulosin [Flomax] 0.4 mg PO DAILY #30 capsule 22 Rx


 


Atorvastatin [Lipitor] 80 mg PO HS 22 History


 


Folic Acid 1 mg PO DAILY 22 History


 


Thiamine [Vitamin B-1] 100 mg PO DAILY 22 History


 


lisinopriL [Zestril] 5 mg PO BID 22 History


 


Aspirin 81 mg PO DAILY 23 History


 


Furosemide [Lasix] 20 mg PO BID@0900,1300 23 History


 


Fluticasone Propion/Salmeterol 1 puff INHALATION RT-BID 06/10/23 05/01/24 H

istory





[Wixela 250-50 Inhub]    


 


Acetaminophen Tab [Tylenol] 1,000 mg PO Q6H PRN 23 History


 


Empagliflozin [Jardiance] 10 mg PO DAILY 23 History


 


Ketoconazole 2% Shampoo [Nizoral] 1 applic TOPICAL DAILY PRN 23 

History


 


Triamcinolone 0.1% Cream [Kenalog 1 applic TOPICAL DAILY PRN 23 

History





0.1% Cream]    


 


Nitroglycerin Sl Tabs [Nitrostat] 0.4 mg SUBLINGUAL Q5M PRN 24 

History


 


Budesonide-Formot 160-4.5 Mcg 2 puff INHALATION RT-BID #1 each 24

Rx





[Symbicort 160-4.5 Mcg Inhaler]    


 


Cholecalciferol [Vitamin D3 (25 25 mcg PO DAILY 24 History





Mcg = 1000 Iu)]    


 


Metoprolol Succinate (ER) [Toprol 25 mg PO DAILY 24 History





XL]    








                                    Allergies











Allergy/AdvReac Type Severity Reaction Status Date / Time


 


Influenza Virus Vaccines Allergy  Anaphylaxis Verified 24 12:27


 


tomato Allergy  Anaphylaxis Verified 24 12:27





   /Hives  














Physical Exam


Vitals: 


                                   Vital Signs











  Temp Pulse Pulse Resp BP BP Pulse Ox


 


 24 09:13   88   18   


 


 24 09:01   84   18   


 


 24 08:50   84   18    93 L


 


 24 07:00  97.5 F L   86  18   128/66  92 L


 


 24 05:16   79   16  123/78   95


 


 24 03:31   83   16  123/68   96


 


 24 02:21   85   16  134/78   97


 


 24 00:12   87   16  122/73   95


 


 24 23:15   91   20  114/68   96


 


 24 22:36   98   16  117/68   95


 


 24 22:05   89   18  106/63   96


 


 24 20:59   97     


 


 24 20:50   97     


 


 24 20:49   96     


 


 24 20:39   86     


 


 24 20:13   90   18  115/62   96


 


 24 15:48   90     


 


 24 15:32   96      96


 


 24 14:34   95   20  105/58   100


 


 24 13:01   90     


 


 24 12:58   91     


 


 24 12:45   91   20   


 


 24 12:38   88   20   








                                Intake and Output











 24





 22:59 06:59 14:59


 


Intake Total   118


 


Output Total  1525 1180


 


Balance  -1527 -1062


 


Intake:   


 


  Oral   118


 


Output:   


 


  Urine  1525 1180


 


Other:   


 


  Voiding Method   Indwelling Catheter


 


  Weight   56.699 kg














- Constitutional


General appearance: average body habitus, disheveled, mild distress





- EENT


Eyes: EOMI, PERRLA


ENT: normal oropharynx


Ears: bilateral: normal





- Neck


Neck: normal ROM


Carotids: bilateral: upstroke normal


Thyroid: bilateral: normal size





- Respiratory


Respiratory: bilateral: diminished, wheezing





- Cardiovascular


Heart sounds: normal: S1, S2





- Gastrointestinal


General gastrointestinal: normal bowel sounds, soft





- Integumentary


Integumentary: normal, normal turgor





- Neurologic


Neurologic: CNII-XII intact





- Musculoskeletal


Musculoskeletal: gait normal, generalized weakness, strength equal bilaterally





- Psychiatric


Psychiatric: A&O x's 3, appropriate affect, intact judgment & insight





Results





- Laboratory Findings


CBC and BMP: 


                                 24 05:07





                                 24 05:07


PT/INR, D-dimer











PT  11.3 sec (10.0-12.5)   24  12:08    


 


INR  1.0  (<1.2)   24  12:08    








Abnormal lab findings: 


                                  Abnormal Labs











  24





  12:08 12:08 12:08


 


MCHC   


 


RDW   


 


Lymphocytes #  0.5 L  


 


Carbon Dioxide   37 H 


 


Creatinine   0.62 L 


 


BUN/Creatinine Ratio   


 


Glucose   125 H 


 


Plasma Lactic Acid Td    3.1 H*


 


AST   


 


Total Protein   


 


Albumin   


 


Albumin/Globulin Ratio   














  24





  14:56 18:55 21:42


 


MCHC   


 


RDW   


 


Lymphocytes #   


 


Carbon Dioxide   


 


Creatinine   


 


BUN/Creatinine Ratio   


 


Glucose   


 


Plasma Lactic Acid Td  2.2 H*  4.5 H*  2.4 H*


 


AST   


 


Total Protein   


 


Albumin   


 


Albumin/Globulin Ratio   














  24





  05:07 05:07


 


MCHC  31.0 L 


 


RDW  15.0 H 


 


Lymphocytes #  


 


Carbon Dioxide  


 


Creatinine  


 


BUN/Creatinine Ratio   22.33 H


 


Glucose   178 H


 


Plasma Lactic Acid Td  


 


AST   42 H


 


Total Protein   5.9 L


 


Albumin   3.6 L


 


Albumin/Globulin Ratio   1.57 L














- Diagnostic Findings


Chest x-ray: report reviewed, image reviewed (Finding as above)





Assessment and Plan


Assessment: 


Right upper lobe pneumonia





Acute on chronic hypoxic respiratory failure





Acute COPD exacerbation





Right-sided lung nodule





Hypertension hypertensive cardiovascular disease





Coronary artery disease and ischemic cardiomyopathy related to 











Plan: 


Continue antibiotics and bronchodilators and IV steroids





Will order CT scan of the chest without IV contrast to assess right upper lobe 

pneumonia process and nodule








Time with Patient: Greater than 30

## 2024-05-02 NOTE — CT
EXAMINATION TYPE: CT chest wo con

CT DLP: 468 mGycm, Automated exposure control for dose reduction was used.

 

DATE OF EXAM: 5/2/2024 12:28 PM

 

COMPARISON: Chest radiograph same day. CT chest 3/7/2024

 

CLINICAL INDICATION:Male, 79 years old with history of rule mass; PHH, chest mass

 

TECHNIQUE: Multiple axial images were obtained through the chest. Sagittal and coronal reformats were
 created for review.

Contrast used: mL of (None if empty)

Oral contrast used: (None if empty)

 

FINDINGS: 

LUNGS/ PLEURA: Consolidation changes within the right lung with calcified granuloma correlating with 
pulmonary nodule seen on same day radiographs.

AIRWAY: Occlusion of the right middle lobe bronchus series 3 image 27. Additional area of narrowing o
f the bronchus of the right upper lobe series 3 image 22. Postobstructive atelectasis of the supplied
 regions of the lung.

HEART: Size within normal limits. Moderate coronary artery calcifications.

MEDIASTINUM: No gross evidence of adenopathy. Scattered partially calcified lymph nodes throughout th
e mediastinum.

VASCULATURE:  No aortic aneurysm. 

MUSCULOSKELETAL: No acute osseous abnormalities, partially visualized cervical fixation hardware is i
ntact.

SOFT TISSUES/LYMPH NODES: Unremarkable.

LOWER NECK: No significant findings.

UPPER ABDOMEN: Calcified splenic granulomas.

 

IMPRESSION:

1.  Calcified granuloma within the right midlung correlate with same day radiograph finding.

2.  Occlusion of the right middle lobe bronchus with suspected post obstructive Atelectasis changes o
f the right middle lobe and in focal narrowing of the right right upper lobe bronchus. The right uppe
r lung narrowing was present on prior on 3/7/2024. Right middle lobe consolidation was not demonstrat
ed on prior. Findings within the right middle lobe favor postobstructive atelectasis from mucous plug
ging well the right upper lobe demonstrate narrowing on prior exam. Bronchoscopy should be considered
.

 

Follow up recommendations for incidental pulmonary nodules, if there are any, are per Fleischner?s Am
[employer] or American College of [employer]. 

https://radiopaedia.org/articles/fleischner-society-pulmonary-nodule-recommendations-1?lang=us

## 2024-05-02 NOTE — P.PN
Subjective


Progress Note Date: 05/02/24





Patient is a 79-year-old male with a history of COPD, recent COVID-19 pneumonia 

in March 2024, chronic hypoxic respiratory failure on 3 L, chronic indwelling 

Jama, and coronary artery disease who presented to the hospital with complaints

of worsening shortness of breath.  On arrival to the emergency department the 

patient was tachycardic with a pulse of 102.  Laboratory analysis included CBC, 

coags, CMP, and troponin which were unremarkable for lactic acid of 3.1.  Chest 

x-ray demonstrated patchy right upper lobe infiltrate with possible adjacent 8 

mm nodule.  In the ER he was given Solu-Medrol, bronchodilators, and 

ceftriaxone.  Arrangements were made for admission. 





Started on bronchodilators, SoluMedrol and Augmentin. 





5/2 Patient was seen and examined. Currently on 7L NC. Feeling short of breath. 

Pulmonary consulted, CT chest ordered. CBC MCHC 31 RDW 15. CMP BUN/Cr 22.33, glu

178, AST 42, alb 3.6.





General: Nontoxic, no distress, appears at stated age


Derm: Warm, dry


Head: Atraumatic, normocephalic, symmetric


Eyes: EOMI, no lid lag, anicteric sclera


Mouth: No lip lesion, mucus membranes moist


Cardiovascular: S1S2 reg, systolic murmur


Lungs: Expiratory wheezing bilateral, no rhonchi, no rales, no accessory muscle 

use, supplemental oxygen


Ext: No gross muscle atrophy, no edema, no contractures


Neuro: no focal neuro deficits


Psych: Alert, oriented, appropriate affect





Acute exacerbation of COPD: DuoNeb QID and Q2H PRN for SOB/wheezing. Pulmicort 1

mg INH BID. Performist 20 mcg INH BID. Mucinex 600 mg PO BID. SoluMedrol 60 mg 

IV Q6H. Pulmonary following.


Acute on chronic hypoxic respiratory failure


Possible Pneumonia vs residual change form COVID: Augmentin 1 tab PO BID.


Chronic systolic congestive heart failure with ejection fraction 40 to 45%: 

Metoprolol 25 mg PO QD. Lisinopril 5 mg PO BID. Lasix 20 mg PO BID. Farxiga 5 mg

PO QD.


Hypertension


Dyslipidemia


Coronary artery disease: ASA 81 mg PO QD. Lipitor 80 mg PO QHS.





CODE STATUS: FULL CODE


DVT Prophylaxis: Lovenox SQ


GI Prophylaxis:


Designated medical POA if patient is not able to make medical decisions for 

themselves:





I have reviewed the following consultant notes: Pulmonary


I have reviewed the results of the following tests: CBC, CMP.


I have ordered the following tests:


I have discussed the care of this patient with the following independent 

historian:


I have independently interpreted the following test below:


I have discussed the management of this patient with the following physician:





Objective





- Vital Signs


Vital signs: 


                                   Vital Signs











Temp  98 F   05/02/24 14:04


 


Pulse  105 H  05/02/24 14:04


 


Resp  18   05/02/24 14:04


 


BP  123/64   05/02/24 14:04


 


Pulse Ox  94 L  05/02/24 14:04


 


FiO2      








                                 Intake & Output











 05/01/24 05/02/24 05/02/24





 18:59 06:59 18:59


 


Intake Total   118


 


Output Total  1525 1430


 


Balance  -1525 -1312


 


Weight 56.699 kg  56.699 kg


 


Intake:   


 


  Oral   118


 


Output:   


 


  Urine  1525 1430


 


Other:   


 


  Voiding Method   Indwelling Catheter














- Labs


CBC & Chem 7: 


                                 05/02/24 05:07





                                 05/02/24 05:07


Labs: 


                  Abnormal Lab Results - Last 24 Hours (Table)











  05/01/24 05/01/24 05/02/24 Range/Units





  18:55 21:42 05:07 


 


MCHC    31.0 L  (32.0-37.0)  g/dL


 


RDW    15.0 H  (11.5-14.5)  %


 


BUN/Creatinine Ratio     (12.00-20.00)  Ratio


 


Glucose     ()  mg/dL


 


POC Glucose (mg/dL)     ()  mg/dL


 


Plasma Lactic Acid Td  4.5 H*  2.4 H*   (0.7-2.0)  mmol/L


 


AST     (14-35)  U/L


 


Total Protein     (6.2-8.2)  g/dL


 


Albumin     (3.8-4.9)  g/dL


 


Albumin/Globulin Ratio     (1.60-3.17)  Ratio














  05/02/24 05/02/24 Range/Units





  05:07 12:12 


 


MCHC    (32.0-37.0)  g/dL


 


RDW    (11.5-14.5)  %


 


BUN/Creatinine Ratio  22.33 H   (12.00-20.00)  Ratio


 


Glucose  178 H   ()  mg/dL


 


POC Glucose (mg/dL)   136 H  ()  mg/dL


 


Plasma Lactic Acid Td    (0.7-2.0)  mmol/L


 


AST  42 H   (14-35)  U/L


 


Total Protein  5.9 L   (6.2-8.2)  g/dL


 


Albumin  3.6 L   (3.8-4.9)  g/dL


 


Albumin/Globulin Ratio  1.57 L   (1.60-3.17)  Ratio

## 2024-05-03 LAB
GLUCOSE BLD-MCNC: 226 MG/DL (ref 70–110)
GLUCOSE BLD-MCNC: 243 MG/DL (ref 70–110)
GLUCOSE BLD-MCNC: 244 MG/DL (ref 70–110)

## 2024-05-03 RX ADMIN — ACETAMINOPHEN PRN MG: 500 TABLET ORAL at 11:37

## 2024-05-03 RX ADMIN — INSULIN ASPART SCH UNIT: 100 INJECTION, SOLUTION INTRAVENOUS; SUBCUTANEOUS at 21:09

## 2024-05-03 NOTE — P.PN
Subjective


Progress Note Date: 05/03/24


Principal diagnosis: 


Right upper lobe pneumonia





Acute on chronic hypoxic respiratory failure





Acute COPD exacerbation





Right-sided lung nodule





Hypertension hypertensive cardiovascular disease





Coronary artery disease and ischemic cardiomyopathy related to 





04/03/2024, patient seen eval examined during rounds labs reviewed medications 

and care plan discussed, patient underwent computed tomography scan of the chest

yesterday reviewed, today patient feels congested ongoing cough is present along

with wheezing.patient is afebrile slightly tachycardic with heart rate of 105 

saturation is the 92% on 2 L supplemental oxygen down from 3 L, blood pressure 

is stable 110/50.the x-ray revealed consolidation in the right lung.  Calcified 

granuloma which correlates with the nodule.  Right middle lobe occluded maybe 

related to mucous plug would recommend to start broad-spectrum IV antibiotics 

rather than by mouth may need bronchoscopy once clinical condition slightly i

mproved.  Patient complaining of neck pain which is chronic along with a history

of C-spine surgery will arrange c-collar to stabilize neck and pain relief.





Patient is a pleasant 79-year-old from my office he has end-stage COPD with 

oxygen dependent and nebulizer dependent and intermittently has been on 

steroids.  He is on chronic oxygen 3 L nasal cannula, he has a history of 

coronary artery disease, prior acute COPD exacerbation several weeks ago 

requiring hospitalization.  Came into the hospital with 2 to 3-day history of 

increasing shortness of breath and cough which is light yellow sputum 

production, chest x-ray right upper lobe consolidation and small pleural 

effusion suggesting underlying pneumonia along with 8 mm nodule in the right 

upper lobe.  Currently patient is on bronchodilator along with oral antibiotics 

IV steroids and bronchodilators and continuation of home medication lactic acid 

is 2.4 came down to 1.7, white cell count 5.3








Objective





- Vital Signs


Vital signs: 


                                   Vital Signs











Temp  97.4 F L  05/03/24 14:15


 


Pulse  105 H  05/03/24 14:15


 


Resp  16   05/03/24 14:15


 


BP  112/49   05/03/24 14:15


 


Pulse Ox  92 L  05/03/24 14:15


 


FiO2      








                                 Intake & Output











 05/02/24 05/03/24 05/03/24





 18:59 06:59 18:59


 


Intake Total 358  908


 


Output Total 2050 800 900


 


Balance -1692 -800 8


 


Weight 56.699 kg  


 


Intake:   


 


  Oral 358  908


 


Output:   


 


  Urine 2050 800 900


 


Other:   


 


  Voiding Method Indwelling Catheter Indwelling Catheter Indwelling Catheter














- Exam


- Constitutional


General appearance: average body habitus, disheveled, mild distress





- EENT


Eyes: EOMI, PERRLA


ENT: normal oropharynx


Ears: bilateral: normal





- Neck


Neck: normal ROM


Carotids: bilateral: upstroke normal


Thyroid: bilateral: normal size





- Respiratory


Respiratory: bilateral: diminished, wheezing





- Cardiovascular


Heart sounds: normal: S1, S2





- Gastrointestinal


General gastrointestinal: normal bowel sounds, soft





- Integumentary


Integumentary: normal, normal turgor





- Neurologic


Neurologic: CNII-XII intact





- Musculoskeletal


Musculoskeletal: gait normal, generalized weakness, strength equal bilaterally





- Psychiatric


Psychiatric: A&O x's 3, appropriate affect, intact judgment & insight











- Labs


CBC & Chem 7: 


                                 05/02/24 05:07





                                 05/02/24 05:07


Labs: 


                  Abnormal Lab Results - Last 24 Hours (Table)











  05/02/24 05/02/24 05/03/24 Range/Units





  17:18 20:27 12:13 


 


POC Glucose (mg/dL)  167 H  192 H  243 H  ()  mg/dL














Assessment and Plan


Assessment: 


Right upper lobe pneumonia, likely mixed bacterial gram-positive and/or or gram-

negative related





right middle lobe atelectasis/narrowing likely mucous plug or endobronchial 

growth





Acute on chronic hypoxic respiratory failure





Acute COPD exacerbation





Right-sided lung nodule





chronic neck pain





Hypertension hypertensive cardiovascular disease





Coronary artery disease and ischemic cardiomyopathy related to 











Plan: 


Continue antibiotics and bronchodilators and IV steroids, consider IV antibiot

ics, send sputum for culture





reviewed computed tomography scan right upper lobe dense consolidation with 

right middle lobe atelectatic changes and narrowing suggestive of post 

obstructive process extrinsic versus intrinsic,may need bronchoscopic evaluation

once clinically stable





Time with Patient: Greater than 30

## 2024-05-03 NOTE — P.PN
Subjective


Progress Note Date: 05/03/24





Patient is a 79-year-old male with a history of COPD, recent COVID-19 pneumonia 

in March 2024, chronic hypoxic respiratory failure on 3 L, chronic indwelling 

Jama, and coronary artery disease who presented to the hospital with complaints

of worsening shortness of breath.  On arrival to the emergency department the 

patient was tachycardic with a pulse of 102.  Laboratory analysis included CBC, 

coags, CMP, and troponin which were unremarkable for lactic acid of 3.1.  Chest 

x-ray demonstrated patchy right upper lobe infiltrate with possible adjacent 8 

mm nodule.  In the ER he was given Solu-Medrol, bronchodilators, and 

ceftriaxone.  Arrangements were made for admission. 





Started on bronchodilators, SoluMedrol and Augmentin. 





5/2 Patient was seen and examined. Currently on 7L NC. Feeling short of breath. 

Pulmonary consulted, CT chest ordered. CBC MCHC 31 RDW 15. CMP BUN/Cr 22.33, glu

178, AST 42, alb 3.6.


5/3 Patient was seen and examined. Still feeling quite short of breath. CT chest

shows calcified R midlung granuloma, occlusion R middle lobe bronchus suspect 

post obstructive atelectasis from mucus plugging, bronchoscopy should be 

considered. Awaiting Pulmonary recommendations.





General: Nontoxic, no distress, appears at stated age


Derm: Warm, dry


Head: Atraumatic, normocephalic, symmetric


Eyes: EOMI, no lid lag, anicteric sclera


Mouth: No lip lesion, mucus membranes moist


Cardiovascular: S1S2 reg, systolic murmur


Lungs: Expiratory wheezing bilateral, no rhonchi, no rales, no accessory muscle 

use, supplemental oxygen


Ext: No gross muscle atrophy, no edema, no contractures


Neuro: no focal neuro deficits


Psych: Alert, oriented, appropriate affect





Acute exacerbation of COPD witth mucus plugging: DuoNeb QID and Q2H PRN for 

SOB/wheezing. Pulmicort 1 mg INH BID. Performist 20 mcg INH BID. Mucinex 600 mg 

PO BID. SoluMedrol 60 mg IV Q6H. May benefit from bronchoscopy. Pulmonary f

ollowing.


Acute on chronic hypoxic respiratory failure


Possible Pneumonia vs residual change form COVID: Augmentin 1 tab PO BID.


Chronic systolic congestive heart failure with ejection fraction 40 to 45%: 

Metoprolol 25 mg PO QD. Lisinopril 5 mg PO BID. Lasix 20 mg PO BID. Farxiga 5 mg

PO QD.


Hypertension


Dyslipidemia


Coronary artery disease: ASA 81 mg PO QD. Lipitor 80 mg PO QHS.





CODE STATUS: FULL CODE


DVT Prophylaxis: Lovenox SQ


GI Prophylaxis:


Designated medical POA if patient is not able to make medical decisions for 

themselves:





I have reviewed the following consultant notes: Pulmonary


I have reviewed the results of the following tests: CT chest


I have ordered the following tests:


I have discussed the care of this patient with the following independent 

historian:


I have independently interpreted the following test below:


I have discussed the management of this patient with the following physician:





Objective





- Vital Signs


Vital signs: 


                                   Vital Signs











Temp  97.8 F   05/03/24 07:20


 


Pulse  88   05/03/24 11:48


 


Resp  17   05/03/24 09:01


 


BP  138/67   05/03/24 07:20


 


Pulse Ox  92 L  05/03/24 09:01


 


FiO2      








                                 Intake & Output











 05/02/24 05/03/24 05/03/24





 18:59 06:59 18:59


 


Intake Total 358  118


 


Output Total 2050 800 900


 


Balance -1692 -800 -782


 


Weight 56.699 kg  


 


Intake:   


 


  Oral 358  118


 


Output:   


 


  Urine 2050 800 900


 


Other:   


 


  Voiding Method Indwelling Catheter Indwelling Catheter Indwelling Catheter














- Labs


CBC & Chem 7: 


                                 05/02/24 05:07





                                 05/02/24 05:07


Labs: 


                  Abnormal Lab Results - Last 24 Hours (Table)











  05/02/24 05/02/24 05/03/24 Range/Units





  17:18 20:27 12:13 


 


POC Glucose (mg/dL)  167 H  192 H  243 H  ()  mg/dL

## 2024-05-04 LAB
GLUCOSE BLD-MCNC: 160 MG/DL (ref 70–110)
GLUCOSE BLD-MCNC: 193 MG/DL (ref 70–110)
GLUCOSE BLD-MCNC: 263 MG/DL (ref 70–110)
GLUCOSE BLD-MCNC: 299 MG/DL (ref 70–110)

## 2024-05-04 RX ADMIN — FLUTICASONE PROPIONATE SCH SPRAY: 50 SPRAY, METERED NASAL at 19:59

## 2024-05-04 RX ADMIN — DAPAGLIFLOZIN STA MG: 5 TABLET, FILM COATED ORAL at 16:45

## 2024-05-04 NOTE — P.PN
Subjective


Progress Note Date: 05/04/24





Hospital Course: 


Patient is a 79-year-old male with a history of COPD, recent COVID-19 pneumonia 

in March 2024, chronic hypoxic respiratory failure on 3 L, chronic indwelling 

Jama, and coronary artery disease who presented to the hospital with complaints

of worsening shortness of breath.  On arrival to the emergency department the 

patient was tachycardic with a pulse of 102.  Laboratory analysis included CBC, 

coags, CMP, and troponin which were unremarkable for lactic acid of 3.1.  Chest 

x-ray demonstrated patchy right upper lobe infiltrate with possible adjacent 8 

mm nodule.  In the ER he was given Solu-Medrol, bronchodilators, and 

ceftriaxone.  Pulmonology consulted. CT chest shows calcified R midlung 

granuloma, occlusion R middle lobe bronchus suspect post obstructive atelectasis

from mucus plugging, bronchoscopy should be considered. 








Subjective: 


Patient seen and examined at bedside.  No acute events overnight.  Continues to 

have shortness of breath, cough, occasional wheezing, unable to bring up sputum.





Pertinent positives and negatives as discussed above, a complete review of 

systems was performed and all other systems are negative.








Vitals Signs Reviewed. 





General: Nontoxic, no distress, appears at stated age


Derm: Warm, dry


Head: Atraumatic, normocephalic, symmetric


Eyes: EOMI, no lid lag, anicteric sclera


Mouth: No lip lesion, mucus membranes moist


Cardiovascular: S1S2 reg, systolic murmur


Lungs: Expiratory wheezing bilateral, no rhonchi, no rales, no accessory muscle 

use, supplemental oxygen


Ext: No gross muscle atrophy, no edema, no contractures


Neuro: no focal neuro deficits


Psych: Alert, oriented, appropriate affect





Data Reviewed Today: 


Pertinent Labs: Blood glucose range between 1 


Imaging: No new imaging





Assessment and Plan:


Patient is severely ill, needs close monitoring, prognosis guarded.





Acute exacerbation of COPD witth mucus plugging


Acute on chronic hypoxic respiratory failure


Possible bacterial pneumonia


-DuoNeb QID and Q2H PRN for SOB/wheezing. Pulmicort 1 mg INH BID. Performist 20 

mcg INH BID. Mucinex 600 mg PO BID. SoluMedrol 60 mg IV Q6H


- May benefit from bronchoscopy


-Pulmonary following


-On ceftriaxone 1 g IV every 24 hours





T2DM


-On sliding scale insulin, monitor for hypoglycemia


-On Farxiga  increased to 10 mg daily





Chronic systolic congestive heart failure with ejection fraction 40 to 45%, not 

in exacerbation


Hypertension


Dyslipidemia


History of coronary artery disease


-Metoprolol 25 mg PO QD. Lisinopril 5 mg PO BID. Lasix 20 mg PO BID. Farxiga 5 

mg PO QD. ASA 81 mg PO QD. Lipitor 80 mg PO QHS.








DVT ppx: Lovenox





Code status: No code





Anticipated discharge place: Pending clinical course


Anticipated discharge time: Pending clinical course





Objective





- Vital Signs


Vital signs: 


                                   Vital Signs











Temp  97.6 F   05/04/24 14:00


 


Pulse  92   05/04/24 15:29


 


Resp  16   05/04/24 14:00


 


BP  130/77   05/04/24 14:00


 


Pulse Ox  94 L  05/04/24 14:00


 


FiO2      








                                 Intake & Output











 05/03/24 05/04/24 05/04/24





 18:59 06:59 18:59


 


Intake Total 1026  118


 


Output Total 1800 1050 700


 


Balance -774 -1050 -582


 


Intake:   


 


  Oral 1026  118


 


Output:   


 


  Urine 1800 1050 700


 


Other:   


 


  Voiding Method Indwelling Catheter Indwelling Catheter Indwelling Catheter














- Labs


CBC & Chem 7: 


                                 05/02/24 05:07





                                 05/02/24 05:07


Labs: 


                  Abnormal Lab Results - Last 24 Hours (Table)











  05/01/24 05/03/24 05/03/24 Range/Units





  12:08 17:21 20:21 


 


POC Glucose (mg/dL)   226 H  244 H  ()  mg/dL


 


Hemoglobin A1c  7.3 H    (<=6.0)  %














  05/04/24 05/04/24 Range/Units





  06:07 10:59 


 


POC Glucose (mg/dL)  160 H  299 H  ()  mg/dL


 


Hemoglobin A1c    (<=6.0)  %

## 2024-05-05 LAB
GLUCOSE BLD-MCNC: 163 MG/DL (ref 70–110)
GLUCOSE BLD-MCNC: 248 MG/DL (ref 70–110)
GLUCOSE BLD-MCNC: 282 MG/DL (ref 70–110)
GLUCOSE BLD-MCNC: 316 MG/DL (ref 70–110)

## 2024-05-05 RX ADMIN — Medication SCH MG: at 08:38

## 2024-05-05 RX ADMIN — FOLIC ACID SCH MG: 1 TABLET ORAL at 08:39

## 2024-05-05 RX ADMIN — DAPAGLIFLOZIN SCH MG: 10 TABLET, FILM COATED ORAL at 08:39

## 2024-05-05 NOTE — P.PN
Subjective


Progress Note Date: 05/05/24


Principal diagnosis: 


Right upper lobe pneumonia





Right middle lobe atelectasis with a differential diagnoses of mucous plug 

versus endobronchial growth versus extrinsic compression of right middle lobe 

bronchus





Acute on chronic hypoxic respiratory failure





Acute COPD exacerbation





Right-sided lung nodule





Hypertension hypertensive cardiovascular disease





Coronary artery disease and ischemic cardiomyopathy related to 





04/04/2024, patient seen eval examined during rounds labs reviewed medications 

reviewed care plan discussed, findings on computed tomography scan discussed 

with patient at length greatly, patient is DO NOT RESUSCITATE, at this point 

time patient wants to recover from pneumonia and think about bronchoscopy as an 

outpatient.  Patient has been started on IV antibiotics tolerating well along 

with IV steroids bronchodilator cough congestion is improved but is still get 

short of breath on minimal activity and exertion and intermittent wheezing and 

nighttime as well.  Patient remains afebrile with stable hemodynamics oxygen 

saturation stable on 2-3 L oxygen on 2 L 98%.culture results are pending unable 

to obtain a sputum for Gram stain and culture, patient remains on home medicine 

along with bronchodilators and IV steroids and broad-spectrum IV antibiotics 

tolerating well





04/03/2024, patient seen eval examined during rounds labs reviewed medications 

and care plan discussed, patient underwent computed tomography scan of the chest

yesterday reviewed, today patient feels congested ongoing cough is present along

with wheezing.patient is afebrile slightly tachycardic with heart rate of 105 

saturation is the 92% on 2 L supplemental oxygen down from 3 L, blood pressure 

is stable 110/50.the x-ray revealed consolidation in the right lung.  Calcified 

granuloma which correlates with the nodule.  Right middle lobe occluded maybe 

related to mucous plug would recommend to start broad-spectrum IV antibiotics 

rather than by mouth may need bronchoscopy once clinical condition slightly 

improved.  Patient complaining of neck pain which is chronic along with a 

history of C-spine surgery will arrange c-collar to stabilize neck and pain 

relief.





Patient is a pleasant 79-year-old from my office he has end-stage COPD with 

oxygen dependent and nebulizer dependent and intermittently has been on 

steroids.  He is on chronic oxygen 3 L nasal cannula, he has a history of 

coronary artery disease, prior acute COPD exacerbation several weeks ago 

requiring hospitalization.  Came into the hospital with 2 to 3-day history of 

increasing shortness of breath and cough which is light yellow sputum 

production, chest x-ray right upper lobe consolidation and small pleural 

effusion suggesting underlying pneumonia along with 8 mm nodule in the right 

upper lobe.  Currently patient is on bronchodilator along with oral antibiotics 

IV steroids and bronchodilators and continuation of home medication lactic acid 

is 2.4 came down to 1.7, white cell count 5.3








Objective





- Vital Signs


Vital signs: 


                                   Vital Signs











Temp  97.4 F L  05/05/24 08:00


 


Pulse  95   05/05/24 08:00


 


Resp  16   05/05/24 08:00


 


BP  136/85   05/05/24 08:00


 


Pulse Ox  95   05/05/24 08:00


 


FiO2      








                                 Intake & Output











 05/04/24 05/05/24 05/05/24





 18:59 06:59 18:59


 


Intake Total 236  


 


Output Total 1250 1550 


 


Balance -1014 -1550 


 


Intake:   


 


  Oral 236  


 


Output:   


 


  Urine 1250 1550 


 


Other:   


 


  Voiding Method Indwelling Catheter Indwelling Catheter 














- Exam


- Constitutional


General appearance: average body habitus, disheveled, mild distress





- EENT


Eyes: EOMI, PERRLA


ENT: normal oropharynx


Ears: bilateral: normal





- Neck


Neck: normal ROM


Carotids: bilateral: upstroke normal


Thyroid: bilateral: normal size





- Respiratory


Respiratory: bilateral: diminished, wheezing





- Cardiovascular


Heart sounds: normal: S1, S2





- Gastrointestinal


General gastrointestinal: normal bowel sounds, soft





- Integumentary


Integumentary: normal, normal turgor





- Neurologic


Neurologic: CNII-XII intact





- Musculoskeletal


Musculoskeletal: gait normal, generalized weakness, strength equal bilaterally





- Psychiatric


Psychiatric: A&O x's 3, appropriate affect, intact judgment & insight











- Labs


CBC & Chem 7: 


                                 05/02/24 05:07





                                 05/02/24 05:07


Labs: 


                  Abnormal Lab Results - Last 24 Hours (Table)











  05/01/24 05/04/24 05/04/24 Range/Units





  12:08 10:59 17:02 


 


POC Glucose (mg/dL)   299 H  263 H  ()  mg/dL


 


Hemoglobin A1c  7.3 H    (<=6.0)  %














  05/04/24 05/05/24 Range/Units





  19:53 06:22 


 


POC Glucose (mg/dL)  193 H  163 H  ()  mg/dL


 


Hemoglobin A1c    (<=6.0)  %














Assessment and Plan


Assessment: 


Right upper lobe pneumonia, likely mixed bacterial gram-positive and/or or gram-

negative related





right middle lobe atelectasis/narrowing likely mucous plug or endobronchial katja

wth





Acute on chronic hypoxic respiratory failure





Acute COPD exacerbation





Right-sided lung nodule





chronic neck pain





Hypertension hypertensive cardiovascular disease





Coronary artery disease and ischemic cardiomyopathy related to 











Plan: 


Continue antibiotics and bronchodilators and IV steroids, continueIV 

antibiotics, send sputum for cultureif possible





reviewed computed tomography scan right upper lobe dense consolidation with 

right middle lobe atelectatic changes and narrowing suggestive of post 

obstructive process extrinsic versus intrinsic,may need bronchoscopic evaluation

once clinically stable, these finding discussed with the patient at length at 

this point time patient wants to hold on doing a bronchoscopy he wants to 

concentrate on recovery from pneumonia then outpatient, then he will decide





Patient wants to be DO NOT RESUSCITATE and not to be placed on the respirator 

specifically,discussed with RN





Patient also wants to be discharged home rather than rehab he expresses good 

family support





Time with Patient: Greater than 30

## 2024-05-05 NOTE — P.PN
Subjective


Progress Note Date: 05/05/24





Patient is a 79-year-old male with a history of COPD, recent COVID-19 pneumonia 

in March 2024, chronic hypoxic respiratory failure on 3 L, chronic indwelling 

Jama, and coronary artery disease who presented to the hospital with complaints

of worsening shortness of breath.  On arrival to the emergency department the 

patient was tachycardic with a pulse of 102.  Laboratory analysis included CBC, 

coags, CMP, and troponin which were unremarkable for lactic acid of 3.1.  Chest 

x-ray demonstrated patchy right upper lobe infiltrate with possible adjacent 8 

mm nodule.  In the ER he was given Solu-Medrol, bronchodilators, and 

ceftriaxone.  Arrangements were made for admission.  He was continued on 

steroids and bronchodilators.  He was seen by pulmonary who did suggest possible

bronchoscopy however patient declined at this time.


Patient seen and examined at bedside.  Continues to feel very tired and worn out

as well as short of breath.  Poor appetite.





Vital signs reviewed


General: Nontoxic, no distress, appears at stated age


Cardiovascular: S1S2 reg, no murmur


Lungs: Coarse breath sounds bilateral , no accessory muscle use


Abdominal: Soft, nontender to palpation, no guarding


Ext: No gross muscle atrophy, no edema b/l lower extremities, no contractures


Neuro: CN II-XI grossly intact, no focal neuro deficits


Psych: Alert, oriented, appropriate affect





Assessment/Plan: 


Acute exacerbation of COPD


Chronic hypoxic respiratory failure


Bacterial Pneumonia, possible gram negative


-Solu-Medrol 60 mg IV every 6 hours


-DuoNeb 4 times daily scheduled and every 2 hours as needed


-Budesonide 1 mg twice daily, piriformis 20 mcg twice daily


-Rocephin 1 g every 24 hours day #3 will increase this to 2 g as it has been 

used to treat pneumonia


-CT chest reviewed from 5/2 which demonstrates occlusion of the right middle 

lobe bronchus with suspected postobstructive atelectasis and focal narrowing of 

the right upper lobe bronchus


-Pulmonary note reviewed: Patient is requesting to delay bronchoscopy out 

patient setting.





Chronic systolic congestive heart failure with ejection fraction 40 to 45%


Hypertension


Dyslipidemia


Coronary artery disease


-Lisinopril 5 mg twice daily, Toprol 25 mg daily, Lasix 20 mg twice daily, 

Farxiga 10 mg daily, patient is not chronically on a mineralocorticoid


-Lipitor 80 mg








Chronic Jaam catheter


Imaging: 


As per HPI


Data Review: 


As per HPI





Imaging: 


None new





Data Review: 


None new





DVT prophylaxis: Lovenox 40 mg daily


Anticipated discharge date: 24 to 48 hours


Anticipated discharge place: Home with home health care


This dictation was prepared using dragon medical voice recognition software. 

Though every attempt is made to correct errors during dictation some may still 

exist.























Objective





- Vital Signs


Vital signs: 


                                   Vital Signs











Temp  97.8 F   05/05/24 14:00


 


Pulse  97   05/05/24 14:00


 


Resp  16   05/05/24 14:00


 


BP  116/69   05/05/24 14:00


 


Pulse Ox  100   05/05/24 14:00


 


FiO2      








                                 Intake & Output











 05/04/24 05/05/24 05/05/24





 18:59 06:59 18:59


 


Intake Total 236  118


 


Output Total 1250 1550 350


 


Balance -1014 -1550 -232


 


Intake:   


 


  Oral 236  118


 


Output:   


 


  Urine 1250 1550 350


 


Other:   


 


  Voiding Method Indwelling Catheter Indwelling Catheter Indwelling Catheter


 


  # Bowel Movements   1














- Labs


CBC & Chem 7: 


                                 05/02/24 05:07





                                 05/02/24 05:07


Labs: 


                  Abnormal Lab Results - Last 24 Hours (Table)











  05/04/24 05/04/24 05/05/24 Range/Units





  17:02 19:53 06:22 


 


POC Glucose (mg/dL)  263 H  193 H  163 H  ()  mg/dL














  05/05/24 Range/Units





  11:07 


 


POC Glucose (mg/dL)  248 H  ()  mg/dL

## 2024-05-05 NOTE — P.PN
Subjective


Progress Note Date: 05/04/24


Principal diagnosis: 


Right upper lobe pneumonia





Acute on chronic hypoxic respiratory failure





Acute COPD exacerbation





Right-sided lung nodule





Hypertension hypertensive cardiovascular disease





Coronary artery disease and ischemic cardiomyopathy related to 





04/04/2024, patient seen eval examined during rounds labs reviewed medications 

reviewed care plan discussed, findings on computed tomography scan discussed 

with patient at length greatly, patient is DO NOT RESUSCITATE, at this point 

time patient wants to recover from pneumonia and think about bronchoscopy as an 

outpatient.  Patient has been started on IV antibiotics tolerating well along 

with IV steroids bronchodilator cough congestion is improved but is still get 

short of breath on minimal activity and exertion and intermittent wheezing and 

nighttime as well.  Patient remains afebrile with stable hemodynamics oxygen 

saturation stable on 2-3 L oxygen on 2 L 98%.culture results are pending unable 

to obtain a sputum for Gram stain and culture, patient remains on home medicine 

along with bronchodilators and IV steroids and broad-spectrum IV antibiotics 

tolerating well





04/03/2024, patient seen eval examined during rounds labs reviewed medications 

and care plan discussed, patient underwent computed tomography scan of the chest

yesterday reviewed, today patient feels congested ongoing cough is present along

with wheezing.patient is afebrile slightly tachycardic with heart rate of 105 

saturation is the 92% on 2 L supplemental oxygen down from 3 L, blood pressure 

is stable 110/50.the x-ray revealed consolidation in the right lung.  Calcified 

granuloma which correlates with the nodule.  Right middle lobe occluded maybe 

related to mucous plug would recommend to start broad-spectrum IV antibiotics 

rather than by mouth may need bronchoscopy once clinical condition slightly 

improved.  Patient complaining of neck pain which is chronic along with a 

history of C-spine surgery will arrange c-collar to stabilize neck and pain 

relief.





Patient is a pleasant 79-year-old from my office he has end-stage COPD with 

oxygen dependent and nebulizer dependent and intermittently has been on stero

ids.  He is on chronic oxygen 3 L nasal cannula, he has a history of coronary 

artery disease, prior acute COPD exacerbation several weeks ago requiring 

hospitalization.  Came into the hospital with 2 to 3-day history of increasing 

shortness of breath and cough which is light yellow sputum production, chest x-

ray right upper lobe consolidation and small pleural effusion suggesting 

underlying pneumonia along with 8 mm nodule in the right upper lobe.  Currently 

patient is on bronchodilator along with oral antibiotics IV steroids and 

bronchodilators and continuation of home medication lactic acid is 2.4 came down

to 1.7, white cell count 5.3








Objective





- Vital Signs


Vital signs: 


                                   Vital Signs











Temp  97.5 F L  05/04/24 08:00


 


Pulse  92   05/04/24 09:02


 


Resp  16   05/04/24 08:00


 


BP  131/77   05/04/24 08:00


 


Pulse Ox  93 L  05/04/24 08:39


 


FiO2      








                                 Intake & Output











 05/03/24 05/04/24 05/04/24





 18:59 06:59 18:59


 


Intake Total 1026  


 


Output Total 1800 1050 


 


Balance -774 -1050 


 


Intake:   


 


  Oral 1026  


 


Output:   


 


  Urine 1800 1050 


 


Other:   


 


  Voiding Method Indwelling Catheter Indwelling Catheter 














- Exam


- Constitutional


General appearance: average body habitus, disheveled, mild distress





- EENT


Eyes: EOMI, PERRLA


ENT: normal oropharynx


Ears: bilateral: normal





- Neck


Neck: normal ROM


Carotids: bilateral: upstroke normal


Thyroid: bilateral: normal size





- Respiratory


Respiratory: bilateral: diminished, wheezing





- Cardiovascular


Heart sounds: normal: S1, S2





- Gastrointestinal


General gastrointestinal: normal bowel sounds, soft





- Integumentary


Integumentary: normal, normal turgor





- Neurologic


Neurologic: CNII-XII intact





- Musculoskeletal


Musculoskeletal: gait normal, generalized weakness, strength equal bilaterally





- Psychiatric


Psychiatric: A&O x's 3, appropriate affect, intact judgment & insight











- Labs


CBC & Chem 7: 


                                 05/02/24 05:07





                                 05/02/24 05:07


Labs: 


                  Abnormal Lab Results - Last 24 Hours (Table)











  05/01/24 05/03/24 05/03/24 Range/Units





  12:08 12:13 17:21 


 


POC Glucose (mg/dL)   243 H  226 H  ()  mg/dL


 


Hemoglobin A1c  7.3 H    (<=6.0)  %














  05/03/24 05/04/24 Range/Units





  20:21 06:07 


 


POC Glucose (mg/dL)  244 H  160 H  ()  mg/dL


 


Hemoglobin A1c    (<=6.0)  %














Assessment and Plan


Assessment: 


Right upper lobe pneumonia, likely mixed bacterial gram-positive and/or or gram-

negative related





right middle lobe atelectasis/narrowing likely mucous plug or endobronchial 

growth





Acute on chronic hypoxic respiratory failure





Acute COPD exacerbation





Right-sided lung nodule





chronic neck pain





Hypertension hypertensive cardiovascular disease





Coronary artery disease and ischemic cardiomyopathy related to 











Plan: 


Continue antibiotics and bronchodilators and IV steroids, continueIV 

antibiotics, send sputum for cultureif possible





reviewed computed tomography scan right upper lobe dense consolidation with 

right middle lobe atelectatic changes and narrowing suggestive of post 

obstructive process extrinsic versus intrinsic,may need bronchoscopic evaluation

once clinically stable, these finding discussed with the patient at length at 

this point time patient wants to hold on doing a bronchoscopy he wants to 

concentrate on recovery from pneumonia then outpatient we'll decide





Patient wants to be DO NOT RESUSCITATE and not to be placed on the respirator 

specifically,





Time with Patient: Greater than 30

## 2024-05-06 LAB
ANION GAP SERPL CALC-SCNC: 7 MMOL/L (ref 4–12)
BUN SERPL-SCNC: 29 MG/DL (ref 9–27)
BUN/CREAT SERPL: 48.33 RATIO (ref 12–20)
CALCIUM SPEC-MCNC: 8.6 MG/DL (ref 8.7–10.3)
CHLORIDE SERPL-SCNC: 103 MMOL/L (ref 96–109)
CO2 SERPL-SCNC: 35 MMOL/L (ref 21.6–31.8)
ERYTHROCYTE [DISTWIDTH] IN BLOOD BY AUTOMATED COUNT: 5.64 X 10*6/UL (ref 4.4–5.6)
ERYTHROCYTE [DISTWIDTH] IN BLOOD: 15.3 % (ref 11.5–14.5)
GLUCOSE BLD-MCNC: 168 MG/DL (ref 70–110)
GLUCOSE BLD-MCNC: 251 MG/DL (ref 70–110)
GLUCOSE BLD-MCNC: 254 MG/DL (ref 70–110)
GLUCOSE BLD-MCNC: 265 MG/DL (ref 70–110)
GLUCOSE SERPL-MCNC: 193 MG/DL (ref 70–110)
HCT VFR BLD AUTO: 49.8 % (ref 39.6–50)
HGB BLD-MCNC: 15.8 G/DL (ref 13–17)
MCH RBC QN AUTO: 28 PG (ref 27–32)
MCHC RBC AUTO-ENTMCNC: 31.7 G/DL (ref 32–37)
MCV RBC AUTO: 88.3 FL (ref 80–97)
NRBC BLD AUTO-RTO: 0 X 10*3/UL (ref 0–0.01)
PLATELET # BLD AUTO: 212 X 10*3/UL (ref 140–440)
POTASSIUM SERPL-SCNC: 4.3 MMOL/L (ref 3.5–5.5)
SODIUM SERPL-SCNC: 145 MMOL/L (ref 135–145)
WBC # BLD AUTO: 12.52 X 10*3/UL (ref 4.5–10)

## 2024-05-06 NOTE — P.PN
Subjective


Progress Note Date: 05/06/24


Principal diagnosis: 


Right upper lobe pneumonia





Right middle lobe atelectasis with a differential diagnoses of mucous plug 

versus endobronchial growth versus extrinsic compression of right middle lobe 

bronchus





Acute on chronic hypoxic respiratory failure





Acute COPD exacerbation





Right-sided lung nodule





Hypertension hypertensive cardiovascular disease





Coronary artery disease and ischemic cardiomyopathy related to 





May 6, 2024, patient seen and examined during rounds and detailed discussions 

happened about bronchoscopy CODE STATUS and future planning, patient would like 

to proceed with bronchoscopy as he feels more rattling on the right side and he 

agreed to discontinue DNR CODE STATUS and changed to full code however he would 

like to DNR initiated back again after procedure when accomplished.  Discussed 

with endoscopy patient will be scheduled for bronchoscopy biopsy and lavage for 

tomorrow however exact times we do not know until tomorrow.  Labs are not done 

today, sugars are 254, patient remains on bronchodilators inhaled aerosolized 

steroids as well as IV Rocephin, Solu-Medrol 60 mg IV every 6 hourly and 

continuation of his home medications





05/05/2024, patient seen eval examined during rounds labs reviewed medications 

reviewed care plan discussed, respiratory status is still marginal ongoing cough

congestion and wheezing is present, patient remains on IV steroids 60 every 6 

and broad-spectrum antibiotics with IV Rocephin, patient has been getting 

bronchodilators as well, reexplored idea about bronchoscopy patient feels that 

he wants to recover from pneumonia and then think about it as outpatient patient

specifically has not to placed on life support, otherwise afebrile temperature 

is 97.4, blood pressure is 136/85 saturation 95% theater oxygen,





04/04/2024, patient seen eval examined during rounds labs reviewed medications 

reviewed care plan discussed, findings on computed tomography scan discussed 

with patient at length greatly, patient is DO NOT RESUSCITATE, at this point 

time patient wants to recover from pneumonia and think about bronchoscopy as an 

outpatient.  Patient has been started on IV antibiotics tolerating well along 

with IV steroids bronchodilator cough congestion is improved but is still get 

short of breath on minimal activity and exertion and intermittent wheezing and 

nighttime as well.  Patient remains afebrile with stable hemodynamics oxygen 

saturation stable on 2-3 L oxygen on 2 L 98%.culture results are pending unable 

to obtain a sputum for Gram stain and culture, patient remains on home medicine 

along with bronchodilators and IV steroids and broad-spectrum IV antibiotics to

lerating well





04/03/2024, patient seen eval examined during rounds labs reviewed medications 

and care plan discussed, patient underwent computed tomography scan of the chest

yesterday reviewed, today patient feels congested ongoing cough is present along

with wheezing.patient is afebrile slightly tachycardic with heart rate of 105 

saturation is the 92% on 2 L supplemental oxygen down from 3 L, blood pressure 

is stable 110/50.the x-ray revealed consolidation in the right lung.  Calcified 

granuloma which correlates with the nodule.  Right middle lobe occluded maybe 

related to mucous plug would recommend to start broad-spectrum IV antibiotics 

rather than by mouth may need bronchoscopy once clinical condition slightly 

improved.  Patient complaining of neck pain which is chronic along with a h

istory of C-spine surgery will arrange c-collar to stabilize neck and pain 

relief.





Patient is a pleasant 79-year-old from my office he has end-stage COPD with 

oxygen dependent and nebulizer dependent and intermittently has been on 

steroids.  He is on chronic oxygen 3 L nasal cannula, he has a history of 

coronary artery disease, prior acute COPD exacerbation several weeks ago 

requiring hospitalization.  Came into the hospital with 2 to 3-day history of 

increasing shortness of breath and cough which is light yellow sputum 

production, chest x-ray right upper lobe consolidation and small pleural 

effusion suggesting underlying pneumonia along with 8 mm nodule in the right 

upper lobe.  Currently patient is on bronchodilator along with oral antibiotics 

IV steroids and bronchodilators and continuation of home medication lactic acid 

is 2.4 came down to 1.7, white cell count 5.3








Objective





- Vital Signs


Vital signs: 


                                   Vital Signs











Temp  97.5 F L  05/06/24 14:27


 


Pulse  76   05/06/24 16:01


 


Resp  18   05/06/24 14:27


 


BP  146/66   05/06/24 14:27


 


Pulse Ox  96   05/06/24 14:27


 


FiO2      








                                 Intake & Output











 05/05/24 05/06/24 05/06/24





 18:59 06:59 18:59


 


Intake Total 118  236


 


Output Total 1550 1100 1850


 


Balance -2030 -9388 -7363


 


Intake:   


 


  Oral 118  236


 


Output:   


 


  Urine 1550 1100 1850


 


Other:   


 


  Voiding Method Indwelling Catheter Indwelling Catheter Indwelling Catheter


 


  # Bowel Movements 1  














- Exam


- Constitutional


General appearance: average body habitus, disheveled, mild distress





- EENT


Eyes: EOMI, PERRLA


ENT: normal oropharynx


Ears: bilateral: normal





- Neck


Neck: normal ROM


Carotids: bilateral: upstroke normal


Thyroid: bilateral: normal size





- Respiratory


Respiratory: bilateral: diminished, wheezing, More on the right side compared to

the left





- Cardiovascular


Heart sounds: normal: S1, S2





- Gastrointestinal


General gastrointestinal: normal bowel sounds, soft





- Integumentary


Integumentary: normal, normal turgor





- Neurologic


Neurologic: CNII-XII intact





- Musculoskeletal


Musculoskeletal: gait normal, generalized weakness, strength equal bilaterally





- Psychiatric


Psychiatric: A&O x's 3, appropriate affect, intact judgment & insight











- Labs


CBC & Chem 7: 


                                 05/06/24 06:37





                                 05/06/24 06:37


Labs: 


                  Abnormal Lab Results - Last 24 Hours (Table)











  05/05/24 05/06/24 05/06/24 Range/Units





  20:36 06:08 06:37 


 


WBC    12.52 H  (4.50-10.00)  X 10*3/uL


 


RBC    5.64 H  (4.40-5.60)  X 10*6/uL


 


MCHC    31.7 L  (32.0-37.0)  g/dL


 


RDW    15.3 H  (11.5-14.5)  %


 


Carbon Dioxide     (21.6-31.8)  mmol/L


 


BUN     (9.0-27.0)  mg/dL


 


BUN/Creatinine Ratio     (12.00-20.00)  Ratio


 


Glucose     ()  mg/dL


 


POC Glucose (mg/dL)  282 H  168 H   ()  mg/dL


 


Calcium     (8.7-10.3)  mg/dL














  05/06/24 05/06/24 05/06/24 Range/Units





  06:37 11:42 17:04 


 


WBC     (4.50-10.00)  X 10*3/uL


 


RBC     (4.40-5.60)  X 10*6/uL


 


MCHC     (32.0-37.0)  g/dL


 


RDW     (11.5-14.5)  %


 


Carbon Dioxide  35.0 H    (21.6-31.8)  mmol/L


 


BUN  29.0 H    (9.0-27.0)  mg/dL


 


BUN/Creatinine Ratio  48.33 H    (12.00-20.00)  Ratio


 


Glucose  193 H    ()  mg/dL


 


POC Glucose (mg/dL)   265 H  254 H  ()  mg/dL


 


Calcium  8.6 L    (8.7-10.3)  mg/dL














Assessment and Plan


Assessment: 


Right upper lobe pneumonia, likely mixed bacterial gram-positive and/or or gram-

negative related





right middle lobe atelectasis/narrowing likely mucous plug or endobronchial 

growth





Acute on chronic hypoxic respiratory failure





Acute COPD exacerbation





Right-sided lung nodule





chronic neck pain





Hypertension hypertensive cardiovascular disease





Coronary artery disease and ischemic cardiomyopathy related to 











Plan: 


Continue antibiotics and bronchodilators and IV steroids, IV antibiotics, send 

sputum for culture if possible , A sample has been obtained and is being 

processed





reviewed computed tomography scan right upper lobe dense consolidation with 

right middle lobe atelectatic changes and narrowing suggestive of post 

obstructive process extrinsic versus intrinsic, Patient now considering 

bronchoscopy pros and cons and complication including side effect and 

requirement for ventilator I expressed to the patient and also need for changing

no CODE STATUS to full code, patient now agreed and would like to proceed





Patient wants to be Full code now for the procedure however afterwards can be 

switched to DNR as per his wishes





Tentatively patient is being scheduled for bronchoscopy biopsy and lavage for 

tomorrow awaiting further advice from endoscopy





Patient also wants to be discharged home rather than rehab he expresses good 

family support





Time with Patient: Greater than 30

## 2024-05-06 NOTE — P.PN
Subjective


Progress Note Date: 05/06/24





Patient is a 79-year-old male with a history of COPD, recent COVID-19 pneumonia 

in March 2024, chronic hypoxic respiratory failure on 3 L, chronic indwelling 

Jama, and coronary artery disease who presented to the hospital with complaints

of worsening shortness of breath.  On arrival to the emergency department the 

patient was tachycardic with a pulse of 102.  Laboratory analysis included CBC, 

coags, CMP, and troponin which were unremarkable for lactic acid of 3.1.  Chest 

x-ray demonstrated patchy right upper lobe infiltrate with possible adjacent 8 

mm nodule.  In the ER he was given Solu-Medrol, bronchodilators, and 

ceftriaxone.  Arrangements were made for admission.  He was continued on 

steroids and bronchodilators.  He was seen by pulmonary who did suggest possible

bronchoscopy however patient declined at that time.  However he is not 

considering bronchoscopy.  Pending further recommendations by pulmonology.


Patient seen and examined at bedside.  Denies any new complaints.





Vital signs reviewed


General: Nontoxic, no distress, appears at stated age


Cardiovascular: S1S2 reg, no murmur


Lungs: Coarse breath sounds bilateral , no accessory muscle use, supplemental 

oxygen


Abdominal: Soft, nontender to palpation, no guarding


Ext: No gross muscle atrophy, no edema b/l lower extremities, no contractures


Neuro: CN II-XI grossly intact, no focal neuro deficits


Psych: Alert, oriented, appropriate affect





Assessment/Plan: 


Acute exacerbation of COPD


Chronic hypoxic respiratory failure


Bacterial Pneumonia, possible gram negative


Leukocytosis, likely steroid-induced


-Solu-Medrol 60 mg IV every 6 hours


-DuoNeb 4 times daily scheduled and every 2 hours as needed


-Budesonide 1 mg twice daily, piriformis 20 mcg twice daily


-Rocephin 2 g IV every 24 hours


-CT chest from 5/2 which demonstrates occlusion of the right middle lobe 

bronchus with suspected postobstructive atelectasis and focal narrowing of the 

right upper lobe bronchus


-Pulmonology following





Chronic systolic congestive heart failure with ejection fraction 40 to 45%


Hypertension


Dyslipidemia


Coronary artery disease


-Lisinopril 5 mg twice daily, Toprol 25 mg daily, Lasix 20 mg twice daily, 

Farxiga 10 mg daily, patient is not chronically on a mineralocorticoid


-Lipitor 80 mg





T2DM


-On sliding scale insulin, monitor for hypoglycemia


-On Farxiga  increased to 10 mg daily





Chronic Jama catheter





Imaging: 


None new





Data Review: 


WBC 12.5, hemoglobin 15.8, creatinine 0.6, blood sugars range between 1 93-2 82





DVT prophylaxis: Lovenox 40 mg daily


Anticipated discharge date: 24 to 48 hours


Anticipated discharge place: Home with home health care





Objective





- Vital Signs


Vital signs: 


                                   Vital Signs











Temp  97.5 F L  05/06/24 07:41


 


Pulse  78   05/06/24 11:58


 


Resp  16   05/06/24 07:41


 


BP  140/74   05/06/24 07:41


 


Pulse Ox  96   05/06/24 08:47


 


FiO2      








                                 Intake & Output











 05/05/24 05/06/24 05/06/24





 18:59 06:59 18:59


 


Intake Total 118  118


 


Output Total 1550 1100 


 


Balance -1432 -1100 118


 


Intake:   


 


  Oral 118  118


 


Output:   


 


  Urine 1550 1100 


 


Other:   


 


  Voiding Method Indwelling Catheter Indwelling Catheter Indwelling Catheter


 


  # Bowel Movements 1  














- Labs


CBC & Chem 7: 


                                 05/06/24 06:37





                                 05/06/24 06:37


Labs: 


                  Abnormal Lab Results - Last 24 Hours (Table)











  05/05/24 05/05/24 05/06/24 Range/Units





  16:37 20:36 06:08 


 


WBC     (4.50-10.00)  X 10*3/uL


 


RBC     (4.40-5.60)  X 10*6/uL


 


MCHC     (32.0-37.0)  g/dL


 


RDW     (11.5-14.5)  %


 


Carbon Dioxide     (21.6-31.8)  mmol/L


 


BUN     (9.0-27.0)  mg/dL


 


BUN/Creatinine Ratio     (12.00-20.00)  Ratio


 


Glucose     ()  mg/dL


 


POC Glucose (mg/dL)  316 H  282 H  168 H  ()  mg/dL


 


Calcium     (8.7-10.3)  mg/dL














  05/06/24 05/06/24 05/06/24 Range/Units





  06:37 06:37 11:42 


 


WBC  12.52 H    (4.50-10.00)  X 10*3/uL


 


RBC  5.64 H    (4.40-5.60)  X 10*6/uL


 


MCHC  31.7 L    (32.0-37.0)  g/dL


 


RDW  15.3 H    (11.5-14.5)  %


 


Carbon Dioxide   35.0 H   (21.6-31.8)  mmol/L


 


BUN   29.0 H   (9.0-27.0)  mg/dL


 


BUN/Creatinine Ratio   48.33 H   (12.00-20.00)  Ratio


 


Glucose   193 H   ()  mg/dL


 


POC Glucose (mg/dL)    265 H  ()  mg/dL


 


Calcium   8.6 L   (8.7-10.3)  mg/dL

## 2024-05-07 LAB
ANION GAP SERPL CALC-SCNC: 4 MMOL/L
BASOPHILS # BLD AUTO: 0 K/UL (ref 0–0.2)
BASOPHILS NFR BLD AUTO: 0 %
BUN SERPL-SCNC: 29 MG/DL (ref 9–20)
CALCIUM SPEC-MCNC: 8.6 MG/DL (ref 8.4–10.2)
CHLORIDE SERPL-SCNC: 100 MMOL/L (ref 98–107)
CO2 SERPL-SCNC: 36 MMOL/L (ref 22–30)
EOSINOPHIL # BLD AUTO: 0 K/UL (ref 0–0.7)
EOSINOPHIL NFR BLD AUTO: 0 %
ERYTHROCYTE [DISTWIDTH] IN BLOOD BY AUTOMATED COUNT: 5.74 M/UL (ref 4.3–5.9)
ERYTHROCYTE [DISTWIDTH] IN BLOOD: 14.5 % (ref 11.5–15.5)
GLUCOSE BLD-MCNC: 163 MG/DL (ref 70–110)
GLUCOSE BLD-MCNC: 164 MG/DL (ref 70–110)
GLUCOSE BLD-MCNC: 203 MG/DL (ref 70–110)
GLUCOSE BLD-MCNC: 232 MG/DL (ref 70–110)
GLUCOSE SERPL-MCNC: 178 MG/DL (ref 74–99)
HCT VFR BLD AUTO: 51.8 % (ref 39–53)
HGB BLD-MCNC: 16.3 GM/DL (ref 13–17.5)
LYMPHOCYTES # SPEC AUTO: 0.2 K/UL (ref 1–4.8)
LYMPHOCYTES NFR SPEC AUTO: 2 %
MCH RBC QN AUTO: 28.3 PG (ref 25–35)
MCHC RBC AUTO-ENTMCNC: 31.4 G/DL (ref 31–37)
MCV RBC AUTO: 90.1 FL (ref 80–100)
MONOCYTES # BLD AUTO: 0.5 K/UL (ref 0–1)
MONOCYTES NFR BLD AUTO: 4 %
NEUTROPHILS # BLD AUTO: 12.6 K/UL (ref 1.3–7.7)
NEUTROPHILS NFR BLD AUTO: 93 %
PLATELET # BLD AUTO: 224 K/UL (ref 150–450)
POTASSIUM SERPL-SCNC: 4.4 MMOL/L (ref 3.5–5.1)
SODIUM SERPL-SCNC: 140 MMOL/L (ref 137–145)
WBC # BLD AUTO: 13.5 K/UL (ref 3.8–10.6)

## 2024-05-07 PROCEDURE — 0B9C8ZX DRAINAGE OF RIGHT UPPER LUNG LOBE, VIA NATURAL OR ARTIFICIAL OPENING ENDOSCOPIC, DIAGNOSTIC: ICD-10-PCS

## 2024-05-07 RX ADMIN — LIDOCAINE HYDROCHLORIDE ONE ML: 20 INJECTION, SOLUTION INFILTRATION; PERINEURAL at 09:53

## 2024-05-07 RX ADMIN — POTASSIUM CHLORIDE ONE MLS: 14.9 INJECTION, SOLUTION INTRAVENOUS at 09:30

## 2024-05-07 RX ADMIN — LIDOCAINE HYDROCHLORIDE ONE ML: 20 JELLY TOPICAL at 09:42

## 2024-05-07 RX ADMIN — LIDOCAINE HYDROCHLORIDE ONE ML: 20 JELLY TOPICAL at 09:52

## 2024-05-07 RX ADMIN — FUROSEMIDE ONE MG: 10 INJECTION, SOLUTION INTRAMUSCULAR; INTRAVENOUS at 13:42

## 2024-05-07 NOTE — P.PN
Subjective


Progress Note Date: 05/07/24


Principal diagnosis: 


Right upper lobe pneumonia





Right middle lobe atelectasis with a differential diagnoses of mucous plug 

versus endobronchial growth versus extrinsic compression of right middle lobe 

bronchus





Acute on chronic hypoxic respiratory failure





Acute COPD exacerbation





Right-sided lung nodule





Hypertension hypertensive cardiovascular disease





Coronary artery disease and ischemic cardiomyopathy related to 





May 7, 2024, patient seen and examined labs reviewed medications reviewed care 

plan discussed, patient has been counseled and educated about procedure 

bronchoscopy patient completed and understood and wants to proceed with that.  

Still ongoing cough is present shortness of breath and wheezing slightly 

improved compared to the time of admission vitals are stable patient remains on 

supplemental oxygen with saturation mid 90s hemodynamic status stable as planned

will proceed with with bronchoscopy





May 6, 2024, patient seen and examined during rounds and detailed discussions 

happened about bronchoscopy CODE STATUS and future planning, patient would like 

to proceed with bronchoscopy as he feels more rattling on the right side and he 

agreed to discontinue DNR CODE STATUS and changed to full code however he would 

like to DNR initiated back again after procedure when accomplished.  Discussed 

with endoscopy patient will be scheduled for bronchoscopy biopsy and lavage for 

tomorrow however exact times we do not know until tomorrow.  Labs are not done 

today, sugars are 254, patient remains on bronchodilators inhaled aerosolized 

steroids as well as IV Rocephin, Solu-Medrol 60 mg IV every 6 hourly and 

continuation of his home medications





05/05/2024, patient seen eval examined during rounds labs reviewed medications r

eviewed care plan discussed, respiratory status is still marginal ongoing cough 

congestion and wheezing is present, patient remains on IV steroids 60 every 6 

and broad-spectrum antibiotics with IV Rocephin, patient has been getting 

bronchodilators as well, reexplored idea about bronchoscopy patient feels that 

he wants to recover from pneumonia and then think about it as outpatient patient

specifically has not to placed on life support, otherwise afebrile temperature 

is 97.4, blood pressure is 136/85 saturation 95% theater oxygen,





04/04/2024, patient seen eval examined during rounds labs reviewed medications 

reviewed care plan discussed, findings on computed tomography scan discussed 

with patient at length greatly, patient is DO NOT RESUSCITATE, at this point 

time patient wants to recover from pneumonia and think about bronchoscopy as an 

outpatient.  Patient has been started on IV antibiotics tolerating well along 

with IV steroids bronchodilator cough congestion is improved but is still get 

short of breath on minimal activity and exertion and intermittent wheezing and 

nighttime as well.  Patient remains afebrile with stable hemodynamics oxygen 

saturation stable on 2-3 L oxygen on 2 L 98%.culture results are pending unable 

to obtain a sputum for Gram stain and culture, patient remains on home medicine 

along with bronchodilators and IV steroids and broad-spectrum IV antibiotics t

silvana well





04/03/2024, patient seen eval examined during rounds labs reviewed medications 

and care plan discussed, patient underwent computed tomography scan of the chest

yesterday reviewed, today patient feels congested ongoing cough is present along

with wheezing.patient is afebrile slightly tachycardic with heart rate of 105 

saturation is the 92% on 2 L supplemental oxygen down from 3 L, blood pressure 

is stable 110/50.the x-ray revealed consolidation in the right lung.  Calcified 

granuloma which correlates with the nodule.  Right middle lobe occluded maybe 

related to mucous plug would recommend to start broad-spectrum IV antibiotics 

rather than by mouth may need bronchoscopy once clinical condition slightly 

improved.  Patient complaining of neck pain which is chronic along with a 

history of C-spine surgery will arrange c-collar to stabilize neck and pain 

relief.





Patient is a pleasant 79-year-old from my office he has end-stage COPD with 

oxygen dependent and nebulizer dependent and intermittently has been on 

steroids.  He is on chronic oxygen 3 L nasal cannula, he has a history of 

coronary artery disease, prior acute COPD exacerbation several weeks ago 

requiring hospitalization.  Came into the hospital with 2 to 3-day history of 

increasing shortness of breath and cough which is light yellow sputum 

production, chest x-ray right upper lobe consolidation and small pleural 

effusion suggesting underlying pneumonia along with 8 mm nodule in the right 

upper lobe.  Currently patient is on bronchodilator along with oral antibiotics 

IV steroids and bronchodilators and continuation of home medication lactic acid 

is 2.4 came down to 1.7, white cell count 5.3








Objective





- Vital Signs


Vital signs: 


                                   Vital Signs











Temp  97.8 F   05/07/24 07:15


 


Pulse  90   05/07/24 08:15


 


Resp  17   05/07/24 07:15


 


BP  134/72   05/07/24 07:15


 


Pulse Ox  96   05/07/24 07:52


 


FiO2      








                                 Intake & Output











 05/06/24 05/07/24 05/07/24





 18:59 06:59 18:59


 


Intake Total 236  


 


Output Total 1850 1200 


 


Balance -1614 -1200 


 


Intake:   


 


  Oral 236  


 


Output:   


 


  Urine 1850 1200 


 


Other:   


 


  Voiding Method Indwelling Catheter Indwelling Catheter Indwelling Catheter


 


  # Bowel Movements   1














- Exam


- Constitutional


General appearance: average body habitus, disheveled, mild distress





- EENT


Eyes: EOMI, PERRLA


ENT: normal oropharynx


Ears: bilateral: normal





- Neck


Neck: normal ROM


Carotids: bilateral: upstroke normal


Thyroid: bilateral: normal size





- Respiratory


Respiratory: bilateral: diminished, wheezing, More on the right side compared to

the left





- Cardiovascular


Heart sounds: normal: S1, S2





- Gastrointestinal


General gastrointestinal: normal bowel sounds, soft





- Integumentary


Integumentary: normal, normal turgor





- Neurologic


Neurologic: CNII-XII intact





- Musculoskeletal


Musculoskeletal: gait normal, generalized weakness, strength equal bilaterally





- Psychiatric


Psychiatric: A&O x's 3, appropriate affect, intact judgment & insight











- Labs


CBC & Chem 7: 


                                 05/07/24 06:28





                                 05/07/24 06:28


Labs: 


                  Abnormal Lab Results - Last 24 Hours (Table)











  05/06/24 05/06/24 05/06/24 Range/Units





  06:37 06:37 11:42 


 


WBC  12.52 H    (4.50-10.00)  X 10*3/uL


 


RBC  5.64 H    (4.40-5.60)  X 10*6/uL


 


MCHC  31.7 L    (32.0-37.0)  g/dL


 


RDW  15.3 H    (11.5-14.5)  %


 


Neutrophils #     (1.3-7.7)  k/uL


 


Lymphocytes #     (1.0-4.8)  k/uL


 


Carbon Dioxide   35.0 H   (21.6-31.8)  mmol/L


 


BUN   29.0 H   (9.0-27.0)  mg/dL


 


Creatinine     (0.66-1.25)  mg/dL


 


BUN/Creatinine Ratio   48.33 H   (12.00-20.00)  Ratio


 


Glucose   193 H   ()  mg/dL


 


POC Glucose (mg/dL)    265 H  ()  mg/dL


 


Calcium   8.6 L   (8.7-10.3)  mg/dL














  05/06/24 05/06/24 05/07/24 Range/Units





  17:04 21:03 06:16 


 


WBC     (4.50-10.00)  X 10*3/uL


 


RBC     (4.40-5.60)  X 10*6/uL


 


MCHC     (32.0-37.0)  g/dL


 


RDW     (11.5-14.5)  %


 


Neutrophils #     (1.3-7.7)  k/uL


 


Lymphocytes #     (1.0-4.8)  k/uL


 


Carbon Dioxide     (21.6-31.8)  mmol/L


 


BUN     (9.0-27.0)  mg/dL


 


Creatinine     (0.66-1.25)  mg/dL


 


BUN/Creatinine Ratio     (12.00-20.00)  Ratio


 


Glucose     ()  mg/dL


 


POC Glucose (mg/dL)  254 H  251 H  163 H  ()  mg/dL


 


Calcium     (8.7-10.3)  mg/dL














  05/07/24 05/07/24 Range/Units





  06:28 06:28 


 


WBC  13.5 H   (4.50-10.00)  X 10*3/uL


 


RBC    (4.40-5.60)  X 10*6/uL


 


MCHC    (32.0-37.0)  g/dL


 


RDW    (11.5-14.5)  %


 


Neutrophils #  12.6 H   (1.3-7.7)  k/uL


 


Lymphocytes #  0.2 L   (1.0-4.8)  k/uL


 


Carbon Dioxide   36 H  (21.6-31.8)  mmol/L


 


BUN   29 H  (9.0-27.0)  mg/dL


 


Creatinine   0.60 L  (0.66-1.25)  mg/dL


 


BUN/Creatinine Ratio    (12.00-20.00)  Ratio


 


Glucose   178 H  ()  mg/dL


 


POC Glucose (mg/dL)    ()  mg/dL


 


Calcium    (8.7-10.3)  mg/dL














Assessment and Plan


Assessment: 


Right upper lobe pneumonia, likely mixed bacterial gram-positive and/or or gram-

negative related





right middle/Upper lobe atelectasis/narrowing likely mucous plug or 

endobronchial growth





Acute on chronic hypoxic respiratory failure





Acute COPD exacerbation





Right-sided lung nodule





chronic neck pain





Hypertension hypertensive cardiovascular disease





Coronary artery disease and ischemic cardiomyopathy related to 











Plan: 


Continue antibiotics and bronchodilators and IV steroids, IV antibiotics, send 

sputum for culture if possible , A sample has been obtained and is being 

processed, Proceed with bronchoscopy as planned.  Bronchial wash





reviewed computed tomography scan right upper lobe dense consolidation with 

right middle lobe atelectatic changes and narrowing suggestive of post 

obstructive process extrinsic versus intrinsic, 





CODE STATUS to full code, patient now agreed and would like to proceed





Patient also wants to be discharged home rather than rehab he expresses good 

family support





Time with Patient: Greater than 30 To get better and follow your care plan as instructed.

## 2024-05-07 NOTE — P.EN
patient seen eval reexamined after bronchoscopy.  Critical care time spent 30 

minutes.  Patient developed increasing respiratory distress after bronchoscopy 

sats drop down into 80s requiring 10 L high flow oxygen, discussed with patient 

he insist of keeping DO NOT RESUSCITATE CODE STATUS.  20 mg of Lasix by mouth 

and IV was given, asked the respiratory to give a stat nebulizer treatment, 

chest x-ray performed reviewed and compared with the prior one increasing 

infiltrates in which actually is due to normal saline installed during 

bronchoscopy for bronchoalveolar lavage, cytology Gram stain and culture is 

pending.  Patient put out clear urine able to decrease his oxygen to 5 L nasal 

cannula oxygen saturation mid 90s discussed with respiratory as well as the RN 

for further go down as tolerated back to baseline 3 L nasal cannula

## 2024-05-07 NOTE — P.PCN
Date of Procedure: 05/07/24


Anesthesia: MAC


Surgeon: Braeden Moses


Estimated Blood Loss (ml): 5


Pathology: other


Condition: stable


Disposition: floor


Indications for Procedure: 


Right upper lobe and middle lobe atelectasis with mucous plugging


Operative Findings: 


Patient prepared and draped in the usual fashion, bronchoscope passed through 

the left nares as right nares were extremely obstructed, able to get into the 

laryngeal area vocal cords normal in structure and function moving bilaterally, 

tip of the scope was passed beyond the vocal cords into the trachea extensive 

degree of mucous plugging was seen more so on the right side compared to left 

side.  After cleaning the mucous plug found out that single opening of right 

upper lobe seen right middle lobe bronchus not seen right lower lobe all 4 

bronchus intact left side was normal except bilateral mucosal erythema edema was

present.  In order to loosen mucous plugging 20% Mucomyst 30 cc mixed with 

normal saline was installed, aggressive pulmonary toilet was done.  Patient 

tolerated the procedure well no complication noted in addition to BAL from right

upper lobe right lower lobe bronchial brushing from the right upper lobe done 

patient tolerated procedure well no complication noted


Description of Procedure: 


As above

## 2024-05-07 NOTE — ED
General Adult HPI





- General


Chief complaint: Shortness of Breath


Stated complaint: KIRA


Time Seen by Provider: 23 15:58


Source: patient, family, RN notes reviewed


Mode of arrival: wheelchair


Limitations: no limitations





- History of Present Illness


Initial comments: 





Patient is a pleasant 78-year-old male presenting to the emergency department 

with concerns with difficulty breathing.  Onset of symptoms was around a week 

ago.  Patient does have chest congestion however cough is been nonproductive.  

No fever at home.  Patient does have history of COPD and asthma.  No leg pain or

leg swelling.





- Related Data


                                Home Medications











 Medication  Instructions  Recorded  Confirmed


 


Albuterol Inhaler [Ventolin Hfa 2 puff INHALATION RT-QID 21





Inhaler]   


 


Albuterol Nebulized [Ventolin 2.5 mg INHALATION RT-QID 21





Nebulized]   


 


Cetirizine HCl [Zyrtec] 10 mg PO HS 21


 


Fluticasone Nasal Spray [Flonase 1 spr EA NOSTRIL DAILY 21





Nasal Spray]   


 


L.idocaine 5% Patch [Lidoderm 5% 1 patch TRANSDERM DAILY 21





Patch]   


 


Diclofenac Sodium Gel [Voltaren 1% 1 applic TOPICAL DAILY 21





Gel]   


 


Atorvastatin [Lipitor] 80 mg PO HS 22


 


Folic Acid 1 mg PO DAILY 22


 


Metoprolol Succinate (ER) [Toprol 25 mg PO BID 22





XL]   


 


Thiamine [Vitamin B-1] 100 mg PO DAILY 22


 


lisinopriL [Zestril] 5 mg PO BID 22


 


Aspirin 81 mg PO HS 23


 


Furosemide [Lasix] 20 mg PO BID@0900,1300 23


 


Fluticasone Propion/Salmeterol 1 puff INHALATION RT-BID 06/10/23 12/02/23





[Wixela 250-50 Inhub]   


 


Acetaminophen Tab [Tylenol Tab] 1,000 mg PO Q6H PRN 23


 


Empagliflozin [Jardiance] 10 mg PO DAILY 23


 


Ketoconazole 2% Shampoo [Nizoral] 1 applic TOPICAL DAILY 23


 


Triamcinolone 0.1% Cream [Kenalog 1 applic TOPICAL DAILY 23





0.1% Cream]   


 


methocarbamoL [Robaxin-750] 750 mg PO QID 23








                                  Previous Rx's











 Medication  Instructions  Recorded


 


Tamsulosin [Flomax] 0.4 mg PO DAILY #30 capsule 22


 


Isosorbide Mononitrate ER [Imdur] 30 mg PO DAILY 30 Days #30 tab 22











                                    Allergies











Allergy/AdvReac Type Severity Reaction Status Date / Time


 


Influenza Virus Vaccines Allergy  Anaphylaxis Verified 23 19:57


 


tomato Allergy  Anaphylaxis Verified 23 19:57





   /Hives  














Review of Systems


ROS Statement: 


Those systems with pertinent positive or pertinent negative responses have been 

documented in the HPI.





ROS Other: All systems not noted in ROS Statement are negative.


Constitutional: Denies: fever


ENT: Reports: congestion


Respiratory: Reports: as per HPI, cough, dyspnea


Musculoskeletal: Denies: back pain





Past Medical History


Past Medical History: Asthma, COPD, Hypertension, Myocardial Infarction (MI), 

Pneumonia, Prostate Disorder


Additional Past Medical History / Comment(s): allergies, "bladder does not 

empty" patient has chronic catheter.


Last Myocardial Infarction Date:: 2021


History of Any Multi-Drug Resistant Organisms: None Reported


Past Surgical History: Appendectomy, Back Surgery, Heart Catheterization With 

Stent, Tonsillectomy


Additional Past Surgical History / Comment(s): cervical, 3 heart stents


Past Anesthesia/Blood Transfusion Reactions: No Reported Reaction


Additional Past Anesthesia/Blood Transfusion Reaction / Comment(s): Pt is 

clausterphobic.


Date of Last Stent Placement:: 21


Past Psychological History: No Psychological Hx Reported


Smoking Status: Former smoker


Past Alcohol Use History: None Reported


Past Drug Use History: None Reported





- Past Family History


  ** Mother


Family Medical History: No Reported History


Additional Family Medical History / Comment(s): Mother was healthy and lived to 

be 92 yrs old.





  ** Father


History Unknown: Yes


Additional Family Medical History / Comment(s): Father  at the age of 68yrs,

 pt unable to say from what.





General Exam


Limitations: no limitations


General appearance: alert, in no apparent distress


Head exam: Present: normocephalic


Eye exam: Present: normal appearance


Respiratory exam: Present: wheezes, decreased breath sounds


Cardiovascular Exam: Present: tachycardia


GI/Abdominal exam: Present: soft.  Absent: tenderness


Extremities exam: Present: normal inspection


Neurological exam: Present: alert


Psychiatric exam: Present: normal affect, normal mood


Skin exam: Present: normal color





Course


                                   Vital Signs











  23





  15:42 16:45 17:28


 


Temperature 100.4 F H  


 


Pulse Rate 125 H 117 H 125 H


 


Respiratory 26 H 17 





Rate   


 


Blood Pressure 122/64  


 


O2 Sat by Pulse 93 L 99 





Oximetry   














  23





  17:43 19:37


 


Temperature  99.2 F


 


Pulse Rate 125 H 105 H


 


Respiratory  18





Rate  


 


Blood Pressure  108/63


 


O2 Sat by Pulse  94 L





Oximetry  














EKG Findings





- EKG Results:


EKG: interpreted by ERMD (Septal Q waves.), sinus rhythm, normal axis, normal 

ST/T


EKG shows: tachycardia





Medical Decision Making





- Medical Decision Making





Was pt. sent in by a medical professional or institution (, PA, NP, urgent 

care, hospital, or nursing home...) When possible be specific


@  -No


Did you speak to anyone other than the patient for history (EMS, parent, family,

 police, friend...)? What history was obtained from this source 


@  -No


Did you review nursing and triage notes (agree or disagree)?  Why? 


@  -I reviewed and agree with nursing and triage notes


Were old charts reviewed (outside hosp., previous admission, EMS record, old 

EKG, old radiological studies, urgent care reports/EKG's, nursing home records)?

 Report findings 


@  -No old charts were reviewed


Differential Diagnosis (chest pain, altered mental status, abdominal pain women,

 abdominal pain men, vaginal bleeding, weakness, fever, dyspnea, syncope, 

headache, dizziness, GI bleed, back pain, seizure, CVA, palpatations, mental 

health, musculoskeletal)? 


@  -Differential Dyspnea:


Coronary syndrome, arrhythmia, tamponade, asthma, COPD, pulmonary embolism, 

pneumonia, pneumothorax, pulmonary effusion, anaphylaxis, diabetic ketoacidosis,

 flailed chest, pulmonary contusion, diaphragmatic rupture, anemia, 

neuromuscular, this is not meant to be an all-inclusive list. 


EKG interpreted by me (3pts min.).


@  -As above


X-rays interpreted by me (1pt min.).


@  -Chest x-ray does have some mild increased upper lobe markings in the right. 

 Radiologist report still pending.


CT interpreted by me (1pt min.).


@  -None done


U/S interpreted by me (1pt. min.).


@  -None done


What testing was considered but not performed or refused? (CT, X-rays, U/S, 

labs)? Why?


@  -None


What meds were considered but not given or refused? Why?


@  -None


Did you discuss the management of the patient with other professionals 

(professionals i.e. , PA, NP, lab, RT, psych nurse, , , 

teacher, , )? Give summary


@  -Case was discussed with [facility] physician, Dr. Bravo, who will admit 

covering this [facility].  He is aware of radiologist report pending.


Was smoking cessation discussed for >3mins.?


@  -No


Was critical care preformed (if so, how long)?


@  -32 minutes critical care time


Were there social determinants of health that impacted care today? How? 

(Homelessness, low income, unemployed, alcoholism, drug addiction, tr

ansportation, low edu. Level, literacy, decrease access to med. care, residential, 

rehab)?


@  -No


Was there de-escalation of care discussed even if they declined (Discuss DNR or 

withdrawal of care, Hospice)? DNR status


@  -No


What co-morbidities impacted this encounter? (DM, HTN, Smoking, COPD, CAD, 

Cancer, CVA, ARF, Chemo, Hep., AIDS, mental health diagnosis, sleep apnea, 

morbid obesity)?


@  -None


Was patient admitted / discharged? Hospital course, mention meds given and 

route, prescriptions, significant lab abnormalities, going to OR and other 

pertinent info.


@  -Patient reevaluated and is somewhat improved.  Patient and family updated on

 results and plan.  Patient will be admitted with IV antibiotics and steroids 

and nebulizers.  Admission orders written. 


Undiagnosed new problem with uncertain prognosis?


@  -No


Drug Therapy requiring intensive monitoring for toxicity (Heparin, Nitro, 

Insulin, Cardizem)?


@  -No


Were any procedures done?


@  -No


Diagnosis/symptom?


@  -COPD, sepsis


Acute, or Chronic, or Acute on Chronic?


@  -Acute, acute


Uncomplicated (without systemic symptoms) or Complicated (systemic symptoms)?


@  -default


Side effects of treatment?


@  -No


Exacerbation, Progression, or Severe Exacerbation?


@  -No


Poses a threat to life or bodily function? How? (Chest pain, USA, MI, pneumonia,

 PE, COPD, DKA, ARF, appy, cholecystitis, CVA, Diverticulitis, Homicidal, 

Suicidal, threat to staff... and all critical care pts)


@  -No





- Lab Data


Result diagrams: 


                                 23 17:25





                                 23 17:25


                                   Lab Results











  23 Range/Units





  17:25 17:25 17:25 


 


WBC  13.6 H    (3.8-10.6)  k/uL


 


RBC  6.25 H    (4.30-5.90)  m/uL


 


Hgb  17.9 H    (13.0-17.5)  gm/dL


 


Hct  55.3 H    (39.0-53.0)  %


 


MCV  88.5    (80.0-100.0)  fL


 


MCH  28.6    (25.0-35.0)  pg


 


MCHC  32.3    (31.0-37.0)  g/dL


 


RDW  14.3    (11.5-15.5)  %


 


Plt Count  261    (150-450)  k/uL


 


MPV  9.6    


 


Neutrophils %  88    %


 


Lymphocytes %  3    %


 


Monocytes %  7    %


 


Eosinophils %  1    %


 


Basophils %  0    %


 


Neutrophils #  12.0 H    (1.3-7.7)  k/uL


 


Lymphocytes #  0.4 L    (1.0-4.8)  k/uL


 


Monocytes #  1.0    (0-1.0)  k/uL


 


Eosinophils #  0.1    (0-0.7)  k/uL


 


Basophils #  0.0    (0-0.2)  k/uL


 


PT   11.3   (10.0-12.5)  sec


 


INR   1.0   (<1.2)  


 


APTT   25.5   (22.0-30.0)  sec


 


Sodium    139  (137-145)  mmol/L


 


Potassium    4.1  (3.5-5.1)  mmol/L


 


Chloride    96 L  ()  mmol/L


 


Carbon Dioxide    28  (22-30)  mmol/L


 


Anion Gap    15  mmol/L


 


BUN    14  (9-20)  mg/dL


 


Creatinine    0.68  (0.66-1.25)  mg/dL


 


Est GFR (CKD-EPI)AfAm    >90  (>60 ml/min/1.73 sqM)  


 


Est GFR (CKD-EPI)NonAf    >90  (>60 ml/min/1.73 sqM)  


 


Glucose    208 H  (74-99)  mg/dL


 


Lactic Ac Sepsis Rflx     


 


Plasma Lactic Acid Td     (0.7-2.0)  mmol/L


 


Calcium    9.4  (8.4-10.2)  mg/dL


 


Total Bilirubin    1.3  (0.2-1.3)  mg/dL


 


AST    55  (17-59)  U/L


 


ALT    39  (4-49)  U/L


 


Alkaline Phosphatase    111  ()  U/L


 


Total Protein    7.6  (6.3-8.2)  g/dL


 


Albumin    4.2  (3.5-5.0)  g/dL


 


Influenza Type A (PCR)     (Not Detectd)  


 


Influenza Type B (PCR)     (Not Detectd)  


 


RSV (PCR)     (Not Detectd)  


 


SARS-CoV-2 (PCR)     (Not Detectd)  














  23 Range/Units





  17:25 17:25 18:23 


 


WBC     (3.8-10.6)  k/uL


 


RBC     (4.30-5.90)  m/uL


 


Hgb     (13.0-17.5)  gm/dL


 


Hct     (39.0-53.0)  %


 


MCV     (80.0-100.0)  fL


 


MCH     (25.0-35.0)  pg


 


MCHC     (31.0-37.0)  g/dL


 


RDW     (11.5-15.5)  %


 


Plt Count     (150-450)  k/uL


 


MPV     


 


Neutrophils %     %


 


Lymphocytes %     %


 


Monocytes %     %


 


Eosinophils %     %


 


Basophils %     %


 


Neutrophils #     (1.3-7.7)  k/uL


 


Lymphocytes #     (1.0-4.8)  k/uL


 


Monocytes #     (0-1.0)  k/uL


 


Eosinophils #     (0-0.7)  k/uL


 


Basophils #     (0-0.2)  k/uL


 


PT     (10.0-12.5)  sec


 


INR     (<1.2)  


 


APTT     (22.0-30.0)  sec


 


Sodium     (137-145)  mmol/L


 


Potassium     (3.5-5.1)  mmol/L


 


Chloride     ()  mmol/L


 


Carbon Dioxide     (22-30)  mmol/L


 


Anion Gap     mmol/L


 


BUN     (9-20)  mg/dL


 


Creatinine     (0.66-1.25)  mg/dL


 


Est GFR (CKD-EPI)AfAm     (>60 ml/min/1.73 sqM)  


 


Est GFR (CKD-EPI)NonAf     (>60 ml/min/1.73 sqM)  


 


Glucose     (74-99)  mg/dL


 


Lactic Ac Sepsis Rflx    Y  


 


Plasma Lactic Acid Td  3.1 H*    (0.7-2.0)  mmol/L


 


Calcium     (8.4-10.2)  mg/dL


 


Total Bilirubin     (0.2-1.3)  mg/dL


 


AST     (17-59)  U/L


 


ALT     (4-49)  U/L


 


Alkaline Phosphatase     ()  U/L


 


Total Protein     (6.3-8.2)  g/dL


 


Albumin     (3.5-5.0)  g/dL


 


Influenza Type A (PCR)   Not Detected   (Not Detectd)  


 


Influenza Type B (PCR)   Not Detected   (Not Detectd)  


 


RSV (PCR)   Not Detected   (Not Detectd)  


 


SARS-CoV-2 (PCR)   Not Detected   (Not Detectd)  














Critical Care Time


Critical Care Time: Yes


Total Critical Care Time: 32





Disposition


Clinical Impression: 


 Acute exacerbation of chronic obstructive pulmonary disease, Sepsis





Disposition: ADMITTED AS IP TO THIS HOSP


Condition: Serious


Is patient prescribed a controlled substance at d/c from ED?: No


Referrals: 


[facility] [Primary Care Provider] - 1-2 days


Time of Disposition: 20:40 Request order for walker/Rollator     Mail to [address] [place]  [id number]

## 2024-05-07 NOTE — P.PN
Subjective


Progress Note Date: 05/07/24





Patient is a 79-year-old male with a history of COPD, recent COVID-19 pneumonia 

in March 2024, chronic hypoxic respiratory failure on 3 L, chronic indwelling 

Jama, and coronary artery disease who presented to the hospital with complaints

of worsening shortness of breath.  On arrival to the emergency department the 

patient was tachycardic with a pulse of 102.  Laboratory analysis included CBC, 

coags, CMP, and troponin which were unremarkable for lactic acid of 3.1.  Chest 

x-ray demonstrated patchy right upper lobe infiltrate with possible adjacent 8 

mm nodule.  In the ER he was given Solu-Medrol, bronchodilators, and 

ceftriaxone.  Arrangements were made for admission.  He was continued on 

steroids and bronchodilators.  Bronchoscopy completed, shows extremely 

obstructive right Neer's, extensive degree of mucous plugging noted in the 

trachea more on the right side, bilateral mucosal erythema and edema noted, 

aggressive pulmonary toileting was done, BAL done involving right upper lobe, 

right lower lobe bronchial brushing.


Patient seen and examined at bedside.  Denies any new complaints.





Vital signs reviewed


General: Nontoxic, no distress, appears at stated age


Cardiovascular: S1S2 reg, no murmur


Lungs: Coarse breath sounds bilateral , no accessory muscle use, supplemental 

oxygen


Abdominal: Soft, nontender to palpation, no guarding


Ext: No gross muscle atrophy, no edema b/l lower extremities, no contractures


Neuro: CN II-XI grossly intact, no focal neuro deficits


Psych: Alert, oriented, appropriate affect





Assessment/Plan: 


Acute exacerbation of COPD


Acute on Chronic hypoxic respiratory failure


Bacterial Pneumonia, possible gram negative


Leukocytosis, likely steroid-induced


-Solu-Medrol 60 mg IV every 6 hours


-DuoNeb 4 times daily scheduled and every 2 hours as needed


-Budesonide 1 mg twice daily, piriformis 20 mcg twice daily


-Rocephin 2 g IV every 24 hours


-Pulmonology note reviewed, continue current therapy, procedure note reviewed


-Wean oxygen





Chronic systolic congestive heart failure with ejection fraction 40 to 45%


Hypertension


Dyslipidemia


Coronary artery disease


-Lisinopril 5 mg twice daily, Toprol 25 mg daily, Lasix 20 mg twice daily, 

Farxiga 10 mg daily, patient is not chronically on a mineralocorticoid


-Lipitor 80 mg





T2DM


-On sliding scale insulin, monitor for hypoglycemia


-On Farxiga  increased to 10 mg daily





Chronic Jama catheter





Imaging: 


Chest x-ray independently interpreted, shows bilateral interstitial opacities





Data Review: 


WBC 13.5, potassium 4.4, creatinine 0.6, blood sugars range between 1 63-2 51





DVT prophylaxis: Lovenox 40 mg daily


Anticipated discharge date: 24 to 48 hours


Anticipated discharge place: Home with home health care








Objective





- Vital Signs


Vital signs: 


                                   Vital Signs











Temp  97.8 F   05/07/24 07:15


 


Pulse  113 H  05/07/24 13:30


 


Resp  14   05/07/24 13:30


 


BP  135/77   05/07/24 13:30


 


Pulse Ox  92 L  05/07/24 13:30


 


FiO2      








                                 Intake & Output











 05/06/24 05/07/24 05/07/24





 18:59 06:59 18:59


 


Intake Total 236  50


 


Output Total 1850 1200 


 


Balance -1614 -1200 50


 


Intake:   


 


  IV   50


 


  Oral 236  


 


Output:   


 


  Urine 1850 1200 


 


Other:   


 


  Voiding Method Indwelling Catheter Indwelling Catheter Indwelling Catheter


 


  # Bowel Movements   1














- Labs


CBC & Chem 7: 


                                 05/07/24 06:28





                                 05/07/24 06:28


Labs: 


                  Abnormal Lab Results - Last 24 Hours (Table)











  05/06/24 05/06/24 05/07/24 Range/Units





  17:04 21:03 06:16 


 


WBC     (3.8-10.6)  k/uL


 


Neutrophils #     (1.3-7.7)  k/uL


 


Lymphocytes #     (1.0-4.8)  k/uL


 


Carbon Dioxide     (22-30)  mmol/L


 


BUN     (9-20)  mg/dL


 


Creatinine     (0.66-1.25)  mg/dL


 


Glucose     (74-99)  mg/dL


 


POC Glucose (mg/dL)  254 H  251 H  163 H  ()  mg/dL














  05/07/24 05/07/24 05/07/24 Range/Units





  06:28 06:28 12:10 


 


WBC  13.5 H    (3.8-10.6)  k/uL


 


Neutrophils #  12.6 H    (1.3-7.7)  k/uL


 


Lymphocytes #  0.2 L    (1.0-4.8)  k/uL


 


Carbon Dioxide   36 H   (22-30)  mmol/L


 


BUN   29 H   (9-20)  mg/dL


 


Creatinine   0.60 L   (0.66-1.25)  mg/dL


 


Glucose   178 H   (74-99)  mg/dL


 


POC Glucose (mg/dL)    203 H  ()  mg/dL

## 2024-05-08 VITALS — RESPIRATION RATE: 16 BRPM | SYSTOLIC BLOOD PRESSURE: 151 MMHG | TEMPERATURE: 98 F | DIASTOLIC BLOOD PRESSURE: 87 MMHG

## 2024-05-08 VITALS — HEART RATE: 86 BPM

## 2024-05-08 LAB
ANION GAP SERPL CALC-SCNC: 5 MMOL/L
BASOPHILS # BLD AUTO: 0 K/UL (ref 0–0.2)
BASOPHILS NFR BLD AUTO: 0 %
BUN SERPL-SCNC: 31 MG/DL (ref 9–20)
CALCIUM SPEC-MCNC: 8.7 MG/DL (ref 8.4–10.2)
CHLORIDE SERPL-SCNC: 96 MMOL/L (ref 98–107)
CO2 SERPL-SCNC: 37 MMOL/L (ref 22–30)
EOSINOPHIL # BLD AUTO: 0 K/UL (ref 0–0.7)
EOSINOPHIL NFR BLD AUTO: 0 %
ERYTHROCYTE [DISTWIDTH] IN BLOOD BY AUTOMATED COUNT: 5.77 M/UL (ref 4.3–5.9)
ERYTHROCYTE [DISTWIDTH] IN BLOOD: 14.6 % (ref 11.5–15.5)
GLUCOSE BLD-MCNC: 150 MG/DL (ref 70–110)
GLUCOSE BLD-MCNC: 300 MG/DL (ref 70–110)
GLUCOSE SERPL-MCNC: 152 MG/DL (ref 74–99)
HCT VFR BLD AUTO: 51.7 % (ref 39–53)
HGB BLD-MCNC: 16.5 GM/DL (ref 13–17.5)
LYMPHOCYTES # SPEC AUTO: 0.2 K/UL (ref 1–4.8)
LYMPHOCYTES NFR SPEC AUTO: 1 %
MCH RBC QN AUTO: 28.7 PG (ref 25–35)
MCHC RBC AUTO-ENTMCNC: 32 G/DL (ref 31–37)
MCV RBC AUTO: 89.5 FL (ref 80–100)
MONOCYTES # BLD AUTO: 0.7 K/UL (ref 0–1)
MONOCYTES NFR BLD AUTO: 4 %
NEUTROPHILS # BLD AUTO: 18.1 K/UL (ref 1.3–7.7)
NEUTROPHILS NFR BLD AUTO: 95 %
PLATELET # BLD AUTO: 222 K/UL (ref 150–450)
POTASSIUM SERPL-SCNC: 4.3 MMOL/L (ref 3.5–5.1)
SODIUM SERPL-SCNC: 138 MMOL/L (ref 137–145)
WBC # BLD AUTO: 19.2 K/UL (ref 3.8–10.6)

## 2024-05-08 NOTE — P.DS
Providers


Date of admission: 


05/01/24 13:40





Expected date of discharge: 05/08/24


Attending physician: 


Michelle Chin DO





Consults: 





                                        





05/01/24 14:59


Consult Physician Routine 


   Consulting Provider: Braeden Moses


   Consult Reason/Comments: COPD exacerbation


   Do you want consulting provider notified?: Yes











Primary care physician: 


[facility] Course: 





Discharge Diagnosis:


Acute exacerbation of COPD


Acute on Chronic hypoxic respiratory failure


Bacterial Pneumonia


Leukocytosis, likely steroid-induced


Chronic systolic congestive heart failure with ejection fraction 40 to 45%


Hypertension


Dyslipidemia


Coronary artery disease


T2DM


Chronic Jama catheter








Hospital Course: 


Patient is a 79-year-old male with a history of COPD, recent COVID-19 pneumonia 

in March 2024, chronic hypoxic respiratory failure on 3 L, chronic indwelling 

Jaam, and coronary artery disease who presented to the hospital with complaints

of worsening shortness of breath.  On arrival to the emergency department the 

patient was tachycardic with a pulse of 102.  Laboratory analysis included CBC, 

coags, CMP, and troponin which were unremarkable for lactic acid of 3.1.  Chest 

x-ray demonstrated patchy right upper lobe infiltrate with possible adjacent 8 

mm nodule.  In the ER he was given Solu-Medrol, bronchodilators, and 

ceftriaxone.  Arrangements were made for admission.  He was continued on 

steroids and bronchodilators, antibiotics.  Bronchoscopy completed, shows 

extremely obstructive right nares, extensive degree of mucous plugging noted in 

the trachea more on the right side, bilateral mucosal erythema and edema noted, 

aggressive pulmonary toileting was done, BAL done involving right upper lobe, 

right lower lobe bronchial brushing.  Patient back on 3 L. Follow up patient 

with pulmonology.








Patient seen and examined at bedside.





Vital signs reviewed and stable. 


General: Nontoxic, no distress, appears at stated age


Cardiovascular: S1S2 reg, no murmur


Lungs: Coarse breath sounds bilateral , no accessory muscle use, supplemental 

oxygen


Abdominal: Soft, nontender to palpation, no guarding


Ext: No gross muscle atrophy, no edema b/l lower extremities, no contractures


Neuro: CN II-XI grossly intact, no focal neuro deficits


Psych: Alert, oriented, appropriate affect











A total of 33 Minutes of time were spent preparing this complex discharge 

summary.


Patient was discharged on 5/8/2024 at 1217. 


Patient Condition at Discharge: Stable





Plan - Discharge Summary


New Discharge Prescriptions: 


New


   Cefdinir 300 mg PO Q12HR #4 cap





Continue


   Albuterol Nebulized [Ventolin Nebulized] 2.5 mg INHALATION RT-Q4H PRN


     PRN Reason: Shortness Of Breath


   Lidocaine 5% Patch [Lidoderm 5% Patch] 1 patch TRANSDERM DAILY PRN


     PRN Reason: Pain


   Tamsulosin [Flomax] 0.4 mg PO DAILY #30 capsule


   Folic Acid 1 mg PO DAILY


   Thiamine [Vitamin B-1] 100 mg PO DAILY


   Furosemide [Lasix] 20 mg PO BID@0900,1300


   Aspirin 81 mg PO DAILY


   Metoprolol Succinate (ER) [Toprol XL] 25 mg PO DAILY


   Cholecalciferol [Vitamin D3 (25 Mcg = 1000 Iu)] 25 mcg PO DAILY


   Albuterol Inhaler [Ventolin Hfa Inhaler] 2 puff INHALATION RT-QID PRN


     PRN Reason: Shortness Of Breath


   Cetirizine HCl [Zyrtec] 10 mg PO DAILY PRN


     PRN Reason: Allergy Symptoms


   Fluticasone Nasal Spray [Flonase Nasal Spray] 1 spr EA NOSTRIL DAILY


   lisinopriL [Zestril] 5 mg PO BID


   Atorvastatin [Lipitor] 80 mg PO HS


   Fluticasone Propion/Salmeterol [Wixela 250-50 Inhub] 1 puff INHALATION RT-BID


   Acetaminophen Tab [Tylenol] 1,000 mg PO Q6H PRN


     PRN Reason: Pain


   Empagliflozin [Jardiance] 10 mg PO DAILY


   Ketoconazole 2% Shampoo [Nizoral] 1 applic TOPICAL DAILY PRN


     PRN Reason: shower


   Triamcinolone 0.1% Cream [Kenalog 0.1% Cream] 1 applic TOPICAL DAILY PRN


     PRN Reason: Rash


   Nitroglycerin Sl Tabs [Nitrostat] 0.4 mg SUBLINGUAL Q5M PRN


     PRN Reason: Chest Pain


   Budesonide-Formot 160-4.5 Mcg [Symbicort 160-4.5 Mcg Inhaler] 2 puff 

INHALATION RT-BID #1 each


Discharge Medication List





Albuterol Inhaler [Ventolin Hfa Inhaler] 2 puff INHALATION RT-QID PRN 12/16/21 

[History]


Albuterol Nebulized [Ventolin Nebulized] 2.5 mg INHALATION RT-Q4H PRN 12/16/21 

[History]


Cetirizine HCl [Zyrtec] 10 mg PO DAILY PRN 12/16/21 [History]


Fluticasone Nasal Spray [Flonase Nasal Spray] 1 spr EA NOSTRIL DAILY 12/16/21 

[History]


Lidocaine 5% Patch [Lidoderm 5% Patch] 1 patch TRANSDERM DAILY PRN 12/16/21 

[History]


Tamsulosin [Flomax] 0.4 mg PO DAILY #30 capsule 01/02/22 [Rx]


Atorvastatin [Lipitor] 80 mg PO HS 09/26/22 [History]


Folic Acid 1 mg PO DAILY 09/26/22 [History]


Thiamine [Vitamin B-1] 100 mg PO DAILY 09/26/22 [History]


lisinopriL [Zestril] 5 mg PO BID 09/26/22 [History]


Aspirin 81 mg PO DAILY 04/17/23 [History]


Furosemide [Lasix] 20 mg PO BID@0900,1300 04/17/23 [History]


Fluticasone Propion/Salmeterol [Wixela 250-50 Inhub] 1 puff INHALATION RT-BID 

06/10/23 [History]


Acetaminophen Tab [Tylenol] 1,000 mg PO Q6H PRN 12/02/23 [History]


Empagliflozin [Jardiance] 10 mg PO DAILY 12/02/23 [History]


Ketoconazole 2% Shampoo [Nizoral] 1 applic TOPICAL DAILY PRN 12/02/23 [History]


Triamcinolone 0.1% Cream [Kenalog 0.1% Cream] 1 applic TOPICAL DAILY PRN 

12/02/23 [History]


Nitroglycerin Sl Tabs [Nitrostat] 0.4 mg SUBLINGUAL Q5M PRN 03/07/24 [History]


Budesonide-Formot 160-4.5 Mcg [Symbicort 160-4.5 Mcg Inhaler] 2 puff INHALATION 

RT-BID #1 each 03/11/24 [Rx]


Cholecalciferol [Vitamin D3 (25 Mcg = 1000 Iu)] 25 mcg PO DAILY 05/01/24 

[History]


Metoprolol Succinate (ER) [Toprol XL] 25 mg PO DAILY 05/01/24 [History]


Cefdinir 300 mg PO Q12HR #4 cap 05/08/24 [Rx]








Follow up Appointment(s)/Referral(s): 


[facility] [NON-STAFF] - 1 Week


Braeden Moses MD [STAFF PHYSICIAN] - 2 Weeks (office hours are Monday Tuesday Friday. please call to make follow up appointment )


[facility] [Primary Care Provider] - 1-2 days


Patient Instructions/Handouts:  COPD (Chronic Obstructive Pulmonary Disease) 

(DC), Community Acquired Pneumonia (DC)


Activity/Diet/Wound Care/Special Instructions: 


Please see your PCP and pulmonologist. 





Bronchoscopy completed 5/7/24


Discharge Disposition: HOME WITH HOME HEALTH SERVICES

## 2024-07-31 NOTE — XR
EXAMINATION TYPE: XR chest 1V portable

 

DATE OF EXAM: 5/7/2024

 

COMPARISON: 5/2/2024

 

HISTORY: Postop bronchoscopy

 

TECHNIQUE: Single frontal view of the chest is obtained.

 

FINDINGS:  Increasing consolidation bilateral lung bases with small bilateral pleural effusion. There
 is a 7 mm nodule in the right upper lobe possibly related to granuloma. Underlying COPD. Heart size 
normal. No pneumothorax. Osseous structures stable. Stable right perihilar area of subsegmental conso
lidation.

 

IMPRESSION:  

1. Increasing consolidation bilateral lower lobe with small bilateral effusions correlate for mild CH
F versus pneumonia.
EXAMINATION TYPE: XR chest 2V

 

DATE OF EXAM: 5/1/2024

 

COMPARISON: 3/8/2024

 

TECHNIQUE: PA and lateral views submitted.

 

HISTORY: Shortness of breath

 

FINDINGS:

Right-sided perihilar and lower lobe consolidation. There is vague infiltrate in the right upper lobe
 with a 8 mm nodule. Left lung clear. Postsurgical changes cervical spine. Arthropathy of the shoulde
rs. No pneumothorax or pleural effusion. Heart size normal and no overt failure. Osseous structures d
emonstrate hypertrophic and degenerative changes of the spine. Subsegmental changes at the right lung
 base.

 

IMPRESSION: 

1. Patchy right upper lobe infiltrates with adjacent 8 mm nodule correlate for early pneumonia.
EXAMINATION TYPE: XR chest 2V

 

DATE OF EXAM: 5/2/2024

 

COMPARISON: 5/1/2024

 

TECHNIQUE: PA and lateral views submitted.

 

HISTORY: Shortness of breath

 

FINDINGS:

Right upper lobe area of consolidation. 8 mm nodule right upper lobe. Correlate for pneumonia. Underl
sylvia neoplasm is not excluded. Prominence the right hilum stable. Bibasilar consolidation and small e
ffusion is more prominent. No sizable pneumothorax. Postsurgical change cervical spine. Arthropathy o
f the shoulders.

 

IMPRESSION: 

1. Bilateral consolidation most noted in the right upper lobe with small pleural effusion. Correlate 
for underlying pneumonia. There is a small 8 mm nodule adjacent to the right upper lobe consolidation
.
no

## 2024-08-19 ENCOUNTER — HOSPITAL ENCOUNTER (EMERGENCY)
Dept: HOSPITAL 47 - EC | Age: 79
Discharge: HOME | End: 2024-08-19
Payer: OTHER GOVERNMENT

## 2024-08-19 DIAGNOSIS — T83.098A: Primary | ICD-10-CM

## 2024-08-19 PROCEDURE — 87086 URINE CULTURE/COLONY COUNT: CPT

## 2024-08-19 PROCEDURE — 90715 TDAP VACCINE 7 YRS/> IM: CPT

## 2024-08-19 PROCEDURE — 96372 THER/PROPH/DIAG INJ SC/IM: CPT

## 2024-08-19 PROCEDURE — 99283 EMERGENCY DEPT VISIT LOW MDM: CPT

## 2024-12-10 NOTE — P.PN
Subjective


Progress Note Date: 06/12/23








Assessment:





COPD/Asthma exacerbation


Acute on chronic systolic heart failure exacerbation, EF 45%


Hypertension


Hyperlipidemia


CAD


BPH





Hospital course:





78-year-old man with medical history of COPD, asthma, hypertension, 

hyperlipidemia, CAD with recent MI status post PCI, BPH presented for evaluation

of dyspnea.  In the emergency room, patient was afebrile, 128/73, heart rate 78,

92% on room air.  CBC was unremarkable.  Basic metabolic panel showed a CO2 of 

33.  Liver function tests are unremarkable.  Troponin was less than 0.012.  

Lactic acid was elevated at 2.4.  Coags were unremarkable.  Chest x-ray showed 

interstitial prominence, no pleural effusions, no opacities.  EKG showed normal 

sinus rhythm with occasional PACs, deep Q waves in precordial leads consistent 

with old anteroseptal myocardial infarction.  Case was discussed with the 

emergency room provider and decision was made to admit the patient to 

observation for further management of COPD exacerbation.  Patient was started on

steroids, standing nebulizers.  By 48 hours of hospitalization, patient improved

back to baseline and no longer felt short of breath.  During his hospitalization

he was also seen and cleared by pulmonology.  He was discharged with 3 

additional days of prednisone to complete a 5 day course of steroids, asked to 

follow-up with his primary care physician, pulmonologist.  He should also obtain

an echocardiogram in the outpatient setting upon follow-up with his 

cardiologist.





I spent 35 minutes coordinating this discharge on 6/12











Gen: awake, alert


HEENT: normocephalic, atraumatic, good hearing acuity, moist mucous membranes


Resp: good air exchange, breathing comfortably with no accessory muscle use


CVS: good distal perfusion x 4,


GI: soft, NTTP, ND


: no SPT, no CVAT, gamble catheter not present


MSK: no pitting edema, no clubbing


Neuro: non-focal, moving all extremities


Psych: cooperative, euthymic mood

















Objective





- Vital Signs


Vital signs: 


                                   Vital Signs











Temp  97.5 F L  06/12/23 06:52


 


Pulse  78   06/12/23 12:10


 


Resp  16   06/12/23 06:52


 


BP  111/67   06/12/23 08:16


 


Pulse Ox  95   06/12/23 08:51


 


FiO2      








                                 Intake & Output











 06/11/23 06/12/23 06/12/23





 18:59 06:59 18:59


 


Output Total 220 600 


 


Balance -220 -600 


 


Weight   54.431 kg


 


Output:   


 


  Urine 220 600 


 


Other:   


 


  Voiding Method  Indwelling Catheter 


 


  # Bowel Movements 1  














- Labs


CBC & Chem 7: 


                                 06/12/23 05:21





                                 06/12/23 05:21


Labs: 


                  Abnormal Lab Results - Last 24 Hours (Table)











  06/12/23 06/12/23 Range/Units





  05:21 05:21 


 


WBC  13.7 H   (3.8-10.6)  k/uL


 


RBC  6.18 H   (4.30-5.90)  m/uL


 


Hct  53.9 H   (39.0-53.0)  %


 


MCHC  30.4 L   (31.0-37.0)  g/dL


 


Neutrophils #  12.7 H   (1.3-7.7)  k/uL


 


Lymphocytes #  0.6 L   (1.0-4.8)  k/uL


 


Chloride   97 L  ()  mmol/L


 


Carbon Dioxide   37 H  (22-30)  mmol/L


 


BUN   32 H  (9-20)  mg/dL


 


Glucose   171 H  (74-99)  mg/dL








                      Microbiology - Last 24 Hours (Table)











 06/10/23 14:11 Blood Culture - Preliminary





 Blood 


 


 06/10/23 14:11 Blood Culture - Preliminary





 Blood much sun, wash your hands, brush your teeth twice a day, and wear a seat belt in the car.   Where can you learn more?  Go to [email or web address]/patientEd and enter P072 to learn more about \"Well Visit, Ages 18 to 65: Care Instructions.\"  Current as of: August 6, 2023  Content Version: 14.2  © 2024 [employer].   Care instructions adapted under license by [employer]. If you have questions about a medical condition or this instruction, always ask your healthcare professional. [employer] disclaims any warranty or liability for your use of this information.